# Patient Record
Sex: MALE | Race: WHITE | Employment: OTHER | URBAN - METROPOLITAN AREA
[De-identification: names, ages, dates, MRNs, and addresses within clinical notes are randomized per-mention and may not be internally consistent; named-entity substitution may affect disease eponyms.]

---

## 2018-10-08 ENCOUNTER — HOSPITAL ENCOUNTER (INPATIENT)
Age: 83
LOS: 22 days | Discharge: NURSING, REHAB OR LONG-TERM CARE | DRG: 057 | End: 2018-10-30
Attending: INTERNAL MEDICINE | Admitting: INTERNAL MEDICINE
Payer: MEDICARE

## 2018-10-08 DIAGNOSIS — I11.9 HYPERTENSIVE HEART DISEASE WITHOUT HEART FAILURE: Chronic | ICD-10-CM

## 2018-10-08 DIAGNOSIS — Z74.09 IMPAIRED MOBILITY AND ADLS: ICD-10-CM

## 2018-10-08 DIAGNOSIS — R05.9 COUGH: Chronic | ICD-10-CM

## 2018-10-08 DIAGNOSIS — E03.9 HYPOTHYROIDISM, UNSPECIFIED TYPE: Chronic | ICD-10-CM

## 2018-10-08 DIAGNOSIS — I25.10 CORONARY ARTERY DISEASE INVOLVING NATIVE CORONARY ARTERY OF NATIVE HEART WITHOUT ANGINA PECTORIS: Chronic | ICD-10-CM

## 2018-10-08 DIAGNOSIS — E78.5 DYSLIPIDEMIA: Chronic | ICD-10-CM

## 2018-10-08 DIAGNOSIS — E55.9 VITAMIN D DEFICIENCY: Chronic | ICD-10-CM

## 2018-10-08 DIAGNOSIS — J44.9 CHRONIC OBSTRUCTIVE PULMONARY DISEASE, UNSPECIFIED COPD TYPE (HCC): Chronic | ICD-10-CM

## 2018-10-08 DIAGNOSIS — Z91.89 AT RISK FOR ASPIRATION PNEUMONIA: Chronic | ICD-10-CM

## 2018-10-08 DIAGNOSIS — I63.9 ACUTE ISCHEMIC STROKE (HCC): Primary | ICD-10-CM

## 2018-10-08 DIAGNOSIS — R35.0 BENIGN PROSTATIC HYPERPLASIA WITH URINARY FREQUENCY: Chronic | ICD-10-CM

## 2018-10-08 DIAGNOSIS — N40.1 BENIGN PROSTATIC HYPERPLASIA WITH URINARY FREQUENCY: Chronic | ICD-10-CM

## 2018-10-08 DIAGNOSIS — Z78.9 IMPAIRED MOBILITY AND ADLS: ICD-10-CM

## 2018-10-08 DIAGNOSIS — K25.9 GASTRIC ULCER, UNSPECIFIED CHRONICITY, UNSPECIFIED WHETHER GASTRIC ULCER HEMORRHAGE OR PERFORATION PRESENT: Chronic | ICD-10-CM

## 2018-10-08 DIAGNOSIS — G47.9 DIFFICULTY SLEEPING: Chronic | ICD-10-CM

## 2018-10-08 DIAGNOSIS — R35.0 URINARY FREQUENCY: ICD-10-CM

## 2018-10-08 PROBLEM — D32.9 MENINGIOMA (HCC): Chronic | Status: ACTIVE | Noted: 2018-10-03

## 2018-10-08 PROBLEM — Z93.1 STATUS POST INSERTION OF PERCUTANEOUS ENDOSCOPIC GASTROSTOMY (PEG) TUBE (HCC): Status: ACTIVE | Noted: 2018-10-05

## 2018-10-08 PROBLEM — I69.354 HEMIPARESIS AFFECTING LEFT SIDE AS LATE EFFECT OF CEREBROVASCULAR ACCIDENT (CVA) (HCC): Status: ACTIVE | Noted: 2018-10-02

## 2018-10-08 PROBLEM — I69.391 DYSPHAGIA AS LATE EFFECT OF CEREBROVASCULAR ACCIDENT (CVA): Status: ACTIVE | Noted: 2018-10-02

## 2018-10-08 PROBLEM — M48.02 CERVICAL SPINAL STENOSIS: Chronic | Status: ACTIVE | Noted: 2018-10-02

## 2018-10-08 PROBLEM — I69.322 DYSARTHRIA AS LATE EFFECT OF CEREBELLAR CEREBROVASCULAR ACCIDENT (CVA): Status: ACTIVE | Noted: 2018-10-02

## 2018-10-08 PROCEDURE — 65310000000 HC RM PRIVATE REHAB

## 2018-10-08 PROCEDURE — 74011250637 HC RX REV CODE- 250/637: Performed by: INTERNAL MEDICINE

## 2018-10-08 RX ORDER — DOCUSATE SODIUM 100 MG/1
100 CAPSULE, LIQUID FILLED ORAL 2 TIMES DAILY
Status: DISCONTINUED | OUTPATIENT
Start: 2018-10-08 | End: 2018-10-30 | Stop reason: HOSPADM

## 2018-10-08 RX ORDER — MULTIVIT WITH MINERALS/HERBS
1 TABLET ORAL DAILY
Status: DISCONTINUED | OUTPATIENT
Start: 2018-10-09 | End: 2018-10-30 | Stop reason: HOSPADM

## 2018-10-08 RX ORDER — ATORVASTATIN CALCIUM 40 MG/1
80 TABLET, FILM COATED ORAL
Status: DISCONTINUED | OUTPATIENT
Start: 2018-10-08 | End: 2018-10-30 | Stop reason: HOSPADM

## 2018-10-08 RX ORDER — LANOLIN ALCOHOL/MO/W.PET/CERES
3 CREAM (GRAM) TOPICAL
Status: DISCONTINUED | OUTPATIENT
Start: 2018-10-08 | End: 2018-10-17

## 2018-10-08 RX ORDER — CLOPIDOGREL BISULFATE 75 MG/1
75 TABLET ORAL DAILY
Status: DISCONTINUED | OUTPATIENT
Start: 2018-10-09 | End: 2018-10-30 | Stop reason: HOSPADM

## 2018-10-08 RX ORDER — CHOLECALCIFEROL (VITAMIN D3) 125 MCG
100 CAPSULE ORAL DAILY
Status: DISCONTINUED | OUTPATIENT
Start: 2018-10-09 | End: 2018-10-30 | Stop reason: HOSPADM

## 2018-10-08 RX ORDER — HYDRALAZINE HYDROCHLORIDE 50 MG/1
50 TABLET, FILM COATED ORAL EVERY 8 HOURS
Status: DISCONTINUED | OUTPATIENT
Start: 2018-10-08 | End: 2018-10-09

## 2018-10-08 RX ORDER — FACIAL-BODY WIPES
10 EACH TOPICAL
Status: DISCONTINUED | OUTPATIENT
Start: 2018-10-08 | End: 2018-10-30 | Stop reason: HOSPADM

## 2018-10-08 RX ORDER — AMLODIPINE BESYLATE 10 MG/1
10 TABLET ORAL DAILY
Status: DISCONTINUED | OUTPATIENT
Start: 2018-10-09 | End: 2018-10-17

## 2018-10-08 RX ORDER — TAMSULOSIN HYDROCHLORIDE 0.4 MG/1
0.4 CAPSULE ORAL EVERY EVENING
Status: DISCONTINUED | OUTPATIENT
Start: 2018-10-08 | End: 2018-10-30 | Stop reason: HOSPADM

## 2018-10-08 RX ORDER — LISINOPRIL 40 MG/1
40 TABLET ORAL DAILY
Status: DISCONTINUED | OUTPATIENT
Start: 2018-10-09 | End: 2018-10-30 | Stop reason: HOSPADM

## 2018-10-08 RX ORDER — TIMOLOL MALEATE 5 MG/ML
1 SOLUTION/ DROPS OPHTHALMIC DAILY
Status: DISCONTINUED | OUTPATIENT
Start: 2018-10-09 | End: 2018-10-30 | Stop reason: HOSPADM

## 2018-10-08 RX ORDER — LEVOTHYROXINE SODIUM 50 UG/1
50 TABLET ORAL
Status: DISCONTINUED | OUTPATIENT
Start: 2018-10-09 | End: 2018-10-30 | Stop reason: HOSPADM

## 2018-10-08 RX ORDER — METOPROLOL TARTRATE 25 MG/1
25 TABLET, FILM COATED ORAL EVERY 12 HOURS
Status: DISCONTINUED | OUTPATIENT
Start: 2018-10-08 | End: 2018-10-30 | Stop reason: HOSPADM

## 2018-10-08 RX ORDER — HEPARIN SODIUM 5000 [USP'U]/ML
5000 INJECTION, SOLUTION INTRAVENOUS; SUBCUTANEOUS EVERY 12 HOURS
Status: DISCONTINUED | OUTPATIENT
Start: 2018-10-09 | End: 2018-10-30 | Stop reason: HOSPADM

## 2018-10-08 RX ORDER — GUAIFENESIN 100 MG/5ML
81 LIQUID (ML) ORAL DAILY
Status: DISCONTINUED | OUTPATIENT
Start: 2018-10-09 | End: 2018-10-30 | Stop reason: HOSPADM

## 2018-10-08 RX ORDER — FINASTERIDE 5 MG/1
5 TABLET, FILM COATED ORAL EVERY EVENING
Status: DISCONTINUED | OUTPATIENT
Start: 2018-10-08 | End: 2018-10-30 | Stop reason: HOSPADM

## 2018-10-08 RX ORDER — PANTOPRAZOLE SODIUM 40 MG/1
40 GRANULE, DELAYED RELEASE ORAL
Status: DISCONTINUED | OUTPATIENT
Start: 2018-10-09 | End: 2018-10-30 | Stop reason: HOSPADM

## 2018-10-08 RX ADMIN — MELATONIN TAB 3 MG 3 MG: 3 TAB at 22:20

## 2018-10-08 RX ADMIN — HYDRALAZINE HYDROCHLORIDE 50 MG: 50 TABLET, FILM COATED ORAL at 22:20

## 2018-10-08 RX ADMIN — TAMSULOSIN HYDROCHLORIDE 0.4 MG: 0.4 CAPSULE ORAL at 22:20

## 2018-10-08 RX ADMIN — METOPROLOL TARTRATE 25 MG: 25 TABLET ORAL at 22:20

## 2018-10-08 RX ADMIN — FINASTERIDE 5 MG: 5 TABLET, FILM COATED ORAL at 22:20

## 2018-10-08 RX ADMIN — DOCUSATE SODIUM 100 MG: 100 CAPSULE, LIQUID FILLED ORAL at 22:20

## 2018-10-08 RX ADMIN — ATORVASTATIN CALCIUM 80 MG: 40 TABLET, FILM COATED ORAL at 22:20

## 2018-10-09 LAB
ALBUMIN SERPL-MCNC: 2.9 G/DL (ref 3.4–5)
ALBUMIN/GLOB SERPL: 1 {RATIO} (ref 0.8–1.7)
ALP SERPL-CCNC: 92 U/L (ref 45–117)
ALT SERPL-CCNC: 21 U/L (ref 16–61)
ANION GAP SERPL CALC-SCNC: 8 MMOL/L (ref 3–18)
AST SERPL-CCNC: 14 U/L (ref 15–37)
BASOPHILS # BLD: 0 K/UL (ref 0–0.1)
BASOPHILS NFR BLD: 0 % (ref 0–2)
BILIRUB DIRECT SERPL-MCNC: 0.4 MG/DL (ref 0–0.2)
BILIRUB SERPL-MCNC: 1.3 MG/DL (ref 0.2–1)
BUN SERPL-MCNC: 20 MG/DL (ref 7–18)
BUN/CREAT SERPL: 26 (ref 12–20)
CALCIUM SERPL-MCNC: 8.1 MG/DL (ref 8.5–10.1)
CHLORIDE SERPL-SCNC: 106 MMOL/L (ref 100–108)
CO2 SERPL-SCNC: 29 MMOL/L (ref 21–32)
CREAT SERPL-MCNC: 0.77 MG/DL (ref 0.6–1.3)
DIFFERENTIAL METHOD BLD: ABNORMAL
EOSINOPHIL # BLD: 0.2 K/UL (ref 0–0.4)
EOSINOPHIL NFR BLD: 3 % (ref 0–5)
ERYTHROCYTE [DISTWIDTH] IN BLOOD BY AUTOMATED COUNT: 14 % (ref 11.6–14.5)
GLOBULIN SER CALC-MCNC: 2.8 G/DL (ref 2–4)
GLUCOSE SERPL-MCNC: 108 MG/DL (ref 74–99)
HCT VFR BLD AUTO: 36.4 % (ref 36–48)
HGB BLD-MCNC: 13.1 G/DL (ref 13–16)
LYMPHOCYTES # BLD: 1.5 K/UL (ref 0.9–3.6)
LYMPHOCYTES NFR BLD: 22 % (ref 21–52)
MAGNESIUM SERPL-MCNC: 2.3 MG/DL (ref 1.6–2.6)
MCH RBC QN AUTO: 31.3 PG (ref 24–34)
MCHC RBC AUTO-ENTMCNC: 36 G/DL (ref 31–37)
MCV RBC AUTO: 87.1 FL (ref 74–97)
MONOCYTES # BLD: 0.6 K/UL (ref 0.05–1.2)
MONOCYTES NFR BLD: 9 % (ref 3–10)
NEUTS SEG # BLD: 4.5 K/UL (ref 1.8–8)
NEUTS SEG NFR BLD: 66 % (ref 40–73)
PLATELET # BLD AUTO: 146 K/UL (ref 135–420)
PMV BLD AUTO: 11.3 FL (ref 9.2–11.8)
POTASSIUM SERPL-SCNC: 4.2 MMOL/L (ref 3.5–5.5)
PROT SERPL-MCNC: 5.7 G/DL (ref 6.4–8.2)
RBC # BLD AUTO: 4.18 M/UL (ref 4.7–5.5)
SODIUM SERPL-SCNC: 143 MMOL/L (ref 136–145)
WBC # BLD AUTO: 6.8 K/UL (ref 4.6–13.2)

## 2018-10-09 PROCEDURE — 97535 SELF CARE MNGMENT TRAINING: CPT

## 2018-10-09 PROCEDURE — 82306 VITAMIN D 25 HYDROXY: CPT | Performed by: INTERNAL MEDICINE

## 2018-10-09 PROCEDURE — 80048 BASIC METABOLIC PNL TOTAL CA: CPT | Performed by: INTERNAL MEDICINE

## 2018-10-09 PROCEDURE — 92610 EVALUATE SWALLOWING FUNCTION: CPT

## 2018-10-09 PROCEDURE — 80076 HEPATIC FUNCTION PANEL: CPT | Performed by: INTERNAL MEDICINE

## 2018-10-09 PROCEDURE — 96125 COGNITIVE TEST BY HC PRO: CPT

## 2018-10-09 PROCEDURE — 74011000250 HC RX REV CODE- 250: Performed by: INTERNAL MEDICINE

## 2018-10-09 PROCEDURE — 92522 EVALUATE SPEECH PRODUCTION: CPT

## 2018-10-09 PROCEDURE — 77030011256 HC DRSG MEPILEX <16IN NO BORD MOLN -A

## 2018-10-09 PROCEDURE — 97167 OT EVAL HIGH COMPLEX 60 MIN: CPT

## 2018-10-09 PROCEDURE — 74011250637 HC RX REV CODE- 250/637: Performed by: INTERNAL MEDICINE

## 2018-10-09 PROCEDURE — 97163 PT EVAL HIGH COMPLEX 45 MIN: CPT

## 2018-10-09 PROCEDURE — 83735 ASSAY OF MAGNESIUM: CPT | Performed by: INTERNAL MEDICINE

## 2018-10-09 PROCEDURE — 74011250636 HC RX REV CODE- 250/636: Performed by: INTERNAL MEDICINE

## 2018-10-09 PROCEDURE — 36415 COLL VENOUS BLD VENIPUNCTURE: CPT | Performed by: INTERNAL MEDICINE

## 2018-10-09 PROCEDURE — 85025 COMPLETE CBC W/AUTO DIFF WBC: CPT | Performed by: INTERNAL MEDICINE

## 2018-10-09 PROCEDURE — 97530 THERAPEUTIC ACTIVITIES: CPT

## 2018-10-09 PROCEDURE — 97112 NEUROMUSCULAR REEDUCATION: CPT

## 2018-10-09 PROCEDURE — 65310000000 HC RM PRIVATE REHAB

## 2018-10-09 RX ORDER — HYDRALAZINE HYDROCHLORIDE 25 MG/1
25 TABLET, FILM COATED ORAL EVERY 8 HOURS
Status: DISCONTINUED | OUTPATIENT
Start: 2018-10-09 | End: 2018-10-10

## 2018-10-09 RX ORDER — TIMOLOL MALEATE 6.8 MG/ML
1 SOLUTION/ DROPS OPHTHALMIC DAILY
COMMUNITY

## 2018-10-09 RX ADMIN — ATORVASTATIN CALCIUM 80 MG: 40 TABLET, FILM COATED ORAL at 21:12

## 2018-10-09 RX ADMIN — Medication 1 TABLET: at 09:22

## 2018-10-09 RX ADMIN — DOCUSATE SODIUM 100 MG: 100 CAPSULE, LIQUID FILLED ORAL at 09:22

## 2018-10-09 RX ADMIN — ASPIRIN 81 MG CHEWABLE TABLET 81 MG: 81 TABLET CHEWABLE at 09:21

## 2018-10-09 RX ADMIN — DOCUSATE SODIUM 100 MG: 100 CAPSULE, LIQUID FILLED ORAL at 17:53

## 2018-10-09 RX ADMIN — TAMSULOSIN HYDROCHLORIDE 0.4 MG: 0.4 CAPSULE ORAL at 17:54

## 2018-10-09 RX ADMIN — CLOPIDOGREL BISULFATE 75 MG: 75 TABLET, FILM COATED ORAL at 21:12

## 2018-10-09 RX ADMIN — AMLODIPINE BESYLATE 10 MG: 10 TABLET ORAL at 09:21

## 2018-10-09 RX ADMIN — HEPARIN SODIUM 5000 UNITS: 5000 INJECTION, SOLUTION INTRAVENOUS; SUBCUTANEOUS at 06:08

## 2018-10-09 RX ADMIN — FINASTERIDE 5 MG: 5 TABLET, FILM COATED ORAL at 17:54

## 2018-10-09 RX ADMIN — HYDRALAZINE HYDROCHLORIDE 25 MG: 25 TABLET, FILM COATED ORAL at 21:12

## 2018-10-09 RX ADMIN — LISINOPRIL 40 MG: 40 TABLET ORAL at 06:08

## 2018-10-09 RX ADMIN — HYDRALAZINE HYDROCHLORIDE 50 MG: 50 TABLET, FILM COATED ORAL at 06:08

## 2018-10-09 RX ADMIN — LEVOTHYROXINE SODIUM 50 MCG: 50 TABLET ORAL at 06:08

## 2018-10-09 RX ADMIN — PANTOPRAZOLE SODIUM 40 MG: 40 GRANULE, DELAYED RELEASE ORAL at 06:54

## 2018-10-09 RX ADMIN — METOPROLOL TARTRATE 25 MG: 25 TABLET ORAL at 09:21

## 2018-10-09 RX ADMIN — METOPROLOL TARTRATE 25 MG: 25 TABLET ORAL at 21:12

## 2018-10-09 RX ADMIN — HEPARIN SODIUM 5000 UNITS: 5000 INJECTION, SOLUTION INTRAVENOUS; SUBCUTANEOUS at 17:55

## 2018-10-09 RX ADMIN — Medication 100 MG: at 09:22

## 2018-10-09 RX ADMIN — TIMOLOL MALEATE 1 DROP: 5 SOLUTION OPHTHALMIC at 09:59

## 2018-10-09 RX ADMIN — MELATONIN TAB 3 MG 3 MG: 3 TAB at 21:12

## 2018-10-09 NOTE — PROGRESS NOTES
NUTRITION Nursing Referral: MST, EN/PN PTA Nutrition Consult: General Nutrition Management & Supplements RECOMMENDATIONS / PLAN:  
 
- Continue bolus tube feeds of Jevity 1.5, 237 mL (1 can) x 5 times daily with water flush of 100 mL before and after each bolus feed. - Continue RD inpatient monitoring and evaluation. Goal EN Regimen: Jevity 1.5, 240 mL (1 can) 5 times daily + 100 mL water flush before and after each bolus to provide: 1775 kcal, 76 gm protein, 59 gm fat, 256 gm CHO, 27 gm fiber, 900 mL free water, 1900 mL total water, 100% RDIs NUTRITION INTERVENTIONS & DIAGNOSIS:  
 
[x] Enteral nutrition: continue bolus tube feeding Nutrition Diagnosis:  Inadequate oral intake related to inability to tolerate po as evidenced by NPO 
 
ASSESSMENT:  
 
Pt unavailable at time of visit. Currently NPO; receiving bolus tube feeding via PEG. Tolerating feeds. EN infusion adequate to meet patients estimated nutritional needs:   [] Yes     [] No   [x] Unable to determine at this time Tube Feeding:  Bolus feeds of Jevity 1.5, 237 mL (1 can) x 5 times daily via PEG Water Flushes:  100 mL before and after each bolus feed Residuals: 0 mL Diet: DIET NPO With Tube Feedings DIET TUBE FEEDING Food Allergies:  None known Current Appetite:   [] Good     [] Fair     [] Poor     [x] Other: NPO Appetite/meal intake prior to admission:   [] Good     [] Fair     [] Poor     [x] Other: unknown, NPO Feeding Limitations:  [x] Swallowing difficulty    [] Chewing difficulty    [] Other: 
Current Meal Intake: No data found. BM: 10/8 Skin Integrity:  Left arm skin tear Edema:   [x] No     [] Yes Pertinent Medications: Reviewed: bowel regimen, vitamin B complex Recent Labs 10/09/18 
 6766 NA  143  
K  4.2 CL  106 CO2  29 GLU  108* BUN  20* CREA  0.77 CA  8.1*  
MG  2.3 ALB  2.9*  
SGOT  14* ALT  21 Intake/Output Summary (Last 24 hours) at 10/09/18 5723 Last data filed at 10/09/18 6563 Gross per 24 hour Intake              320 ml Output              150 ml Net              170 ml Anthropometrics: 
Ht Readings from Last 1 Encounters:  
10/08/18 5' 6\" (1.676 m) Last 3 Recorded Weights in this Encounter 10/08/18 2046 Weight: 67.6 kg (149 lb) Body mass index is 24.05 kg/(m^2). Weight History:    Noted 4 lb wt loss x 10 month PTA per chart hx 
 
Weight Metrics 10/8/2018 12/11/2017 6/27/2017 9/7/2016 5/24/2016 Weight 149 lb 153 lb 153 lb 155 lb 154 lb BMI 24.05 kg/m2 25.07 kg/m2 25.07 kg/m2 25.4 kg/m2 25.23 kg/m2 Admitting Diagnosis: R CVA Acute ischemic stroke ([facility] [address] Pertinent PMHx:  Kidney stones, gastric ulcer, CAD, dyslipidemia, COPD, hypothyroidism Education Needs:        [x] None identified  [] Identified - Not appropriate at this time  []  Identified and addressed - refer to education log Learning Limitations:   [] None identified  [x] Identified: bilateral hearing loss Cultural, Synagogue & ethnic food preferences:  [x] None identified    [] Identified and addressed ESTIMATED NUTRITION NEEDS:  
 
Calories: 9285-6582 kcal (MSJx1.2-1.4) based on  [x] Actual BW: 68 kg      [] IBW Protein: 54-68 gm (0.8-1 gm/kg) based on  [x] Actual BW      [] IBW Fluid: 1 mL/kcal 
  
MONITORING & EVALUATION:  
 
Nutrition Goal(s): 1. Patient will tolerate enteral nutrition formula and regimen without difficulty within the next 7 days. Outcome:  [] Met/Ongoing    []  Not Met    [x] New/Initial Goal  
2. Patient will meet their estimated nutritional needs through adequate enteral nutrition within the next 7 days. Outcome:  [] Met/Ongoing    []  Not Met    [x] New/Initial Goal  
 
Monitoring:   [] Food and beverage intake   [] Diet order   [x] Nutrition-focused physical findings   [x] Treatment/therapy   [] Weight   [x] Enteral nutrition intake Previous Recommendations (for follow-up assessments only):     [] Implemented       []   Not Implemented (RD to address)      [] No Longer Appropriate     [] No Recommendation Made Discharge Planning:  Goal tube feeds via PEG [x] Participated in care planning, discharge planning, & interdisciplinary rounds as appropriate Demar Sosa RD Pager: [phone]

## 2018-10-09 NOTE — PROGRESS NOTES
Problem: Dysphagia (Adult) Goal: *Speech Goal: (INSERT TEXT) Long term goals: 1. Patient will tolerate small amounts of PO using safe swallowing techniques without overt s/s of aspiration in 4/5 trials. 2. Patient will perform oral/pharyngeal strengthening exercises with supervision. 3. Patient will participate in laryngeal strengthening exercises and swallowing maneuvers, min cues - supervison. 4. Patient will recall 3 words after 5 minutes, min assist-supervision. 5. Patient will perform functional problem solving and reasoning tasks with supervision. Short term goals (by 10/16/18): 1. Patient will tolerate small amounts of PO puree with the SLP using safe swallowing techniques without overt s/s of aspiration in 4/5 trials. 2. Patient will perform oral/pharyngeal strengthening exercises with mod assist/cues. 3. Patient will participate in laryngeal strengthening exercises and swallowing maneuvers, mod cues. 4. Patient will recall 3 words after 5 minutes, mod-min assist. 
5. Patient will perform functional problem solving and reasoning tasks with 75-85% accuracy. Speech LAnguage Pathology evaluation Patient: Enma Ruiz (22 y.o. male) Date: 10/9/2018 Primary Diagnosis: R CVA Acute ischemic stroke ([facility] [address] Precautions:   Aspiration, Fall (Head of bed 30-45 degrees at all times per speech therapist) Time In: 4960 Time Out[de-identified]  1330 SUBJECTIVE:  
Patient stated I have been having trouble swallowing for 6-7 months. OBJECTIVE:  
 
Past Medical History:  
Diagnosis Date  Acute ischemic stroke ([facility] [address] 10/2/2018 Acute Ischemic Stroke (acute infarct of the posterior superior right basal ganglia and adjacent right corona radiata) with residual left hemiparesis, dysphagia and dysarthria  Benign prostatic hyperplasia  Bilateral hearing loss  Cervical spinal stenosis 10/2/2018  Chronic obstructive pulmonary disease (COPD) ([facility] [address]  Coronary artery disease involving native coronary artery of native heart  Diastolic dysfunction without heart failure  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) 10/2/2018  Dyslipidemia  Dysphagia as late effect of cerebrovascular accident (CVA) 10/2/2018  Gastric ulcer  Glaucoma  Hemiparesis affecting left side as late effect of cerebrovascular accident (CVA) ([facility] [address] 10/2/2018  History of kidney stones  History of transient ischemic attack (TIA) 2x  Hypertensive heart disease without heart failure  Hypothyroidism  Meningioma ([facility] [address] 10/3/2018 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  
Procedure Laterality Date  HX CORONARY ARTERY BYPASS GRAFT  2011 S/P CABG x 4  
 HX GASTROSTOMY  10/05/2018  HX HEMORRHOIDECTOMY Prior Level of Function/Home Situation:  
Barriers to Learning/Limitations: None Compensate with: visual, verbal, tactile, kinesthetic cues/model Home Situation Home Environment: Private residence # Steps to Enter: 4 Rails to Enter: Yes Hand Rails : Bilateral 
Wheelchair Ramp: No 
One/Two Story Residence: One story Living Alone: No 
Support Systems: Spouse/Significant Other/Partner Patient Expects to be Discharged to[de-identified] Private residence Current DME Used/Available at Home: Isauro Jones, tigre, Tonya Holman, rollator Tub or Shower Type: Shower Mental Status: 
Neurologic State: Appropriate for age, Drowsy Orientation Level: Disoriented to place, Disoriented to time, Oriented to person, Oriented to situation Cognition: Appropriate for age attention/concentration Perception: Appears intact Perseveration: No perseveration noted Safety/Judgement: Awareness of environment Motor Speech: 
Oral-Motor Structure/Motor Speech Face: Lower facial weakness Labial: Left droop Dentition: Upper & lower dentures Oral Hygiene: fair Lingual: Decreased strength;Left deviation Velum: No impairment Mandible: No impairment Apraxic Characteristics: None Dysarthric Characteristics: Imprecise Intonation: [facility] Rate: [facility] Prosody: [facility] Overall Impairment Severity: Mild Language Comprehension and Expression: Auditory Comprehension Auditory Impairment: No  
Hearing Aid: Bilateral 
Verbal Expression Primary Mode of Expression: Verbal 
Initiation: No impairment Automatic Speech Task: No impairment Repetition: No impairment Naming: No impairment Sentence Completion: No impairment Sentence Formulation: No impairment Conversation: Dysarthric Overall Impairment: Mild Cueing type: Verbal 
Cueing amount: Minimal 
Reading Comprehension Visual Impairment: Glasses/contacts Pre-Morbid Reading Status: Unknown/unable to assess Written Expression Pre-Morbid Dominant Hand: Right Pre-Morbid Writing Skills: [facility] Neuro-Linguistics: 
Verbal Reasoning Tasks: Impaired Verbal Problem Solving: Impaired Verbal Organization: Impaired Thought Organization: [facility] Mathematical:  (DNT) Memory: Impaired Attention : No Impairement Pragmatics: 
Pragmatics Impairment: No impairment Voice: 
Vocal Quality: Wet (Intermittently) Pain: 
Pre-Treatment:   No report of pain Post-Treatment:  No report of pain After treatment:  
[x]              Patient left in no apparent distress sitting up in chair 
[]              Patient left in no apparent distress in bed 
[x]              Call bell left within reach 
[]              Nursing notified 
[x]              Caregiver present 
[]              Bed alarm activated ASSESSMENT:  
Based on the objective data described below, the patient presents with mild impairment of memory and reasoning skills. Patient will benefit from skilled intervention to address the above impairments. Patients rehabilitation potential is considered to be Excellent Factors which may influence rehabilitation potential include:  
[]              None noted []              Mental ability/status [x]              Medical condition [x]              Home/family situation and support systems [x]              Safety awareness []              Pain tolerance/management 
[]              Other: PLAN:  
Recommendations and Planned Interventions: SLP will follow daily for cognitive retraining per the above plan of care. Frequency/Duration: Patient will be followed by speech-language pathology 1-2 times per day/4-7 days per week to address goals. Discharge Recommendations: Home Health COMMUNICATION/EDUCATION:  
[x]  Patient/family have participated as able in goal setting and plan of care. [x]  Patient/family agree to work toward stated goals and plan of care. []  Patient understands intent and goals of therapy, but is neutral about his/her participation. []  Patient is unable to participate in goal setting and plan of care. Estimated LOS: three weeks Thank you for this referral. 
Pepe Agustin SLP Time Calculation:  23 Minutes Speech LAnguage Pathology bedside swallow evaluation Patient: Arcenio Pickens (72 y.o. male) Date: 10/9/2018 Primary Diagnosis: R CVA Acute ischemic stroke ([facility] Precautions:   Aspiration, Fall (Head of bed 30-45 degrees at all times per speech therapist) Time In: 1250 Time Out[de-identified]  1330 SUBJECTIVE:  
Patient stated I have been having trouble swallowing for 6-7 months. OBJECTIVE:  
 
Past Medical History:  
Diagnosis Date  [facility] 10/2/2018 Acute Ischemic Stroke (acute infarct of the posterior superior right basal ganglia and adjacent right corona radiata) with residual left hemiparesis, dysphagia and dysarthria  Benign prostatic hyperplasia  Bilateral hearing loss  Cervical spinal stenosis 10/2/2018  Chronic obstructive pulmonary disease ([facility]D) [facility]  Coronary artery disease involving native coronary artery of native heart  Diastolic dysfunction without heart failure  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) 10/2/2018  Dyslipidemia  Dysphagia as late effect of cerebrovascular accident (CVA) 10/2/2018  Gastric ulcer  Glaucoma  Hemiparesis affecting left side as late effect of cerebrovascular accident (CVA) ([facility] [address] 10/2/2018  History of kidney stones  History of transient ischemic attack (TIA) 2x  Hypertensive heart disease without heart failure  Hypothyroidism  Meningioma ([facility] [address] 10/3/2018 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  
Procedure Laterality Date  HX CORONARY ARTERY BYPASS GRAFT  2011 S/P CABG x 4  
 HX GASTROSTOMY  10/05/2018  HX HEMORRHOIDECTOMY Prior Level of Function/Home Situation:  
Home Situation Home Environment: Private residence # Steps to Enter: 4 Rails to Enter: Yes Hand Rails : Bilateral 
Wheelchair Ramp: No 
One/Two Story Residence: One story Living Alone: No 
Support Systems: Spouse/Significant Other/Partner Patient Expects to be Discharged to[de-identified] Private residence Current DME Used/Available at Home: Casandra Latch, quad, Scharlene Minors, rollator Tub or Shower Type: Shower Diet prior to admission: NPO with PEG tube feedings Current Diet:  NPO with PEG tube feedings Barriers to Learning/Limitations: None Compensate with: visual, verbal, tactile, kinesthetic cues/model Cognitive and Communication Status: 
Neurologic State: Appropriate for age, Drowsy Orientation Level: Disoriented to place, Disoriented to time, Oriented to person, Oriented to situation Cognition: Appropriate for age attention/concentration Perception: Appears intact Perseveration: No perseveration noted Safety/Judgement: Awareness of environment Oral Assessment: 
Oral Assessment Labial: Left droop Dentition: Upper & lower dentures Oral Hygiene: fair Lingual: Decreased strength;Left deviation Velum: No impairment Mandible: No impairment P.O. Trials: 
Patient Position: Seated Vocal quality prior to P.O.: Wet (Intermittently) Consistency Presented: Puree How Presented: SLP-fed/presented;Straw Bolus Acceptance: No impairment Bolus Formation/Control: Impaired Type of Impairment: Delayed Propulsion: Delayed (# of seconds) Oral Residue: None Initiation of Swallow: Delayed (# of seconds) Laryngeal Elevation: Decreased Aspiration Signs/Symptoms: Clear throat Pharyngeal Phase Characteristics: Double swallow Comments: Other consistencies not presented as documented aspiration seen on MBS. Oral Phase Severity: Mild Pharyngeal Phase Severity : Moderate-severe Pain: 
Pre-Treatment:    No report of pain Post-Treatment:  No report of pain After treatment:  
[x]            Patient left in no apparent distress sitting up in chair 
[]            Patient left in no apparent distress in bed 
[x]            Call bell left within reach 
[]            Nursing notified 
[x]            Caregiver present 
[]            Bed alarm activated COMMUNICATION/EDUCATION:  
[x]            Posted safety precautions in patient's room. [x]            Patient/family have participated as able in goal setting and plan of care. [x]            Patient/family agree to work toward stated goals and plan of care. []            Patient understands intent and goals of therapy, but is neutral about his/her participation. []            Patient is unable to participate in goal setting and plan of care. ASSESSMENT:  
Based on the objective data described above, the patient presents with moderate to severe primarily pharyngeal dysphagia which may have preceded his CVA. Patient will benefit from skilled intervention to address the above impairments. Patients rehabilitation potential is considered to be Fair Factors which may influence rehabilitation potential include:  
[]            None noted []            Mental ability/status [x]            Medical condition [x]            Home/family situation and support systems [x]            Safety awareness 
[]            Pain tolerance/management []            Other: PLAN:  
Recommendations and Planned Interventions: SLP will follow daily to address dysphagia with the above plan of care. Frequency/Duration: Patient will be followed by speech-language pathology 1-2 times per day/4-7 days per week to address goals. Discharge Recommendations: Home Health Estimated LOS: Three weeks Thank you for this referral. 
JAIME Dean Time Calculation:  15 Minutes

## 2018-10-09 NOTE — H&P
[address] 
[address] [place] [address], [address] INPATIENT REHABILITATION 
HISTORY AND PHYSICAL 
(Post Admission Physician Evaluation) Name: Heather Villanueva CSN: [id number] Age: 80 y.o. MRN: [id number] Sex: male Admit Date: 10/8/2018 PCP: Dr. Andrew Dawkins Primary Rehab Impairment Category (HEATHER): Stroke Impairment Group Label: Left body involvement (right brain) Etiologic Diagnosis: Acute Ischemic Stroke (acute infarct of the posterior superior right basal ganglia and adjacent right corona radiata) with residual left hemiparesis, dysphagia and dysarthria Subjective:  
 
Patient seen and examined. History of the Present Illness: The patient is an 15-year-old White male with multiple medical comorbidities who was admitted to [facility] on 10/2/2018 due to left-sided weakness. The patient was apparently well until 3 days prior to admission, while walking through the hallway in his house using a quad cane, patient had a fall and he hit his head against the furniture as he fell to the floor. The wife dit not report any loss of consciousness. The patient refused to go t the doctor for medical attention. On the day of admission, the patient was brought to the office of his PCP and after evaluation, he was sent to the [facility] Emergency Department for further evaluation. CT scan of the head (10/2/2018) showed atrophy, ischemic change and chronic lacunar infarctions; no hemorrhage, mass effect or convincing CT evidence of acute cortical infarction; no other significant abnormality. CT scan of the cervical spine (10/2/2018) showed no evidence of fracture or dislocation; central stenosis is maximal at C3-C4 and considerable at this level; multilevel neural foramen stenosis also maximal at C3-C4 and on the right at C6-C7. Chest x-ray (10/2/2018) showed no acute cardiopulmonary disease. The patient was admitted under the service of the [address] [place] [address] (Dr. Lyndsey Harvey). Excerpt (History of Present Illness) from the H&P by Dr. Lyndsey Harvey: \"Mr Jennifer Cummings is a 80 y.o. Male w/ PMH remarkable for HTN, CAD CABG who presents to the ED w/ chief complaint of progressive left arm and leg weakness since Saturday. Patient's wife reports fall and symptoms that started on Saturday where she wanted him to be evaluated but patient refused and wanted to be seen by his PCP today. PCP referred him to the emergency room for further evaluation. Patient does not have any headache, vision changes, swallowing issues, neck pain, numbness in arms or legs. It appears the fall was mechanical per the wife. Wife reports patient progressively worsening over the past few months. Wife says he only takes baby aspirin but not Plavix. Patient with no chest pain or shortness of breath currently but complains of bilateral lower extremity edema. \" 
 
Neurological exam by Dr. Lyndsey Harvey was WNL except for 4/5 motor strength on the LUE and LLE MRI of the brain (10/3/2018) showed an acute ischemic infarct posterior superior right basal ganglia and adjacent right corona radiata; combination of chronic lacunar infarcts, chronic ischemic changes and prominent perivascular spaces bilateral basal ganglia and thalami; bilateral deep white matter and pontine signal changes likely chronic microvascular ischemic disease; incidental small right parafalcine meningioma without significant mass effect; nonspecific partial opacification bilateral mastoid and epitympanum.  
 
MRI of the cervical spine (10/3/2018) showed high- grade spinal stenosis at C3-4 with lesser degrees of stenosis at C4-5, C5-6 and C6-7; there is likely subtle cord edema around C3-4 due to the severity of stenosis; potentially significant foraminal narrowing at several levels, most notably bilaterally at C3-4 and on the right at C6-7; clinical indication states abnormal cervical spine imaging with bone destruction; there is no destructive lesion seen on this examination; there is no recent cervical spine imaging showing bone destruction. MRA of the head (10/3/2018) multifocal areas of intracranial stenosis most pronounced proximal right posterior cerebral artery and right M1 segment middle cerebral artery; hypoplastic right vertebral artery terminating in PICA. MRA of the neck (10/3/2018) showed moderate stenosis proximal bilateral ICAs, 40% on the right and 38% on the left; developmentally small right vertebral artery temrinating in PICA, with multifocal areas of stenosis; no significant left vertebral stenosis; moderate to severe stenosis right M1 segment, mild to moderate stenosis left M1 segment. Neurology consult (Dr. Stacy Jang) was called for evaluation and comanagement of the stroke. Dr. Balwinder Abel recommended dual antiplatelet therapy and a high-dose statin. He also recommended repeat imaging of his meningioma in 1 year. Aspirin was continued. Atorvastatin and Clopidogrel were added. Metoprolol tartrate was continued. Amlodipine, Lisinopril and Hydralazine were added for additional blood pressure control. 2D echocardiogram (10/3/2018) showed EF 55%; mild concentric LVH; mild diastolic dysfunction; no masses; shunts or thrombi seen; negative bubble study x 2. Gastroenterology consult (Dr. Lalo Herron) was called for evaluation of dysphagia and placement of PEG tube. The patient underwent an Insertion of percutaneous endoscopic gastrostomy (PEG) tube on 10/5/2018 done by Dr. Lalo Herron. The patient tolerated the procedure well with no reported complications.   
 
The patient had remained hemodynamically stable but due to the abovementioned neurologic deficit, the patient was noted to have impaired mobility and ADLs. Patient was felt to be a good candidate for acute inpatient rehabilitation. Upon evaluation by Physical Therapy and Occupational Therapy, the patient was recommended for acute inpatient rehabilitation. The patient was discharged and was subsequently admitted to the [facility] for intensive rehabilitation to help recover strength, function and mobility. Past Medical History: 
Past Medical History:  
Diagnosis Date  Acute ischemic stroke ([address] 10/2/2018 Acute Ischemic Stroke (acute infarct of the posterior superior right basal ganglia and adjacent right corona radiata) with residual left hemiparesis, dysphagia and dysarthria  Benign prostatic hyperplasia  Bilateral hearing loss  Cervical spinal stenosis 10/2/2018  Chronic obstructive pulmonary disease (COPD) ([address]  Coronary artery disease involving native coronary artery of native heart  Diastolic dysfunction without heart failure  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) 10/2/2018  Dyslipidemia  Dysphagia as late effect of cerebrovascular accident (CVA) 10/2/2018  Gastric ulcer  Glaucoma  Hemiparesis affecting left side as late effect of cerebrovascular accident (CVA) ([address] 10/2/2018  History of kidney stones  History of transient ischemic attack (TIA) 2x  Hypertensive heart disease without heart failure  Hypothyroidism  Meningioma ([address] 10/3/2018 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 
Past Surgical History:  
Procedure Laterality Date  HX CORONARY ARTERY BYPASS GRAFT  2011 S/P CABG x 4  
 HX GASTROSTOMY  10/05/2018  HX HEMORRHOIDECTOMY Allergies: Allergies Allergen Reactions  Codeine Other (comments) Social History: The patient is , lives with his wife in a 1-story house with a 3-step entry in [place], [place]. He has a remote history of cigarette smoking. He denied any alcohol or illicit drug use. Family History: Both parents are . Family History Problem Relation Age of Onset  Breast Cancer Mother  Emphysema Father Home Medications (medications taken at home prior to admission to [facility]): Prior to Admission Medications Prescriptions Last Dose Informant Patient Reported? Taking? Vit A,C,E-Zinc-Copper (PRESERVISION) cap capsule Unknown at Unknown time  Yes No  
Sig: Take 1 Cap by mouth. aspirin delayed-release 81 mg tablet 10/8/2018 at Unknown time  Yes Yes Sig: Take  by mouth daily. diclofenac EC (VOLTAREN) 75 mg EC tablet Unknown at Unknown time  Yes No  
finasteride (PROSCAR) 5 mg tablet 10/8/2018 at Unknown time  No Yes Sig: Take 1 Tab by mouth daily. metoprolol tartrate (LOPRESSOR) 25 mg tablet 10/8/2018 at Unknown time  Yes Yes  
tamsulosin (FLOMAX) 0.4 mg capsule 10/7/2018 at Unknown time  No Yes Sig: Take 1 Cap by mouth nightly. Facility-Administered Medications: None Transfer Medications (from the transfer summary prepared by Dr. Komal Smith): 
 
Medication Dose Route Frequency  multivitamin with mineral PO LIQD  15 ml Oral DAILY  acetaminophen (TYLENOL) solution 650 mg  650 mg Oral Q4H PRN  
 amLODIPine (NORVASC) tablet 10 mg  10 mg Oral DAILY  aspirin EC 81 mg PO TBEC  81 mg Oral DAILY  atorvastatin (LIPITOR) tablet 80 mg  80 mg Oral QHS  clopidogrel (PLAVIX) tablet 75 mg  75 mg Oral DAILY  hydrALAZINE (APRESOLINE) tablet 50 mg  50 mg Oral Q8H  
 lisinopril (PRINIVIL, ZESTRIL) tablet 40 mg  40 mg Oral DAILY  omeprazole (PRILOSEC) CPDR 40 mg  40 mg Oral ACB  finasteride (PROSCAR) tablet 5 mg  5 mg Oral EVERY EVENING  
 levothyroxine (SYNTHROID) tablet 50 mcg  50 mcg Oral DAILY  metoprolol tartrate (LOPRESSOR) tablet 25 mg  25 mg Oral Q12H  tamsulosin (FLOMAX) capsule 0.4 mg  0.4 mg Oral DAILY BEFORE SUPPER  timolol (TIMOPTIC) 0.5 % ophthalmic solution 1 Drop  1 Drop Both Eyes DAILY Review Of Systems:  
CONSTITUTIONAL: No weight loss. EYES: No blurred vision and no eye discharge. ENT: No nasal discharge. No ear pain. CARDIOVASCULAR: No chest pain and no diaphoresis. RESPIRATORY: No cough, no hemoptysis. GI: No vomiting, no diarrhea : No urinary frequency and no dysuria. MUSCULOSKELETAL: No muscle pains. SKIN: No rashes. NEURO: As above. ENDOCRINE: No polyphagia and no polydipsia. HEMATOLOGY: No bleeding tendencies. Objective:  
 
Vital Signs: 
Patient Vitals for the past 24 hrs: 
 BP Temp Pulse Resp SpO2 Height Weight 10/09/18 1343 109/53 - 62 - - - -  
10/09/18 0749 121/59 98 °F (36.7 °C) 100 20 93 % - -  
10/09/18 0606 143/61 - 63 - - - -  
10/08/18 2046 165/59 97.3 °F (36.3 °C) 65 18 97 % 5' 6\" (1.676 m) 67.6 kg (149 lb) Body mass index is 24.05 kg/(m^2). Physical Examination: 
GENERAL SURVEY: Patient is awake, alert, oriented x 3, sitting comfortably on the chair, not in acute respiratory distress. HEENT: pink palpebral conjunctivae, anicteric sclerae, no nasoaural discharge, moist oral mucosa NECK: supple, no jugular venous distention, no palpable lymph nodes CHEST/LUNGS: symmetrical chest expansion, good air entry, clear breath sounds HEART: adynamic precordium, good S1 S2, no S3, regular rhythm, no murmurs ABDOMEN: (+) PEG tube in place, flat, bowel sounds appreciated, soft, non-tender EXTREMITIES: (+) amputated distal phalanx of the thumb of the left hand, pink nailbeds, no edema, full and equal pulses, no calf tenderness NEUROLOGICAL EXAM: The patient is awake, alert and oriented x3, able to answer questions fairly appropriately, able to follow 1 and 2 step commands. Able to tell time from the wall clock. Cranial nerves II-XII are grossly intact except for slurred speech and dysphagia. No gross sensory deficit. Motor strength is 4+/5 on the RUE and RLE, 4/5 on the LUE, 4-/5 on the LLE. Current Medications: 
Current Facility-Administered Medications Medication Dose Route Frequency  docusate sodium (COLACE) capsule 100 mg  100 mg Per G Tube BID  
 bisacodyl (DULCOLAX) suppository 10 mg  10 mg Rectal Q48H PRN  
 heparin (porcine) injection 5,000 Units  5,000 Units SubCUTAneous Q12H  
 acetaminophen (TYLENOL) solution 650 mg  650 mg Oral Q4H PRN  
 amLODIPine (NORVASC) tablet 10 mg  10 mg Per G Tube DAILY  co-enzyme Q-10 (CO Q-10) capsule 100 mg  100 mg Oral DAILY  vitamin B complex tablet  1 Tab Oral DAILY  melatonin tablet 3 mg  3 mg Per G Tube QHS  aspirin chewable tablet 81 mg  81 mg Per G Tube DAILY  atorvastatin (LIPITOR) tablet 80 mg  80 mg Per G Tube QHS  clopidogrel (PLAVIX) tablet 75 mg  75 mg PEG Tube DAILY  hydrALAZINE (APRESOLINE) tablet 50 mg  50 mg Per G Tube Q8H  
 lisinopril (PRINIVIL, ZESTRIL) tablet 40 mg  40 mg Per G Tube DAILY  pantoprazole (PROTONIX) granules for oral suspension 40 mg  40 mg Per G Tube ACB  finasteride (PROSCAR) tablet 5 mg  5 mg Oral QPM  
 levothyroxine (SYNTHROID) tablet 50 mcg  50 mcg Per G Tube 6am  
 metoprolol tartrate (LOPRESSOR) tablet 25 mg  25 mg Per G Tube Q12H  tamsulosin (FLOMAX) capsule 0.4 mg  0.4 mg Oral QPM  
 timolol (TIMOPTIC) 0.5 % ophthalmic solution 1 Drop  1 Drop Both Eyes DAILY Functional Assessment:  
 
Occupational Therapy Prior Level of Function  Pre-Admission Screen  Post-Admission Evaluation Eating Independent Eating Moderate Assist Eating Functional Level: 1 Grooming Independent Grooming Moderate Assist Grooming Upper Body Dressing Independent Upper Body Dressing Moderate Assist Upper Body Dressing Lower Body Dressing Independent Lower Body Dressing Maximal Assist Lower Body Dressing Bladder Management Independent Bladder Management Supervision Toileting Functional Level: 1 Bowel Management Independent Bowel Management Supervision Physical Therapy Prior Level of Function  Pre-Admission Screen  Post-Admission Evaluation Ambulation Independent Ambulation Maximal Assist    
Bed Mobility Independent Bed Mobility Minimal Assist Bed/Mat Mobility Rolling Right : 3 (Moderate assistance ) Rolling Left : 3 (Moderate assistance ) Supine to Sit Independent Supine to Sit Minimal Assist Bed/Mat Mobility Rolling Right : 3 (Moderate assistance ) Rolling Left : 3 (Moderate assistance ) Sit to Stand Independent Sit to Stand Minimal Assist Bed/Mat Mobility Rolling Right : 3 (Moderate assistance ) Rolling Left : 3 (Moderate assistance ) Bed/Chair Transfers Independent Bed/Chair Transfers Moderate Assist    
Toilet Transfers Independent Toilet Transfers Moderate Assist Toilet Transfers Speech and Language Pathology Post-Admission Evaluation Comprehension (Native Language) Primary Mode of Comprehension: Auditory Score: 3 Comments:  (Hard of Hearing) Expression (Native Language) Primary Mode of Expression: Verbal 
Score: 3 Social Interaction/Pragmatics Score: 3 Problem Solving Score: 2 Memory Score: 2 Legend: 
 7 - Independent 6 - Modified Independent 5 - Standby Assistance / Supervision / Alex Oneil 4 - Minimum Assistance / [address] 3 - Moderate Assistance 2 - Maximum Assistance 1 - Total Assistance / Dependent Labs on Admission: 
Recent Results (from the past 24 hour(s)) CBC WITH AUTOMATED DIFF Collection Time: 10/09/18  6:20 AM  
Result Value Ref Range WBC 6.8 4.6 - 13.2 K/uL  
 RBC 4.18 (L) 4.70 - 5.50 M/uL  
 HGB 13.1 13.0 - 16.0 g/dL HCT 36.4 36.0 - 48.0 % MCV 87.1 74.0 - 97.0 FL  
 MCH 31.3 24.0 - 34.0 PG  
 MCHC 36.0 31.0 - 37.0 g/dL  
 RDW 14.0 11.6 - 14.5 % PLATELET 712 473 - 722 K/uL MPV 11.3 9.2 - 11.8 FL  
 NEUTROPHILS 66 40 - 73 % LYMPHOCYTES 22 21 - 52 % MONOCYTES 9 3 - 10 % EOSINOPHILS 3 0 - 5 % BASOPHILS 0 0 - 2 %  
 ABS. NEUTROPHILS 4.5 1.8 - 8.0 K/UL  
 ABS. LYMPHOCYTES 1.5 0.9 - 3.6 K/UL  
 ABS. MONOCYTES 0.6 0.05 - 1.2 K/UL  
 ABS. EOSINOPHILS 0.2 0.0 - 0.4 K/UL  
 ABS. BASOPHILS 0.0 0.0 - 0.1 K/UL  
 DF AUTOMATED MAGNESIUM Collection Time: 10/09/18  6:20 AM  
Result Value Ref Range Magnesium 2.3 1.6 - 2.6 mg/dL METABOLIC PANEL, BASIC Collection Time: 10/09/18  6:20 AM  
Result Value Ref Range Sodium 143 136 - 145 mmol/L Potassium 4.2 3.5 - 5.5 mmol/L Chloride 106 100 - 108 mmol/L  
 CO2 29 21 - 32 mmol/L Anion gap 8 3.0 - 18 mmol/L Glucose 108 (H) 74 - 99 mg/dL BUN 20 (H) 7.0 - 18 MG/DL Creatinine 0.77 0.6 - 1.3 MG/DL  
 BUN/Creatinine ratio 26 (H) 12 - 20 GFR est AA >60 >60 ml/min/1.73m2 GFR est non-AA >60 >60 ml/min/1.73m2 Calcium 8.1 (L) 8.5 - 10.1 MG/DL  
HEPATIC FUNCTION PANEL Collection Time: 10/09/18  6:20 AM  
Result Value Ref Range Protein, total 5.7 (L) 6.4 - 8.2 g/dL Albumin 2.9 (L) 3.4 - 5.0 g/dL Globulin 2.8 2.0 - 4.0 g/dL A-G Ratio 1.0 0.8 - 1.7 Bilirubin, total 1.3 (H) 0.2 - 1.0 MG/DL Bilirubin, direct 0.4 (H) 0.0 - 0.2 MG/DL Alk. phosphatase 92 45 - 117 U/L  
 AST (SGOT) 14 (L) 15 - 37 U/L  
 ALT (SGPT) 21 16 - 61 U/L Estimated Glomerular Filtration Rate: On admission, estimated GFR based on a Creatinine of 0.77 mg/dl: 
 Using CKD-EPI = 81.6 mL/min/1.73m2 Using MDRD = 101.6 mL/min/1.73m2 Assessment:  
 
Primary Rehabilitation Diagnosis 1. Impaired Mobility and ADLs 2.  Acute Ischemic Stroke (acute infarct of the posterior superior right basal ganglia and adjacent right corona radiata) with residual left hemiparesis, dysphagia and dysarthria Comorbidities  Urinary frequency R35.0  Nocturia R35.1  Glaucoma H40.9  History of kidney stones Z87.442  Cervical spinal stenosis M48.02  
 Meningioma  D32.9  Hypertensive heart disease without heart failure I11.9  Diastolic dysfunction without heart failure I51.89  
 Gastric ulcer K25.9  Coronary artery disease involving native coronary artery of native heart I25.10  Dyslipidemia E78.5  Chronic obstructive pulmonary disease (COPD)  J44.9  Status post insertion of percutaneous endoscopic gastrostomy (PEG) tube  Z93.1  Hypothyroidism E03.9  Benign prostatic hyperplasia N40.0  Bilateral hearing loss H91.93  
 History of transient ischemic attack (TIA) Z86.73  
 Traumatic amputation of left thumb U31.413V Willingness to participate in the program: Good Rehabilitation Potential: Good Plan: 1. Medical Issues being followed closely: 
  [x]  Fall and safety precautions  
  []  Wound Care  
  [x]  Bowel and Bladder Function  
  [x]  Fluid Electrolyte and Nutrition Balance  
  []  Pain Control 2. Issues that 24 hour rehabilitation nursing is following: 
  [x]  Fall and safety precautions  
  []  Wound Care  
  [x]  Bowel and Bladder Function  
  [x]  Fluid Electrolyte and Nutrition Balance  
  []  Pain Control   
  [x]  Assistance with and education on in-room safety with transfers to and from the bed, wheelchair, toilet and shower. 3. Acute rehabilitation plan of care: 
  [x]  Patient to be evaluated and treated by:  
        [x]  Physical Therapy [x]  Occupational Therapy [x]  Speech Therapy  
  []  Hold Rehab until further notice 5. Medications: 
  [x]  MAR Reviewed [x]  Continue Present Medications 6. DVT Prophylaxis:   
  []  Lovenox [x]  Unfractionated Heparin  
  []  Coumadin  
  []  NOAC  
  []  BECKY Stockings  
  []  Sequential Compression Device []  None 7. Rehabilitation program and expectations from patient, as well as medical issues discussed with the patient. REHABILITATION PLAN:   
1. The patient is being admitted to a comprehensive acute inpatient rehabilitation program consisting of at least 180 minutes a day, 5 out of 7 days a week of  combined physical, occupational and speech therapy, and close supervision by a physician with special training and experience in rehabilitation medicine. 2. The patient's prognosis for significant practical improvement within a reasonable period of time appears to be good. 3. The estimated length of stay is 17 days. 4. The patient/family has a good understanding of our discharge process. The patient has potential to make improvement and is in need of at least two of the following multidisciplinary therapies including but not limited to physical, occupational, speech, nutritional services, wound care, prosthetics and orthotics. The patient is expected to be able to return to home with home health therapy and family support. 5. Given the patient's multiple co-morbidities and risk for further medical complications, rehabilitation services could not be safely provided at a lower level of care such as a skilled nursing facility. 6. Physical therapy for therapeutic exercise, progressive mobility, gait training, transfer training, bed mobility training, patient and family education, and wheelchair mobility training. Physical therapy goals to address  extremity function, range of motion, balance, safety awareness, independence in transfers, activity tolerance, independence in bed mobility, and independence in ambulation. 7. Occupational therapy for self-care home management, transfer training, therapeutic exercise, activity, wheelchair mobility training.  Occupational therapy goals to address  extremity function, cognition, balance, activity tolerance, independence in functional transfers, range of motion, safety awareness, independence in ADL  and independence in home management skills. 8. Speech and Language Pathology for cognitive training, dysphagia therapy, speech and language communication therapy. Goals for speech therapy to address cognition, expression of language, comprehension, safety awareness and safe swallow. 9. Specialized 24 hour rehabilitation nursing care for bowel and bladder retraining, disease management, pain management, pressure ulcer prevention and management per policy, education on pressure relief techniques, embolism prevention, nutrition management, hydration management, transfer training and  
medication distribution. 10. Nutrition and Dietary services will be obtained for assessment of adequate calorie needs, hydration and calorie counts as appropriate. 11. Therapeutic recreation for leisure skills. 15. Rehab psychology for coping skills. 15. Social work services for patient and family counseling and safe discharge planning. 14. I will be in charge of the inpatient rehab program. Full details of the inpatient rehab program will be outlined in the initial team conference. REHABILITATION GOALS:  Improve functional and activities of daily living skills in order to return back to independent living with family support. MEDICAL PLAN: 
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 
 > Aspirin 81 mg via PEG tube once daily in AM 
 > Atorvastatin 80 mg via PEG tube q HS 
 > Coenzyme Q10 100 mg via PEG tube once daily 
 > Clopidogrel 75 mg via PEG tube once daily in PM 
 > Vitamin B complex 1 tab via PEG tube once daily 
 
> Dysphagia as late effect of CVA; S/P Insertion of percutaneous endoscopic gastrostomy (PEG) tube (10/5/2018) 
 > NPO with tube feeding 
 > Jevity 1.5 237 ml via PEG tube 5 times daily (6AM, 10AM, 2PM, 6PM, 10PM) > Water flush 100 ml via PEG tube vefore and after each bolus feeding 
 
> Hypertensive heart disease without heart failure 
 > Amlodipine 10 mg via PEG tube once daily (9AM) 
 > Decrease Hydralazine from 50 mg to 25 mg via PEG tube q 8 hr (6AM, 2PM, 10PM) > Lisinopril 40 mg via PEG tube once daily (6AM) 
 > Metoprolol tartrate 25 mg via PEG tube q 12 hr (9AM, 9PM) PRECAUTIONS:  
1. Safety/fall precautions. 2. Deep venous thrombosis precautions. 3. Aspiration precautions. POTENTIAL BARRIERS TO DISCHARGE:  
1. Risk for falls. 2. Family limited in ability to provide constant care. RELEVANT CHANGES SINCE PREADMISSION SCREENING: I have compared the patients medical and functional status at the time of the pre-admission screening and on this post-admission evaluation. The preadmission screen and findings from therapy evaluations both support my post admission physician evaluation, deeming this patient to be an appropriate candidate for the IRF. The patient requires multidisciplinary treatment, physician oversight and intensive therapy not provided at a lower level of care. By signing this document, I acknowledge that I have personally performed a full physical examination on this patient within 24 hours of admission to this inpatient rehabilitation facility and have determined the patient to be able to tolerate the above course of treatment at an intensive level for a reasonable period of time. I will be completing a detailed individualized plan of care for this patient by day #4 of the patients stay based upon the Pre-Admission Screen, the Post-Admission Evaluation, and the therapy evaluations. Signed:    Francine Moore MD 
  October 9, 2018

## 2018-10-09 NOTE — PROGRESS NOTES
Problem: Mobility Impaired (Adult and Pediatric) Goal: *Therapy Goal (Edit Goal, Insert Text) Physical Therapy Goals Initiated 10/9/2018 and to be accomplished within 7 day(s) 10/15/2018 1. Patient will move from supine to sit and sit to supine , scoot up and down and roll side to side in bed with moderate assistance . 2.  Patient will transfer from bed to chair and chair to bed with moderate assistance  using the least restrictive device. 3.  Patient will perform sit to stand with moderate assistance . 4. Patient will ambulate with maximal assistance for 5 feet with the least restrictive device. 5.  Patient will ascend/descend 1 stair with 1-2 handrail(s) with maximal assistance. 6.  Patient will perform 50 ft of wheelchair mobility with modified technique moderate assist for steering and negotiation of obstacles. 7.  Patient will be able to maintain sitting balance without support for at least 5 minutes with verbal cues only for maintaining midline/upright position for ease of transfers. Physical Therapy Goals Initiated 10/9/2018 and to be accomplished within 28 day(s) on 11/6/2018 1. Patient will move from supine to sit and sit to supine , scoot up and down and roll side to side in bed with supervision/set-up. 2.  Patient will transfer from bed to chair and chair to bed with supervision/set-up using the least restrictive device. 3.  Patient will perform sit to stand with supervision/set-up. 4.  Patient will ambulate with minimal assistance/contact guard assist for 50 feet with the least restrictive device. 5.  Patient will ascend/descend 4 stairs with 2 handrail(s) with minimal assistance/contact guard assist. 
6.  Patient will perform 150 ft of wheelchair mobility at modified independent level. 7.  Patient will demonstrate improved postural control for ease of functional mobility as demonstrated by PASS score >16/36 physical Therapy EVALUATION 
 
 Patient: Rigoberto Tom (73 y.o. male) Date: 10/9/2018 Diagnosis: R CVA Acute ischemic stroke ([facility] [place] [address] Acute ischemic stroke ([facility] [place] [address] Precautions: Aspiration, Fall (Head of bed 30-45 degrees at all times per speech therapist) Chart, physical therapy assessment, plan of care and goals were reviewed. Time In: 1130 Time Out: 1240 Patient seen for initial evaluation and subsequent treatment including therapeutic activity, wheelchair mobility, and neuromuscular re-education. Per chart, patient with acute infarct of posterior superior right basal ganglia and adjacent right corona radiata. Pain: No pain reported. Patient identified with name and :yes SUBJECTIVE:  
 
Patient stated I'm tired, I didn't sleep well.  Patient agreeable to participation in physical therapy evaluation and treatment; agreeable to participation with wife present in room. Patient soft spoken, hard of hearing needing repetition of questions/instructions and needing to repeat himself at times with his responses. Patient is a fairly good historian, with his spouse only providing occasional details. Patient reported that on the day of his admission to the hospital, he was feeling \"dizzy\" and \"unbalanced. \" His wife reports that she gave him a quad cane to use to walk with inside their home and then he had a fall. Per patient, his left leg \"gave out\" and he felt very weak. OBJECTIVE DATA SUMMARY:  
 
Past Medical History:  
Diagnosis Date  Acute ischemic stroke ([facility] [place] [address] 10/2/2018 Acute Ischemic Stroke (acute infarct of the posterior superior right basal ganglia and adjacent right corona radiata) with residual left hemiparesis, dysphagia and dysarthria  Benign prostatic hyperplasia  Bilateral hearing loss  Cervical spinal stenosis 10/2/2018  Chronic obstructive pulmonary disease (COPD) ([facility] [place] [address]  Coronary artery disease involving native coronary artery of native heart  Diastolic dysfunction without heart failure  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) 10/2/2018  Dyslipidemia  Dysphagia as late effect of cerebrovascular accident (CVA) 10/2/2018  Gastric ulcer  Glaucoma  Hemiparesis affecting left side as late effect of cerebrovascular accident (CVA) ([facility] [address] 10/2/2018  History of kidney stones  History of transient ischemic attack (TIA) 2x  Hypertensive heart disease without heart failure  Hypothyroidism  Meningioma ([facility] [address] 10/3/2018 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  
Procedure Laterality Date  HX CORONARY ARTERY BYPASS GRAFT  2011 S/P CABG x 4  
 HX GASTROSTOMY  10/05/2018  HX HEMORRHOIDECTOMY Therapy Diagnosis:  
Difficulty with bed mobility  [x] Difficulty with functional transfers  [x] Difficulty with ambulation  [x] Difficulty with stair negotiations  [x] Problem List:   
Decreased strength B LE, left weaker than right  [x] Decreased strength trunk/core  [x] Decreased AROM, particularly left LE  [x] Decreased PROM  [x] Decreased endurance  [x] Decreased balance sitting  [x] Decreased balance standing  [x] Pain   [] Slow ambulation velocity  [x] Decreased coordination  [x] Decreased safety awareness  [x] Functional Limitations:  
Decreased independence with bed mobility  [x] Decreased independence with functional transfers  [x] Decreased independence with ambulation  [x] Decreased independence with stair negotiation  [x] Previous Functional Level:  Patient was independent with all mobility without AD but per patient and his wife, patient moved very cautiously and slowly.  Patient drove, was able to go grocery shopping, attend [employer] regularly without difficulty with his mobility other than moving slowly for safety. Home Environment: Home Environment: Private residence # Steps to Enter:  4 Rails to Enter: Yes Hand Rails : Bilateral 
Wheelchair Ramp: No 
One/Two Story Residence: One story Living Alone: No 
Support Systems: Child(geovani), Family member(s) Patient Expects to be Discharged to[de-identified] Private residence Current DME Used/Available at Home: None Tub or Shower Type: Shower Barriers to Learning/Limitations: yes;  sensory deficits-vision/hearing/speech Compensate with: visual, verbal, tactile, kinesthetic cues/model Outcome Measures: Postural Assessment Scale for Stroke Patients: 6/36 indicating significant difficulty with postural control/trunk control and motor planning for maintaining postures and for changing postures. MMT Initial Assessment Right Lower Extremity Left Lower Extremity Hip Flexion 4+ 2-  
Knee Extension 4+ 2-  
Knee Flexion 4+ 2- Ankle Dorsiflexion 4+ 2-  
0/5 No palpable muscle contraction 1/5 Palpable muscle contraction, no joint movement 2-/5 Less than full range of motion in gravity eliminated position 2/5 Able to complete full range of motion in gravity eliminated position 2+/5 Able to initiate movement against gravity 3-/5 More than half but not full range of motion against gravity 3/5 Able to complete full range of motion against gravity 3+/5 Completes full range of motion against gravity with minimal resistance 4-/5 Completes full range of motion against gravity with minimal-moderate resistance 4/5 Completes full range of motion against gravity with moderate resistance 4+/5 Completes full range of motion against gravity with moderate-maximum resistance 5/5 Completes full range of motion against gravity with maximum resistance AROM: patient demonstrating significant difficulty with performing left LE movements for AROM due to weakness. FIM SCORES Initial Assessment Bed/Chair/Wheelchair Transfers Transfer Type: SPT without device Transfer Assistance : 2 (Maximal assistance) Sit to Stand Assistance: Maximum assistance Car Transfers: Not tested Car Type: N/A Wheelchair Mobility Able to Propel (ft): 15 feet Functional Level: 1 Curbs/Ramps Assist Required (FIM Score): 0 (Not tested) Wheelchair Setup Assist Required : 2 (Maximal assistance) Wheelchair Management: Manages right brake Walking [place] 1 (Dependent/total assistance) Steps/Stairs Steps/Stairs Ambulated (#): 0 Level of Assist : 0 (Not tested) (not safe to assess at this time) PRIMARY MODE OF LOCOMOTION: currently safest at wheelchair level and based on current impairments will likely continue to be at wheelchair level for safest mobility. Will re-assess as appropriate. Please see [facility] Interdisciplinary Eval: Coordination/Balance Section for details regarding FIM score description. BED/CHAIR/WHEELCHAIR TRANSFERS Initial Assessment Rolling Right 3 (Moderate assistance ) Rolling Left 3 (Moderate assistance ) Supine to Sit 2 (Maximal assistance) Sit to Stand Maximum assistance Sit to Supine 2 (Maximal assistance) Transfer Assist Score Transfer Type: SPT without device Transfer Assistance : 2 (Maximal assistance) Sit to Stand Assistance: Maximum assistance Car Transfers: Not tested Car Type: N/A Transfer Type SPT without device Comments needing assist with lifting,lowering,pivoting to transfer surface Patient pushing to his weaker left side during transfer, resulting in difficulty cueing patient to appropriately weightshift and pivot toward his right side when transferring to his right. Slightly better with transfers to his right side. Car Transfer Not tested due to safety, will reassess when appropriate Car Type N/A  
 
 
WHEELCHAIR MOBILITY/MANAGEMENT Initial Assessment Able to Propel 15 feet Functional Level 1: Max assist for steering as patient with difficulty with tyrell-technique using right UE/LE to propel himself straight in hallway, fatiguing quickly when only performing with right LE. Curbs/ramps assistance required 0 (Not tested) Wheelchair set up assistance required 2 (Maximal assistance) Wheelchair management Manages right brake WALKING INDEPENDENCE Initial Assessment Assistive device Other (comment) (parallel bars) Ambulation assistance - level surface 1 (Dependent/total assistance) Distance 2 Feet (ft) Functional Level 1 Comments Performing only a couple steps forward/backward assist x 2, cues and assist for weightshift, upright and more midline position with decreased left lateral pushing to left side. Patient needing max/total assist for facilitating left LE forward/backward. Ambulation assistance - unlevel surface  Not yet safe to perform STEPS/STAIRS Initial Assessment Steps/Stairs ambulated Steps/Stairs Ambulated (#): 0 Level of Assist : 0 (Not tested) (not safe to assess at this time) Rail Use  n/a Functional Level 0 Comments unable to safely assess at this time given significant assist required for transfers and for gait attempt. Curbs/Ramps  Not yet safe to assess Neuromuscular re-education:  
 Patient performing supine bridges emphasis on improved hip ext activation left LE, also performing sidelying left LE PNF rhythmic initiation progressing to repeated contractions for improved muscle activation. Static standing in parallel bars with second person facilitating left UE weightbearing, mirror for visual feedback and PT blocking left knee for safety, emphasis on midline and upright trunk position. Static sitting balance on mat emphasis on midline orientation and upright posture.   
 
ASSESSMENT : 
Based on the objective data described below, the patient presents with decreased strength, endurance, impaired trunk control, impaired motor control/planning, decreased functional problem solving, impaired perception of midline orientation, impaired sensation left LE leading to difficulty participating in bed mobility, transfers, gait including negotiation of stairs. Patient currently requiring significant assist, observed to have pushing behaviors toward his weaker left side. Patient will benefit from skilled intervention to address the above impairments. Patients rehabilitation potential is considered to be Good Factors which may influence rehabilitation potential include:  
[]         None noted 
[]         Mental ability/status [x]         Medical condition 
[x]         Home/family situation and support systems 
[]         Safety awareness 
[]         Pain tolerance/management 
[]         Other: PLAN : 
Order received from MD for physical therapy services and chart reviewed. Pt to be seen 5 times per week for 3 hours of total therapy per day for 4 weeks. Thank you for the referral. 
 
Pt would benefit from skilled physical therapy in order to improve independent functional mobility within the home. Interventions may include range of motion (AROM, PROM B LE/trunk), motor function (B LE/trunk strengthening/coordination), activity tolerance (vitals, oxygen saturation levels), bed mobility training, balance activities, gait training (progressive ambulation program), and functional transfer training. Goals to be updated as appropriate based on patient's progress. Discharge Recommendations: [address] Further Equipment Recommendations for Discharge: Equipment needs to continue to be updated based on patient's progress. Currently, recommending wheelchair with wheelchair cushion. Assistive device to be determined as gait training progresses. May require AFO left LE as patient does not have functional ankle DF strength and may improve safety of transfers and allow for progression with gait/standing activities. Activity Tolerance:  
Fair, patient fatiguing quickly. After treatment:  
Patient left seated in wheelchair, call button within reach and spouse in room. COMMUNICATION/EDUCATION:  
[x]         Fall prevention education was provided and the patient/caregiver indicated understanding. [x]         Patient/family have participated as able in goal setting and plan of care. [x]         Patient/family agree to work toward stated goals and plan of care. []         Patient understands intent and goals of therapy, but is neutral about his/her participation. []         Patient is unable to participate in goal setting and plan of care. Thank you for this referral. 
Tho Hernadez, PT 
10/9/2018

## 2018-10-09 NOTE — PROGRESS NOTES
Problem: Self Care Deficits Care Plan (Adult) Goal: *Therapy Goal (Edit Goal, Insert Text) Occupational Therapy Goals Long Term Goals Initiated 10/9/18 and to be accomplished within 4 week(s) 1. Pt will perform self-feeding with total dependence. 2. Pt will perform grooming with supervision. 3. Pt will perform UB bathing with Min A. 
4. Pt will perform LB bathing with Min A prn AE. 5. Pt will perform tub/shower transfer with Mod A.  
6. Pt will perform UB dressing with Setup. 7. Pt will perform LB dressing with Min A prn AE. 8. Pt will perform toileting task with Min A. 
9. Pt will perform toilet transfer with Mod A. Short Term Goals Initiated 10/9/18 and to be accomplished within 7 day(s) 10/16/18 1. Pt will perform grooming with CGA. 2. Pt will perform UB bathing with Mod A. 
3. Pt will perform LB bathing with Mod A prn AE. 4. Pt will perform tub/shower transfer with Mod A. 
5. Pt will perform UB dressing with Mod A. 
6. Pt will perform LB dressing with Mod A prn AE. 7. Pt will perform toileting task with Max A. 
8. Pt will perform toilet transfer with Mod A. Occupational Therapy EVALUATION Patient: Heather Villanueva 80 y.o. Date: 10/9/2018 Primary Diagnosis: R CVA Acute ischemic stroke ([facility] [address] Patient identified with name and : Yes Past Medical History:  
Past Medical History:  
Diagnosis Date  Acute ischemic stroke ([facility] [address] 10/2/2018 Acute Ischemic Stroke (acute infarct of the posterior superior right basal ganglia and adjacent right corona radiata) with residual left hemiparesis, dysphagia and dysarthria  Benign prostatic hyperplasia  Bilateral hearing loss  Cervical spinal stenosis 10/2/2018  Chronic obstructive pulmonary disease (COPD) ([facility] [address]  Coronary artery disease involving native coronary artery of native heart  Diastolic dysfunction without heart failure  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) 10/2/2018  Dyslipidemia  Dysphagia as late effect of cerebrovascular accident (CVA) 10/2/2018  Gastric ulcer  Glaucoma  Hemiparesis affecting left side as late effect of cerebrovascular accident (CVA) ([facility] [address] 10/2/2018  History of kidney stones  History of transient ischemic attack (TIA) 2x  Hypertensive heart disease without heart failure  Hypothyroidism  Meningioma ([facility] [address] 10/3/2018 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 
Pt reports 4/10 pain or discomfort prior to treatment in dorsal right hand. Abrasions and bruising observed on dorsal right hand; pt reported the marks were from when he fell. Pt reports 0/10 pain or discomfort post treatment. SUBJECTIVE:  
Patient reported he had a heart attack and cut the tip of his left thumb off in 2011. OBJECTIVE DATA SUMMARY:  
Pt reclined in bed upon initiation of session. Pt informed of current date plan of care; pt receptive and agreeable. [x]  Right hand dominant   []  Left hand dominant Therapy Diagnosis:  
Difficulty with ADLs  [x] Difficulty with functional transfers  [x] Difficulty with ambulation  [x] Difficulty with IADLs  [x] Problem List:   
Decreased strength B UE  [x]  Left UE hemiparesis Decreased strength trunk/core  [x] Decreased AROM   [x]  Left UE hemiparesis Decreased endurance  [x] Decreased balance sitting  [x] Decreased balance standing  [x] Pain   [x] Decreased PROM  [x]  Left UE hemiparesis Functional Limitations:  
Decreased independence with ADL  [x] Decreased independence with functional transfers  [x] Decreased independence with ambulation  [x] Decreased independence with IADL  [x] Previous Functional Level: Independent Home Environment: Home Situation Home Environment: Private residence # Steps to Enter:  4 Rails to Enter: Yes Hand Rails : Bilateral 
Wheelchair Ramp: No 
One/Two Story Residence: One story Living Alone: No 
Support Systems: Child(geovani), Family member(s), Spouse Patient Expects to be Discharged to[de-identified] Private residence Current DME Used/Available at Home: Quad cane Tub or Shower Type: Shower Barriers to Learning/Limitations: yes;  cognitive and physical 
Compensate with: visual, verbal, tactile, kinesthetic cues/model Outcome Measures: MMT Initial Assessment Right Upper Extremity  Left Upper Extremity UE AROM WFL Hemiparesis Shoulder flexion - - Shoulder extension - - Shoulder ABDuction - - Shoulder ADDUction - -  
Elbow Flexion - -  
Elbow Extension - - Wrist Extension/Flexion - -  
 - -  
0/5 No palpable muscle contraction 1/5 Palpable muscle contraction, no joint movement 2-/5 Less than full range of motion in gravity eliminated position 2/5 Able to complete full range of motion in gravity eliminated position 2+/5 Able to initiate movement against gravity 3-/5 More than half but not full range of motion against gravity 3/5 Able to complete full range of motion against gravity 3+/5 Completes full range of motion against gravity with minimal resistance 4-/5 Completes full range of motion against gravity with minimal resistance 4/5 Completes full range of motion against gravity with moderate resistance 5/5 Completes full range of motion against gravity with maximum resistance Sensation: Impaired Coordination: Impaired FIM SCORES Initial Assessment Bladder - level of assist 1 Bladder - accident frequency score Bowel - level of assist    
Bowel - accident frequency score Please see IRC Interdisciplinary Eval: Coordination/Balance Section for details regarding FIM score description. COGNITION/PERCEPTION Initial Assessment Reading Status Unknown/unable to assess Writing Status Intact Arousal/Alertness Generalized responses Orientation Level Oriented X4 Visual Fields  (Appears intact) Praxis Impaired Body Scheme Cues to maintain midline in sitting, Cues to maintain midline in standing, Tactile, Verbal, Visual  
 
COMPREHENSION MODE Initial Assessment Primary Mode of Comprehension Auditory Hearing Aide Bilateral  
Corrective Lenses Glasses Score 3 EXPRESSION Initial Assessment Primary Mode of Expression Verbal  
Score 3 Comments SOCIAL INTERACTION/PRAGMATICS Initial Assessment Score 3 Comments PROBLEM SOLVING Initial Assessment Score 2 Comments MEMORY Initial Assessment Score 2 Comments EATING Initial Assessment Functional Level 0 Comments NPO  
 
GROOMING Initial Assessment Functional Level 4 Oral Hygiene FIM: 3 Tasks completed by patient Washed face, Washed hands Comments Pt long sitting in bed; washed face with mod vcs for sequencing (needed cues to remove glasses before washing face). Pt lying supine in bed performed hand hygeine with min A for thoroughness. BATHING Initial Assessment Functional Level 2 Body parts patient bathed Arm, left, Buttocks, Chest, Maribel area, Thigh, left, Thigh, right Comments Pt completed bathing in bed; long sitting he washed left arm, chest, anterior pericare, and right and left thigh. Side lying he washed buttocks and posterior pericare. TUB/SHOWER TRANSFER INDEPENDENCE Initial Assessment Score 0 Comments NT due to safety UPPER BODY DRESSING/UNDRESSING Initial Assessment Functional Level 2 Items applied/Steps completed Pullover (4 steps) Comments Pt doff/don pullover shirt with Max A and Max vcs. Pt required mod assist to lean forward; pt attempted to reach behind head to pull shirt overhead and required mod assist for this. Pt required mod assist to remove BUE from shirt.  Pt required max assist to thread left arm through arm hole, and setup for him to thread right arm. Pt donned shirt overhead with min A. Pt requried max A to pull shirt down. LOWER BODY DRESSING/UNDRESSING Initial Assessment Functional Level 2 Sock and/or Shoe Management FIM:1  
Items applied/Steps completed Elastic waist pants (3 steps), Sock, left (1 step), Sock, right (1 step) Comments Pt lying supine in bed; pt required total assist to thread legs in pants. Pt bend knees with max assist and stabilization for left leg; pt bridge with stabilization of left leg and requried min A to pull pants up to waist.   
 
TOILETING Initial Assessment Functional Level 1 Comments Pt require max A for clothing management and max A for maintaining midline sitting. TOILET TRANSFER INDEPENDENCE Initial Assessment Transfer score 2 Comments INSTRUMENTAL ADL Initial Assessment (PLOF) Meal preparation Other (Comment) (Unknown) Homemaking  (Unknown) Medicine Management  (unknown) Financial Management  (unknown) ASSESSMENT : 
Based on the objective data described below, the patient presents with deficits in ADL/IADL participation, activity tolerance, functional transfers, and functional mobility. Patient demonstrates weakness in left UE, decreased endurance, decreased problem solving, decreased balance sitting and standing. Patient demonstrates lateral lean to left side when sitting/standing. Pt requires total dependence to max assist for self care tasks. Pt reports living in a one story home with his wife of 58 years. There are 4 steps to enter his home, and there is a railing on both sides of the stairs. Patient reports he has a bathroom adjacent to his bedroom and has a walk-in shower. Per patient report, there are no grab bars in the bathroom. Patient reports he has two sons that live in the [facility].   
 
Pt would benefit from skilled occupational therapy in order to improve independent functional mobility/ADLs,/IADLs within the home. Interventions may include range of motion (AROM, PROM B UE), motor function (B UE/ strengthening/coordination), activity tolerance (vitals, oxygen saturation levels), balance training, ADL/IADL training and functional transfer training. Please see IRC; Interdisciplinary Eval, Care Plan, and Patient Education for further information regarding occupational therapy examination and plan of care. PLAN : 
Recommendations and Planned Interventions: 
[x]               Self Care Training                   [x]        Therapeutic Activities [x]               Functional Mobility Training    [x]        Cognitive Retraining 
[x]               Therapeutic Exercises            [x]        Endurance Activities [x]               Balance Training                     [x]        Neuromuscular Re-Education [x]               Visual/Perceptual Training      [x]   Home Safety Training 
[x]               Patient Education                    [x]        Family Training/Education []               Other (comment): Frequency/Duration: Patient will be followed by occupational therapy 1-2 times per day/4-7 days per week to address goals. Discharge Recommendations: Home health vs SNF 2/2 progress Further Equipment Recommendations for Discharge: Shower chair with backrest, bedside commode 2/2 progress and discharge to home environment, installation of grab bars near toilet and shower for safety Please refer to the flowsheet for vital signs taken during this treatment. After treatment:  
[x] Patient left in no apparent distress sitting up in wheelchair 
[] Patient left in no apparent distress in bed 
[x] Call bell left within reach 
[] Nursing notified 
[x] Caregiver present 
[] Bed alarm activated COMMUNICATION/EDUCATION:  
[] Home safety education was provided and the patient/caregiver indicated understanding. [x] Patient/family have participated as able in goal setting and plan of care. [x] Patient/family agree to work toward stated goals and plan of care. [] Patient understands intent and goals of therapy, but is neutral about his/her participation. [] Patient is unable to participate in goal setting and plan of care. Order received from MD for occupational therapy services and chart reviewed. Pt to be seen 5 times per week for 3 hours of total therapy per day for 4 weeks.   Thank you for the referral. 
 
Thank you for this referral. 
Danny Gutierrez OTS

## 2018-10-09 NOTE — PROGRESS NOTES
conducted an initial consultation and Spiritual Assessment for Mellissa Aragon, who is a 80 y. o.,male. Patients Primary Language is: [place]. According to the patients EMR Jew Affiliation is: Unknown. The reason the Patient came to the hospital is:  
Patient Active Problem List  
 Diagnosis Date Noted  Hypertensive heart disease without heart failure  Diastolic dysfunction without heart failure  Gastric ulcer  Coronary artery disease involving native coronary artery of native heart  Dyslipidemia  Chronic obstructive pulmonary disease (COPD) ([address]  Hypothyroidism  Benign prostatic hyperplasia  Bilateral hearing loss  Status post insertion of percutaneous endoscopic gastrostomy (PEG) tube ([address] 10/05/2018  Meningioma ([address] 10/03/2018  Acute ischemic stroke ([address] 10/02/2018  Impaired mobility and ADLs 10/02/2018  Hemiparesis affecting left side as late effect of cerebrovascular accident (CVA) ([address] 10/02/2018  Dysphagia as late effect of cerebrovascular accident (CVA) 10/02/2018  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) 10/02/2018  Cervical spinal stenosis 10/02/2018  Urinary frequency  Nocturia  Glaucoma  History of kidney stones The  provided the following Interventions: 
Initiated a relationship of care and support. Explored issues of abby, belief, spirituality and Holiness/ritual needs while hospitalized. Listened empathically. Provided chaplaincy education. Provided information about Spiritual Care Services. Offered prayer and assurance of continued prayers on patient's behalf. Chart reviewed. The following outcomes where achieved: 
Patient shared limited information about both their medical narrative and spiritual journey/beliefs.  confirmed Patient's Jew Affiliation as Carmen Marquez. Patient processed feeling about current hospitalization. Patient expressed gratitude for 's visit. Assessment: 
Patient does not have any Samaritan/cultural needs that will affect patients preferences in health care. There are no spiritual or Samaritan issues which require intervention at this time. Plan: 
Chaplains will continue to follow and will provide pastoral care on an as needed/requested basis.  recommends bedside caregivers page  on duty if patient shows signs of acute spiritual or emotional distress. [id number] [facility] Spiritual Care  
[phone]

## 2018-10-09 NOTE — REHAB NOTE
SHIFT CHANGE NOTE FOR Adriel Alvarado Bedside and Verbal shift change report given to Kellee Velasquez (oncoming nurse) by Maria Elena Dumont RN (offgoing nurse). Report included the following information SBAR, Kardex, MAR and Recent Results. Situation: 
 Code Status: DNR Reason for Admission:  
Hospital Day: 1 Problem List:  
Hospital Problems  Date Reviewed: 10/9/2018 Codes Class Noted POA History of transient ischemic attack (TIA) ICD-10-CM: K74.00 
ICD-9-CM: V12.54  Unknown Yes Overview Signed 10/9/2018  3:28 PM by Morro Johnson MD  
  2x Hypertensive heart disease without heart failure (Chronic) ICD-10-CM: I11.9 ICD-9-CM: 402.90  Unknown Yes Status post insertion of percutaneous [facility] gastrostomy (PEG) tube ([facility] ICD-10-CM: Z93.1 ICD-9-CM: V44.1  10/5/2018 Yes * (Principal)Acute ischemic stroke ([facility] ICD-10-CM: I63.9 ICD-9-CM: 434.91  10/2/2018 Yes Overview Signed 10/8/2018  6:32 PM by Morro Johnson MD  
  Acute Ischemic Stroke (acute infarct of the posterior superior right basal ganglia and adjacent right corona radiata) with residual left hemiparesis, dysphagia and dysarthria Impaired mobility and ADLs ICD-10-CM: Z74.09 
ICD-9-CM: 799.89  10/2/2018 Yes Hemiparesis affecting left side as late effect of cerebrovascular accident (CVA) ([facility] [address] ICD-10-CM: W43.485 ICD-9-CM: 438.20  10/2/2018 Yes Dysphagia as late effect of cerebrovascular accident (CVA) ICD-10-CM: R52.910 ICD-9-CM: 438.82  10/2/2018 Yes 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) ICD-10-CM: M07.682 ICD-9-CM: 438.13  10/2/2018 Yes Traumatic amputation of left thumb ICD-10-CM: T86.511L ICD-9-CM: 885.0  1/1/2011 Yes Background: 
 Past Medical History:  
Past Medical History:  
Diagnosis Date  Acute ischemic stroke ([facility] [address] 10/2/2018  Acute Ischemic Stroke (acute infarct of the posterior superior right basal ganglia and adjacent right corona radiata) with residual left hemiparesis, dysphagia and dysarthria  Benign prostatic hyperplasia  Bilateral hearing loss  Cervical spinal stenosis 10/2/2018  Chronic obstructive pulmonary disease (COPD) ([facility] [address]  Coronary artery disease involving native coronary artery of native heart  Diastolic dysfunction without heart failure  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) 10/2/2018  Dyslipidemia  Dysphagia as late effect of cerebrovascular accident (CVA) 10/2/2018  Gastric ulcer  Glaucoma  Hemiparesis affecting left side as late effect of cerebrovascular accident (CVA) ([facility] [address] 10/2/2018  History of kidney stones  History of transient ischemic attack (TIA) 2x  Hypertensive heart disease without heart failure  Hypothyroidism  Meningioma ([facility] [address] 10/3/2018 Small right parafalcine meningioma without mass effect  Nocturia  Traumatic amputation of left thumb 2011  Urinary frequency Patient taking anticoagulants yes Patient has a defibrillator: no  
 
Assessment: 
? Changes in Assessment throughout shift: none ? Patient has central line: no Reasons if yes: n/a Insertion date: none Last dressing date: N/A 
? Patient has Suarez Cath: no Reasons if yes: N/A Insertion date:N/A 
 
? Last Vitals: 
  
Vitals:  
 10/09/18 0749 10/09/18 0939 10/09/18 1343 10/09/18 1553 BP: 121/59 147/58 109/53 146/60 Pulse: 100 94 62 70 Resp: 20   19 Temp: 98 °F (36.7 °C)   97.8 °F (36.6 °C) SpO2: 93% 96%  100% Weight:      
Height:      
 
 
? PAIN Pain Assessment Pain Intensity 1: 0 (10/09/18 1526) Pain Intensity 1: 2 (12/29/14 1105) Pain Location 1: Abdomen Pain Intervention(s) 1: Medication (see MAR) Patient Stated Pain Goal: 0 Patient Stated Pain Goal: 0 
o Intervention effective: yes   
o Other actions taken for pain: comfort measures - repositioning - no pain ? Skin Assessment Skin color Condition/Temperature Integrity Turgor Weekly Pressure Ulcer Documentation Wound Prevention & Protection Methods Orientation of wound Orientation of Wound Prevention: Posterior Location of Prevention Location of Wound Prevention: Abdomen, Sacrum/Coccyx Dressing Present Dressing Present : Yes Dressing Status Dressing Status: Intact Wound Offloading Wound Offloading (Prevention Methods): Bed, pressure reduction mattress, Chair cushion, Pillows, Repositioning, Turning, Wheelchair ? INTAKE/OUPUT Date 10/08/18 1900 - 10/09/18 4995 10/09/18 0700 - 10/10/18 5978 Shift 8853-1594 24 Hour Total 4719-8744 6819-6915 24 Hour Total  
I 
N 
T 
A 
K 
E 
 NG/ 265 5091  1080 Water Flush Volume (mL) (PEG/Gastrostomy Tube 10/06/18) 200 200 300  300 Medication Volume (PEG/Gastrostomy Tube 10/06/18) 60 60 100  100 Intake (ml) (PEG/Gastrostomy Tube 10/06/18) 60 60 680  680 Shift Total 
(mL/kg) 320 
(4.7) 320 
(4.7) 1080 
(16)  1080 
(16) O 
U T 
P 
U Ilah Austin Urine (mL/kg/hr) 150 150 Urine Voided 150 150 Urine Occurrence(s) 2 x 2 x 0 x  0 x Stool Stool Occurrence(s) 0 x 0 x 0 x  0 x Shift Total 
(mL/kg) 150 
(2.2) 150 
(2.2)  549 1153  1080 Weight (kg) 67.6 67.6 67.6 67.6 67.6 Recommendations: 1. Patient needs and requests: repositioning 2. Diet: NPO - Jevity 1.5 bolus feeds 3. Pending tests/procedures: am labs 4. Functional Level/Equipment: w/c 
 
5. Estimated Discharge Date:  Posted on Whiteboard in Patients Room: [facility] Safety Check A safety check occurred in the patient's room between off going nurse and oncoming nurse listed above. The safety check included the below items Area Items H High Alert Medications ? Verify all high alert medication drips (heparin, PCA, etc.) E Equipment ? Suction is set up for ALL patients (with sonia) ? Red plugs utilized for all equipment (IV pumps, etc.) ? WOWs wiped down at end of shift. ? Room stocked with oxygen, suction, and other unit-specific supplies A Alarms ? Bed alarm is set for fall risk patients ? Ensure chair alarm is in place and activated if patient is up in a chair L Lines ? Check IV for any infiltration ? Suarez bag is empty if patient has a Suarez ? Tubing and IV bags are labeled Patricalyn Sonia Safety ? Room is clean, patient is clean, and equipment is clean. ? Hallways are clear from equipment besides carts. ? Fall bracelet on for fall risk patients ? Ensure room is clear and free of clutter ? Suction is set up for ALL patients (with sonia) ? Hallways are clear from equipment besides carts. ? Isolation precautions followed, supplies available outside room, sign posted Leo Ibarra RN

## 2018-10-09 NOTE — REHAB NOTE
SHIFT CHANGE NOTE FOR Socrates Bustos Bedside and Verbal shift change report given to Isaac Ordoñez (oncoming nurse) by Cori Taylor (offgoing nurse). Report included the following information SBAR, Kardex, MAR and Recent Results. Situation: 
 Code Status: DNR Reason for Admission:  
Hospital Day: 1 Problem List:  
Hospital Problems  Date Reviewed: 12/11/2017 Codes Class Noted POA Hypertensive heart disease without heart failure (Chronic) ICD-10-CM: I11.9 ICD-9-CM: 402.90  Unknown Yes Status post insertion of percutaneous endoscopic gastrostomy (PEG) tube ([facility] ICD-10-CM: Z93.1 ICD-9-CM: V44.1  10/5/2018 Yes * (Principal)Acute ischemic stroke ([facility] ICD-10-CM: I63.9 ICD-9-CM: 434.91  10/2/2018 Yes Overview Signed 10/8/2018  6:32 PM by Chris Coyne MD  
  Acute Ischemic Stroke (acute infarct of the posterior superior right basal ganglia and adjacent right corona radiata) with residual left hemiparesis, dysphagia and dysarthria Impaired mobility and ADLs ICD-10-CM: Z74.09 
ICD-9-CM: 799.89  10/2/2018 Yes Hemiparesis affecting left side as late effect of cerebrovascular accident (CVA) ([facility] ICD-10-CM: N95.427 ICD-9-CM: 438.20  10/2/2018 Yes Dysphagia as late effect of cerebrovascular accident (CVA) ICD-10-CM: G25.340 ICD-9-CM: 438.82  10/2/2018 Yes 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) ICD-10-CM: U99.253 ICD-9-CM: 438.13  10/2/2018 Yes Background: 
 Past Medical History:  
Past Medical History:  
Diagnosis Date  Acute ischemic stroke ([facility] 10/2/2018 Acute Ischemic Stroke (acute infarct of the posterior superior right basal ganglia and adjacent right corona radiata) with residual left hemiparesis, dysphagia and dysarthria  Benign prostatic hyperplasia  Bilateral hearing loss  Cervical spinal stenosis 10/2/2018  Chronic obstructive pulmonary disease (COPD) ([facility]  Coronary artery disease involving native coronary artery of native heart  Diastolic dysfunction without heart failure  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) 10/2/2018  Dyslipidemia  Dysphagia as late effect of cerebrovascular accident (CVA) 10/2/2018  Gastric ulcer  Glaucoma  Hemiparesis affecting left side as late effect of cerebrovascular accident (CVA) ([facility] 10/2/2018  History of kidney stones  Hypertensive heart disease without heart failure  Hypothyroidism  Meningioma ([facility] 10/3/2018 Small right parafalcine meningioma without mass effect  Nocturia  Urinary frequency Patient taking anticoagulants yes Patient has a defibrillator: no  
 
Assessment: 
? Changes in Assessment throughout shift: none ? Patient has central line: no Reasons if yes: n/a Insertion date: none Last dressing date: N/A 
? Patient has Suarez Cath: no Reasons if yes: N/A Insertion date:N/A 
 
? Last Vitals: 
  
Vitals:  
 10/08/18 2046 BP: 165/59 Pulse: 65 Resp: 18 Temp: 97.3 °F (36.3 °C) SpO2: 97% Weight: 67.6 kg (149 lb) Height: 5' 6\" (1.676 m) ? PAIN Pain Assessment Pain Intensity 1: 0 (10/09/18 0205) Pain Intensity 1: 2 (12/29/14 1105) Pain Location 1: Abdomen Pain Intervention(s) 1: Medication (see MAR) Patient Stated Pain Goal: 0 Patient Stated Pain Goal: 0 
o Intervention effective: yes   
o Other actions taken for pain: comfort measures - repositioning - no pain ? Skin Assessment Skin color Condition/Temperature Integrity Turgor Weekly Pressure Ulcer Documentation Wound Prevention & Protection Methods Orientation of wound Orientation of Wound Prevention: Mid 
Location of Prevention Location of Wound Prevention: Abdomen Dressing Present Dressing Present : Yes Dressing Status Dressing Status: Intact Wound Offloading ?  INTAKE/OUPUT 
 
 Date 10/08/18 0700 - 10/09/18 3517 10/09/18 0700 - 10/10/18 8582 Shift 0700-1859 1900-0659 24 Hour Total 0700-1859 1900-0659 24 Hour Total  
I 
N 
T 
A 
K 
E Shift Total 
(mL/kg) O 
U T 
P 
U Kyle Bail Urine  150 150 Urine Voided  150 150 Urine Occurrence(s)  1 x 1 x Stool Stool Occurrence(s)  0 x 0 x Shift Total 
(mL/kg)  150 
(2.2) 150 
(2.2) NET  -150 -150 Weight (kg)  67.6 67.6 67.6 67.6 67.6 Recommendations: 1. Patient needs and requests: repositioning 2. Diet: NPO - Jevity 1.5 bolus feeds 3. Pending tests/procedures: am labs 4. Functional Level/Equipment: w/c 
 
5. Estimated Discharge Date:  Posted on Whiteboard in Patients Room: [facility] Safety Check A safety check occurred in the patient's room between off going nurse and oncoming nurse listed above. The safety check included the below items Area Items H High Alert Medications ? Verify all high alert medication drips (heparin, PCA, etc.) E Equipment ? Suction is set up for ALL patients (with sonia) ? Red plugs utilized for all equipment (IV pumps, etc.) ? WOWs wiped down at end of shift. ? Room stocked with oxygen, suction, and other unit-specific supplies A Alarms ? Bed alarm is set for fall risk patients ? Ensure chair alarm is in place and activated if patient is up in a chair L Lines ? Check IV for any infiltration ? Suarez bag is empty if patient has a Suarez ? Tubing and IV bags are labeled [facility] Safety ? Room is clean, patient is clean, and equipment is clean. ? Hallways are clear from equipment besides carts. ? Fall bracelet on for fall risk patients ? Ensure room is clear and free of clutter ? Suction is set up for ALL patients (with sonia) ? Hallways are clear from equipment besides carts. ? Isolation precautions followed, supplies available outside room, sign posted Noé Hankins RN

## 2018-10-09 NOTE — PROGRESS NOTES
4 eyed skin assessment performed by Sonido Luis and Per Alexander RN - no noted skin breakdown. Skin intact. Skin tear with dressing noted to Left upper extremity with bruisng. Family states patient had a fall previously at home. Dry dressing noted to peg site. Small scratches noted to left lower leg. Maribel area intact.

## 2018-10-10 PROBLEM — E55.9 VITAMIN D DEFICIENCY: Chronic | Status: ACTIVE | Noted: 2018-10-09

## 2018-10-10 LAB — 25(OH)D3 SERPL-MCNC: 22.5 NG/ML (ref 30–100)

## 2018-10-10 PROCEDURE — 74011250636 HC RX REV CODE- 250/636: Performed by: INTERNAL MEDICINE

## 2018-10-10 PROCEDURE — 97530 THERAPEUTIC ACTIVITIES: CPT

## 2018-10-10 PROCEDURE — 97112 NEUROMUSCULAR REEDUCATION: CPT

## 2018-10-10 PROCEDURE — 65310000000 HC RM PRIVATE REHAB

## 2018-10-10 PROCEDURE — 74011250637 HC RX REV CODE- 250/637: Performed by: INTERNAL MEDICINE

## 2018-10-10 PROCEDURE — 97542 WHEELCHAIR MNGMENT TRAINING: CPT

## 2018-10-10 PROCEDURE — 92526 ORAL FUNCTION THERAPY: CPT

## 2018-10-10 RX ORDER — HYDRALAZINE HYDROCHLORIDE 10 MG/1
20 TABLET, FILM COATED ORAL EVERY 12 HOURS
Status: DISCONTINUED | OUTPATIENT
Start: 2018-10-10 | End: 2018-10-30 | Stop reason: HOSPADM

## 2018-10-10 RX ORDER — MELATONIN
2000 2 TIMES DAILY
Status: DISCONTINUED | OUTPATIENT
Start: 2018-10-11 | End: 2018-10-30 | Stop reason: HOSPADM

## 2018-10-10 RX ADMIN — ASPIRIN 81 MG CHEWABLE TABLET 81 MG: 81 TABLET CHEWABLE at 09:14

## 2018-10-10 RX ADMIN — HEPARIN SODIUM 5000 UNITS: 5000 INJECTION, SOLUTION INTRAVENOUS; SUBCUTANEOUS at 06:28

## 2018-10-10 RX ADMIN — LEVOTHYROXINE SODIUM 50 MCG: 50 TABLET ORAL at 06:29

## 2018-10-10 RX ADMIN — ATORVASTATIN CALCIUM 80 MG: 40 TABLET, FILM COATED ORAL at 22:03

## 2018-10-10 RX ADMIN — PANTOPRAZOLE SODIUM 40 MG: 40 GRANULE, DELAYED RELEASE ORAL at 06:29

## 2018-10-10 RX ADMIN — LISINOPRIL 40 MG: 40 TABLET ORAL at 06:27

## 2018-10-10 RX ADMIN — AMLODIPINE BESYLATE 10 MG: 10 TABLET ORAL at 09:14

## 2018-10-10 RX ADMIN — TIMOLOL MALEATE 1 DROP: 5 SOLUTION OPHTHALMIC at 09:34

## 2018-10-10 RX ADMIN — DOCUSATE SODIUM 100 MG: 100 CAPSULE, LIQUID FILLED ORAL at 09:15

## 2018-10-10 RX ADMIN — Medication 50000 UNITS: at 16:43

## 2018-10-10 RX ADMIN — Medication 100 MG: at 00:00

## 2018-10-10 RX ADMIN — METOPROLOL TARTRATE 25 MG: 25 TABLET ORAL at 09:16

## 2018-10-10 RX ADMIN — FINASTERIDE 5 MG: 5 TABLET, FILM COATED ORAL at 18:07

## 2018-10-10 RX ADMIN — CLOPIDOGREL BISULFATE 75 MG: 75 TABLET, FILM COATED ORAL at 22:03

## 2018-10-10 RX ADMIN — Medication 1 TABLET: at 09:16

## 2018-10-10 RX ADMIN — ACETAMINOPHEN 650 MG: 650 SOLUTION ORAL at 11:57

## 2018-10-10 RX ADMIN — METOPROLOL TARTRATE 25 MG: 25 TABLET ORAL at 22:03

## 2018-10-10 RX ADMIN — TAMSULOSIN HYDROCHLORIDE 0.4 MG: 0.4 CAPSULE ORAL at 18:07

## 2018-10-10 RX ADMIN — HEPARIN SODIUM 5000 UNITS: 5000 INJECTION, SOLUTION INTRAVENOUS; SUBCUTANEOUS at 18:04

## 2018-10-10 RX ADMIN — DOCUSATE SODIUM 100 MG: 100 CAPSULE, LIQUID FILLED ORAL at 18:07

## 2018-10-10 RX ADMIN — HYDRALAZINE HYDROCHLORIDE 25 MG: 25 TABLET, FILM COATED ORAL at 06:27

## 2018-10-10 RX ADMIN — HYDRALAZINE HYDROCHLORIDE 20 MG: 10 TABLET, FILM COATED ORAL at 22:03

## 2018-10-10 RX ADMIN — MELATONIN TAB 3 MG 3 MG: 3 TAB at 22:03

## 2018-10-10 NOTE — PROGRESS NOTES
[x] Psychology  [] Social Work [] Recreational Therapy INTERVENTION  UNITS/TIME OF SERVICE Assessment October 10, 2018 Supportive Counseling Orientation Discharge Planning Resource Linkage Progress/Current Status Attempt to complete Psychological Evaluation, as requested on admission to ARU by Dr. Kang, was unsuccessful as patient is found having extreme problems with coughing/choking secondary to problems with swallow. It was deemed too stressful to try and engage him in conversation at this time and attempt will be made at a later date. I asked nursing to try and provide him with some assistance.  
 
Juan Francisco Aragon, PHD 10/10/2018 9:53 AM

## 2018-10-10 NOTE — PROGRESS NOTES
Problem: Mobility Impaired (Adult and Pediatric) Goal: *Therapy Goal (Edit Goal, Insert Text) Physical Therapy Goals Initiated 10/9/2018 and to be accomplished within 7 day(s) 10/15/2018 1. Patient will move from supine to sit and sit to supine , scoot up and down and roll side to side in bed with moderate assistance . 2.  Patient will transfer from bed to chair and chair to bed with moderate assistance  using the least restrictive device. 3.  Patient will perform sit to stand with moderate assistance . 4. Patient will ambulate with maximal assistance for 5 feet with the least restrictive device. 5.  Patient will ascend/descend 1 stair with 1-2 handrail(s) with maximal assistance. 6.  Patient will perform 50 ft of wheelchair mobility with modified technique moderate assist for steering and negotiation of obstacles. 7.  Patient will be able to maintain sitting balance without support for at least 5 minutes with verbal cues only for maintaining midline/upright position for ease of transfers. Physical Therapy Goals Initiated 10/9/2018 and to be accomplished within 28 day(s) on 11/6/2018 1. Patient will move from supine to sit and sit to supine , scoot up and down and roll side to side in bed with supervision/set-up. 2.  Patient will transfer from bed to chair and chair to bed with supervision/set-up using the least restrictive device. 3.  Patient will perform sit to stand with supervision/set-up. 4.  Patient will ambulate with minimal assistance/contact guard assist for 50 feet with the least restrictive device. 5.  Patient will ascend/descend 4 stairs with 2 handrail(s) with minimal assistance/contact guard assist. 
6.  Patient will perform 150 ft of wheelchair mobility at modified independent level. 7.  Patient will demonstrate improved postural control for ease of functional mobility as demonstrated by PASS score >16/36 physical Therapy TREATMENT Patient: Quan Hernandez (38 y.o. male) Date: 10/10/2018 Diagnosis: R CVA Acute ischemic stroke ([facility] Acute ischemic stroke ([facility] Precautions: Aspiration, Fall (Head of bed 30-45 degrees at all times per speech therapist) Chart, physical therapy assessment, plan of care and goals were reviewed. Time In: 9715 Time Out: 1135 Patient Seen For: Balance activities;Transfer training; Wheelchair mobility; Patient education; Neuromuscular re-education Pain: No pain reporting during therapy session. Patient identified with name and :yes SUBJECTIVE:  
 
Patient agreeable to treatment; continues to report that he tires easily. OBJECTIVE DATA SUMMARY:  
Objective: BED/MAT MOBILITY Daily Assessment Supine to Sit : 2 (Maximal assistance) Sit to Supine : 2 (Maximal assistance) Needing min/moderate assist for rolling to left (paretic) side, cues and assist for bringing left LE off edge of bed and mat, assist at trunk/shoulders for coming into sitting position at edge of mat from left sidelying position. Has difficulty coming into midline position upon immediate sitting, needing assist initially with achieving appropriate upright and midline position as patient with tendency to lean/push laterally to left. TRANSFERS Daily Assessment Transfer Type: SPT without device Transfer Assistance : 2 (Maximal assistance) Sit to Stand Assistance: Maximum assistance Car Transfers: Not tested (Not yet assessed given difficulty of bed<->chair) Car Type: N/A Max assist required as patient needing lifting, pivoting and lowering assist for stand pivot transfer. Greater ease transferring to left side as patient pushing toward his left side. Sit<->stand reps from mat emphasis on maintaining more midline position.  Cueing patient to reach back to mat to his right side with his right hand to encourage more midline position as patient otherwise pushing with right LE and trunk toward his left side with sit<->stand. GAIT Daily Assessment Amount of Assistance: 0 (Not tested) Not emphasis of treatment today. Will progress with pre-gait as appropriate. STEPS or STAIRS Daily Assessment Steps/Stairs Ambulated (#): 1 Level of Assist : 1 (Dependent/total care) (Assist x 2 to perform task) Rail Use: Right Needing 2-person assist for negotiation of stairs with total assist with weightshift, blocking of left knee to allow right LE to step up onto step and then lifting assist up onto step and assist with bringing left LE onto step. Needing assist with safe stepping off of step and lowering back into chair. BALANCE Daily Assessment Sitting - Static: Fair (occasional); Occassional;Poor (constant support) Sitting - Dynamic: Poor (constant support) Standing - Static: Poor Standing - Dynamic : Impaired Needing assist initially with obtaining midline and upright position for initial sitting balance edge of bed and mat, then needing only occasional min assist and verbal cues 50-75% of the time. WHEELCHAIR MOBILITY Daily Assessment Able to Propel (ft): 120 feet Functional Level: 2 Curbs/Ramps Assist Required (FIM Score): 0 (Not tested) Wheelchair Setup Assist Required : 2 (Maximal assistance) Wheelchair Management: Manages right brake Better able to perform wheelchair mobility today with tyrell-technique using right UE and LE to perform w/c mobility; cues for scanning environment appropriately for management of obstacles and for steering straight. Still needing assist with left brake, left footrest.   
 
 
Neuro Re-Education: 
Sitting balance edge of mat, leaning laterally initially to right and then coming back into midline position, postural mirror with plumbline for visual feedback of upright and midline position.  Progressing to laterally leaning to left elbow on wedge and needing moderate assist coming back into midline position. Progressed to static standing with parallel bar positioned only at patient's right while therapist facilitating from in front and then from the side to assist with patient standing in upright, midline position, needing mod/max assist for maintaining balance and needing blocking of left knee constantly. Cues for appropriate trunk position including avoiding trunk rotation. Supine (head/trunk propped up on wedge) bridges 3 x 10 reps emphasis on symmetrical lifting bilateral hips/pelvis to facilitate left LE muscle activation. ASSESSMENT: 
Patient would continue to benefit from neuro re-ed to improve ability to participate in transfers, progressing to pre-gait as appropriate. May benefit from co-treatment with OT to maximize ability to participate in functional mobility as patient with complex presentation with left UE/LE weakness and decreased postural control with trunk weakness. Continues to have difficulty with maintaining midline/upright postural control in sitting and standing; recommend ongoing core/trunk stabilization and postural awareness training particularly in sitting progressing to dynamic tasks and standing as appropriate. Better ability to participate in wheelchair mobility today. Progression toward goals: 
[x]      Improving appropriately and progressing toward goals 
[]      Improving slowly and progressing toward goals 
[]      Not making progress toward goals and plan of care will be adjusted PLAN: 
Patient continues to benefit from skilled intervention to address the above impairments. Continue treatment per established plan of care. Discharge Recommendations:  [address] depending on progression with functional mobility and caregiver needs Further Equipment Recommendations for Discharge:  wheelchair currently recommended given significant difficulty participating in standing tasks, and other equipment for gait not yet been able to assess as patient unable to safely gait at this time. Will reassess as appropriate based on patient's progress. Estimated LOS: 4 weeks Activity Tolerance:  
Fair, fatigues quickly. After treatment:  
Patient left seated in wheelchair, call button within reach and family in room. Rosalie Butler, PT 
10/10/2018

## 2018-10-10 NOTE — CONSULTS
ARU PSYCHOLOGICAL SCREENING    Assessment Initiated:  October 10, 2018    Rehab Diagnosis:  Right CVA    Pertinent Physical/Psychiatric History:     Patient Active Problem List   Diagnosis Code    Urinary frequency R35.0    Nocturia R35.1    Glaucoma H40.9    History of kidney stones Z87.442    Acute ischemic stroke ([facility] I63.9    Impaired mobility and ADLs Z74.09    Hemiparesis affecting left side as late effect of cerebrovascular accident (CVA) ([facility] [address] [address] R99.576    Dysphagia as late effect of cerebrovascular accident (CVA) I74.200    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) I69.322    Cervical spinal stenosis M48.02    Meningioma ([facility] [address] D32.9    Hypertensive heart disease without heart failure J61.4    Diastolic dysfunction without heart failure I51.89    Gastric ulcer K25.9    Coronary artery disease involving native coronary artery of native heart I25.10    Dyslipidemia E78.5    Chronic obstructive pulmonary disease (COPD) ([facility]) J44.9    Status post insertion of percutaneous endoscopic gastrostomy (PEG) tube ([facility]) Z93.1    Hypothyroidism E03.9    Benign prostatic hyperplasia N40.0    Bilateral hearing loss H91.93    History of transient ischemic attack (TIA) Z86.73    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 Patient has remote history of tobacco use but no other substance use nor dependency in his history. OBJECTIVE  GENERAL OBSERVATIONS  Willingness to participate in program: [] good   [] fair [x] indifferent [] poor    General Appearance:  Patient found lying in bed with upper torso elevated and wearing night clothes. Sensory Impairments:  Patient was initially consumed by uncontrolled coughing but was eventually able to calm himself, though requiring self suctioning of fluids.   He spoke in very low volume and slowly, appearing tired, especially after the coughing/choking episode. Patient has bilateral hearing loss, wears hearing aides, but was able to compensate for conversational purposes. Faith Affiliation:  Unknown    Admission Assessment  Discharge Status   [] alert  [x] lethargic  [] difficult to arouse  [] fluctuating  [] other: Level of Consciousness [] alert  [] lethargic  [] difficult to arouse  [] fluctuating  [] other:   [x] person  [] place  [] time  [x] situation Oriented [] person  [] place  [] time  [] situation   [] within normal limits  [x] impaired       [] mild        [x] moderate        [] severe Attention [] within normal limits  [] impaired       [] mild        [] moderate        [] severe   [] within normal limits  [x] impaired       [x] mild        [x] moderate        [] severe Memory [] within normal limits  [] impaired       [] mild        [] moderate        [] severe   [] appropriate to situation  [] depressed  [x] anxious  [] angry   [x] fearful  [] emotionally labile  [x] other: Patient describes immediate concerns about recent family dynamics between a son and patient's spouse.  Mood [] appropriate to situation  [] depressed  [] anxious  [] angry   [] fearful  [] emotionally labile  [] other:   [] appropriate  [x] flat  [] inappropriate to content of speech Affect [] appropriate  [] flat  [] inappropriate to content of speech   [] appropriate  [] aggressive/agitated  [x] withdrawn  [] inappropriate  [] other: Behavior [] appropriate  [] aggressive/agitated  [] withdrawn  [] inappropriate  [] other:   [] good  [x] limited  [] denial  [] none Insight Into Illness [] good  [] limited  [] denial  [] none   [] intact  [x] impaired       [x] mild        [x] moderate        [] severe       Describe:  Cognition [] intact  [] impaired       [] mild        [] moderate        [] severe       Describe:    [] coping  [x] demonstrates poor adjustment  [] undetermined       As evidenced by: Patient is overwhelmed by uncontrolled coughing/choking at times of initial contact and then appearing exhausted. Patient Adjustment to Disability [] coping  [] demonstrates poor adjustment  [] undetermined       As evidenced by:    [] coping  [] demonstrates poor adjustment  [x] undetermined      As evidenced by: Not available on contact. Family Adjustment to Disability [] coping  [] demonstrates poor adjustment  [] undetermined      As evidenced by:      ASSESSMENT  Clinical Impression: On my initial contact with patient this morning, he was consumed with uncontrolled coughing and choking, requiring self suctioning and allotted time to try and calm himself. Importantly, he was especially assisted by a nursing aide who provided him with outstanding support. On my later contact with him to complete this evaluation, patient was more calm though obviously tired after earlier episode. He is an 80year old, , retired ([employer] ),  gentleman who resides with spouse in one [place] residence with three steps to enter in [place], [place]. They have two sons residing locally. Unfortunately, he reflected on recent dynamics between his spouse and their younger son who apparently was angry that spouse did not initiate medical treatment for patient quickly enough, without relying later on their children's input to make decision for hospital evaluation. Ironically, patient suggests that he would have resisted earlier, regardless. He describes having had prior TIA but now feeling overwhelmed by extreme stroke sequelae. He reports that he has always been independent and was still operating an automobile. He is not entirely convincing that he is wholly motivated to participate in treatment program; and, instead, he simply seems tired and overwhelmed by what he must confront. Attempt will be made to provide stroke education, as appropriate.     Emotionally, patient denies history of psychiatric services and is only Rx Melatonin, presumably for sleep, at time of this evaluation. He actually denied feeling depressed and only acknowledged anxiety feelings about his situation and, specifically, the apparent problems that ensued between spouse and son. Patient is not displaying acute distress on evaluation and no lability is observed. But, he nonetheless seems depressed and may be a candidate for anti-depressant medication, which can be further assessed while he is on ARU and managed by Dr. Knag. Patient will be monitored for emotional and/or behavioral difficulties while he is on ARU and encouraged to persevere. However, it seems uncertain whether he actually has the stamina for acute rehabilitation and that minimum requirements for participation may simply be too demanding for him. Cognitively, patient was intermittently alert but sometimes lapsing with closed eyes. He was not entirely oriented. His problem solving and recall are impaired and he will require cues, prompts and redirection for maximum effort, problem solving and carry over. Patient Strengths:  Superficially pleasant and cooperative and reportedly was premorbidly independent. Patient Preferences:  Uncertain disposition, although he would prefer to return to home. Rehab Potential:  Guarded with uncertain stamina for acute rehabilitation. Educational Needs: Under each heading list the specific items in which the patient or family will need education/training.  Example: hip precautions, use of walker, ADL equipment, neglect, judgment, adjustment, etc.     Special considerations or accommodations for teaching:  [x] Yes     [] No     [] NA  If Yes, explain: Situational stress, exhaustion, cognitive sequelae Discharge Status    Completed Demonstrated/ Verbalized Understanding    Yes No Yes No   Info regarding disability: Limited insight about recovery [] [] [] []   Adjustment: Increased dependency [] [] [] []   Cognition: Impaired problem solving and recall [] [] [] []   Other: Situational stress [] [] [] []   Other:  [] [] [] []   If education not completed, explain: [] [] [] []     PLAN  Problem: Limited insight about all aspects of recovery  Long Term Goal: Increase insight about recovery  Intervention: Patient/storke education  At Discharge  LTG Achieved: [] Yes [] No If not achieved, explain:    Problem: Forced dependency  Long Term Goal: Accept increased dependency  Intervention: Support  and patient education  At Discharge  LTG Achieved: [] Yes [] No If not achieved, explain:    Problem: Situational stress  Long Term Goal: Attempt to minimize subjective distress  Intervention: Support   At Discharge  LTG Achieved: [] Yes [] No If not achieved, explain:    Problem: Cognitive sequelae  Long Term Goal: Maximize attention, problem solving and carry over  Intervention: Cues, prompts and redirection  At Discharge  LTG Achieved: [] Yes [] No If not achieved, explain:    Problem:   Long Term Goal:   Intervention:   At Discharge  LTG Achieved: [] Yes [] No If not achieved, explain:    Cary Smart, THE [facility]  10/10/2018 7:45 PM    DISCHARGE STATUS    Clinical Impressions:      Follow-up Services Recommended Purpose                 Cary Smart, PHD  Discharge Date/Time:

## 2018-10-10 NOTE — REHAB NOTE
Bedside and Verbal shift change report given to Connie Key RN (oncoming nurse) by Kelsey Alex RN (offgoing nurse). Report included the following information SBAR, Kardex, MAR and Recent Results. SITUATION:  
? Code Status: DNR Reason for Admission: R CVA ? Acute ischemic stroke ([facility] [place] [facility] ? Hospital day: 2 
? Problem List:  
   
Hospital Problems  Date Reviewed: 10/9/2018 Codes Class Noted POA History of transient ischemic attack (TIA) ICD-10-CM: M34.06 
ICD-9-CM: V12.54  Unknown Yes Overview Signed 10/9/2018  3:28 PM by Lizzy Haile MD  
  2x Hypertensive heart disease without heart failure (Chronic) ICD-10-CM: I11.9 ICD-9-CM: 402.90  Unknown Yes Status post insertion of percutaneous endoscopic gastrostomy (PEG) tube ([facility] [place] [facility] ICD-10-CM: Z93.1 ICD-9-CM: V44.1  10/5/2018 Yes * (Principal)Acute ischemic stroke ([facility] [place] ICD-10-CM: I63.9 ICD-9-CM: 434.91  10/2/2018 Yes Overview Signed 10/8/2018  6:32 PM by Lizzy Haile MD  
  Acute Ischemic Stroke (acute infarct of the posterior superior right basal ganglia and adjacent right corona radiata) with residual left hemiparesis, dysphagia and dysarthria Impaired mobility and ADLs ICD-10-CM: Z74.09 
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  
 Past Medical History:  
Past Medical History:  
Diagnosis Date  Acute ischemic stroke ([facility] 10/2/2018  Acute Ischemic Stroke (acute infarct of the posterior superior right basal ganglia and adjacent right corona radiata) with residual left hemiparesis, dysphagia and dysarthria  Benign prostatic hyperplasia  Bilateral hearing loss  Cervical spinal stenosis 10/2/2018  Chronic obstructive pulmonary disease (COPD) ([facility]  Coronary artery disease involving native coronary artery of native heart  Diastolic dysfunction without heart failure  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) 10/2/2018  Dyslipidemia  Dysphagia as late effect of cerebrovascular accident (CVA) 10/2/2018  Gastric ulcer  Glaucoma  Hemiparesis affecting left side as late effect of cerebrovascular accident (CVA) ([facility] [address] 10/2/2018  History of kidney stones  History of transient ischemic attack (TIA) 2x  Hypertensive heart disease without heart failure  Hypothyroidism  Meningioma ([facility] [address] [facility] 10/3/2018 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  
? Changes in Assessment Throughout Shift: none ? Patient has Central Line: no Reasons if yes:  
? Patient has Suarez Cath: no Reasons if yes:   
 
? Last Vitals: 
  
Vitals:  
 10/09/18 1553 10/09/18 2100 10/10/18 7037 10/10/18 5308 BP: 146/60 133/58 132/60 163/67 Pulse: 70 85 88 65 Resp: 19 20  18 Temp: 97.8 °F (36.6 °C) 98.8 °F (37.1 °C)  99 °F (37.2 °C) SpO2: 100% 99%  98% Weight:      
Height:      
 
 
? IV and DRAINS (will only show if present) PEG/Gastrostomy Tube 10/06/18-Site Assessment: Intact ? WOUND (if present) Wound Type:  none Dressing present Dressing Present : No 
 Wound Concerns/Notes:  none ? PAIN Pain Assessment Pain Intensity 1: 0 (10/10/18 0739) Patient Stated Pain Goal: 0 
o Interventions for Pain:  none 
o Intervention effective: no 
o Time of last intervention: see flowsheet o Reassessment Completed: yes ? Last 3 Weights: 
Last 3 Recorded Weights in this Encounter 10/08/18 2046 Weight: 67.6 kg (149 lb) Weight change: ? INTAKE/OUPUT Current Shift: 10/10 0701 - 10/10 1900 In: -  
Out: 225 [Urine:225] Last three shifts: 10/08 1901 - 10/10 0700 In: 2280 Out: 150 [Urine:150] ? LAB RESULTS Recent Labs 10/09/18 
 6752 WBC  6.8 HGB  13.1 HCT  36.4 PLT  146 Recent Labs 10/09/18 
 1602 NA  143  
K  4.2 GLU  108* BUN  20* CREA  0.77 CA  8.1*  
MG  2.3 RECOMMENDATIONS AND DISCHARGE PLANNING 1. Pending tests/procedures/ Plan of Care or Other Needs: labs 2. Discharge plan for patient and Needs/Barriers: SNF 3. Estimated Discharge Date: TBD Posted on Whiteboard in Patients Room: no   
 
4. The patient's care plan was reviewed with the oncoming nurse. \"HEALS\" SAFETY CHECK Fall Risk Total Score: 6 Safety Measures: Safety Measures: Bed/Chair alarm on, Bed/Chair-Wheels locked, Bed in low position, Call light within reach A safety check occurred in the patient's room between off going nurse and oncoming nurse listed above. The safety check included the below items Area Items H High Alert Medications ? Verify all high alert medication drips (heparin, PCA, etc.) E Equipment ? Suction is set up for ALL patients (with yanker) ? Red plugs utilized for all equipment (IV pumps, etc.) ? WOWs wiped down at end of shift. ? Room stocked with oxygen, suction, and other unit-specific supplies A Alarms ? Bed alarm is set for fall risk patients ? Ensure chair alarm is in place and activated if patient is up in a chair L Lines ? Check IV for any infiltration ? Suarez bag is empty if patient has a Suarez ? Tubing and IV bags are labeled Leaf[employer] Rink Safety ? Room is clean, patient is clean, and equipment is clean. ? Hallways are clear from equipment besides carts. ? Fall bracelet on for fall risk patients ? Ensure room is clear and free of clutter ? Suction is set up for ALL patients (with sonia) ? Hallways are clear from equipment besides carts. ? Isolation precautions followed, supplies available outside room, sign posted Caamcho Lopez RN

## 2018-10-10 NOTE — PROGRESS NOTES
[address] 
[address] [place] [address], [address] INPATIENT REHABILITATION 
DAILY PROGRESS NOTE Date: 10/10/2018 Name: Cindy Oneal Age / Sex: 80 y.o. / male CSN: [id number] MRN: [id number] Admit Date: 10/8/2018 Length of Stay: 2 days Primary Rehab Diagnosis: Impaired Mobility and ADLs secondary to Acute Ischemic Stroke (acute infarct of the posterior superior right basal ganglia and adjacent right corona radiata) with residual left hemiparesis, dysphagia and dysarthria Subjective:  
 
Patient seen and examined. Blood pressure fairly controlled. Patient's Complaint:  
No significant medical complaints Pain Control: no current joint or muscle symptoms, essentially pain-free Objective:  
 
Vital Signs: 
Patient Vitals for the past 24 hrs: 
 BP Temp Pulse Resp SpO2  
10/10/18 1327 109/52 - 64 - -  
10/10/18 0735 163/67 99 °F (37.2 °C) 65 18 98 % 10/10/18 0627 132/60 - 88 - -  
10/09/18 2100 133/58 98.8 °F (37.1 °C) 85 20 99 % 10/09/18 1553 146/60 97.8 °F (36.6 °C) 70 19 100 % Physical Examination: 
GENERAL SURVEY: Patient is awake, alert, oriented x 3, sitting comfortably on the chair, not in acute respiratory distress. HEENT: pink palpebral conjunctivae, anicteric sclerae, no nasoaural discharge, moist oral mucosa NECK: supple, no jugular venous distention, no palpable lymph nodes CHEST/LUNGS: symmetrical chest expansion, good air entry, clear breath sounds HEART: adynamic precordium, good S1 S2, no S3, regular rhythm, no murmurs ABDOMEN: (+) PEG tube in place, flat, bowel sounds appreciated, soft, non-tender EXTREMITIES: (+) amputated distal phalanx of the thumb of the left hand, pink nailbeds, no edema, full and equal pulses, no calf tenderness NEUROLOGICAL EXAM: The patient is awake, alert and oriented x3, able to answer questions fairly appropriately, able to follow 1 and 2 step commands. Able to tell time from the wall clock. Cranial nerves II-XII are grossly intact except for slurred speech and dysphagia. No gross sensory deficit. Motor strength is 4+/5 on the RUE and RLE, 4/5 on the LUE, 4-/5 on the LLE. Current Medications: 
Current Facility-Administered Medications Medication Dose Route Frequency  pneumococcal 23-valent (PNEUMOVAX 23) injection 0.5 mL  0.5 mL IntraMUSCular PRIOR TO DISCHARGE  hydrALAZINE (APRESOLINE) tablet 25 mg  25 mg Per G Tube Q8H  
 docusate sodium (COLACE) capsule 100 mg  100 mg Per G Tube BID  
 bisacodyl (DULCOLAX) suppository 10 mg  10 mg Rectal Q48H PRN  
 heparin (porcine) injection 5,000 Units  5,000 Units SubCUTAneous Q12H  
 acetaminophen (TYLENOL) solution 650 mg  650 mg Oral Q4H PRN  
 amLODIPine (NORVASC) tablet 10 mg  10 mg Per G Tube DAILY  co-enzyme Q-10 (CO Q-10) capsule 100 mg  100 mg Oral DAILY  vitamin B complex tablet  1 Tab Oral DAILY  melatonin tablet 3 mg  3 mg Per G Tube QHS  aspirin chewable tablet 81 mg  81 mg Per G Tube DAILY  atorvastatin (LIPITOR) tablet 80 mg  80 mg Per G Tube QHS  clopidogrel (PLAVIX) tablet 75 mg  75 mg PEG Tube DAILY  lisinopril (PRINIVIL, ZESTRIL) tablet 40 mg  40 mg Per G Tube DAILY  pantoprazole (PROTONIX) granules for oral suspension 40 mg  40 mg Per G Tube ACB  finasteride (PROSCAR) tablet 5 mg  5 mg Oral QPM  
 levothyroxine (SYNTHROID) tablet 50 mcg  50 mcg Per G Tube 6am  
 metoprolol tartrate (LOPRESSOR) tablet 25 mg  25 mg Per G Tube Q12H  tamsulosin (FLOMAX) capsule 0.4 mg  0.4 mg Oral QPM  
 timolol (TIMOPTIC) 0.5 % ophthalmic solution 1 Drop  1 Drop Both Eyes DAILY Allergies: Allergies Allergen Reactions  Codeine Other (comments) Functional Progress: PHYSICAL THERAPY 
 
ON ADMISSION MOST RECENT Wheelchair Mobility/Management Able to Propel (ft): 15 feet Functional Level: 1 Curbs/Ramps Assist Required (FIM Score): 0 (Not tested) Wheelchair Setup Assist Required : 2 (Maximal assistance) Wheelchair Management: Manages right brake Wheelchair Mobility/Management Able to Propel (ft): 15 feet Functional Level: 1 Curbs/Ramps Assist Required (FIM Score): 0 (Not tested) Wheelchair Setup Assist Required : 2 (Maximal assistance) Wheelchair Management: Manages right brake Gait Amount of Assistance: 1 (Dependent/total assistance) Distance (ft): 2 Feet (ft) Assistive Device: Other (comment) (parallel bars) Gait Amount of Assistance: 1 (Dependent/total assistance) Distance (ft): 2 Feet (ft) Assistive Device: Other (comment) (parallel bars) Balance-Sitting/Standing Sitting - Static: Fair (occasional) Sitting - Dynamic: Poor (constant support) Standing - Static: Poor, Constant support Standing - Dynamic : Impaired Other (comment): Postural Assessment Scale for Stroke Patients (PASS): 6/36 Indicating significant difficulty maintaining and changing postures Balance-Sitting/Standing Sitting - Static: Fair (occasional) Sitting - Dynamic: Poor (constant support) Standing - Static: Poor, Constant support Standing - Dynamic : Impaired Other (comment): Postural Assessment Scale for Stroke Patients (PASS): 6/36 Indicating significant difficulty maintaining and changing postures Bed/Mat Mobility Rolling Right : 3 (Moderate assistance ) Rolling Left : 3 (Moderate assistance ) Supine to Sit : 2 (Maximal assistance) Sit to Supine : 2 (Maximal assistance) Bed/Mat Mobility Rolling Right : 2 (Maximal assistance) Rolling Left : 2 (Maximal assistance) Supine to Sit : 2 (Maximal assistance) Sit to Supine : 2 (Maximal assistance) Transfers Transfer Type: SPT without device Transfer Assistance : 2 (Maximal assistance) Sit to Stand Assistance: Maximum assistance Car Transfers: Not tested Car Type: N/A Transfers Transfer Type: SPT without device Transfer Assistance : 2 (Maximal assistance) Sit to Stand Assistance: Maximum assistance Car Transfers: Not tested Car Type: N/A Steps or Stairs Steps/Stairs Ambulated (#): 0 Level of Assist : 0 (Not tested) (not safe to assess at this time) Steps or Stairs Steps/Stairs Ambulated (#): 0 Level of Assist : 0 (Not tested) (not safe to assess at this time) Lab/Data Review: No results found for this or any previous visit (from the past 24 hour(s)). Estimated Glomerular Filtration Rate: On admission, estimated GFR based on a Creatinine of 0.77 mg/dl: 
 Using CKD-EPI = 81.6 mL/min/1.73m2 Using MDRD = 101.6 mL/min/1.73m2 Assessment:  
 
Primary Rehabilitation Diagnosis 1. Impaired Mobility and ADLs 2. 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 
  
Comorbidities  Urinary frequency R35.0  Nocturia R35.1  Glaucoma H40.9  History of kidney stones Z87.442  Cervical spinal stenosis M48.02  
 Meningioma  D32.9  Hypertensive heart disease without heart failure I11.9  Diastolic dysfunction without heart failure I51.89  
 Gastric ulcer K25.9  Coronary artery disease involving native coronary artery of native heart I25.10  Dyslipidemia E78.5  Chronic obstructive pulmonary disease (COPD)  J44.9  Status post insertion of percutaneous endoscopic gastrostomy (PEG) tube  Z93.1  Hypothyroidism E03.9  Benign prostatic hyperplasia N40.0  Bilateral hearing loss H91.93  
 History of transient ischemic attack (TIA) Z86.73  
 Traumatic amputation of left thumb P15.919M  
  
 
Plan: 1. Justification for continued stay: Good progression towards established rehabilitation goals. 2. Medical Issues being followed closely: 
  [x]  Fall and safety precautions  
  []  Wound Care  
  [x]  Bowel and Bladder Function [x]  Fluid Electrolyte and Nutrition Balance  
  []  Pain Control 3. Issues that 24 hour rehabilitation nursing is following: 
  [x]  Fall and safety precautions  
  []  Wound Care  
  [x]  Bowel and Bladder Function  
  [x]  Fluid Electrolyte and Nutrition Balance  
  []  Pain Control   
  [x]  Assistance with and education on in-room safety with transfers to and from the bed, wheelchair, toilet and shower. 4. Acute rehabilitation plan of care: 
  [x]  Continue current care and rehab. [x]  Physical Therapy [x]  Occupational Therapy [x]  Speech Therapy  
  []  Hold Rehab until further notice 5. Medications: 
  [x]  MAR Reviewed [x]  Continue Present Medications 6. DVT Prophylaxis:   
  []  Lovenox [x]  Unfractionated Heparin  
  []  Coumadin  
  []  NOAC  
  []  BECKY Stockings  
  []  Sequential Compression Device []  None 7. Orders:  
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 
 > Aspirin 81 mg via PEG tube once daily in AM 
 > Atorvastatin 80 mg via PEG tube q HS 
 > Coenzyme Q10 100 mg via PEG tube once daily 
 > Clopidogrel 75 mg via PEG tube once daily in PM 
 > Vitamin B complex 1 tab via PEG tube once daily 
 
> Dysphagia as late effect of CVA; S/P Insertion of percutaneous endoscopic gastrostomy (PEG) tube (10/5/2018) 
 > NPO with tube feeding 
 > Jevity 1.5 237 ml via PEG tube 5 times daily (6AM, 10AM, 2PM, 6PM, 10PM) > Water flush 100 ml via PEG tube vefore and after each bolus feeding 
 
> Hypertensive heart disease without heart failure 
 > Amlodipine 10 mg via PEG tube once daily (9AM) 
 > Decrease Hydralazine from 50 mg to 25 mg via PEG tube q 8 hr (6AM, 2PM, 10PM) > Lisinopril 40 mg via PEG tube once daily (6AM) 
 > Metoprolol tartrate 25 mg via PEG tube q 12 hr (9AM, 9PM) 8. Patient's progress in rehabilitation and medical issues discussed with the patient. All questions answered to the best of my ability. Care plan discussed with patient and nurse. Signed:    Lyla Stack MD 
  October 10, 2018

## 2018-10-10 NOTE — PROGRESS NOTES
Problem: Self Care Deficits Care Plan (Adult) Goal: *Therapy Goal (Edit Goal, Insert Text) Occupational Therapy Goals Long Term Goals Initiated 10/9/18 and to be accomplished within 4 week(s) 1. Pt will perform self-feeding with total dependence. 2. Pt will perform grooming with supervision. 3. Pt will perform UB bathing with Min A. 
4. Pt will perform LB bathing with Min A prn AE. 5. Pt will perform tub/shower transfer with Mod A.  
6. Pt will perform UB dressing with Setup. 7. Pt will perform LB dressing with Min A prn AE. 8. Pt will perform toileting task with Min A. 
9. Pt will perform toilet transfer with Mod A. Short Term Goals Initiated 10/9/18 and to be accomplished within 7 day(s) 10/16/18 1. Pt will perform grooming with CGA. 2. Pt will perform UB bathing with Mod A. 
3. Pt will perform LB bathing with Mod A prn AE. 4. Pt will perform tub/shower transfer with Mod A. 
5. Pt will perform UB dressing with Mod A. 
6. Pt will perform LB dressing with Mod A prn AE. 7. Pt will perform toileting task with Max A. 
8. Pt will perform toilet transfer with Mod A. Occupational Therapy TREATMENT Patient: Yusra Rothman 80 y.o. Patient identified with name and : yes Date: 10/10/2018 Time In: 1330 Time Out[de-identified] 1500 Diagnosis: R CVA Acute ischemic stroke ([facility] [address] Precautions: Aspiration, Fall (Head of bed 30-45 degrees at all times per speech therapist) Chart, occupational therapy assessment, plan of care, and goals were reviewed. Pain: 
Pt reports 4/10 pain or discomfort prior to treatment in left shoulder. Pt reports 0/10 pain or discomfort post treatment. Intervention Provided: Rest Breaks SUBJECTIVE:  
Patient reported he was tired at initiation of treatment. Patient observed to fall asleep during first 10 minutes of NMRE exercises.  Following continued prompting from OTS to keep head up and to complete the exercises, patient reported he was starting to wake up a little, and demonstrated higher arousal.  
 
OBJECTIVE DATA SUMMARY:  
 
NMRE Daily Assessment Pt seated in wheelchair at table top completed skateboard exercises. Pt performed abduction and adduction with and without right arm self-assist. Pt moved arm to targets with mod assist for abduction and min assist for adduction when pt completed movement without right arm self-assist.  
 
Pt seated in wheelchair perform scapular elevation and retraction with visual mirror feedback: 4 sets x 3 reps. Scapular elevation required overload on the right shoulder to facilitate flow on the left scapula. Scapular retraction required max vcs to lean forward and press chest forward. Minimal movement detected in left scapula for both retraction and elevation. Pt seated in wheelchair perform self assisted PROM elbow flexion  (2 sets x 10 reps) and elbow extension (3 sets x 10 reps) via interlocking fingers. Pt required min A to interlock fingers and total assist to stabilize left elbow to facilitate full ROM. MOBILITY/TRANSFERS Daily Assessment Pt wc<>stand with min A. Pt complete stand pivot transfer wc<>bed with mod A. Pt eob<>supine with max A and max vcs for body positioning. ASSESSMENT: 
Patient continues to demonstrate lateral lean and hunched posture when sitting upright. Patient requires max vcs to keep head and shoulders up when sitting upright. Patient demonstrates decreased endurance/ activity tolerance. Patient continues to require mod assist and max vcs for functional transfers. Patient would benefit from continued skilled OT services to increase his independence and safety in self care tasks and functional transfers. Progression toward goals: 
[]          Improving appropriately and progressing toward goals [x]          Improving slowly and progressing toward goals []          Not making progress toward goals and plan of care will be adjusted PLAN: 
Patient continues to benefit from skilled intervention to address the above impairments. Continue treatment per established plan of care. Discharge Recommendations:  Home health vs SNF 2/2 progress Further Equipment Recommendations for Discharge: Shower chair with backrest, bedside commode 2/2 progress and discharge to home environment, installation of grab bars near toilet and shower for safety Activity Tolerance: 
poor Estimated LOS: 11/6/18 Please refer to the flowsheet for vital signs taken during this treatment. After treatment:  
[]  Patient left in no apparent distress sitting up in chair  
[x]  Patient left in no apparent distress in bed 
[x]  Call bell left within reach 
[]  Nursing notified 
[]  Caregiver present 
[]  Bed alarm activated COMMUNICATION/EDUCATION:  
[] Home safety education was provided and the patient/caregiver indicated understanding. [x] Patient/family have participated as able in goal setting and plan of care. [x] Patient/family agree to work toward stated goals and plan of care. [] Patient understands intent and goals of therapy, but is neutral about his/her participation. [] Patient is unable to participate in goal setting and plan of care.  
 
 
Shanna Macias, OTS

## 2018-10-10 NOTE — REHAB NOTE
[address] 
[address] [place] [address], [address] INPATIENT REHABILITATION 
OVERALL PLAN OF CARE Name: Quan Hernandez CSN: [id number] Age: 80 y.o. MRN: [id number] Sex: male Admit Date: 10/8/2018 Primary Rehabilitation Diagnosis 1. Impaired Mobility and ADLs 2. 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 
  
Comorbidities  Urinary frequency R35.0  Nocturia R35.1  Glaucoma H40.9  History of kidney stones Z87.442  Cervical spinal stenosis M48.02  
 Meningioma  D32.9  Hypertensive heart disease without heart failure I11.9  Diastolic dysfunction without heart failure I51.89  
 Gastric ulcer K25.9  Coronary artery disease involving native coronary artery of native heart I25.10  Dyslipidemia E78.5  Chronic obstructive pulmonary disease (COPD)  J44.9  Status post insertion of percutaneous endoscopic gastrostomy (PEG) tube  Z93.1  Hypothyroidism E03.9  Benign prostatic hyperplasia N40.0  Bilateral hearing loss H91.93  
 History of transient ischemic attack (TIA) Z86.73  
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  
  
 
ANTICIPATED INTERVENTIONS THAT SUPPORT THE MEDICAL NECESSITY OF THIS ADMISSION: 
 
I. Physical Therapy 
            A. Intensity: 1 hour per day 
            B. Frequency: 5 times per week 
            C. Duration: 4 weeks             I. Long Term Goals: 1. Patient will move from supine to sit and sit to supine , scoot up and down and roll side to side in bed with supervision/set-up. 2.  Patient will transfer from bed to chair and chair to bed with supervision/set-up using the least restrictive device. 3.  Patient will perform sit to stand with supervision/set-up.  
  4.  Patient will ambulate with minimal assistance/contact guard assist for 50 feet with the least restrictive device. 5.  Patient will ascend/descend 4 stairs with 2 handrail(s) with minimal assistance/contact guard assist. 
  6.  Patient will perform 150 ft of wheelchair mobility at modified independent level. 7.  Patient will demonstrate improved postural control for ease of functional mobility as demonstrated by PASS score >16/36 
 E. Interventions: Interventions may include range of motion (AROM, PROM B LE/trunk), motor function (B LE/trunk strengthening/coordination), activity tolerance (vitals, oxygen saturation levels), bed mobility training, balance activities, gait training (progressive ambulation program), and functional transfer training. Goals to be updated as appropriate based on patient's progress. II. Occupational Therapy 
21 . Intensity: 1 hour per day 
            B. Frequency: 5 times per week 
            C. Duration: 4 weeks             P. Long Term Goals: 1. Pt will perform self-feeding with total dependence. 2. Pt will perform grooming with supervision. 3. Pt will perform UB bathing with Min A. 
  4. Pt will perform LB bathing with Min A prn AE. 5. Pt will perform tub/shower transfer with Mod A.  
  6. Pt will perform UB dressing with Setup. 7. Pt will perform LB dressing with Min A prn AE. 8. Pt will perform toileting task with Min A. 
  9. Pt will perform toilet transfer with Mod A. 
 E. Interventions: Interventions may include range of motion (AROM, PROM B UE), motor function (B UE/ strengthening/coordination), activity tolerance (vitals, oxygen saturation levels), balance training, ADL/IADL training and functional transfer training. III. Speech Therapy 
            A. Intensity: 1-2 times per day 
            B. Frequency: 4-7 times per week 
            C. Duration: 3 weeks             D. Long Term Goals: 1.  Patient will tolerate small amounts of PO using safe swallowing techniques without overt s/s of aspiration in 4/5 trials. 2. Patient will perform oral/pharyngeal strengthening exercises with supervision. 3. Patient will participate in laryngeal strengthening exercises and swallowing maneuvers, min cues - supervison. 4. Patient will recall 3 words after 5 minutes, min assist-supervision. 5. Patient will perform functional problem solving and reasoning tasks with supervision. E. Interventions: SLP will follow daily for cognitive retraining and to address dysphagia as outlined in the SLP Evaluation. PHYSICIAN'S ASSESSMENT OF FINDINGS: 
 
Are the established goals sufficient for achieving the optimal level of function? [x]  Yes      []  No 
 
What changes would you recommend to the goals as written? None Are the interventions noted sufficient for achieving the optimal level of function? [x]  Yes      []  No 
 
What changes would you recommend to the interventions noted? If therapy staff is unable to provide 3 hr of total therapy per day in 5 days due to medical issues or decreased patient tolerance, may modify treatment schedule to 15 hr/week. Estimated length of stay: 3 weeks Medical rehabilitation prognosis: 
  []  Excellent [x]  Good  
  []  Fair  
  []  Guarded Discharge Destination:  
  [x]  Home  
  []  [address]  
  []  [address]  
  []  [address]  
  []  [address] []  Other:  
 
 
Signed:    Rossy Amaay MD 
  October 10, 2018

## 2018-10-10 NOTE — PROGRESS NOTES
Problem: Dysphagia (Adult) Goal: *Speech Goal: (INSERT TEXT) Long term goals: 1. Patient will tolerate small amounts of PO using safe swallowing techniques without overt s/s of aspiration in 4/5 trials. 2. Patient will perform oral/pharyngeal strengthening exercises with supervision. 3. Patient will participate in laryngeal strengthening exercises and swallowing maneuvers, min cues - supervison. 4. Patient will recall 3 words after 5 minutes, min assist-supervision. 5. Patient will perform functional problem solving and reasoning tasks with supervision. Short term goals (by 10/16/18): 1. Patient will tolerate small amounts of PO puree with the SLP using safe swallowing techniques without overt s/s of aspiration in 4/5 trials. 2. Patient will perform oral/pharyngeal strengthening exercises with mod assist/cues. 3. Patient will participate in laryngeal strengthening exercises and swallowing maneuvers, mod cues. 4. Patient will recall 3 words after 5 minutes, mod-min assist. 
5. Patient will perform functional problem solving and reasoning tasks with 75-85% accuracy. Speech language pathology treatment Patient: Joseph Ibarra (41 y.o. male) Date: 10/10/2018 Diagnosis: R CVA Acute ischemic stroke ([facility] [address] Acute ischemic stroke ([facility] [address] SUBJECTIVE:  
Patient stated Yes, I'm tired. OBJECTIVE:  
Mental Status: 
Patient was awakened from sleep to start the session. He continued to fall back into sleep, even while participating in oral exercises. Treatment & Interventions:  
Mr. Sindy Ness was seen for only 15 minutes this afternoon as he was very sleepy and unable to remain alert. Motor Speech: SLP presented oral exercises and reviewed many of these x 5 with the patient. Pharyngeal exercises deferred until patient is more alert. Response & Tolerance to Activities: 
Patient was somnolent and difficult to keep awake.   Session cut short due to his relative inability to participate after having had OT and PT eariler. Pain: 
Pain Scale 1: Visual 
Pain Orientation 1: Left Pain Description 1: Aching After treatment:  
[]       Patient left in no apparent distress sitting up in chair 
[x]       Patient left in no apparent distress in bed 
[x]       Call bell left within reach [x]       Nursing notified 
[]       Caregiver present 
[]       Bed alarm activated ASSESSMENT:  
Progression toward goals: 
[]       Improving appropriately and progressing toward goals [x]       Improving slowly and progressing toward goals 
[]       Not making progress toward goals and plan of care will be adjusted PLAN:  
Patient continues to benefit from skilled intervention to address the above impairments. Continue treatment per established plan of care. Discharge Recommendations:  Home Health Estimated LOS: 3-4 weeks JAIME Peña Time Calculation: 15 mins

## 2018-10-10 NOTE — PROGRESS NOTES
Pt is an 80year old male admitted to ARU for CVA. Pt is alert and oriented with his wife in the room. Pt consents to conversation with wife present. Pt lives with his wife in a 1 level home with 3 steps with railing to enter the front of the home and a walk in shower. Pt also notes that he has 3 steps to enter with no railing to enter the back of the home and a tub shower. Pt states that he was independent in care prior to acute onset. Pt states that she has not history with DME, outpatient therapy and SNF. Pt states that he ahs used home health in the past for nursing care. Pt states that he does not have a POA and notes that his wife is his legal NOK contact, [place] Hernandez ([phone] no cell phone). Pt further indicates family contacts as Samia Jose - oldest son ([phone]) and james Lukas Brayan - youngest son ([phone]). Pt confirms his insurance as medicare and [employer]. Sw reviewed dc planning and team conference. Pt and wife declined questions at this time. Sw will follow.

## 2018-10-10 NOTE — INTERDISCIPLINARY ROUNDS
[address] 
[address] [place] [address], [address] INPATIENT REHABILITATION 
PRE-TEAM CONFERENCE SUMMARY Date of Conference: 10/11/18 Name: Arcenio Pickens Age / Sex: 80 y.o. / male CSN: [id number] MRN: [id number] Admit Date: 10/8/2018 Length of Stay: 2 days Primary Rehabilitation Diagnosis 1. Impaired Mobility and ADLs 2. 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 
  
Comorbidities  Urinary frequency R35.0  Nocturia R35.1  Glaucoma H40.9  History of kidney stones Z87.442  Cervical spinal stenosis M48.02  
 Meningioma  D32.9  Hypertensive heart disease without heart failure I11.9  Diastolic dysfunction without heart failure I51.89  
 Gastric ulcer K25.9  Coronary artery disease involving native coronary artery of native heart I25.10  Dyslipidemia E78.5  Chronic obstructive pulmonary disease (COPD)  J44.9  Status post insertion of percutaneous endoscopic gastrostomy (PEG) tube  Z93.1  Hypothyroidism E03.9  Benign prostatic hyperplasia N40.0  Bilateral hearing loss H91.93  
 History of transient ischemic attack (TIA) Z86.73  
 Traumatic amputation of left thumb A85.027W  
  
 
 
Therapy: FIM SCORES Initial Assessment Weekly Progress Assessment 10/9/2018 Eating Functional Level: 1 Comments: NPO   Functional Level: 1 Comments: NPO Swallowing Grooming 4  4 Bathing 2  2 Upper Body Dressing Functional Level: 2 Items Applied/Steps Completed: Pullover (4 steps) Comments: Pt doff/don pullover shirt with Max A and Max vcs. Pt required mod assist to lean forward; pt attempted to reach behind head to pull shirt overhead and required mod assist for this. Pt required mod assist to remove BUE from shirt.  Pt required max assist to thread left arm through arm hole, and setup for him to thread right arm. Pt donned shirt overhead with min A. Pt requried max A to pull shirt down. Functional Level: 2 Items Applied/Steps Completed: Pullover (4 steps) Comments: Pt doff/don pullover shirt with Max A and Max vcs. Pt required mod assist to lean forward; pt attempted to reach behind head to pull shirt overhead and required mod assist for this. Pt required mod assist to remove BUE from shirt. Pt required max assist to thread left arm through arm hole, and setup for him to thread right arm. Pt donned shirt overhead with min A. Pt requried max A to pull shirt down. Lower Body Dressing Functional Level: 2 Items Applied/Steps Completed: Elastic waist pants (3 steps), Sock, left (1 step), Sock, right (1 step) Comments: Pt lying supine in bed; pt required total assist to thread legs in pants. Pt bend knees with max assist and stabilization for left leg; pt bridge with stabilization of left leg and requried min A to pull pants up to waist.    Functional Level: 2 Items Applied/Steps Completed: Elastic waist pants (3 steps), Sock, left (1 step), Sock, right (1 step) Comments: Pt lying supine in bed; pt required total assist to thread legs in pants. Pt bend knees with max assist and stabilization for left leg; pt bridge with stabilization of left leg and requried min A to pull pants up to waist.   
Toileting Functional Level: 1 Comments: Pt require max A for clothing management and max A for maintaining midline sitting. Functional Level: 1 Comments: Pt require max A for clothing management and max A for maintaining midline sitting. Bladder  level of assist 1 Bladder  accident frequency score Bowel  level of assist      
Bowel  accident frequency score Toilet Transfer [place] Toilet Transfer Score: 2   Toilet Transfer Score: 2 Tub/Shower Transfer [place] Tub or Shower Type: Shower Tub/Shower Transfer Score: 0 Comments: NT due to safety Tub or Shower Type: Shower Tub/Shower Transfer Score: 0 Comments: NT due to safety Comprehension Primary Mode of Comprehension: Auditory Score: 3 Comments:  (Hard of Hearing) Primary Mode of Comprehension: Auditory Score: 5 Comments:  (Hard of Hearing) Expression Primary Mode of Expression: Verbal 
Score: 3 Comments: Needing min cues for speaking up to be able to be understood by therapist   
  Primary Mode of Expression: Verbal 
Score: 4 Comments: Needing min cues for speaking up to be able to be understood by therapist  
Social Interaction Score: 3 Comments: minimal encouragement to participate in therapy   Score: 4 Comments: minimal encouragement to participate in therapy Problem Solving Score: 2 Comments: Functional problem solving for initiating transfers, bed mobility needing moderate cues, also needing significant cues for attempting to sequence wheelchair mobility using right UE/LE   Score:5 Comments: Functional problem solving for initiating transfers, bed mobility needing moderate cues, also needing significant cues for attempting to sequence wheelchair mobility using right UE/LE Memory Score: 2 Comments: able to recall information with only occasional reminder provided by spouse/therapist   Score: 4 Comments: able to recall information with only occasional reminder provided by spouse/therapist  
 
FIM SCORES Initial Assessment Weekly Progress Assessment 10/10/2018 Bed/Chair/Wheelchair Transfers Transfer Type: SPT without device Transfer Assistance : 2 (Maximal assistance) Sit to Stand Assistance: Maximum assistance Car Transfers: Not tested Car Type: N/A Transfer Type: SPT without device Transfer Assistance : 2 (Maximal assistance) Sit to Stand Assistance: Maximum assistance Car Transfers: Not tested (Not yet assessed given difficulty of bed<->chair) Car Type: N/A Bed Mobility Rolling Right 3 (Moderate assistance ) Rolling Left 3 (Moderate assistance ) Supine to Sit 2 (Maximal assistance) Sit to Stand Maximum assistance Sit to Supine 2 (Maximal assistance) Rolling Right 3 (from eval) Rolling Left 3 (from eval) Supine to Sit 
 2 (Maximal assistance) Sit to Stand Maximum assistance Sit to Supine 2 (Maximal assistance) Locomotion (W/C) Able to Propel (ft): 15 feet Functional Level: 1 Curbs/Ramps Assist Required (FIM Score): 0 (Not tested) Wheelchair Setup Assist Required : 2 (Maximal assistance) Wheelchair Management: Manages right brake Function 2 performing minimal assist for 120 ft Setup Assistance 
2 (Maximal assistance) Locomotion (W/C distance) 15 Feet 120 feet Locomotion (Walk) 1 (Dependent/total assistance) 0 (Not tested) Locomotion (Walk dist.) 2 Feet (ft)  not assessed since eval (2 ft) Steps/Stairs Steps/Stairs Ambulated (#): 0 Level of Assist : 0 (Not tested) (not safe to assess at this time)  Stairs: 1 Level of assist: 1 (dependent with assist x 2) Railing use: right Nursing:  
 
Neuro:   AAA&O x3____ Respiratory:   [x] WNL   [] O2   [] LPM ______ Other: 
Peripheral Vascular:   [] TEDS present   [] Edema present ____ Grade Cardiac:   [x] WNL   [] Other Genitourinary:   [] continent   [x] incontinent   [] mary  Abdominal _______ LBM 
GI: __NPO  Diet/ Liquids _Jevity 1.5 bolus  tube feeds Musculoskeletal: ____Limited ROM Transfers __w/c___ Assistive Device Used __1 person assist__ Level of Assistance Skin Integumentary:   [x] Intact   [] Not Intact   __________Preventative Measures Details_Turn and reposition_____________________________________________________________ Pain: [x] Controlled   [] Not Controlled   Pain Meds:   [] Scheduled   [] PRN Registered Dietitian / Nutrition:  
No data found. Supplements:          [] Yes   [] No     
Amount of supplement consumed: Intake/Output Summary (Last 24 hours) at 10/10/18 1549 Last data filed at 10/10/18 1020 Gross per 24 hour Intake             1820 ml Output              405 ml Net             1415 ml Last bowel movement:  
 
 
 
Interdisciplinary Team Goals: 1. Discipline  Physical Therapy Goal  Patient will be able to perform transfers at moderate assist level without pushing to his left side. Barrier  Pushing behaviors toward weaker left side, decreased strength, impaired perception of midline, decreased motor control and trunk control, impaired static/dynamic balance, decreased endurance. Intervention  Neuromuscular re-education, therapeutic activity, education regarding safe mobility techniques. Goals written by:   Jordon Holloway, PETE  
 
2. Discipline  Occupational Therapy Goal  Patient will perform self ROM exercises (elbow flexion and elbow extension) 3 times a day when not in therapy. Barrier  Memory, inattention to left side, endurance, left hemiparesis Intervention  NMRE, TA, TE, ADL retraining Goals written by:  NINA Epps 3. Discipline  Speech Therapy Goal  Patient will perform oral/pharyngeal strengthening exercises with min-mod cues from the SLP Barrier  Deconditioning, Significant dysphagia Intervention  Training in strengthening exercises and swallowing maeuvers, PO trials, repeat MBS as needed. Goals written by:  Fang Carrillo CCC-SLP 4. Discipline  Nursing Goal  Patient will be free from aspiration Barrier  difficulties swallowing Intervention  suction mouth as needed, mouth care, Maintain NPO diet, keep HOB above 30 degrees Goals written by: Emani Caro RN  
 
5. Discipline  Clinical Psychology Goal  Identify mood distress associated with increased dependency Barrier  Situational stress Intervention  Support   Goals written by:  Wade Olson, Phd  
 
 6.  Discipline  Nutrition / Dietetics Goal    
 Barrier Intervention Goals written by:     
 
 
Disposition / Discharge Planning: Follow-up therapy services:  tbd  
DME recommendations:  tbd  
Estimated discharge date:  11/6/18 Discharge Location:  home Electronic Signatures:  
 
 Signature Date Signed Physical Therapist 
  Aris Chao, PT [id number] Occupational Therapist 
  Claudeen Logan, NINA Angel, OTR/L  10/10/2018 
10/10/2018 Speech Therapist 
 Michael Vallecillo, [address]  10/10/18 Recreational Therapist 
  Vj Jensena Downs 10/10/2018 Nursing Matias Garcia, Krista  10/10/18 Dietitian Clinical Psychologist 
  Jose Casanova, PhD 10/10/18 Physician 
  Dinora Myers MD   10/10/2018  Christina Slaughter, FELICIANO  10/10/18 The above information has been reviewed with the patient in a language that they can understand. Opportunity for comments and questions has been provided and a signed attestation has been scanned into the \"media tab\" of the EMR. Patient Signature: ______________________________________________________ Date Signed: __________________________________________________________

## 2018-10-10 NOTE — PROGRESS NOTES
[x] Psychology  [] Social Work [] Recreational Therapy INTERVENTION  UNITS/TIME OF SERVICE Assessment October 10, 2018 Supportive Counseling Orientation Discharge Planning Resource Linkage Progress/Current Status Later attempt this morning to complete Psychological Evaluation was successful, as patient had stopped excessive coughing and was found more at ease. He speaks in very low volume and appears very tired. He presents as overwhelmed by circumstances post stroke, though describes feeling more anxious than depressed. He alludes to feeling particularly upset about some family dynamics occurring when his stroke occurred, with his younger son reportedly blaming patient's spouse for not contacting emergency services quickly enough. He will be monitored for emotional and/or behavioral difficulties while on ARU and encouraged to persevere.  
 
Dena Aldana, THE [facility] 10/10/2018 7:42 PM

## 2018-10-11 LAB
HCT VFR BLD AUTO: 33.7 % (ref 36–48)
HGB BLD-MCNC: 11.9 G/DL (ref 13–16)
PLATELET # BLD AUTO: 156 K/UL (ref 135–420)

## 2018-10-11 PROCEDURE — 74011250636 HC RX REV CODE- 250/636: Performed by: INTERNAL MEDICINE

## 2018-10-11 PROCEDURE — 36415 COLL VENOUS BLD VENIPUNCTURE: CPT | Performed by: INTERNAL MEDICINE

## 2018-10-11 PROCEDURE — 97112 NEUROMUSCULAR REEDUCATION: CPT

## 2018-10-11 PROCEDURE — 97535 SELF CARE MNGMENT TRAINING: CPT

## 2018-10-11 PROCEDURE — 92526 ORAL FUNCTION THERAPY: CPT

## 2018-10-11 PROCEDURE — 74011250637 HC RX REV CODE- 250/637: Performed by: INTERNAL MEDICINE

## 2018-10-11 PROCEDURE — 97110 THERAPEUTIC EXERCISES: CPT

## 2018-10-11 PROCEDURE — 85018 HEMOGLOBIN: CPT | Performed by: INTERNAL MEDICINE

## 2018-10-11 PROCEDURE — 85049 AUTOMATED PLATELET COUNT: CPT | Performed by: INTERNAL MEDICINE

## 2018-10-11 PROCEDURE — 65310000000 HC RM PRIVATE REHAB

## 2018-10-11 PROCEDURE — 97530 THERAPEUTIC ACTIVITIES: CPT

## 2018-10-11 RX ORDER — TRAZODONE HYDROCHLORIDE 50 MG/1
50 TABLET ORAL
Status: DISCONTINUED | OUTPATIENT
Start: 2018-10-11 | End: 2018-10-23

## 2018-10-11 RX ORDER — MODAFINIL 100 MG/1
100 TABLET ORAL DAILY
Status: DISCONTINUED | OUTPATIENT
Start: 2018-10-12 | End: 2018-10-11

## 2018-10-11 RX ORDER — TRAZODONE HYDROCHLORIDE 50 MG/1
50 TABLET ORAL
Status: DISCONTINUED | OUTPATIENT
Start: 2018-10-11 | End: 2018-10-11

## 2018-10-11 RX ORDER — MODAFINIL 100 MG/1
100 TABLET ORAL DAILY
Status: DISCONTINUED | OUTPATIENT
Start: 2018-10-12 | End: 2018-10-30 | Stop reason: HOSPADM

## 2018-10-11 RX ADMIN — LISINOPRIL 40 MG: 40 TABLET ORAL at 05:08

## 2018-10-11 RX ADMIN — MELATONIN TAB 3 MG 3 MG: 3 TAB at 21:59

## 2018-10-11 RX ADMIN — HEPARIN SODIUM 5000 UNITS: 5000 INJECTION, SOLUTION INTRAVENOUS; SUBCUTANEOUS at 17:38

## 2018-10-11 RX ADMIN — TRAZODONE HYDROCHLORIDE 50 MG: 50 TABLET ORAL at 21:59

## 2018-10-11 RX ADMIN — Medication 1 TABLET: at 10:57

## 2018-10-11 RX ADMIN — TAMSULOSIN HYDROCHLORIDE 0.4 MG: 0.4 CAPSULE ORAL at 17:37

## 2018-10-11 RX ADMIN — PANTOPRAZOLE SODIUM 40 MG: 40 GRANULE, DELAYED RELEASE ORAL at 06:50

## 2018-10-11 RX ADMIN — DOCUSATE SODIUM 100 MG: 100 CAPSULE, LIQUID FILLED ORAL at 10:58

## 2018-10-11 RX ADMIN — ACETAMINOPHEN 650 MG: 650 SOLUTION ORAL at 22:00

## 2018-10-11 RX ADMIN — HYDRALAZINE HYDROCHLORIDE 20 MG: 10 TABLET, FILM COATED ORAL at 10:58

## 2018-10-11 RX ADMIN — FINASTERIDE 5 MG: 5 TABLET, FILM COATED ORAL at 17:37

## 2018-10-11 RX ADMIN — VITAMIN D, TAB 1000IU (100/BT) 2000 UNITS: 25 TAB at 10:57

## 2018-10-11 RX ADMIN — HEPARIN SODIUM 5000 UNITS: 5000 INJECTION, SOLUTION INTRAVENOUS; SUBCUTANEOUS at 05:11

## 2018-10-11 RX ADMIN — Medication 100 MG: at 10:57

## 2018-10-11 RX ADMIN — BISACODYL 10 MG: 10 SUPPOSITORY RECTAL at 21:59

## 2018-10-11 RX ADMIN — METOPROLOL TARTRATE 25 MG: 25 TABLET ORAL at 10:58

## 2018-10-11 RX ADMIN — AMLODIPINE BESYLATE 10 MG: 10 TABLET ORAL at 10:57

## 2018-10-11 RX ADMIN — TIMOLOL MALEATE 1 DROP: 5 SOLUTION OPHTHALMIC at 10:59

## 2018-10-11 RX ADMIN — METOPROLOL TARTRATE 25 MG: 25 TABLET ORAL at 21:59

## 2018-10-11 RX ADMIN — ATORVASTATIN CALCIUM 80 MG: 40 TABLET, FILM COATED ORAL at 21:59

## 2018-10-11 RX ADMIN — HYDRALAZINE HYDROCHLORIDE 20 MG: 10 TABLET, FILM COATED ORAL at 21:59

## 2018-10-11 RX ADMIN — DOCUSATE SODIUM 100 MG: 100 CAPSULE, LIQUID FILLED ORAL at 17:37

## 2018-10-11 RX ADMIN — CLOPIDOGREL BISULFATE 75 MG: 75 TABLET, FILM COATED ORAL at 21:59

## 2018-10-11 RX ADMIN — LEVOTHYROXINE SODIUM 50 MCG: 50 TABLET ORAL at 05:08

## 2018-10-11 RX ADMIN — VITAMIN D, TAB 1000IU (100/BT) 2000 UNITS: 25 TAB at 17:37

## 2018-10-11 RX ADMIN — ASPIRIN 81 MG CHEWABLE TABLET 81 MG: 81 TABLET CHEWABLE at 10:58

## 2018-10-11 NOTE — PROGRESS NOTES
[address] 
[address] [place] [address], [address] INPATIENT REHABILITATION 
DAILY PROGRESS NOTE Date: 10/11/2018 Name: Avi Hall Age / Sex: 80 y.o. 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  
 
Patient seen and examined. Blood pressure fairly controlled. Patient reported to be drowsy/sleepy during daytime. Team conference was held at bedside this PM.  
 
Patient's Complaint:  
Has problem sleeping Pain Control: no current joint or muscle symptoms, essentially pain-free Objective:  
 
Vital Signs: 
Patient Vitals for the past 24 hrs: 
 BP Temp Pulse Resp SpO2  
10/11/18 0745 180/67 97.4 °F (36.3 °C) 94 18 96 % 10/11/18 0508 135/58 - 71 - -  
10/10/18 2100 149/66 97 °F (36.1 °C) 83 20 97 % 10/10/18 1600 123/61 97.6 °F (36.4 °C) 63 18 99 % 10/10/18 1327 109/52 - 64 - - Physical Examination: 
GENERAL SURVEY: Patient is awake, alert, oriented x 3, sitting comfortably on the chair, not in acute respiratory distress. HEENT: pink palpebral conjunctivae, anicteric sclerae, no nasoaural discharge, moist oral mucosa NECK: supple, no jugular venous distention, no palpable lymph nodes CHEST/LUNGS: symmetrical chest expansion, good air entry, clear breath sounds HEART: adynamic precordium, good S1 S2, no S3, regular rhythm, no murmurs ABDOMEN: (+) PEG tube in place, flat, bowel sounds appreciated, soft, non-tender EXTREMITIES: (+) amputated distal phalanx of the thumb of the left hand, pink nailbeds, no edema, full and equal pulses, no calf tenderness NEUROLOGICAL EXAM: The patient is awake, alert and oriented x3, able to answer questions fairly appropriately, able to follow 1 and 2 step commands. Able to tell time from the wall clock. Cranial nerves II-XII are grossly intact except for slurred speech and dysphagia. No gross sensory deficit. Motor strength is 4+/5 on the RUE and RLE, 4/5 on the LUE, 4-/5 on the LLE. Current Medications: 
Current Facility-Administered Medications Medication Dose Route Frequency  cholecalciferol (VITAMIN D3) tablet 2,000 Units  2,000 Units Oral BID  hydrALAZINE (APRESOLINE) tablet 20 mg  20 mg Per G Tube Q12H  pneumococcal 23-valent (PNEUMOVAX 23) injection 0.5 mL  0.5 mL IntraMUSCular PRIOR TO DISCHARGE  docusate sodium (COLACE) capsule 100 mg  100 mg Per G Tube BID  
 bisacodyl (DULCOLAX) suppository 10 mg  10 mg Rectal Q48H PRN  
 heparin (porcine) injection 5,000 Units  5,000 Units SubCUTAneous Q12H  
 acetaminophen (TYLENOL) solution 650 mg  650 mg Oral Q4H PRN  
 amLODIPine (NORVASC) tablet 10 mg  10 mg Per G Tube DAILY  co-enzyme Q-10 (CO Q-10) capsule 100 mg  100 mg Oral DAILY  vitamin B complex tablet  1 Tab Oral DAILY  melatonin tablet 3 mg  3 mg Per G Tube QHS  aspirin chewable tablet 81 mg  81 mg Per G Tube DAILY  atorvastatin (LIPITOR) tablet 80 mg  80 mg Per G Tube QHS  clopidogrel (PLAVIX) tablet 75 mg  75 mg PEG Tube DAILY  lisinopril (PRINIVIL, ZESTRIL) tablet 40 mg  40 mg Per G Tube DAILY  pantoprazole (PROTONIX) granules for oral suspension 40 mg  40 mg Per G Tube ACB  finasteride (PROSCAR) tablet 5 mg  5 mg Oral QPM  
 levothyroxine (SYNTHROID) tablet 50 mcg  50 mcg Per G Tube 6am  
 metoprolol tartrate (LOPRESSOR) tablet 25 mg  25 mg Per G Tube Q12H  tamsulosin (FLOMAX) capsule 0.4 mg  0.4 mg Oral QPM  
 timolol (TIMOPTIC) 0.5 % ophthalmic solution 1 Drop  1 Drop Both Eyes DAILY Allergies: Allergies Allergen Reactions  Codeine Other (comments) Functional Progress: SPEECH AND LANGUAGE PATHOLOGY ON ADMISSION MOST RECENT Comprehension (Native Language) Primary Mode of Comprehension: Auditory Score: 3 Comments:  (Hard of Hearing) Comprehension (Native Language) Primary Mode of Comprehension: Auditory Score: 5 Comments: hard of hearing needing min repetition for full understanding Expression (Native Language) Primary Mode of Expression: Verbal 
Score: 3 Comments: Needing min cues for speaking up to be able to be understood by therapist 
 Expression (Native Language) Primary Mode of Expression: Verbal 
Score: 4 Comments: Needing min cues for speaking up to be able to be understood by therapist 
  
Social Interaction/Pragmatics Score: 3 Comments: minimal encouragement to participate in therapy Social Interaction/Pragmatics Score: 4 Comments: minimal encouragement to participate in therapy Problem Solving Score: 2 Comments: Functional problem solving for initiating transfers, bed mobility needing moderate cues, also needing significant cues for attempting to sequence wheelchair mobility using right UE/LE Problem Solving Score: 4 Comments: Functional problem solving for initiating transfers, bed mobility needing moderate cues, also needing significant cues for attempting to sequence wheelchair mobility using right UE/LE Memory Score: 2 Comments: able to recall information with only occasional reminder provided by spouse/therapist Memory Score: 4 Comments: able to recall information with only occasional reminder provided by spouse/therapist 
  
 
Legend: 
 7 - Independent 6 - Modified Independent 5 - Standby Assistance / Supervision / Syl Odonnell 4 - Minimum Assistance / [address] 3 - Moderate Assistance 2 - Maximum Assistance 1 - Total Assistance / Dependent Lab/Data Review: 
Recent Results (from the past 24 hour(s)) HGB & HCT Collection Time: 10/11/18  6:11 AM  
Result Value Ref Range HGB 11.9 (L) 13.0 - 16.0 g/dL HCT 33.7 (L) 36.0 - 48.0 % PLATELET COUNT Collection Time: 10/11/18  6:11 AM  
Result Value Ref Range PLATELET 204 072 - 656 K/uL Estimated Glomerular Filtration Rate: On admission, estimated GFR based on a Creatinine of 0.77 mg/dl: 
 Using CKD-EPI = 81.6 mL/min/1.73m2 Using MDRD = 101.6 mL/min/1.73m2 Assessment:  
 
Primary Rehabilitation Diagnosis 1. Impaired Mobility and ADLs 2. 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 
  
Comorbidities  Urinary frequency R35.0  Nocturia R35.1  Glaucoma H40.9  History of kidney stones Z87.442  Cervical spinal stenosis M48.02  
 Meningioma  D32.9  Hypertensive heart disease without heart failure I11.9  Diastolic dysfunction without heart failure I51.89  
 Gastric ulcer K25.9  Coronary artery disease involving native coronary artery of native heart I25.10  Dyslipidemia E78.5  Chronic obstructive pulmonary disease (COPD)  J44.9  Status post insertion of percutaneous endoscopic gastrostomy (PEG) tube  Z93.1  Hypothyroidism E03.9  Benign prostatic hyperplasia N40.0  Bilateral hearing loss H91.93  
 History of transient ischemic attack (TIA) Z86.73  
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  
  
 
Plan: 1. Justification for continued stay: Good progression towards established rehabilitation goals. 2. Medical Issues being followed closely: 
  [x]  Fall and safety precautions  
  []  Wound Care  
  [x]  Bowel and Bladder Function  
  [x]  Fluid Electrolyte and Nutrition Balance  
  []  Pain Control 3. Issues that 24 hour rehabilitation nursing is following: 
  [x]  Fall and safety precautions  
  []  Wound Care  
  [x]  Bowel and Bladder Function  
  [x]  Fluid Electrolyte and Nutrition Balance  
  []  Pain Control [x]  Assistance with and education on in-room safety with transfers to and from the bed, wheelchair, toilet and shower. 4. Acute rehabilitation plan of care: 
  [x]  Continue current care and rehab. [x]  Physical Therapy [x]  Occupational Therapy [x]  Speech Therapy  
  []  Hold Rehab until further notice 5. Medications: 
  [x]  MAR Reviewed [x]  Continue Present Medications 6. DVT Prophylaxis:   
  []  Lovenox [x]  Unfractionated Heparin  
  []  Coumadin  
  []  NOAC  
  []  BECKY Stockings  
  []  Sequential Compression Device []  None 7. Orders:  
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 
 > Continue: 
  > Aspirin 81 mg via PEG tube once daily in AM 
  > Atorvastatin 80 mg via PEG tube q HS 
  > Coenzyme Q10 100 mg via PEG tube once daily 
  > Clopidogrel 75 mg via PEG tube once daily in PM 
  > Vitamin B complex 1 tab via PEG tube once daily 
 
> Dysphagia as late effect of CVA; S/P Insertion of percutaneous endoscopic gastrostomy (PEG) tube (10/5/2018) 
 > NPO with tube feeding 
 > Jevity 1.5 237 ml via PEG tube 5 times daily (6AM, 10AM, 2PM, 6PM, 10PM) > Water flush 100 ml via PEG tube vefore and after each bolus feeding 
 
> Hypertensive heart disease without heart failure 
 > On admission to the ARU, decreased Hydralazine from 50 mg to 25 mg via PEG tube q 8 hr (6AM, 2PM, 10PM) > On 10/10/2018, decrease Hydralazine from 25 mg via PEG tube q 8 hr (6AM, 2PM, 10PM) to 20 mg via PEG tube q 12 hr (9AM, 9PM) > Continue: 
  > Amlodipine 10 mg via PEG tube once daily (9AM) 
  > Hydralazine 20 mg via PEG tube q 12 hr (9AM, 9PM) > Lisinopril 40 mg via PEG tube once daily (6AM) 
  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 
 
> Neurocognitive modulation 
 > Start: 
  > Trazodone 50 mg via PEG tube q HS  
  > Modafinil 100 mg via PEG tube q AM 
 
> Difficulty sleeping 
 > On admission to the ARU, started Melatonin 3 mg via PEG tube q HS 
 > Start Trazodone 50 mg via PEG tube q HS 
 > Continue Melatonin 3 mg via PEG tube q HS 8. Patient's progress in rehabilitation and medical issues discussed with the patient. All questions answered to the best of my ability. Care plan discussed with patient and nurse. Signed:    Robby Kelly MD 
  October 11, 2018

## 2018-10-11 NOTE — INTERDISCIPLINARY ROUNDS
[address] 
[address] [place] [address], [address] [facility] SUMMARY Date of Conference: 10/11/2018 Name: Enma Ruiz Age / Sex: 80 y.o. / male CSN: [id number] MRN: [id number] Admit Date: 10/8/2018 Length of Stay: 3 days Primary Rehabilitation Diagnosis 1. Impaired Mobility and ADLs 2. 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 
  
Comorbidities  Urinary frequency R35.0  Nocturia R35.1  Glaucoma H40.9  History of kidney stones Z87.442  Cervical spinal stenosis M48.02  
 Meningioma  D32.9  Hypertensive heart disease without heart failure I11.9  Diastolic dysfunction without heart failure I51.89  
 Gastric ulcer K25.9  Coronary artery disease involving native coronary artery of native heart I25.10  Dyslipidemia E78.5  Chronic obstructive pulmonary disease (COPD)  J44.9  Status post insertion of percutaneous endoscopic gastrostomy (PEG) tube  Z93.1  Hypothyroidism E03.9  Benign prostatic hyperplasia N40.0  Bilateral hearing loss H91.93  
 History of transient ischemic attack (TIA) Z86.73  
 Traumatic amputation of left thumb L64.860K  
  
 
 
Therapy: FIM SCORES Initial Assessment Weekly Progress Assessment 10/9/2018 Eating Functional Level: 1 Comments: NPO   Functional Level: 1 Comments: NPO Swallowing Grooming 4 Grooming Assistance : 4 (Minimal assistance)4 Bathing 2  2 Upper Body Dressing Functional Level: 2 Items Applied/Steps Completed: Pullover (4 steps) Comments: Pt doff/don pullover shirt with Max A and Max vcs. Pt required mod assist to lean forward; pt attempted to reach behind head to pull shirt overhead and required mod assist for this.  Pt required mod assist to remove BUE from shirt. Pt required max assist to thread left arm through arm hole, and setup for him to thread right arm. Pt donned shirt overhead with min A. Pt requried max A to pull shirt down. Functional Level: 2 Items Applied/Steps Completed: Pullover (4 steps) Comments: Pt doff/don pullover shirt with Max A and Max vcs. Pt required mod assist to lean forward; pt attempted to reach behind head to pull shirt overhead and required mod assist for this. Pt required mod assist to remove BUE from shirt. Pt required max assist to thread left arm through arm hole, and setup for him to thread right arm. Pt donned shirt overhead with min A. Pt requried max A to pull shirt down. Lower Body Dressing Functional Level: 2 Items Applied/Steps Completed: Elastic waist pants (3 steps), Sock, left (1 step), Sock, right (1 step) Comments: Pt lying supine in bed; pt required total assist to thread legs in pants. Pt bend knees with max assist and stabilization for left leg; pt bridge with stabilization of left leg and requried min A to pull pants up to waist.    Functional Level: 2 Items Applied/Steps Completed: Elastic waist pants (3 steps), Sock, left (1 step), Sock, right (1 step) Comments: Pt lying supine in bed; pt required total assist to thread legs in pants. Pt bend knees with max assist and stabilization for left leg; pt bridge with stabilization of left leg and requried min A to pull pants up to waist.   
Toileting Functional Level: 1 Comments: Pt require max A for clothing management and max A for maintaining midline sitting. Functional Level: 1 Comments: Pt require max A for clothing management and max A for maintaining midline sitting. Bladder  level of assist 1 Bladder  accident frequency score Bowel  level of assist      
Bowel  accident frequency score Toilet Transfer Mineral Toilet Transfer Score: 2   Toilet Transfer Score: 2 Tub/Shower Transfer Mercer Tub or Shower Type: Shower Tub/Shower Transfer Score: 0 Comments: NT due to safety Tub or Shower Type: Shower Tub/Shower Transfer Score: 0 Comments: NT due to safety Comprehension Primary Mode of Comprehension: Auditory Score: 3 Comments:  (Hard of Hearing) Primary Mode of Comprehension: Auditory Score: 5 Comments:  (Hard of Hearing) Expression Primary Mode of Expression: Verbal 
Score: 3 Comments: Needing min cues for speaking up to be able to be understood by therapist   
  Primary Mode of Expression: Verbal 
Score: 4 Comments: Needing min cues for speaking up to be able to be understood by therapist  
Social Interaction Score: 3 Comments: minimal encouragement to participate in therapy   Score: 4 Comments: minimal encouragement to participate in therapy Problem Solving Score: 2 Comments: Functional problem solving for initiating transfers, bed mobility needing moderate cues, also needing significant cues for attempting to sequence wheelchair mobility using right UE/LE   Score:5 Comments: Functional problem solving for initiating transfers, bed mobility needing moderate cues, also needing significant cues for attempting to sequence wheelchair mobility using right UE/LE Memory Score: 2 Comments: able to recall information with only occasional reminder provided by spouse/therapist   Score: 4 Comments: able to recall information with only occasional reminder provided by spouse/therapist  
 
FIM SCORES Initial Assessment Weekly Progress Assessment 10/11/2018 Bed/Chair/Wheelchair Transfers Transfer Type: SPT without device Transfer Assistance : 2 (Maximal assistance) Sit to Stand Assistance: Maximum assistance Car Transfers: Not tested Car Type: N/A Transfer Type: SPT without device Transfer Assistance : 2 (Maximal assistance) Sit to Stand Assistance: Maximum assistance Car Transfers: Not tested Car Type: N/A  
 Bed Mobility Rolling Right 3 (Moderate assistance ) Rolling Left 3 (Moderate assistance ) Supine to Sit 2 (Maximal assistance) Sit to Stand Maximum assistance Sit to Supine 2 (Maximal assistance) Rolling Right 3 (Moderate assistance )3 (from eval) Rolling Left 
 2 (Maximal assistance)3 (from eval) Supine to Sit 
 2 (Maximal assistance) Sit to Stand Maximum assistance Sit to Supine 2 (Maximal assistance) Locomotion (W/C) Able to Propel (ft): 15 feet Functional Level: 1 Curbs/Ramps Assist Required (FIM Score): 0 (Not tested) Wheelchair Setup Assist Required : 2 (Maximal assistance) Wheelchair Management: Manages right brake Function 3 performing minimal assist for 120 ft Setup Assistance 
2 (Maximal assistance) Locomotion (W/C distance) 15 Feet 180 feet Locomotion (Walk) 1 (Dependent/total assistance) 0 (Not tested) Locomotion (Walk dist.) 2 Feet (ft) 0 Feet (ft)not assessed since eval (2 ft) Steps/Stairs Steps/Stairs Ambulated (#): 0 Level of Assist : 0 (Not tested) (not safe to assess at this time)  Stairs: 1 Level of assist: 1 (dependent with assist x 2) Railing use: right Nursing:  
 
Neuro:   AAA&O x3____ Respiratory:   [x] WNL   [] O2   [] LPM ______ Other: 
Peripheral Vascular:   [] TEDS present   [] Edema present ____ Grade Cardiac:   [x] WNL   [] Other Genitourinary:   [] continent   [x] incontinent   [] mary  Abdominal _______ LBM 
GI: __NPO  Diet/ Liquids _Jevity 1.5 bolus  tube feeds Musculoskeletal: ____Limited ROM Transfers __w/c___ Assistive Device Used __1 person assist__ Level of Assistance Skin Integumentary:   [x] Intact   [] Not Intact   __________Preventative Measures Details_Turn and reposition_____________________________________________________________ Pain: [x] Controlled   [] Not Controlled   Pain Meds:   [] Scheduled   [] PRN Registered Dietitian / Nutrition:  
No data found. Supplements:          [] Yes   [] No     
Amount of supplement consumed:   
 
 
Intake/Output Summary (Last 24 hours) at 10/11/18 1636 Last data filed at 10/11/18 1544 Gross per 24 hour Intake              940 ml Output              200 ml Net              740 ml Last bowel movement:  
 
 
 
Interdisciplinary Team Goals: 1. Discipline  Physical Therapy Goal  Patient will be able to perform transfers at moderate assist level without pushing to his left side. Barrier  Pushing behaviors toward weaker left side, decreased strength, impaired perception of midline, decreased motor control and trunk control, impaired static/dynamic balance, decreased endurance. Intervention  Neuromuscular re-education, therapeutic activity, education regarding safe mobility techniques. Goals written by:   Gracia Henley PT  
 
2. Discipline  Occupational Therapy Goal  Patient will perform self ROM exercises (elbow flexion and elbow extension) 3 times a day when not in therapy. Barrier  Memory, inattention to left side, endurance, left hemiparesis Intervention  NMRE, TA, TE, ADL retraining Goals written by:  NINA Oviedo 3. Discipline  Speech Therapy Goal  Patient will perform oral/pharyngeal strengthening exercises with min-mod cues from the SLP Barrier  Deconditioning, Significant dysphagia Intervention  Training in strengthening exercises and swallowing manayeli, PO trials, repeat MBS as needed. Goals written by:  Julienne Ugalde CCC-SLP 4. Discipline  Nursing Goal  Patient will be free from aspiration Barrier  difficulties swallowing Intervention  suction mouth as needed, mouth care, Maintain NPO diet, keep HOB above 30 degrees Goals written by: Vicenta Scruggs RN  
 
5. Discipline  Clinical Psychology Goal  Identify mood distress associated with increased dependency Barrier  Situational stress Intervention  Support  Goals written by:  Kaden Vogel, Phd  
 
6. Discipline  Nutrition / Dietetics Goal    
 Barrier Intervention Goals written by:     
 
 
Disposition / Discharge Planning: Follow-up therapy services:  tbd  
DME recommendations:  tbd  
Estimated discharge date:  11/6/18 Discharge Location:  home Interdisciplinary team rounds were held this PM with the following team members:   
 
 Name Physical Therapist 
  Edmundo Renteria, PT Occupational Therapist 
  Sneha Kenyon, OTR/L Speech Therapist 
  Livier Posadas, [address] [facility] Nursing Marion Vance RN Physician 
  Frank Simon MD   
 FELICIANO Rangel Signed:  Frank Simon MD 
  October 11, 2018

## 2018-10-11 NOTE — ROUTINE PROCESS
SHIFT CHANGE NOTE FOR Tisha Reed Bedside and Verbal shift change report given to Lor Glynn LPN (oncoming nurse) by Anthony Michael, HARESH (offgoing nurse). Report included the following information SBAR, Kardex, MAR and Recent Results. Situation: 
 Code Status: DNR Reason for Admission: Right CVA Hospital Day: 3 Problem List:  
Hospital Problems  Date Reviewed: 10/9/2018 Codes Class Noted POA History of transient ischemic attack (TIA) ICD-10-CM: D51.05 
ICD-9-CM: V12.54  Unknown Yes Overview Signed 10/9/2018  3:28 PM by Ramonita Buckner MD  
  2x Vitamin D deficiency (Chronic) ICD-10-CM: E55.9 ICD-9-CM: 268.9  10/9/2018 Yes Overview Signed 10/10/2018  1:59 PM by Ramonita Buckner MD  
  Vitamin D 25-Hydroxy (10/10/2018) = 22.5 Hypertensive heart disease without heart failure (Chronic) ICD-10-CM: I11.9 ICD-9-CM: 402.90  Unknown Yes Status post insertion of percutaneous endoscopic gastrostomy (PEG) tube ([facility] ICD-10-CM: Z93.1 ICD-9-CM: V44.1  10/5/2018 Yes * (Principal)Acute ischemic stroke ([facility] [address] ICD-10-CM: I63.9 ICD-9-CM: 434.91  10/2/2018 Yes Overview Signed 10/8/2018  6:32 PM by Ramonita Buckner MD  
  Acute Ischemic Stroke (acute infarct of the posterior superior right basal ganglia and adjacent right corona radiata) with residual left hemiparesis, dysphagia and dysarthria Impaired mobility and ADLs ICD-10-CM: Z74.09 
ICD-9-CM: 799.89  10/2/2018 Yes Hemiparesis affecting left side as late effect of cerebrovascular accident (CVA) ([facility] [address] ICD-10-CM: N36.436 ICD-9-CM: 438.20  10/2/2018 Yes Dysphagia as late effect of cerebrovascular accident (CVA) ICD-10-CM: J11.458 ICD-9-CM: 438.82  10/2/2018 Yes 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) ICD-10-CM: F86.813 ICD-9-CM: 438.13  10/2/2018 Yes Traumatic amputation of left thumb ICD-10-CM: X04.796X ICD-9-CM: 885.0  1/1/2011 Yes Background: 
 Past Medical History:  
Past Medical History:  
Diagnosis Date  Acute ischemic stroke ([facility] [address] 10/2/2018 Acute Ischemic Stroke (acute infarct of the posterior superior right basal ganglia and adjacent right corona radiata) with residual left hemiparesis, dysphagia and dysarthria  Benign prostatic hyperplasia  Bilateral hearing loss  Cervical spinal stenosis 10/2/2018  Chronic obstructive pulmonary disease (COPD) ([facility] [address]  Coronary artery disease involving native coronary artery of native heart  Diastolic dysfunction without heart failure  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) 10/2/2018  Dyslipidemia  Dysphagia as late effect of cerebrovascular accident (CVA) 10/2/2018  Gastric ulcer  Glaucoma  Hemiparesis affecting left side as late effect of cerebrovascular accident (CVA) ([facility] [address] 10/2/2018  History of kidney stones  History of transient ischemic attack (TIA) 2x  Hypertensive heart disease without heart failure  Hypothyroidism  [facility] 10/3/2018 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  
 
Assessment: 
? Changes in Assessment throughout shift: none ? Patient has central line: no Reasons if yes: Insertion date: Last dressing date: 
? Patient has Suarez Cath: no Reasons if yes: Insertion date: 
 
? Last Vitals: 
  
Vitals:  
 10/10/18 0735 10/10/18 1327 10/10/18 1600 10/10/18 2100 BP: 163/67 109/52 123/61 149/66 Pulse: 65 64 63 83 Resp: 18  18 20 Temp: 99 °F (37.2 °C)  97.6 °F (36.4 °C) 97 °F (36.1 °C) SpO2: 98%  99% 97% Weight:      
Height:      
 
 
? PAIN Pain Assessment Pain Intensity 1: 0 (10/11/18 0040) Pain Intensity 1: 2 (12/29/14 1105) Pain Location 1: Shoulder Pain Location 1: Abdomen Pain Intervention(s) 1: Medication (see MAR) Pain Intervention(s) 1: Medication (see MAR) Patient Stated Pain Goal: 0 Patient Stated Pain Goal: 0 
o Intervention effective: yes   
o Other actions taken for pain: turn reposition rest 
 
? Skin Assessment Skin color Skin Color: Appropriate for ethnicity Condition/Temperature Skin Condition/Temp: Dry, Fragile Integrity Skin Integrity: Abrasion, Laceration (comment), Tear Turgor Turgor: Non-tenting Weekly Pressure Ulcer Documentation Wound Prevention & Protection Methods Orientation of wound Orientation of Wound Prevention: Posterior Location of Prevention Location of Wound Prevention: Buttocks, Sacrum/Coccyx Dressing Present Dressing Present : No 
Dressing Status Dressing Status: Intact Wound Offloading Wound Offloading (Prevention Methods): Bed, pressure reduction mattress, Turning, Repositioning, Pillows ? INTAKE/OUPUT Date 10/10/18 0700 - 10/11/18 5457 10/11/18 0700 - 10/12/18 8287 Shift 8430-2121 0423-8734 24 Hour Total 4050-9405 6278-7128 24 Hour Total  
I 
N 
T 
A 
K 
E 
 P. O.  0 0     
   P. O.  0 0 NG/ 522 5442 Water Flush Volume (mL) (PEG/Gastrostomy Tube 10/06/18) 150 100 250 Medication Volume (PEG/Gastrostomy Tube 10/06/18) 110 20 130 Intake (ml) (PEG/Gastrostomy Tube 10/06/18)  Shift Total 
(mL/kg) 960 
(14.2) 480 
(7.1) 1440 
(21.3) O 
U T 
P 
U Ulyess Pong Urine (mL/kg/hr) 405 
(0.5)  405 Urine Voided 405  405 Urine Occurrence(s) 2 x 2 x 4 x Stool Stool Occurrence(s) 0 x 0 x 0 x Shift Total 
(mL/kg) 405 
(6)  405 
(6)  923 7328 Weight (kg) 67.6 67.6 67.6 67.6 67.6 67.6 Recommendations: 1. Patient needs and requests: assist with feedings, ADl's, mobility 2. Diet: NPO with tube feeding bolus 3. Pending tests/procedures: none 4. Functional Level/Equipment: 1 person assist, w/c 
 
5. Estimated Discharge Date: TBD Posted on Whiteboard in Patients Room: no 
 
[facility] Safety Check A safety check occurred in the patient's room between off going nurse and oncoming nurse listed above. The safety check included the below items Area Items H High Alert Medications ? Verify all high alert medication drips (heparin, PCA, etc.) E Equipment ? Suction is set up for ALL patients (with sonia) ? Red plugs utilized for all equipment (IV pumps, etc.) ? WOWs wiped down at end of shift. ? Room stocked with oxygen, suction, and other unit-specific supplies A Alarms ? Bed alarm is set for fall risk patients ? Ensure chair alarm is in place and activated if patient is up in a chair L Lines ? Check IV for any infiltration ? Suarez bag is empty if patient has a Suarez ? Tubing and IV bags are labeled Luke Brown Safety ? Room is clean, patient is clean, and equipment is clean. ? Hallways are clear from equipment besides carts. ? Fall bracelet on for fall risk patients ? Ensure room is clear and free of clutter ? Suction is set up for ALL patients (with sonia) ? Hallways are clear from equipment besides carts. ? Isolation precautions followed, supplies available outside room, sign posted

## 2018-10-11 NOTE — ROUTINE PROCESS
SHIFT CHANGE NOTE FOR Yusra Primes Bedside and Verbal shift change report given to Naty RN (oncoming nurse) by Mirza Rosales LPN (offgoing nurse). Report included the following information SBAR, Kardex, MAR and Recent Results. Situation: 
 Code Status: DNR Reason for Admission: Right CVA Hospital Day: 3 Problem List:  
Hospital Problems  Date Reviewed: 10/11/2018 Codes Class Noted POA History of transient ischemic attack (TIA) ICD-10-CM: T09.22 
ICD-9-CM: V12.54  Unknown Yes Overview Signed 10/9/2018  3:28 PM by Rizwan Dominique MD  
  2x Vitamin D deficiency (Chronic) ICD-10-CM: E55.9 ICD-9-CM: 268.9  10/9/2018 Yes Overview Signed 10/10/2018  1:59 PM by Rizwan Dominique MD  
  Vitamin D 25-Hydroxy (10/10/2018) = 22.5 Hypertensive heart disease without heart failure (Chronic) ICD-10-CM: I11.9 ICD-9-CM: 402.90  Unknown Yes Status post insertion of percutaneous endoscopic gastrostomy (PEG) tube ([facility] ICD-10-CM: Z93.1 ICD-9-CM: V44.1  10/5/2018 Yes * (Principal)Acute ischemic stroke ([facility] [facility] ICD-10-CM: I63.9 ICD-9-CM: 434.91  10/2/2018 Yes Overview Signed 10/8/2018  6:32 PM by Rizwan Dominique MD  
  Acute Ischemic Stroke (acute infarct of the posterior superior right basal ganglia and adjacent right corona radiata) with residual left hemiparesis, dysphagia and dysarthria Impaired mobility and ADLs ICD-10-CM: Z74.09 
ICD-9-CM: 799.89  10/2/2018 Yes Hemiparesis affecting left side as late effect of cerebrovascular accident (CVA) ([facility] [facility] ICD-10-CM: I31.133 ICD-9-CM: 438.20  10/2/2018 Yes Dysphagia as late effect of cerebrovascular accident (CVA) ICD-10-CM: F06.793 ICD-9-CM: 438.82  10/2/2018 Yes 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) ICD-10-CM: B87.556 ICD-9-CM: 438.13  10/2/2018 Yes Traumatic amputation of left thumb ICD-10-CM: A13.367I ICD-9-CM: 885.0  1/1/2011 Yes Background: 
 Past Medical History:  
Past Medical History:  
Diagnosis Date  Acute ischemic stroke ([facility] [address] [facility] 10/2/2018 Acute Ischemic Stroke (acute infarct of the posterior superior right basal ganglia and adjacent right corona radiata) with residual left hemiparesis, dysphagia and dysarthria  Benign prostatic hyperplasia  Bilateral hearing loss  Cervical spinal stenosis 10/2/2018  Chronic obstructive pulmonary disease (COPD) ([facility] [facility]  Coronary artery disease involving native coronary artery of native heart  Diastolic dysfunction without heart failure  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) 10/2/2018  Dyslipidemia  Dysphagia as late effect of cerebrovascular accident (CVA) 10/2/2018  Gastric ulcer  Glaucoma  Hemiparesis affecting left side as late effect of cerebrovascular accident (CVA) ([facility] 10/2/2018  History of kidney stones  History of transient ischemic attack (TIA) 2x  Hypertensive heart disease without heart failure  Hypothyroidism  [facility] 10/3/2018 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  
 
Assessment: 
? Changes in Assessment throughout shift: none ? Patient has central line: no Reasons if yes: Insertion date: Last dressing date: 
? Patient has Suarez Cath: no Reasons if yes: Insertion date: 
 
? Last Vitals: 
  
Vitals:  
 10/10/18 2100 10/11/18 0508 10/11/18 0745 10/11/18 1544 BP: 149/66 135/58 180/67 136/54 Pulse: 83 71 94 64 Resp: 20 18 18 Temp: 97 °F (36.1 °C)  97.4 °F (36.3 °C) 97.9 °F (36.6 °C) SpO2: 97%  96% 97% Weight:      
Height:      
 
 
? PAIN Pain Assessment Pain Intensity 1: 0 (10/11/18 1608) Pain Intensity 1: 2 (12/29/14 1105) Pain Location 1: Shoulder Pain Location 1: Abdomen Pain Intervention(s) 1: Medication (see MAR) Pain Intervention(s) 1: Medication (see MAR) Patient Stated Pain Goal: 0 Patient Stated Pain Goal: 0 
o Intervention effective: yes   
o Other actions taken for pain: turn reposition rest 
 
? Skin Assessment Skin color Skin Color: Appropriate for ethnicity Condition/Temperature Skin Condition/Temp: Dry, Fragile Integrity Skin Integrity: Abrasion, Laceration (comment), Tear Turgor Turgor: Non-tenting Weekly Pressure Ulcer Documentation Wound Prevention & Protection Methods Orientation of wound Orientation of Wound Prevention: Posterior Location of Prevention Location of Wound Prevention: Buttocks, Sacrum/Coccyx Dressing Present Dressing Present : No 
Dressing Status Dressing Status: Intact Wound Offloading Wound Offloading (Prevention Methods): Bed, pressure reduction mattress, Turning, Repositioning, Pillows ? INTAKE/OUPUT Date 10/10/18 0700 - 10/11/18 1074 10/11/18 0700 - 10/12/18 6978 Shift 2999-5791 2553-1874 24 Hour Total 4954-2482 6459-7875 24 Hour Total  
I 
N 
T 
A 
K 
E 
 P. O.  0 0     
   P. O.  0 0 NG/ 401 0872 Water Flush Volume (mL) (PEG/Gastrostomy Tube 10/06/18) 150 100 250 Medication Volume (PEG/Gastrostomy Tube 10/06/18) 110 20 130 Intake (ml) (PEG/Gastrostomy Tube 10/06/18)  Shift Total 
(mL/kg) 960 
(14.2) 480 
(7.1) 1440 
(21.3) O 
U T 
P 
U Ernst Goodie Urine (mL/kg/hr) 405 
(0.5)  405 
(0.2) 500  500 Urine Voided 405  405 500  500 Urine Occurrence(s) 2 x 3 x 5 x 0 x  0 x Stool Stool Occurrence(s) 0 x 0 x 0 x 0 x  0 x Shift Total 
(mL/kg) 405 
(6)  405 (6) 500 
(7.4)  500 
(7.4)  109 1963 -500  -500 Weight (kg) 67.6 67.6 67.6 67.6 67.6 67.6 Recommendations: 1. Patient needs and requests: assist with feedings, ADl's, mobility 2. Diet: NPO with tube feeding bolus 3. Pending tests/procedures: none 4. Functional Level/Equipment: 1 person assist, w/c 
 
5. Estimated Discharge Date: TBD Posted on Whiteboard in Patients Room: no 
 
[facility] Safety Check A safety check occurred in the patient's room between off going nurse and oncoming nurse listed above. The safety check included the below items Area Items H High Alert Medications ? Verify all high alert medication drips (heparin, PCA, etc.) E Equipment ? Suction is set up for ALL patients (with sonia) ? Red plugs utilized for all equipment (IV pumps, etc.) ? WOWs wiped down at end of shift. ? Room stocked with oxygen, suction, and other unit-specific supplies A Alarms ? Bed alarm is set for fall risk patients ? Ensure chair alarm is in place and activated if patient is up in a chair L Lines ? Check IV for any infiltration ? Suarez bag is empty if patient has a Suarez ? Tubing and IV bags are labeled [facility] Safety ? Room is clean, patient is clean, and equipment is clean. ? Hallways are clear from equipment besides carts. ? Fall bracelet on for fall risk patients ? Ensure room is clear and free of clutter ? Suction is set up for ALL patients (with sonia) ? Hallways are clear from equipment besides carts. ? Isolation precautions followed, supplies available outside room, sign posted

## 2018-10-11 NOTE — PROGRESS NOTES
Problem: Dysphagia (Adult) Goal: *Speech Goal: (INSERT TEXT) Long term goals: 1. Patient will tolerate small amounts of PO using safe swallowing techniques without overt s/s of aspiration in 4/5 trials. 2. Patient will perform oral/pharyngeal strengthening exercises with supervision. 3. Patient will participate in laryngeal strengthening exercises and swallowing maneuvers, min cues - supervison. 4. Patient will recall 3 words after 5 minutes, min assist-supervision. 5. Patient will perform functional problem solving and reasoning tasks with supervision. Short term goals (by 10/16/18): 1. Patient will tolerate small amounts of PO puree with the SLP using safe swallowing techniques without overt s/s of aspiration in 4/5 trials. 2. Patient will perform oral/pharyngeal strengthening exercises with mod assist/cues. 3. Patient will participate in laryngeal strengthening exercises and swallowing maneuvers, mod cues. 4. Patient will recall 3 words after 5 minutes, mod-min assist. 
5. Patient will perform functional problem solving and reasoning tasks with 75-85% accuracy. Speech language pathology treatment Patient: Jennifer Cummings (65 y.o. male) Date: 10/11/2018 Diagnosis: R CVA Acute ischemic stroke ([facility] [address] [facility] Acute ischemic stroke ([facility] [address] SUBJECTIVE:  
Patient stated This is my granddaughter Hungjer. She's a nurse down the road. OBJECTIVE:  
Mental Status: Though awakened from a nap, patient was more alert than yesterday. He was engaged and interactive, even more so after the arrival of his granddaughter. Treatment & Interventions:  
Patient was seen for a half hour session at his bedside this morning. The following treatment tasks were presented: Motor Speech/Dysphagia:  
Oral exercises: Min cues Trials with ice:  Initially tolerated well, the cough when distracted Neuro-Linguistics:  
Orientation:  Supervision Problem solvin% accuracy Response & Tolerance to Activities: 
Mr. Luis Miguel Crespo continues to demonstrate relatively poor stamina in tasks, but was more lively as his family entered. He received encouragement to participate to his best. 
 
Pain: 
Pain Scale 1: Visual 
No report of pain After treatment:  
[]       Patient left in no apparent distress sitting up in chair 
[x]       Patient left in no apparent distress in bed 
[x]       Call bell left within reach 
[]       Nursing notified 
[x]       Caregiver present 
[]       Bed alarm activated ASSESSMENT:  
Progression toward goals: 
[]       Improving appropriately and progressing toward goals [x]       Improving slowly and progressing toward goals 
[]       Not making progress toward goals and plan of care will be adjusted PLAN:  
Patient continues to benefit from skilled intervention to address the above impairments. Continue treatment per established plan of care. Discharge Recommendations:  Home Health Estimated LOS: 4 weeks JAIME Greco Time Calculation: 30 mins

## 2018-10-11 NOTE — PROGRESS NOTES
Problem: Self Care Deficits Care Plan (Adult) Goal: *Therapy Goal (Edit Goal, Insert Text) Occupational Therapy Goals Long Term Goals Initiated 10/9/18 and to be accomplished within 4 week(s) 1. Pt will perform self-feeding with total dependence. 2. Pt will perform grooming with supervision. 3. Pt will perform UB bathing with Min A. 
4. Pt will perform LB bathing with Min A prn AE. 5. Pt will perform tub/shower transfer with Mod A.  
6. Pt will perform UB dressing with Setup. 7. Pt will perform LB dressing with Min A prn AE. 8. Pt will perform toileting task with Min A. 
9. Pt will perform toilet transfer with Mod A. Short Term Goals Initiated 10/9/18 and to be accomplished within 7 day(s) 10/16/18 1. Pt will perform grooming with CGA. 2. Pt will perform UB bathing with Mod A. 
3. Pt will perform LB bathing with Mod A prn AE. 4. Pt will perform tub/shower transfer with Mod A. 
5. Pt will perform UB dressing with Mod A. 
6. Pt will perform LB dressing with Mod A prn AE. 7. Pt will perform toileting task with Max A. 
8. Pt will perform toilet transfer with Mod A. Occupational Therapy TREATMENT Patient: Ramiro Mojica 80 y.o. Patient identified with name and : Yes 
 
Date: 10/11/2018 First Tx Session Time In:  8:05 Time Out[de-identified] 9:35 Diagnosis: R CVA Acute ischemic stroke ([facility] [address] Precautions: Aspiration, Fall (Head of bed 30-45 degrees at all times per speech therapist) Chart, occupational therapy assessment, plan of care, and goals were reviewed. Pain: 
Pt reports 0/10 pain or discomfort prior to treatment. Pt reports 0/10 pain or discomfort post treatment. SUBJECTIVE:  
Patient stated I sure hope I can get back to gardening after all this. \" OBJECTIVE DATA SUMMARY:  
 
THERAPEUTIC ACTIVITY Daily Assessment Pt presented supine in bed upon therapist arrival with HOB ~ 30 degrees. Pt reported he did not sleep well last night and was very tried requesting bed level activity this morning. Pt also reported he was having difficulty hearing and required MAX A changing hearing batteries. NMRE Daily Assessment Pt performed skateboard exercises 2 sets x 15 MOD A with abduction and min assist with adduction without  R UE assist.  
AAROM 2 sets x 10 elbow flexion and extension ( Pt conts to require min a to interlock fingers) . Pt also performed scapular elevation and retraction 2 sets x 15 with min movements noted on L scapula. Pt requires max cueing and asssitance for proper upright posture long sitting in bed with notable lateral lean. GROOMING Daily Assessment Grooming Grooming Assistance : 4 (Minimal assistance) Pt performed grooming task supine in bed with HOB raised ~ 40 degrees combing hair and shaving with electric shaver. Pt required increased time due to fatigue with mult rest breaks. ASSESSMENT: 
Pt demonstrated fair efforts throughout tx session this date due to fatigue requiring mod/max cueing to stay on task and keep eyes open. Pt educated on SROM HEP with elbow flexion and extension when long sitting in bed or in w/c. Pt returned understanding through verbalization. Progression toward goals: 
[]          Improving appropriately and progressing toward goals [x]          Improving slowly and progressing toward goals 
[]          Not making progress toward goals and plan of care will be adjusted PLAN: 
Patient continues to benefit from skilled intervention to address the above impairments. Continue treatment per established plan of care. Discharge Recommendations:  Home Health vs SNF 2/2 progress Further Equipment Recommendations for Discharge: If d/c to Home recommend BSC, shower chair with back Activity Tolerance: 
Fair - due to fatigue. Estimated LOS: 11/6/18 Please refer to the flowsheet for vital signs taken during this treatment. After treatment:  
[]  Patient left in no apparent distress sitting up in chair  
[x]  Patient left in no apparent distress in bed 
[x]  Call bell left within reach [x]  Nursing notified 
[]  Caregiver present 
[]  Bed alarm activated COMMUNICATION/EDUCATION:  
[x] Home safety education was provided and the patient/caregiver indicated understanding. [] Patient/family have participated as able in goal setting and plan of care. [x] Patient/family agree to work toward stated goals and plan of care. [] Patient understands intent and goals of therapy, but is neutral about his/her participation. [] Patient is unable to participate in goal setting and plan of care. Yasmine Feliciano

## 2018-10-11 NOTE — REHAB NOTE
Bedside and Verbal shift change report given to Gabby Kan RN (oncoming nurse) by Iza Pang RN (offgoing nurse). Report included the following information SBAR, Kardex, MAR and Recent Results. SITUATION:  
? Code Status: DNR Reason for Admission: R CVA ? [facility] ? Hospital day: 2 
? Problem List:  
   
Hospital Problems  Date Reviewed: 10/9/2018 Codes Class Noted POA History of transient ischemic attack (TIA) ICD-10-CM: F85.75 
ICD-9-CM: V12.54  Unknown Yes Overview Signed 10/9/2018  3:28 PM by Caio Metzger MD  
  2x Vitamin D deficiency (Chronic) ICD-10-CM: E55.9 ICD-9-CM: 268.9  10/9/2018 Yes Overview Signed 10/10/2018  1:59 PM by Caio Metzger MD  
  Vitamin D 25-Hydroxy (10/10/2018) = 22.5 Hypertensive heart disease without heart failure (Chronic) ICD-10-CM: I11.9 ICD-9-CM: 402.90  Unknown Yes Status post insertion of percutaneous endoscopic gastrostomy (PEG) tube ([facility] ICD-10-CM: Z93.1 ICD-9-CM: V44.1  10/5/2018 Yes * (Principal)Acute ischemic stroke ([facility] ICD-10-CM: I63.9 ICD-9-CM: 434.91  10/2/2018 Yes Overview Signed 10/8/2018  6:32 PM by Caio Metzger MD  
  Acute Ischemic Stroke (acute infarct of the posterior superior right basal ganglia and adjacent right corona radiata) with residual left hemiparesis, dysphagia and dysarthria Impaired mobility and ADLs ICD-10-CM: Z74.09 
ICD-9-CM: 799.89  10/2/2018 Yes Hemiparesis affecting left side as late effect of cerebrovascular accident (CVA) ([facility] ICD-10-CM: E92.802 ICD-9-CM: 438.20  10/2/2018 Yes Dysphagia as late effect of cerebrovascular accident (CVA) ICD-10-CM: L55.813 ICD-9-CM: 438.82  10/2/2018 Yes 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) ICD-10-CM: W32.127 ICD-9-CM: 438.13  10/2/2018 Yes Traumatic amputation of left thumb ICD-10-CM: S36.702D ICD-9-CM: 885.0  1/1/2011 Yes BACKGROUND:  
 Past Medical History:  
Past Medical History:  
Diagnosis Date  Acute ischemic stroke ([facility] [address] [facility] 10/2/2018 Acute Ischemic Stroke (acute infarct of the posterior superior right basal ganglia and adjacent right corona radiata) with residual left hemiparesis, dysphagia and dysarthria  Benign prostatic hyperplasia  Bilateral hearing loss  Cervical spinal stenosis 10/2/2018  Chronic obstructive pulmonary disease (COPD) ([facility]  Coronary artery disease involving native coronary artery of native heart  Diastolic dysfunction without heart failure  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) 10/2/2018  Dyslipidemia  Dysphagia as late effect of cerebrovascular accident (CVA) 10/2/2018  Gastric ulcer  Glaucoma  Hemiparesis affecting left side as late effect of cerebrovascular accident (CVA) ([facility] 10/2/2018  History of kidney stones  History of transient ischemic attack (TIA) 2x  Hypertensive heart disease without heart failure  Hypothyroidism  Meningioma ([facility] 10/3/2018 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  
? Changes in Assessment Throughout Shift: none ? Patient has Central Line: no Reasons if yes:  
? Patient has Suarez Cath: no Reasons if yes:   
 
? Last Vitals: 
  
Vitals:  
 10/10/18 0627 10/10/18 0735 10/10/18 1327 10/10/18 1600 BP: 132/60 163/67 109/52 123/61 Pulse: 88 65 64 63 Resp:  18  18 Temp:  99 °F (37.2 °C)  97.6 °F (36.4 °C) SpO2:  98%  99% Weight:      
Height:      
 
 
? IV and DRAINS (will only show if present) PEG/Gastrostomy Tube 10/06/18-Site Assessment: Drainage (comment) ? WOUND (if present) Wound Type:  none Dressing present Dressing Present : No 
 Wound Concerns/Notes:  none ? PAIN Pain Assessment Pain Intensity 1: 0 (10/10/18 1600) Pain Location 1: Shoulder Pain Intervention(s) 1: Medication (see MAR) Patient Stated Pain Goal: 0 
o Interventions for Pain:  none 
o Intervention effective: no 
o Time of last intervention: see flowsheet  
o Reassessment Completed: yes ? Last 3 Weights: 
Last 3 Recorded Weights in this Encounter 10/08/18 2046 Weight: 67.6 kg (149 lb) Weight change: ? INTAKE/OUPUT Current Shift:   
  Last three shifts: 10/09 0701 - 10/10 1900 In: 0082 Out: 405 [Urine:405] ? LAB RESULTS Recent Labs 10/09/18 
 9978 WBC  6.8 HGB  13.1 HCT  36.4 PLT  146 Recent Labs 10/09/18 
 1519 NA  143  
K  4.2 GLU  108* BUN  20* CREA  0.77 CA  8.1*  
MG  2.3 RECOMMENDATIONS AND DISCHARGE PLANNING 1. Pending tests/procedures/ Plan of Care or Other Needs: labs 2. Discharge plan for patient and Needs/Barriers: SNF 3. Estimated Discharge Date: TBD Posted on Whiteboard in Patients Room: no   
 
4. The patient's care plan was reviewed with the oncoming nurse. \"HEALS\" SAFETY CHECK Fall Risk Total Score: 6 Safety Measures: Safety Measures: Bed/Chair alarm on, Bed/Chair-Wheels locked, Bed in low position, Call light within reach, Fall prevention (comment), Gripper socks, Side rails X 3 A safety check occurred in the patient's room between off going nurse and oncoming nurse listed above. The safety check included the below items Area Items H High Alert Medications ? Verify all high alert medication drips (heparin, PCA, etc.) E Equipment ? Suction is set up for ALL patients (with yanker) ? Red plugs utilized for all equipment (IV pumps, etc.) ? WOWs wiped down at end of shift. ? Room stocked with oxygen, suction, and other unit-specific supplies A Alarms ? Bed alarm is set for fall risk patients ? Ensure chair alarm is in place and activated if patient is up in a chair L Lines ? Check IV for any infiltration ? Suarez bag is empty if patient has a Suarez ? Tubing and IV bags are labeled [facility] Safety ? Room is clean, patient is clean, and equipment is clean. ? Hallways are clear from equipment besides carts. ? Fall bracelet on for fall risk patients ? Ensure room is clear and free of clutter ? Suction is set up for ALL patients (with sonia) ? Hallways are clear from equipment besides carts. ? Isolation precautions followed, supplies available outside room, sign posted Gaston Beauchamp RN

## 2018-10-11 NOTE — PROGRESS NOTES
Problem: Mobility Impaired (Adult and Pediatric) Goal: *Therapy Goal (Edit Goal, Insert Text) Physical Therapy Goals Initiated 10/9/2018 and to be accomplished within 7 day(s) 10/15/2018 1. Patient will move from supine to sit and sit to supine , scoot up and down and roll side to side in bed with moderate assistance . 2.  Patient will transfer from bed to chair and chair to bed with moderate assistance  using the least restrictive device. 3.  Patient will perform sit to stand with moderate assistance . 4. Patient will ambulate with maximal assistance for 5 feet with the least restrictive device. 5.  Patient will ascend/descend 1 stair with 1-2 handrail(s) with maximal assistance. 6.  Patient will perform 50 ft of wheelchair mobility with modified technique moderate assist for steering and negotiation of obstacles. 7.  Patient will be able to maintain sitting balance without support for at least 5 minutes with verbal cues only for maintaining midline/upright position for ease of transfers. Physical Therapy Goals Initiated 10/9/2018 and to be accomplished within 28 day(s) on 11/6/2018 1. Patient will move from supine to sit and sit to supine , scoot up and down and roll side to side in bed with supervision/set-up. 2.  Patient will transfer from bed to chair and chair to bed with supervision/set-up using the least restrictive device. 3.  Patient will perform sit to stand with supervision/set-up. 4.  Patient will ambulate with minimal assistance/contact guard assist for 50 feet with the least restrictive device. 5.  Patient will ascend/descend 4 stairs with 2 handrail(s) with minimal assistance/contact guard assist. 
6.  Patient will perform 150 ft of wheelchair mobility at modified independent level. 7.  Patient will demonstrate improved postural control for ease of functional mobility as demonstrated by PASS score >16/36 physical Therapy TREATMENT Patient: Alvin Walker (42 y.o. male) Date: 10/11/2018 Diagnosis: R CVA Acute ischemic stroke ([facility] [address] Acute ischemic stroke ([facility] [address] Precautions: Aspiration, Fall (Head of bed 30-45 degrees at all times per speech therapist) Chart, physical therapy assessment, plan of care and goals were reviewed. Time In: 1325 Time Out: 1425 Patient Seen For: Patient education; Therapeutic exercise;Transfer training; Other (see progress notes); Wheelchair mobility Pain: 
Pt pain was reported as 0/10 pre-treatment. Pt pain was reported as 0/10 post-treatment. Intervention: N/A Patient identified with name and : Frederick Luque SUBJECTIVE:  
 
Patient was found supine in bed with HOB elevated to 30 degrees, asleep. He is easily aroused when therapist knocks. He is pleasant and willing to participate in therapy, reporting he has been waiting. OBJECTIVE DATA SUMMARY:  
Objective: BED/MAT MOBILITY Daily Assessment Patient performed bridging with Max A for left pelvis in order to don his pants over his brief at start of session and to reposition himself back into bed at end of session. He performed rolling to the right with Mod A using right UE to reach across and grab bed rail. He required Max A to roll to the left with assistance at shoulder and hip. He also requires Max assist for supine<>sit at trunk and with left hip/LE management to square himself on the EOB. Rolling Right : 3 (Moderate assistance ) Rolling Left : 2 (Maximal assistance) Supine to Sit : 2 (Maximal assistance) Sit to Supine : 2 (Maximal assistance) TRANSFERS Daily Assessment Patient is Max A for STS and stand pivot transfers with left knee block and max cuing for stepping with right LE to turn to his right. Max A for anterior and up boost, turn and lowering into w/c and onto bed. SPT bed to w/c, w/c<>mat, w/c to bed. Transfer Type: SPT without device Transfer Assistance : 2 (Maximal assistance) Sit to Stand Assistance: Maximum assistance Car Transfers: Not tested Car Type: N/A  
 
 
GAIT Daily Assessment Unable at this time. Amount of Assistance: 0 (Not tested) Distance (ft): 0 Feet (ft) STEPS or STAIRS Daily Assessment Unable at this time. Steps/Stairs Ambulated (#): 0 Level of Assist : 0 (Not tested) BALANCE Daily Assessment See NMRE Sitting - Static: Fair (occasional) Sitting - Dynamic: Poor (constant support) Standing - Static: Poor Standing - Dynamic : Impaired WHEELCHAIR MOBILITY Daily Assessment Patient performed w/c mobility with right tyrell-technique and Mod A to steer, with constant left pathway deviations. At times her required cuing for consistent right LE use. Able to Propel (ft): 180 feet Functional Level: 3 Curbs/Ramps Assist Required (FIM Score): 0 (Not tested) Wheelchair Setup Assist Required : 2 (Maximal assistance) Wheelchair Management: Manages right brake;Manages left brake (with brake extender and cuing) THERAPEUTIC EXERCISES Daily Assessment Patient performed seated there-ex on EOM with Mod A to maintain balance while performing. Patient able to perform with S with right LE and with Mod A for left LE as her requires AAROM for left LE. Extremity: Both Exercise Type #1: Seated lower extremity strengthening (Marching and LAQ (left LE AAROM)) Sets Performed: 1 Reps Performed: 10 Level of Assist: Moderate assistance (for left LE AAROM) Neuro Re-Education: 
Seated balance EOM with leaning laterally to right and then coming back into midline position, postural mirror with freya line in front of patient for visual feedback of upright and midline position. Pt required intermittent CGA to maintain midline and upright position for seated balance EOM, tactile and verbal cues 50-75% of the time. Patient appears extremely fatigued and frequently nods off requiring therapist to wake him up and redirect him to current task.   
 
ASSESSMENT: 
 Patient is limited by fatigue despite being willing to participate. He continues to demonstrate decreased postural control and left sided weakness requiring Max A for functional mobility. Agree with previous therapist's note that patient may benefit from co-treatment with OT to maximize ability to participate in functional mobility tasks. Progression toward goals: 
[]      Improving appropriately and progressing toward goals [x]      Improving slowly and progressing toward goals 
[]      Not making progress toward goals and plan of care will be adjusted PLAN: 
Patient continues to benefit from skilled intervention to address the above impairments. Continue treatment per established plan of care. Discharge Recommendations: [address] depending on progression with functional mobility and caregiver needs Further Equipment Recommendations for Discharge:  wheelchair currently recommended given significant difficulty participating in standing tasks,  and other equipment for gait not yet been able to assess as patient unable to safely gait at this time. Will reassess as appropriate based on patient's progress. Estimated LOS: 11/6/2018 Activity Tolerance:  
Fair - Please refer to the flowsheet for vital signs taken during this treatment. After treatment:  
Patient left supine in bed with HOB elevated to 45 degrees, call bell/phone/suction in reach. CAM Earl 
10/11/2018

## 2018-10-11 NOTE — PROGRESS NOTES
Problem: Falls - Risk of 
Goal: *Absence of Falls Document Damianifm Furparesh Fall Risk and appropriate interventions in the flowsheet. Outcome: Progressing Towards Goal 
Fall Risk Interventions: 
Mobility Interventions: Bed/chair exit alarm, Patient to call before getting OOB, Utilize gait belt for transfers/ambulation Medication Interventions: Bed/chair exit alarm, Patient to call before getting OOB, Teach patient to arise slowly Elimination Interventions: Bed/chair exit alarm, Call light in reach, Patient to call for help with toileting needs, Urinal in reach, Toilet paper/wipes in reach, Toileting schedule/hourly rounds History of Falls Interventions: Bed/chair exit alarm, Room close to nurse's station, Door open when patient unattended Problem: Pressure Injury - Risk of 
Goal: *Prevention of pressure injury Document Rupert Scale and appropriate interventions in the flowsheet. Outcome: Progressing Towards Goal 
Pressure Injury Interventions: 
Sensory Interventions: Assess changes in LOC, Chair cushion, Check visual cues for pain, Keep linens dry and wrinkle-free, Maintain/enhance activity level, Minimize linen layers, Pressure redistribution bed/mattress (bed type), Turn and reposition approx. every two hours (pillows and wedges if needed) Activity Interventions: Chair cushion, Pressure redistribution bed/mattress(bed type) Mobility Interventions: Chair cushion, Pressure redistribution bed/mattress (bed type), Turn and reposition approx. every two hours(pillow and wedges) Nutrition Interventions: Document food/fluid/supplement intake Friction and Shear Interventions: Minimize layers

## 2018-10-12 ENCOUNTER — APPOINTMENT (OUTPATIENT)
Dept: GENERAL RADIOLOGY | Age: 83
DRG: 057 | End: 2018-10-12
Attending: INTERNAL MEDICINE
Payer: MEDICARE

## 2018-10-12 PROCEDURE — 97530 THERAPEUTIC ACTIVITIES: CPT

## 2018-10-12 PROCEDURE — 97542 WHEELCHAIR MNGMENT TRAINING: CPT

## 2018-10-12 PROCEDURE — 97112 NEUROMUSCULAR REEDUCATION: CPT

## 2018-10-12 PROCEDURE — BW03ZZZ PLAIN RADIOGRAPHY OF CHEST: ICD-10-PCS | Performed by: INTERNAL MEDICINE

## 2018-10-12 PROCEDURE — 74011250637 HC RX REV CODE- 250/637: Performed by: INTERNAL MEDICINE

## 2018-10-12 PROCEDURE — 65310000000 HC RM PRIVATE REHAB

## 2018-10-12 PROCEDURE — 92526 ORAL FUNCTION THERAPY: CPT

## 2018-10-12 PROCEDURE — 74011250636 HC RX REV CODE- 250/636: Performed by: INTERNAL MEDICINE

## 2018-10-12 PROCEDURE — 71046 X-RAY EXAM CHEST 2 VIEWS: CPT

## 2018-10-12 PROCEDURE — 77030011256 HC DRSG MEPILEX <16IN NO BORD MOLN -A

## 2018-10-12 RX ORDER — CLOPIDOGREL BISULFATE 75 MG/1
75 TABLET ORAL
Status: ON HOLD | COMMUNITY
End: 2018-10-29 | Stop reason: CLARIF

## 2018-10-12 RX ORDER — AMLODIPINE BESYLATE 10 MG/1
TABLET ORAL DAILY
Status: ON HOLD | COMMUNITY
End: 2018-10-29 | Stop reason: CLARIF

## 2018-10-12 RX ORDER — ATORVASTATIN CALCIUM 80 MG/1
80 TABLET, FILM COATED ORAL DAILY
Status: ON HOLD | COMMUNITY
End: 2018-10-29 | Stop reason: CLARIF

## 2018-10-12 RX ORDER — OMEPRAZOLE 40 MG/1
40 CAPSULE, DELAYED RELEASE ORAL DAILY
COMMUNITY
End: 2018-10-30

## 2018-10-12 RX ORDER — HYDRALAZINE HYDROCHLORIDE 50 MG/1
25 TABLET, FILM COATED ORAL 3 TIMES DAILY
Status: ON HOLD | COMMUNITY
End: 2018-10-29 | Stop reason: CLARIF

## 2018-10-12 RX ORDER — LISINOPRIL 40 MG/1
40 TABLET ORAL DAILY
Status: ON HOLD | COMMUNITY
End: 2018-10-29 | Stop reason: CLARIF

## 2018-10-12 RX ADMIN — PANTOPRAZOLE SODIUM 40 MG: 40 GRANULE, DELAYED RELEASE ORAL at 07:30

## 2018-10-12 RX ADMIN — HYDRALAZINE HYDROCHLORIDE 20 MG: 10 TABLET, FILM COATED ORAL at 20:41

## 2018-10-12 RX ADMIN — CLOPIDOGREL BISULFATE 75 MG: 75 TABLET, FILM COATED ORAL at 20:41

## 2018-10-12 RX ADMIN — Medication 100 MG: at 09:17

## 2018-10-12 RX ADMIN — LISINOPRIL 40 MG: 40 TABLET ORAL at 05:04

## 2018-10-12 RX ADMIN — MODAFINIL 100 MG: 100 TABLET ORAL at 09:17

## 2018-10-12 RX ADMIN — METOPROLOL TARTRATE 25 MG: 25 TABLET ORAL at 20:41

## 2018-10-12 RX ADMIN — AMLODIPINE BESYLATE 10 MG: 10 TABLET ORAL at 09:17

## 2018-10-12 RX ADMIN — VITAMIN D, TAB 1000IU (100/BT) 2000 UNITS: 25 TAB at 09:17

## 2018-10-12 RX ADMIN — DOCUSATE SODIUM 100 MG: 100 CAPSULE, LIQUID FILLED ORAL at 18:49

## 2018-10-12 RX ADMIN — Medication 1 TABLET: at 09:17

## 2018-10-12 RX ADMIN — ATORVASTATIN CALCIUM 80 MG: 40 TABLET, FILM COATED ORAL at 20:40

## 2018-10-12 RX ADMIN — FINASTERIDE 5 MG: 5 TABLET, FILM COATED ORAL at 18:49

## 2018-10-12 RX ADMIN — HEPARIN SODIUM 5000 UNITS: 5000 INJECTION, SOLUTION INTRAVENOUS; SUBCUTANEOUS at 05:04

## 2018-10-12 RX ADMIN — VITAMIN D, TAB 1000IU (100/BT) 2000 UNITS: 25 TAB at 18:49

## 2018-10-12 RX ADMIN — METOPROLOL TARTRATE 25 MG: 25 TABLET ORAL at 09:17

## 2018-10-12 RX ADMIN — HYDRALAZINE HYDROCHLORIDE 20 MG: 10 TABLET, FILM COATED ORAL at 09:17

## 2018-10-12 RX ADMIN — HEPARIN SODIUM 5000 UNITS: 5000 INJECTION, SOLUTION INTRAVENOUS; SUBCUTANEOUS at 18:49

## 2018-10-12 RX ADMIN — LEVOTHYROXINE SODIUM 50 MCG: 50 TABLET ORAL at 05:04

## 2018-10-12 RX ADMIN — TIMOLOL MALEATE 1 DROP: 5 SOLUTION OPHTHALMIC at 11:11

## 2018-10-12 RX ADMIN — TAMSULOSIN HYDROCHLORIDE 0.4 MG: 0.4 CAPSULE ORAL at 18:49

## 2018-10-12 RX ADMIN — DOCUSATE SODIUM 100 MG: 100 CAPSULE, LIQUID FILLED ORAL at 09:17

## 2018-10-12 RX ADMIN — ASPIRIN 81 MG CHEWABLE TABLET 81 MG: 81 TABLET CHEWABLE at 09:17

## 2018-10-12 RX ADMIN — MELATONIN TAB 3 MG 3 MG: 3 TAB at 20:40

## 2018-10-12 RX ADMIN — TRAZODONE HYDROCHLORIDE 50 MG: 50 TABLET ORAL at 20:41

## 2018-10-12 NOTE — PROGRESS NOTES
NUTRITION Nursing Referral: SHIELA FERGUSON PTA Nutrition Consult: General Nutrition Management & Supplements RECOMMENDATIONS / PLAN:  
 
- Continue bolus tube feeds of Jevity 1.5, 237 mL (1 can) x 5 times daily with water flush of 100 mL before and after each bolus feed. - Continue RD inpatient monitoring and evaluation. Goal EN Regimen: Jevity 1.5, 240 mL (1 can) 5 times daily + 100 mL water flush before and after each bolus to provide: 1775 kcal, 76 gm protein, 59 gm fat, 256 gm CHO, 27 gm fiber, 900 mL free water, 1900 mL total water, 100% RDIs NUTRITION INTERVENTIONS & DIAGNOSIS:  
 
[x] Enteral nutrition: continue bolus tube feeding Nutrition Diagnosis:  Inadequate oral intake related to inability to tolerate po as evidenced by NPO 
 
ASSESSMENT:  
 
10/12: Pt tolerating bolus tube feeds at goal rate 10/9: Pt unavailable at time of visit. Currently NPO; receiving bolus tube feeding via PEG. Tolerating feeds. EN infusion adequate to meet patients estimated nutritional needs:   [] Yes     [] No   [x] Unable to determine at this time Tube Feeding:  Bolus feeds of Jevity 1.5, 237 mL (1 can) x 5 times daily via PEG Water Flushes:  100 mL before and after each bolus feed Residuals: 0 mL Diet: DIET NPO With Tube Feedings DIET TUBE FEEDING Food Allergies:  None known Current Appetite:   [] Good     [] Fair     [] Poor     [x] Other: NPO Appetite/meal intake prior to admission:   [] Good     [] Fair     [] Poor     [x] Other: unknown, NPO Feeding Limitations:  [x] Swallowing difficulty    [] Chewing difficulty    [] Other: 
Current Meal Intake: No data found. BM: 10/12 Skin Integrity:  Left arm skin tear Edema:   [x] No     [] Yes Pertinent Medications: Reviewed: bowel regimen, vitamin B complex No results for input(s): NA, K, CL, CO2, GLU, BUN, CREA, CA, MG, PHOS, ALB, PREALB, TBIL, SGOT, ALT in the last 72 hours. Intake/Output Summary (Last 24 hours) at 10/12/18 1007 Last data filed at 10/12/18 6608 Gross per 24 hour Intake              160 ml Output              500 ml Net             -340 ml Anthropometrics: 
Ht Readings from Last 1 Encounters:  
10/08/18 5' 6\" (1.676 m) Last 3 Recorded Weights in this Encounter 10/08/18 2046 Weight: 67.6 kg (149 lb) Body mass index is 24.05 kg/(m^2). Weight History:    Noted 4 lb wt loss x 10 month PTA per chart hx 
 
Weight Metrics 10/8/2018 12/11/2017 6/27/2017 9/7/2016 5/24/2016 Weight 149 lb 153 lb 153 lb 155 lb 154 lb BMI 24.05 kg/m2 25.07 kg/m2 25.07 kg/m2 25.4 kg/m2 25.23 kg/m2 Admitting Diagnosis: R CVA Acute ischemic stroke ([facility] [address] Pertinent PMHx:  Kidney stones, gastric ulcer, CAD, dyslipidemia, COPD, hypothyroidism Education Needs:        [x] None identified  [] Identified - Not appropriate at this time  []  Identified and addressed - refer to education log Learning Limitations:   [] None identified  [x] Identified: bilateral hearing loss Cultural, [employer]ism & ethnic food preferences:  [x] None identified    [] Identified and addressed ESTIMATED NUTRITION NEEDS:  
 
Calories: 0384-2966 kcal (MSJx1.2-1.4) based on  [x] Actual BW: 68 kg      [] IBW Protein: 54-68 gm (0.8-1 gm/kg) based on  [x] Actual BW      [] IBW Fluid: 1 mL/kcal 
  
MONITORING & EVALUATION:  
 
Nutrition Goal(s): 1. Patient will tolerate enteral nutrition formula and regimen without difficulty within the next 7 days. Outcome:  [x] Met/Ongoing    []  Not Met    [] New/Initial Goal  
2. Patient will meet their estimated nutritional needs through adequate enteral nutrition within the next 7 days. Outcome:  [x] Met/Ongoing    []  Not Met    [] New/Initial Goal  
 
Monitoring:   [] Food and beverage intake   [] Diet order   [x] Nutrition-focused physical findings   [x] Treatment/therapy   [] Weight   [x] Enteral nutrition intake Previous Recommendations (for follow-up assessments only):     [x]   Implemented       []   Not Implemented (RD to address)      [] No Longer Appropriate     [] No Recommendation Made Discharge Planning:  Goal tube feeds via PEG [x] Participated in care planning, discharge planning, & interdisciplinary rounds as appropriate Jose Roberto Mcleod RD Pager: [phone]

## 2018-10-12 NOTE — PROGRESS NOTES
Problem: Mobility Impaired (Adult and Pediatric) Goal: *Therapy Goal (Edit Goal, Insert Text) Physical Therapy Goals Initiated 10/9/2018 and to be accomplished within 7 day(s) 10/15/2018 1. Patient will move from supine to sit and sit to supine , scoot up and down and roll side to side in bed with moderate assistance . 2.  Patient will transfer from bed to chair and chair to bed with moderate assistance  using the least restrictive device. 3.  Patient will perform sit to stand with moderate assistance . 4. Patient will ambulate with maximal assistance for 5 feet with the least restrictive device. 5.  Patient will ascend/descend 1 stair with 1-2 handrail(s) with maximal assistance. 6.  Patient will perform 50 ft of wheelchair mobility with modified technique moderate assist for steering and negotiation of obstacles. 7.  Patient will be able to maintain sitting balance without support for at least 5 minutes with verbal cues only for maintaining midline/upright position for ease of transfers. Physical Therapy Goals Initiated 10/9/2018 and to be accomplished within 28 day(s) on 11/6/2018 1. Patient will move from supine to sit and sit to supine , scoot up and down and roll side to side in bed with supervision/set-up. 2.  Patient will transfer from bed to chair and chair to bed with supervision/set-up using the least restrictive device. 3.  Patient will perform sit to stand with supervision/set-up. 4.  Patient will ambulate with minimal assistance/contact guard assist for 50 feet with the least restrictive device. 5.  Patient will ascend/descend 4 stairs with 2 handrail(s) with minimal assistance/contact guard assist. 
6.  Patient will perform 150 ft of wheelchair mobility at modified independent level. 7.  Patient will demonstrate improved postural control for ease of functional mobility as demonstrated by PASS score >16/36 physical Therapy TREATMENT Patient: Jazmin Howell (63 y.o. male) Date: 10/12/2018 Diagnosis: R CVA Acute ischemic stroke ([facility] [address] Acute ischemic stroke ([facility] [address] Precautions: Aspiration, Fall (Head of bed 30-45 degrees at all times per speech therapist) Chart, physical therapy assessment, plan of care and goals were reviewed. Time In: 1030 Time Out: 1102 Time In: 9141 Time Out: 1530 Patient Seen For: Wheelchair mobility;Transfer training;Balance activities (Neuromuscular re-education) Pain: No pain reported today. Patient identified with name and :yes SUBJECTIVE:  
 
Patient reporting feeling very tired today; stated that he did not sleep well last night. Observed to have more coughing throughout treatment today. OBJECTIVE DATA SUMMARY:  
Objective: BED/MAT MOBILITY Daily Assessment Rolling Right : 2 (Maximal assistance) Rolling Left : 2 (Maximal assistance) Maximal assist for rolling in bed (elevated head of bed due to aspiration precautions) and for lifting up hips for supine bridging to don/doff pants. Patient working on rolling with therapist as part of getting cleaned up after episode of urinary and bowel incontinence. TRANSFERS Daily Assessment Transfer Assistance : 2 (Maximal assistance) Sit to Stand Assistance: Maximum assistance Car Transfers: Not tested Maximal assist with transfers without AD, performing both stand pivot and squat pivot transfer, noted to have significant pushing to his weaker left side. Sit<->stand needing maximal assist, blocking of left knee and cues for more upright and midline position. BALANCE Daily Assessment Sitting - Static: Fair (occasional) Sitting - Dynamic: Poor (constant support) Standing - Static: Poor Standing - Dynamic : Impaired See neuro re-ed WHEELCHAIR MOBILITY Daily Assessment Able to Propel (ft): 55 feet Functional Level: 2 Curbs/Ramps Assist Required (FIM Score): 0 (Not tested) Wheelchair Setup Assist Required : 2 (Maximal assistance) Wheelchair Management: Manages right brake Needing cue for using only right LE initially and then using right UE to assist with propulsion/steering as patient otherwise attempting to only use right UE, forgetting to coordinate/use right LE to appropriately steer himself straight in hallway. Needing mod/max assist for steering as a result. Requiring frequent rests as a result. Performing 55-60 ft bouts. THERAPEUTIC EXERCISES Daily Assessment Reviewed incentive spirometry x 10 reps while still in room, cues for taking slow, deep inspiration breath. Neuro Re-Education: 
Significantly increased left UE tone observed today (particularly during second session) with left UE drawing into shoulder add, IR, elbow flex. Patient needing assist and cues ~75-80% of the time for maintaining upright, midline position during static sitting at edge of mat as patient with tendency to push with right LE, trunk and right UE toward weaker left side. Once in midline position, attempted forward reaching task using swiss ball but patient unable to maintain appropriate midline position even with small amplitude of movement. Performing standing repetition x 1 minute, needing second person assist for safety, patient assisted with standing and right knee blocked, cues for upright head/chest with bilateral forearms resting on table in front of him, mirror for feedback to maintain more midline and upright position. Patient reporting some lightheadedness requiring max assist for lowering back onto mat table surface, and patient then reported complete alleviation of symptoms. Assisted back into wheelchair and handed off to speech therapist for continued treatment.   
 
ASSESSMENT: 
Patient would continue to benefit from neuro re-ed emphasis on postural awareness and midline orientation, progressing with static sitting to dynamic sitting and to static standing as appropriate. Recommend co-treatment with OT given complex presentation and pushing behaviors, requiring two skilled therapists to facilitate appropriate upright, midline posture for maximum participation in functional positions to progress toward his functional goals. Progression toward goals: 
[]      Improving appropriately and progressing toward goals [x]      Improving slowly and progressing toward goals 
[]      Not making progress toward goals and plan of care will be adjusted PLAN: 
Patient continues to benefit from skilled intervention to address the above impairments. Continue treatment per established plan of care. Discharge Recommendations:  [address] depending on patient's progression and caregiver needs Further Equipment Recommendations for Discharge:  wheelchair currently required given significant impairments, however, will continue to reassess most appropriate equipment needs as patient progresses. Estimated LOS:4 weeks Activity Tolerance:  
Fair, continues to be quite drowsy at times needing frequent rests. After treatment:  
Patient left supine in bed with hand off to radiology technician for AM session; handed off to speech therapist in PM seated in wheelchair. Elke Zayas, PT 
10/12/2018

## 2018-10-12 NOTE — PROGRESS NOTES
Problem: Self Care Deficits Care Plan (Adult) Goal: *Therapy Goal (Edit Goal, Insert Text) Occupational Therapy Goals Long Term Goals Initiated 10/9/18 and to be accomplished within 4 week(s) 1. Pt will perform self-feeding with total dependence. 2. Pt will perform grooming with supervision. 3. Pt will perform UB bathing with Min A. 
4. Pt will perform LB bathing with Min A prn AE. 5. Pt will perform tub/shower transfer with Mod A.  
6. Pt will perform UB dressing with Setup. 7. Pt will perform LB dressing with Min A prn AE. 8. Pt will perform toileting task with Min A. 
9. Pt will perform toilet transfer with Mod A. Short Term Goals Initiated 10/9/18 and to be accomplished within 7 day(s) 10/16/18 1. Pt will perform grooming with CGA. 2. Pt will perform UB bathing with Mod A. 
3. Pt will perform LB bathing with Mod A prn AE. 4. Pt will perform tub/shower transfer with Mod A. 
5. Pt will perform UB dressing with Mod A. 
6. Pt will perform LB dressing with Mod A prn AE. 7. Pt will perform toileting task with Max A. 
8. Pt will perform toilet transfer with Mod A. Occupational Therapy TREATMENT Patient: Lenny Branham 80 y.o. Patient identified with name and : Yes 
 
Date: 10/12/2018 First Tx Session Time In: 1330 Time Out[de-identified] 2360 Diagnosis: R CVA Acute ischemic stroke ([facility] [address] Precautions: Aspiration, Fall (Head of bed 30-45 degrees at all times per speech therapist) Chart, occupational therapy assessment, plan of care, and goals were reviewed. Pain: 
Pt reports 0/10 pain or discomfort prior to treatment. Pt reports 0/10 pain or discomfort post treatment. Intervention Provided: None Required SUBJECTIVE:  
Patient stated I love the therapy gym\", upon initiation of session. OBJECTIVE DATA SUMMARY:  
 
THERAPEUTIC ACTIVITY Daily Assessment Pt supine<>eob with max A and mod vcs for hand placement. Pt squat pivot eob<>wc with max A. NMRE Daily Assessment Pt seated in wc with back off backrest performed scapular exercises (elevation (5 sets x 3 reps) and retraction (4 sets x 3 reps)) prn rest breaks. Pt require right shoulder overload to facilitate moderate scapular elevation. Pt require mod tactile cueing between scapulae and mod vcs for moderate scapular retraction. Pt seated in wc with back off backrest performed shoulder abduction and adduction (3 sets x 6 reps) prn rest breaks. Pt required max A for abduction to 90 degrees; pt demonstrate minimal initiation of movement. Pt required max A for adduction; pt demonstrate minimal initiation of movement for first 2 sets, third set pt pectoralis major fatigued out. Pt attempt to interlock fingers for self assisted PROM; due to difficulty OTR/L modified grasp to cupping grasp. Pt self assist PROM, with right hand cupping left hand, elbow flexion and elbow extension (2 sets x 10 reps). Pt require stabilization of left elbow when completing elbow extension to facilitate full ROM. ASSESSMENT: 
Pt continues to demonstrate left UE weakness. On current date, pt demonstrated minimal tone in elbow extension around 30 degrees and minimal tone in shoulder abduction around 80 degrees. Pt demonstrated no tone in elbow flexion or forearm supination/pronation. On current date, pt require max assist for squat pivot transfer eob<>wc. On current date, pt demonstrate moderate scapular contraction for scapular retraction and elevation. Pt would benefit from continued skilled OT services to increase independence in self care and functional transfers. Progression toward goals: 
[]          Improving appropriately and progressing toward goals [x]          Improving slowly and progressing toward goals 
[]          Not making progress toward goals and plan of care will be adjusted PLAN: 
Patient continues to benefit from skilled intervention to address the above impairments. Continue treatment per established plan of care. Discharge Recommendations:  Home Health vs SNF 2/2 progress Further Equipment Recommendations for Discharge: If d/c to Home recommend BSC, shower chair with back Activity Tolerance: 
Poor Estimated LOS: 11/6/18 Please refer to the flowsheet for vital signs taken during this treatment. After treatment:  
[x]  Patient left in no apparent distress sitting up in wheelchair  
[]  Patient left in no apparent distress in bed 
[x]  Call bell left within reach 
[]  Nursing notified 
[x]  Caregiver present (spouse) []  Bed alarm activated COMMUNICATION/EDUCATION:  
[] Home safety education was provided and the patient/caregiver indicated understanding. [x] Patient/family have participated as able in goal setting and plan of care. [x] Patient/family agree to work toward stated goals and plan of care. [] Patient understands intent and goals of therapy, but is neutral about his/her participation. [] Patient is unable to participate in goal setting and plan of care.  
 
 
Edna John, OTS

## 2018-10-12 NOTE — ROUTINE PROCESS
SHIFT CHANGE NOTE FOR [facility] Bedside and Verbal shift change report given to Naty RN (oncoming nurse) by Marty Mancia LPN (offgoing nurse). Report included the following information SBAR, Kardex, MAR and Recent Results. Situation: 
 Code Status: DNR Reason for Admission: Right CVA Hospital Day: 4 Problem List:  
Hospital Problems  Date Reviewed: 10/11/2018 Codes Class Noted POA History of transient ischemic attack (TIA) ICD-10-CM: K31.66 
ICD-9-CM: V12.54  Unknown Yes Overview Signed 10/9/2018  3:28 PM by Sherine Quiñones MD  
  2x Vitamin D deficiency (Chronic) ICD-10-CM: E55.9 ICD-9-CM: 268.9  10/9/2018 Yes Overview Signed 10/10/2018  1:59 PM by Sherine Quiñones MD  
  Vitamin D 25-Hydroxy (10/10/2018) = 22.5 Hypertensive heart disease without heart failure (Chronic) ICD-10-CM: I11.9 ICD-9-CM: 402.90  Unknown Yes Status post insertion of percutaneous endoscopic gastrostomy (PEG) tube ([facility] [place] ICD-10-CM: Z93.1 ICD-9-CM: V44.1  10/5/2018 Yes * (Principal)Acute ischemic stroke ([facility] [address] ICD-10-CM: I63.9 ICD-9-CM: 434.91  10/2/2018 Yes Overview Signed 10/8/2018  6:32 PM by Sherine Quiñones MD  
  Acute Ischemic Stroke (acute infarct of the posterior superior right basal ganglia and adjacent right corona radiata) with residual left hemiparesis, dysphagia and dysarthria Impaired mobility and ADLs ICD-10-CM: Z74.09 
ICD-9-CM: 799.89  10/2/2018 Yes Hemiparesis affecting left side as late effect of cerebrovascular accident (CVA) ([facility] [address] ICD-10-CM: N30.729 ICD-9-CM: 438.20  10/2/2018 Yes Dysphagia as late effect of cerebrovascular accident (CVA) ICD-10-CM: X00.894 ICD-9-CM: 438.82  10/2/2018 Yes 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) ICD-10-CM: D13.025 ICD-9-CM: 438.13  10/2/2018 Yes Traumatic amputation of left thumb ICD-10-CM: D84.153Z ICD-9-CM: 885.0  1/1/2011 Yes Background: 
 Past Medical History:  
Past Medical History:  
Diagnosis Date  Acute ischemic stroke ([facility] [address] 10/2/2018 Acute Ischemic Stroke (acute infarct of the posterior superior right basal ganglia and adjacent right corona radiata) with residual left hemiparesis, dysphagia and dysarthria  Benign prostatic hyperplasia  Bilateral hearing loss  Cervical spinal stenosis 10/2/2018  Chronic obstructive pulmonary disease (COPD) ([facility] [address]  Coronary artery disease involving native coronary artery of native heart  Diastolic dysfunction without heart failure  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) 10/2/2018  Dyslipidemia  Dysphagia as late effect of cerebrovascular accident (CVA) 10/2/2018  Gastric ulcer  Glaucoma  Hemiparesis affecting left side as late effect of cerebrovascular accident (CVA) ([facility] [address] 10/2/2018  History of kidney stones  History of transient ischemic attack (TIA) 2x  Hypertensive heart disease without heart failure  Hypothyroidism  Meningioma ([facility] [address] 10/3/2018 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  
 
Assessment: 
? Changes in Assessment throughout shift: none ? Patient has central line: no Reasons if yes: Insertion date: Last dressing date: 
? Patient has Suarez Cath: no Reasons if yes: Insertion date: 
 
? Last Vitals: 
  
Vitals:  
 10/11/18 1544 10/11/18 2201 10/12/18 6798 10/12/18 0725 BP: 136/54 135/59 131/59 135/62 Pulse: 64 70 61 65 Resp: 18 18 18 Temp: 97.9 °F (36.6 °C) 97 °F (36.1 °C)  97.5 °F (36.4 °C) SpO2: 97% 96%  100% Weight:      
Height:      
 
 
? PAIN Pain Assessment Pain Intensity 1: 0 (10/12/18 0755) Pain Intensity 1: 2 (12/29/14 1105) Pain Location 1: Shoulder Pain Location 1: Abdomen Pain Intervention(s) 1: Medication (see MAR), Repositioned, Rest, Position, Elevation Pain Intervention(s) 1: Medication (see MAR) Patient Stated Pain Goal: 0 Patient Stated Pain Goal: 0 
o Intervention effective: yes   
o Other actions taken for pain: turn reposition rest 
 
? Skin Assessment Skin color Skin Color: Appropriate for ethnicity Condition/Temperature Skin Condition/Temp: Dry, Fragile Integrity Skin Integrity: Abrasion Turgor Turgor: Non-tenting Weekly Pressure Ulcer Documentation Wound Prevention & Protection Methods Orientation of wound Orientation of Wound Prevention: Posterior Location of Prevention Location of Wound Prevention: Buttocks, Sacrum/Coccyx Dressing Present Dressing Present : Yes Dressing Status Dressing Status: Intact Wound Offloading Wound Offloading (Prevention Methods): Bed, pressure redistribution/air ? INTAKE/OUPUT Date 10/11/18 0700 - 10/12/18 4660 10/12/18 0700 - 10/13/18 3708 Shift 4862-6297 6542-0818 24 Hour Total 4265-4171 2482-3941 24 Hour Total  
I 
N 
T 
A 
K 
E 
 P. O.  0 0     
   P. O.  0 0 NG/GT  160 160 Water Flush Volume (mL) (PEG/Gastrostomy Tube 10/06/18)  100 100 Medication Volume (PEG/Gastrostomy Tube 10/06/18)  60 60 Shift Total 
(mL/kg)  160 
(2.4) 160 
(2.4) O 
U T 
P 
U [place] Urine (mL/kg/hr) 500 
(0.6)  500 
(0.3) Urine Voided 500  500 Urine Occurrence(s) 0 x 2 x 2 x Stool Stool Occurrence(s) 0 x 2 x 2 x Shift Total 
(mL/kg) 500 
(7.4)  500 
(7.4) NET -500 160 -340 Weight (kg) 67.6 67.6 67.6 67.6 67.6 67.6 Recommendations: 1. Patient needs and requests: assist with feedings, ADl's, mobility 2. Diet: NPO with tube feeding bolus 3. Pending tests/procedures: none 4.  Functional Level/Equipment: 1 person assist, w/c 
 
 5. Estimated Discharge Date: TBD Posted on Whiteboard in Patients Room: no 
 
[facility] A safety check occurred in the patient's room between off going nurse and oncoming nurse listed above. The safety check included the below items Area Items H High Alert Medications ? Verify all high alert medication drips (heparin, PCA, etc.) E Equipment ? Suction is set up for ALL patients (with sonia) ? Red plugs utilized for all equipment (IV pumps, etc.) ? WOWs wiped down at end of shift. ? Room stocked with oxygen, suction, and other unit-specific supplies A Alarms ? Bed alarm is set for fall risk patients ? Ensure chair alarm is in place and activated if patient is up in a chair L Lines ? Check IV for any infiltration ? Suarez bag is empty if patient has a Suarez ? Tubing and IV bags are labeled Nitish Deed Safety ? Room is clean, patient is clean, and equipment is clean. ? Hallways are clear from equipment besides carts. ? Fall bracelet on for fall risk patients ? Ensure room is clear and free of clutter ? Suction is set up for ALL patients (with sonia) ? Hallways are clear from equipment besides carts. ? Isolation precautions followed, supplies available outside room, sign posted

## 2018-10-12 NOTE — PROGRESS NOTES
[address] 
[address] [place] [address], [address] INPATIENT REHABILITATION 
DAILY PROGRESS NOTE Date: 10/12/2018 Name: Heather Villanueva Age / Sex: 80 y.o. 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  
 
Patient seen and examined. Blood pressure fairly controlled. Patient was found by Speech Therapist laying flat on the bed after just receiving PEG tube feeds. Patient's Complaint:  
No significant medical complaints Pain Control: no current joint or muscle symptoms, essentially pain-free Objective:  
 
Vital Signs: 
Patient Vitals for the past 24 hrs: 
 BP Temp Pulse Resp SpO2  
10/12/18 0725 135/62 97.5 °F (36.4 °C) 65 18 100 % 10/12/18 0504 131/59 - 61 - -  
10/11/18 2201 135/59 97 °F (36.1 °C) 70 18 96 % 10/11/18 1544 136/54 97.9 °F (36.6 °C) 64 18 97 % Physical Examination: 
GENERAL SURVEY: Patient is awake, alert, oriented x 3, sitting comfortably on the chair, not in acute respiratory distress. HEENT: pink palpebral conjunctivae, anicteric sclerae, no nasoaural discharge, moist oral mucosa NECK: supple, no jugular venous distention, no palpable lymph nodes CHEST/LUNGS: symmetrical chest expansion, good air entry, clear breath sounds HEART: adynamic precordium, good S1 S2, no S3, regular rhythm, no murmurs ABDOMEN: (+) PEG tube in place, flat, bowel sounds appreciated, soft, non-tender EXTREMITIES: (+) amputated distal phalanx of the thumb of the left hand, pink nailbeds, no edema, full and equal pulses, no calf tenderness NEUROLOGICAL EXAM: The patient is awake, alert and oriented x3, able to answer questions fairly appropriately, able to follow 1 and 2 step commands. Able to tell time from the wall clock. Cranial nerves II-XII are grossly intact except for slurred speech and dysphagia. No gross sensory deficit. Motor strength is 4+/5 on the RUE and RLE, 4/5 on the LUE, 4-/5 on the LLE. Current Medications: 
Current Facility-Administered Medications Medication Dose Route Frequency  traZODone (DESYREL) tablet 50 mg  50 mg Per G Tube QHS  modafinil (PROVIGIL) tablet 100 mg  100 mg Per G Tube DAILY  cholecalciferol (VITAMIN D3) tablet 2,000 Units  2,000 Units Oral BID  hydrALAZINE (APRESOLINE) tablet 20 mg  20 mg Per G Tube Q12H  pneumococcal 23-valent (PNEUMOVAX 23) injection 0.5 mL  0.5 mL IntraMUSCular PRIOR TO DISCHARGE  docusate sodium (COLACE) capsule 100 mg  100 mg Per G Tube BID  
 bisacodyl (DULCOLAX) suppository 10 mg  10 mg Rectal Q48H PRN  
 heparin (porcine) injection 5,000 Units  5,000 Units SubCUTAneous Q12H  
 acetaminophen (TYLENOL) solution 650 mg  650 mg Oral Q4H PRN  
 amLODIPine (NORVASC) tablet 10 mg  10 mg Per G Tube DAILY  co-enzyme Q-10 (CO Q-10) capsule 100 mg  100 mg Oral DAILY  vitamin B complex tablet  1 Tab Oral DAILY  melatonin tablet 3 mg  3 mg Per G Tube QHS  aspirin chewable tablet 81 mg  81 mg Per G Tube DAILY  atorvastatin (LIPITOR) tablet 80 mg  80 mg Per G Tube QHS  clopidogrel (PLAVIX) tablet 75 mg  75 mg PEG Tube DAILY  lisinopril (PRINIVIL, ZESTRIL) tablet 40 mg  40 mg Per G Tube DAILY  pantoprazole (PROTONIX) granules for oral suspension 40 mg  40 mg Per G Tube ACB  finasteride (PROSCAR) tablet 5 mg  5 mg Oral QPM  
 levothyroxine (SYNTHROID) tablet 50 mcg  50 mcg Per G Tube 6am  
 metoprolol tartrate (LOPRESSOR) tablet 25 mg  25 mg Per G Tube Q12H  tamsulosin (FLOMAX) capsule 0.4 mg  0.4 mg Oral QPM  
 timolol (TIMOPTIC) 0.5 % ophthalmic solution 1 Drop  1 Drop Both Eyes DAILY Allergies: Allergies Allergen Reactions  Codeine Other (comments) Lab/Data Review: No results found for this or any previous visit (from the past 24 hour(s)). Estimated Glomerular Filtration Rate: On admission, estimated GFR based on a Creatinine of 0.77 mg/dl: 
 Using CKD-EPI = 81.6 mL/min/1.73m2 Using MDRD = 101.6 mL/min/1.73m2 Assessment:  
 
Primary Rehabilitation Diagnosis 1. Impaired Mobility and ADLs 2. 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 
  
Comorbidities  Urinary frequency R35.0  Nocturia R35.1  Glaucoma H40.9  History of kidney stones Z87.442  Cervical spinal stenosis M48.02  
 Meningioma  D32.9  Hypertensive heart disease without heart failure I11.9  Diastolic dysfunction without heart failure I51.89  
 Gastric ulcer K25.9  Coronary artery disease involving native coronary artery of native heart I25.10  Dyslipidemia E78.5  Chronic obstructive pulmonary disease (COPD)  J44.9  Status post insertion of percutaneous endoscopic gastrostomy (PEG) tube  Z93.1  Hypothyroidism E03.9  Benign prostatic hyperplasia N40.0  Bilateral hearing loss H91.93  
 History of transient ischemic attack (TIA) Z86.73  
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  
  
 
Plan: 1. Justification for continued stay: Good progression towards established rehabilitation goals. 2. Medical Issues being followed closely: 
  [x]  Fall and safety precautions  
  []  Wound Care  
  [x]  Bowel and Bladder Function  
  [x]  Fluid Electrolyte and Nutrition Balance  
  []  Pain Control 3. Issues that 24 hour rehabilitation nursing is following: 
  [x]  Fall and safety precautions  
  []  Wound Care  
  [x]  Bowel and Bladder Function  
  [x]  Fluid Electrolyte and Nutrition Balance  
  []  Pain Control [x]  Assistance with and education on in-room safety with transfers to and from the bed, wheelchair, toilet and shower. 4. Acute rehabilitation plan of care: 
  [x]  Continue current care and rehab. [x]  Physical Therapy [x]  Occupational Therapy [x]  Speech Therapy  
  []  Hold Rehab until further notice 5. Medications: 
  [x]  MAR Reviewed [x]  Continue Present Medications 6. DVT Prophylaxis:   
  []  Lovenox [x]  Unfractionated Heparin  
  []  Coumadin  
  []  NOAC  
  []  BECKY Stockings  
  []  Sequential Compression Device []  None 7. Orders:  
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 
 > Continue: 
  > Aspirin 81 mg via PEG tube once daily in AM 
  > Atorvastatin 80 mg via PEG tube q HS 
  > Coenzyme Q10 100 mg via PEG tube once daily 
  > Clopidogrel 75 mg via PEG tube once daily in PM 
  > Vitamin B complex 1 tab via PEG tube once daily 
 
> Dysphagia as late effect of CVA; S/P Insertion of percutaneous endoscopic gastrostomy (PEG) tube (10/5/2018) 
 > NPO with tube feeding 
 > Jevity 1.5 237 ml via PEG tube 5 times daily (6AM, 10AM, 2PM, 6PM, 10PM) > Water flush 100 ml via PEG tube vefore and after each bolus feeding 
 
> Hypertensive heart disease without heart failure 
 > On admission to the ARU, decreased Hydralazine from 50 mg to 25 mg via PEG tube q 8 hr (6AM, 2PM, 10PM) > On 10/10/2018, decrease Hydralazine from 25 mg via PEG tube q 8 hr (6AM, 2PM, 10PM) to 20 mg via PEG tube q 12 hr (9AM, 9PM) > Continue: 
  > Amlodipine 10 mg via PEG tube once daily (9AM) 
  > Hydralazine 20 mg via PEG tube q 12 hr (9AM, 9PM) > Lisinopril 40 mg via PEG tube once daily (6AM) 
  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 
 > Continue Trazodone 50 mg via PEG tube q HS 
 
> Neurocognitive modulation 
 > On 10/11/2018, started: 
  > Trazodone 50 mg via PEG tube q HS 
  > Modafinil 100 mg via PEG tube q AM 
 > Continue: 
  > Trazodone 50 mg via PEG tube q HS  
  > Modafinil 100 mg via PEG tube q AM 
 
> Difficulty sleeping 
 > On admission to the ARU, started Melatonin 3 mg via PEG tube q HS 
 > On 10/11/2018, started Trazodone 50 mg via PEG tube q HS 
 > Continue: 
  > Melatonin 3 mg via PEG tube q HS 
   > Trazodone 50 mg via PEG tube q HS 
 
> Chest x-ray (10/12/2018) showed a small left pleural effusion; prominent interstitial lung markers could represent mild infiltration or chronic interstitial lung disease. 8. Patient's progress in rehabilitation and medical issues discussed with the patient. All questions answered to the best of my ability. Care plan discussed with patient and nurse. Signed:    Rossy Amaya MD 
  October 12, 2018

## 2018-10-12 NOTE — ROUTINE PROCESS
SHIFT CHANGE NOTE FOR [facility] 
  
Bedside and Verbal shift change report given to GRECIA Reyna (oncoming nurse) by NINA Mcclure, RN (offgoing nurse). Report included the following information SBAR, Kardex, MAR and Recent Results. 
  
Situation: 
                      Code Status: DNR Reason for Admission: Right CVA Hospital Day: 3 Problem List:  
Hospital Problems  Date Reviewed: 10/11/2018  
           
 Hang Hauser    Codes Class Noted POA   
         
  History of transient ischemic attack (TIA) ICD-10-CM: Z86.73 
ICD-9-CM: V12.54   Unknown Yes   
  Overview Signed 10/9/2018  3:28 PM by Ramonita Buckner MD   
    2x  
   
      
  Vitamin D deficiency (Chronic) ICD-10-CM: E55.9 ICD-9-CM: 269. 9   10/9/2018 Yes   
  Overview Signed 10/10/2018  1:59 PM by Ramonita Buckner MD   
    Vitamin D 25-Hydroxy (10/10/2018) = 22.5   
   
      
  Hypertensive heart disease without heart failure (Chronic) ICD-10-CM: I11.9 ICD-9-CM: 402.90   Unknown Yes   
      
  Status post insertion of percutaneous endoscopic gastrostomy (PEG) tube ([facility] ICD-10-CM: Z93.1 ICD-9-CM: V44.1   10/5/2018 Yes   
      
  * (Principal)Acute ischemic stroke ([facility] ICD-10-CM: I63.9 ICD-9-CM: 434.91   10/2/2018 Yes   
  Overview Signed 10/8/2018  6:32 PM by Ramonita Buckner MD   
    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  
   
      
  Impaired mobility and ADLs ICD-10-CM: Z74.09 
ICD-9-CM: 799.89   10/2/2018 Yes   
      
  Hemiparesis affecting left side as late effect of cerebrovascular accident (CVA) ([facility] ICD-10-CM: Y69.178 ICD-9-CM: 438.20   10/2/2018 Yes   
      
  Dysphagia as late effect of cerebrovascular accident (CVA) ICD-10-CM: Y97.982 ICD-9-CM: 438.82   10/2/2018 Yes   
      
  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) ICD-10-CM: Z55.767 ICD-9-CM: 438.13   10/2/2018 Yes   
      
  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 0   1/1/2011 Yes   
      
  
  
  
Background: 
                      Past Medical History:  
    
Past Medical History:  
Diagnosis Date  Acute ischemic stroke ([facility] 10/2/2018  
  Acute Ischemic Stroke (acute infarct of the posterior superior right basal ganglia and adjacent right corona radiata) with residual left hemiparesis, dysphagia and dysarthria  Benign prostatic hyperplasia    
 Bilateral hearing loss    
 Cervical spinal stenosis 10/2/2018  Chronic obstructive pulmonary disease (COPD) ([facility]    
 Coronary artery disease involving native coronary artery of native heart    
 Diastolic dysfunction without heart failure    
 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) 10/2/2018  Dyslipidemia    
 Dysphagia as late effect of cerebrovascular accident (CVA) 10/2/2018  Gastric ulcer    
 Glaucoma    
 Hemiparesis affecting left side as late effect of cerebrovascular accident (CVA) ([facility] 10/2/2018  History of kidney stones    
 History of transient ischemic attack (TIA)    
  2x  Hypertensive heart disease without heart failure    
 Hypothyroidism    
 Meningioma ([facility] 10/3/2018  
  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    
 Traumatic amputation of left thumb 2011  Urinary frequency    
 Vitamin D deficiency 10/9/2018  
  Vitamin D 25-Hydroxy (10/10/2018) = 22.5   
  
                      Patient taking anticoagulants yes Heparin Patient has a defibrillator: no  
  
Assessment: 
¨ Changes in Assessment throughout shift: none 
  
¨ Patient has central line: no Reasons if yes: Insertion date: Last dressing date: 
¨ Patient has Suarez Cath: no Reasons if yes: Insertion date: 
  
¨ Last Vitals: 
   
      
Vitals:   10/10/18 2100 10/11/18 0508 10/11/18 0745 10/11/18 1544 BP: 149/66 135/58 180/67 136/54 Pulse: 83 71 94 64 Resp: 20 18 18 Temp: 97 °F (36.1 °C)   97.4 °F (36.3 °C) 97.9 °F (36.6 °C) SpO2: 97%   96% 97% Weight:          
Height:          
  
  
· PAIN Pain Assessment Pain Intensity 1: 0 (10/11/18 1608) Pain Intensity 1: 2 (12/29/14 1105) Pain Location 1: Shoulder Pain Location 1: Abdomen Pain Intervention(s) 1: Medication (see MAR) Pain Intervention(s) 1: Medication (see MAR) Patient Stated Pain Goal: 0 Patient Stated Pain Goal: 0 
¨ Intervention effective: yes ¨ Other actions taken for pain: turn reposition rest 
  
· Skin Assessment Skin color Skin Color: Appropriate for ethnicity Condition/Temperature Skin Condition/Temp: Dry, Fragile Integrity Skin Integrity: Abrasion, Laceration (comment), Tear Turgor Turgor: Non-tenting Weekly Pressure Ulcer Documentation Wound Prevention & Protection Methods Orientation of wound Orientation of Wound Prevention: Posterior Location of Prevention Location of Wound Prevention: Buttocks, Sacrum/Coccyx Dressing Present Dressing Present : No 
Dressing Status Dressing Status: Intact Wound Offloading Wound Offloading (Prevention Methods): Bed, pressure reduction mattress, Turning, Repositioning, Pillows 
  
· INTAKE/OUPUT 
  
         
Date 10/10/18 0700 - 10/11/18 1978 10/11/18 0700 - 10/12/18 5555 Shift 0700-1859 1900-0659 24 Hour Total 0700-1859 1900-0659 24 Hour Total  
I 
N 
T 
A 
K 
E  P. O.   0 0        
   P. O.   0 0        
 NG/ 629 6672        
   Water Flush Volume (mL) (PEG/Gastrostomy Tube 10/06/18) 150 100 250        
   Medication Volume (PEG/Gastrostomy Tube 10/06/18) 110 20 130        
   Intake (ml) (PEG/Gastrostomy Tube 10/06/18)         
 Shift Total 
(mL/kg) 960 
(14.2) 480 
(7.1) 1440 
(21.3)        
O 
U T 
P 
U T  Urine (mL/kg/hr) 405 
(0.5)   405 
(0.2) 500   500 Urine Voided 405   405 500   500 Urine Occurrence(s) 2 x 3 x 5 x 0 x   0 x Stool              
   Stool Occurrence(s) 0 x 0 x 0 x 0 x   0 x Shift Total 
(mL/kg) 405 (6)   405 
(6) 500 
(7.4)   500 
(7.4)  477 6615 -500   -500 Weight (kg) 67.6 67.6 67.6 67.6 67.6 67.6  
  
  
Recommendations: 1. Patient needs and requests: assist with feedings, ADl's, mobility 
  
2. Diet: NPO with tube feeding bolus 
  
3. Pending tests/procedures: none  
  
4. Functional Level/Equipment: 1 person assist, w/c 
  
5. Estimated Discharge Date: TBD Posted on Whiteboard in Patients Room: no 
  
[facility] Safety Check 
  
A safety check occurred in the patient's room between off going nurse and oncoming nurse listed above. 
  
The safety check included the below items Area Items H High Alert Medications § Verify all high alert medication drips (heparin, PCA, etc.) E Equipment § Suction is set up for ALL patients (with sonia) § Red plugs utilized for all equipment (IV pumps, etc.) § WOWs wiped down at end of shift. § Room stocked with oxygen, suction, and other unit-specific supplies A Alarms § Bed alarm is set for fall risk patients § Ensure chair alarm is in place and activated if patient is up in a chair L Lines § Check IV for any infiltration § Suarez bag is empty if patient has a Suarez § Tubing and IV bags are labeled Abhay Jasper Safety § Room is clean, patient is clean, and equipment is clean. § Hallways are clear from equipment besides carts. § Fall bracelet on for fall risk patients § Ensure room is clear and free of clutter § Suction is set up for ALL patients (with sonia) § Hallways are clear from equipment besides carts.   
§ Isolation precautions followed, supplies available outside room, sign posted

## 2018-10-12 NOTE — PROGRESS NOTES
Problem: Dysphagia (Adult) Goal: *Speech Goal: (INSERT TEXT) Long term goals: 1. Patient will tolerate small amounts of PO using safe swallowing techniques without overt s/s of aspiration in 4/5 trials. 2. Patient will perform oral/pharyngeal strengthening exercises with supervision. 3. Patient will participate in laryngeal strengthening exercises and swallowing maneuvers, min cues - supervison. 4. Patient will recall 3 words after 5 minutes, min assist-supervision. 5. Patient will perform functional problem solving and reasoning tasks with supervision. Short term goals (by 10/16/18): 1. Patient will tolerate small amounts of PO puree with the SLP using safe swallowing techniques without overt s/s of aspiration in 4/5 trials. 2. Patient will perform oral/pharyngeal strengthening exercises with mod assist/cues. 3. Patient will participate in laryngeal strengthening exercises and swallowing maneuvers, mod cues. 4. Patient will recall 3 words after 5 minutes, mod-min assist. 
5. Patient will perform functional problem solving and reasoning tasks with 75-85% accuracy. Speech language pathology treatment Patient: Jeancarlos Santacruz (5 y.o. male) Date: 10/12/2018 Diagnosis: R CVA Acute ischemic stroke ([facility] Acute ischemic stroke ([facility] SUBJECTIVE:  
Patient stated I could taste that stuff this morning referring to his tube feeding, prior to the 1000 feeding. OBJECTIVE:  
Mental Status: 
Mr. Leisa Mortimer was found asleep in his bed this morning with the bed nearly flat (<15* per the side of the bed). SLP raised the [facility] to 30* and put in his hearing aids. After elevating the bed, patient demonstrated strong, wet cough. Initially patient not coughing, but had wet viuce; later (after raising the [facility]) cough was productive of thick frothy mucous. Patient and SLP able to remove a small amount with oral suctioning.   RN was asked to assist in repositioning him in the bed to be more comfortable. When informed about the bed position she stated, \"that must have been the night shift\". It was unclear whether this nurse had been in the room this morning before the SLP at 0930. When asked, she reported that the last tube feeding had been given at 0600. These events were reported to the MD who followed up with the patient and ordered a chest xray. This afternoon, Mr. Lalit Verdugo was still with frequent wet cough, more so that previously seen since his admission on Tuesday. Treatment & Interventions:  
Mr. Lalit Verdugo was seen for two speech therapy sessions, morning and afternoon. Little could be accomplished this morning as SLP was trying to encourage cough and make the patient comfortable. This afternoon, the following treatment tasks were presented: Motor Speech/Dysphagia: 
Production of strong final /k/:  100% accuracy Vowel prolongations:   Up to 15 seconds Pitch glides:    Limited range Use of incentive spirometer:  Patient able to use the device and a 2nd at his bedside. Response & Tolerance to Activities: 
Patient quite cooperative this afternoon. He is very pleasant and enjoys conversation. SLP remains concerned about suspected aspiration of tube feeding due to near absent elevation of HOB after the early morning tube feeding. Pain: 
Pain Scale 1: Visual 
No report of pain. After treatment:  
[x]       Patient left in no apparent distress sitting up in chair 
[]       Patient left in no apparent distress in bed 
[]       Call bell left within reach [x]       Nursing notified 
[]       Caregiver present 
[]       Bed alarm activated ASSESSMENT:  
Progression toward goals: 
[]       Improving appropriately and progressing toward goals [x]       Improving slowly and progressing toward goals 
[]       Not making progress toward goals and plan of care will be adjusted PLAN:  
Patient continues to benefit from skilled intervention to address the above impairments. Continue treatment per established plan of care. Discharge Recommendations:  Home Health Estimated LOS: 11/6/18 JAIME Bolton Time Calculation:  60 Minutes ADDENDUM: 
SLP able to hear Mr. Oliverio Guan coughing throughout the time spent writing daily notes. After their completion, SLP stopped in to give him more soft tissues (from SLP office). SLP observed his t-shirt to have stains of mucous on the front. These stains were beige in color, like the tube feeding. Patient quite uncomfortable with coughing and has suctioned significant amounts from productive coughs.

## 2018-10-12 NOTE — PROGRESS NOTES
Patient taken to xray via stretcher for chest xray with transportation. Head elevated 45 degree angle

## 2018-10-13 PROCEDURE — 74011250636 HC RX REV CODE- 250/636: Performed by: INTERNAL MEDICINE

## 2018-10-13 PROCEDURE — 74011250637 HC RX REV CODE- 250/637: Performed by: INTERNAL MEDICINE

## 2018-10-13 PROCEDURE — 97530 THERAPEUTIC ACTIVITIES: CPT

## 2018-10-13 PROCEDURE — 65310000000 HC RM PRIVATE REHAB

## 2018-10-13 RX ADMIN — VITAMIN D, TAB 1000IU (100/BT) 2000 UNITS: 25 TAB at 17:38

## 2018-10-13 RX ADMIN — LEVOTHYROXINE SODIUM 50 MCG: 50 TABLET ORAL at 05:39

## 2018-10-13 RX ADMIN — HYDRALAZINE HYDROCHLORIDE 20 MG: 10 TABLET, FILM COATED ORAL at 21:53

## 2018-10-13 RX ADMIN — DOCUSATE SODIUM 100 MG: 100 CAPSULE, LIQUID FILLED ORAL at 09:01

## 2018-10-13 RX ADMIN — AMLODIPINE BESYLATE 10 MG: 10 TABLET ORAL at 09:01

## 2018-10-13 RX ADMIN — TRAZODONE HYDROCHLORIDE 50 MG: 50 TABLET ORAL at 21:53

## 2018-10-13 RX ADMIN — METOPROLOL TARTRATE 25 MG: 25 TABLET ORAL at 21:53

## 2018-10-13 RX ADMIN — ASPIRIN 81 MG CHEWABLE TABLET 81 MG: 81 TABLET CHEWABLE at 09:00

## 2018-10-13 RX ADMIN — MODAFINIL 100 MG: 100 TABLET ORAL at 09:02

## 2018-10-13 RX ADMIN — TAMSULOSIN HYDROCHLORIDE 0.4 MG: 0.4 CAPSULE ORAL at 17:38

## 2018-10-13 RX ADMIN — DOCUSATE SODIUM 100 MG: 100 CAPSULE, LIQUID FILLED ORAL at 17:38

## 2018-10-13 RX ADMIN — HEPARIN SODIUM 5000 UNITS: 5000 INJECTION, SOLUTION INTRAVENOUS; SUBCUTANEOUS at 05:39

## 2018-10-13 RX ADMIN — TIMOLOL MALEATE 1 DROP: 5 SOLUTION OPHTHALMIC at 09:18

## 2018-10-13 RX ADMIN — PANTOPRAZOLE SODIUM 40 MG: 40 GRANULE, DELAYED RELEASE ORAL at 06:38

## 2018-10-13 RX ADMIN — Medication 100 MG: at 14:01

## 2018-10-13 RX ADMIN — CLOPIDOGREL BISULFATE 75 MG: 75 TABLET, FILM COATED ORAL at 21:53

## 2018-10-13 RX ADMIN — LISINOPRIL 40 MG: 40 TABLET ORAL at 05:39

## 2018-10-13 RX ADMIN — MELATONIN TAB 3 MG 3 MG: 3 TAB at 21:53

## 2018-10-13 RX ADMIN — METOPROLOL TARTRATE 25 MG: 25 TABLET ORAL at 09:01

## 2018-10-13 RX ADMIN — Medication 1 TABLET: at 09:01

## 2018-10-13 RX ADMIN — VITAMIN D, TAB 1000IU (100/BT) 2000 UNITS: 25 TAB at 09:01

## 2018-10-13 RX ADMIN — HYDRALAZINE HYDROCHLORIDE 20 MG: 10 TABLET, FILM COATED ORAL at 09:00

## 2018-10-13 RX ADMIN — HEPARIN SODIUM 5000 UNITS: 5000 INJECTION, SOLUTION INTRAVENOUS; SUBCUTANEOUS at 17:39

## 2018-10-13 RX ADMIN — FINASTERIDE 5 MG: 5 TABLET, FILM COATED ORAL at 17:39

## 2018-10-13 RX ADMIN — ATORVASTATIN CALCIUM 80 MG: 40 TABLET, FILM COATED ORAL at 21:53

## 2018-10-13 NOTE — PROGRESS NOTES
Problem: Mobility Impaired (Adult and Pediatric) Goal: *Therapy Goal (Edit Goal, Insert Text) Physical Therapy Goals Initiated 10/9/2018 and to be accomplished within 7 day(s) 10/15/2018 1. Patient will move from supine to sit and sit to supine , scoot up and down and roll side to side in bed with moderate assistance . 2.  Patient will transfer from bed to chair and chair to bed with moderate assistance  using the least restrictive device. 3.  Patient will perform sit to stand with moderate assistance . 4. Patient will ambulate with maximal assistance for 5 feet with the least restrictive device. 5.  Patient will ascend/descend 1 stair with 1-2 handrail(s) with maximal assistance. 6.  Patient will perform 50 ft of wheelchair mobility with modified technique moderate assist for steering and negotiation of obstacles. 7.  Patient will be able to maintain sitting balance without support for at least 5 minutes with verbal cues only for maintaining midline/upright position for ease of transfers. Physical Therapy Goals Initiated 10/9/2018 and to be accomplished within 28 day(s) on 11/6/2018 1. Patient will move from supine to sit and sit to supine , scoot up and down and roll side to side in bed with supervision/set-up. 2.  Patient will transfer from bed to chair and chair to bed with supervision/set-up using the least restrictive device. 3.  Patient will perform sit to stand with supervision/set-up. 4.  Patient will ambulate with minimal assistance/contact guard assist for 50 feet with the least restrictive device. 5.  Patient will ascend/descend 4 stairs with 2 handrail(s) with minimal assistance/contact guard assist. 
6.  Patient will perform 150 ft of wheelchair mobility at modified independent level. 7.  Patient will demonstrate improved postural control for ease of functional mobility as demonstrated by PASS score >16/36 physical Therapy TREATMENT Patient: Lalo Colón (15 y.o. male) Date: 10/13/2018 Diagnosis: R CVA Acute ischemic stroke ([facility] Acute ischemic stroke ([facility] Precautions: Aspiration, Fall (Head of bed 30-45 degrees at all times per speech therapist) Chart, physical therapy assessment, plan of care and goals were reviewed. Time In: 1030 Time Out: 1130 Patient Seen For: Transfer training; bed mobility training, sitting balance Pain: No pain reported during therapy Patient identified with name and :yes SUBJECTIVE:  
 
Patient continues to report feeling very tired, again observed to have coughing. Found in upright position in bed, sleeping needing to be woken up for therapy. OBJECTIVE DATA SUMMARY:  
Objective: BED/MAT MOBILITY Daily Assessment Rolling Right : 0 (Not tested) Rolling Left : 2 (Maximal assistance) Supine to Sit : 2 (Maximal assistance) Sit to Supine : 0 (Not tested) Max assist as patient needing assist at trunk and lower extremities to come into sitting. Pushes to left side upon coming up into sitting beyond midline position needing assist and cues for righting his balance. TRANSFERS Daily Assessment Transfer Type: SPT without device Other: stand pivot and squat pivot transfer Transfer Assistance : 2 (Maximal assistance) Sit to Stand Assistance: Maximum assistance Max assist for transfers without AD, having significant difficulty with transferring to right due to pushing with right LE observed (even if patient attempting to pull himself over with right UE on armrest of chair). Needing total assist for toilet transfer (3:1 commode over toilet) as patient needing second person assist for going back to his right side. Bed<->chair and chair->recliner chair needing max assist for stand/squat pivot to his left as he assisted with pushing to his left. More difficulty with pivoting when shoes donned during transfer. Therapist having to provide significant assist for bringing trunk forward so center of gravity forward over base of support, assist and cues for maintaining more midline position. Sit<->stand max assist for lifting/lowering with patient having difficulty coming into full upright position and leaning toward his weaker left side. BALANCE Daily Assessment Sitting - Static: Fair (occasional); Occassional;Poor (constant support) (seated edge of bed) Sitting - Dynamic: Poor (constant support) Standing - Static: Poor Standing - Dynamic : Impaired Needing moderate assist for maintaining midline position, cues for upright position for sitting edge of bed, more difficulty today sitting at edge of bed with compliant bed surface. ASSESSMENT: 
Patient noted to fatigue more easily with sitting edge of bed today, continues to require significant assist for bed mobility, transfers, including sit<->stand. Recommend co-treatment with OT given complexity of patient's presentation, including poor strength and endurance, impaired midline orientation requiring two skilled therapists to appropriately facilitate functional mobility with upright, midline positioning during tasks. Progression toward goals: 
[]      Improving appropriately and progressing toward goals [x]      Improving slowly and progressing toward goals 
[]      Not making progress toward goals and plan of care will be adjusted PLAN: 
Patient continues to benefit from skilled intervention to address the above impairments. Continue treatment per established plan of care. Discharge Recommendations:  [address] depending on patient's progress. Further Equipment Recommendations for Discharge:  wheelchair required currently, may require more complex seating depending on patient's presentation and progression with therapy. Ongoing reassessment of most appropriate equipment needs for patient. Estimated LOS: 4 weeks Activity Tolerance:  
Poor to fair depending on fatigue. Patient fatiguing very quickly. After treatment:  
Patient left seated in recliner chair with bilateral feet elevated, call button within reach and family in room. Ghazala Duran, PT 
10/13/2018

## 2018-10-13 NOTE — ROUTINE PROCESS
SHIFT CHANGE NOTE FOR Tigist Wiley Bedside and Verbal shift change report given to Jessica Joseph LPN (oncoming nurse) by Uriah Ugalde RN (offgoing nurse). Report included the following information SBAR, Kardex, MAR and Recent Results. Situation: 
 Code Status: DNR Reason for Admission: Right ischemic stroke Hospital Day: 5 Problem List:  
Hospital Problems  Date Reviewed: 10/12/2018 Codes Class Noted POA History of transient ischemic attack (TIA) ICD-10-CM: F63.87 
ICD-9-CM: V12.54  Unknown Yes Overview Signed 10/9/2018  3:28 PM by Javy Ryder MD  
  2x Vitamin D deficiency (Chronic) ICD-10-CM: E55.9 ICD-9-CM: 268.9  10/9/2018 Yes Overview Signed 10/10/2018  1:59 PM by Javy Ryder MD  
  Vitamin D 25-Hydroxy (10/10/2018) = 22.5 Hypertensive heart disease without heart failure (Chronic) ICD-10-CM: I11.9 ICD-9-CM: 402.90  Unknown Yes Status post insertion of [facility] ICD-10-CM: Z93.1 ICD-9-CM: V44.1  10/5/2018 Yes * (Principal)Acute ischemic stroke ([facility] ICD-10-CM: I63.9 ICD-9-CM: 434.91  10/2/2018 Yes Overview Signed 10/8/2018  6:32 PM by Javy Ryder MD  
  Acute Ischemic Stroke (acute infarct of the posterior superior right basal ganglia and adjacent right corona radiata) with residual left hemiparesis, dysphagia and dysarthria Impaired mobility and ADLs ICD-10-CM: Z74.09 
ICD-9-CM: 799.89  10/2/2018 Yes Hemiparesis affecting left side as late effect of cerebrovascular accident (CVA) ([facility] ICD-10-CM: T93.755 ICD-9-CM: 438.20  10/2/2018 Yes Dysphagia as late effect of cerebrovascular accident (CVA) ICD-10-CM: R80.917 ICD-9-CM: 438.82  10/2/2018 Yes 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) ICD-10-CM: T62.332 ICD-9-CM: 438.13  10/2/2018 Yes Traumatic amputation of left thumb ICD-10-CM: E11.106G ICD-9-CM: 885.0  1/1/2011 Yes Background: 
 Past Medical History:  
Past Medical History:  
Diagnosis Date  Acute ischemic stroke ([facility] 10/2/2018 Acute Ischemic Stroke (acute infarct of the posterior superior right basal ganglia and adjacent right corona radiata) with residual left hemiparesis, dysphagia and dysarthria  Benign prostatic hyperplasia  Bilateral hearing loss  Cervical spinal stenosis 10/2/2018  Chronic obstructive pulmonary disease (COPD) ([facility]  Coronary artery disease involving native coronary artery of native heart  Diastolic dysfunction without heart failure  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) 10/2/2018  Dyslipidemia  Dysphagia as late effect of cerebrovascular accident (CVA) 10/2/2018  Gastric ulcer  Glaucoma  Hemiparesis affecting left side as late effect of cerebrovascular accident (CVA) ([facility] 10/2/2018  History of kidney stones  History of transient ischemic attack (TIA) 2x  Hypertensive heart disease without heart failure  Hypothyroidism  Meningioma ([facility] 10/3/2018 Small right parafalcine meningioma without mass effect  Nocturia  Traumatic amputation of left thumb 2011  Urinary frequency  Vitamin D deficiency 10/9/2018 Vitamin D 25-Hydroxy (10/10/2018) = 22.5 Patient taking anticoagulants no Patient has a defibrillator: no  
 
Assessment: 
? Changes in Assessment throughout shift: hacking cough, productive ? Patient has central line: no Reasons if yes: Insertion date: Last dressing date: 
? Patient has Suarez Cath: no Reasons if yes: Insertion date: 
 
? Last Vitals: 
  
Vitals:  
 10/12/18 2040 10/12/18 2041 10/13/18 0726 10/13/18 1630 BP: 141/57 148/60 142/56 125/55 Pulse: 81 81 70 77 Resp: 18 18 18 Temp: 98.9 °F (37.2 °C)  96.9 °F (36.1 °C) 98.3 °F (36.8 °C) SpO2: 98%  98% 95% Weight:      
Height:      
 
 
?  PAIN 
 Pain Assessment Pain Intensity 1: 0 (10/13/18 1509) Pain Intensity 1: 2 (12/29/14 1105) Pain Location 1: Shoulder Pain Location 1: Abdomen Pain Intervention(s) 1: Medication (see MAR), Repositioned, Rest, Position, Elevation Pain Intervention(s) 1: Medication (see MAR) Patient Stated Pain Goal: 0 Patient Stated Pain Goal: 0 
o Intervention effective: yes   
o Other actions taken for pain: turn reposition ? Skin Assessment Skin color Skin Color: Appropriate for ethnicity Condition/Temperature Skin Condition/Temp: Warm, Dry Integrity Skin Integrity: Incision (comment), Abrasion Turgor Turgor: Non-tenting Weekly Pressure Ulcer Documentation Wound Prevention & Protection Methods Orientation of wound Orientation of Wound Prevention: Posterior Location of Prevention Location of Wound Prevention: Buttocks, Sacrum/Coccyx Dressing Present Dressing Present : Yes Dressing Status Dressing Status: Intact Wound Offloading Wound Offloading (Prevention Methods): Bed, pressure reduction mattress, Chair cushion, Pillows, Repositioning, Turning, Wheelchair ? INTAKE/OUPUT Date 10/12/18 1900 - 10/13/18 1113 10/13/18 0700 - 10/14/18 1737 Shift 6234-7083 24 Hour Total 5436-1206 5537-8001 24 Hour Total  
I 
N 
T 
A 
K 
E 
 P. O. 0 0 0  0  
   P. O. 0 0 0  0 NG/ 320 980  980 Water Flush Volume (mL) (PEG/Gastrostomy Tube 10/06/18) 200 200 200  200 Medication Volume (PEG/Gastrostomy Tube 10/06/18) 120 120 50  50 Intake (ml) (PEG/Gastrostomy Tube 10/06/18)   730  730 Shift Total 
(mL/kg) 320 
(4.7) 320 
(4.7) 980 
(14.5)  980 
(14.5) O 
U T 
P 
U Lion Rias Urine (mL/kg/hr)   200 
(0.2)  200 Urine Voided   200  200 Urine Occurrence(s) 3 x 4 x 4 x  4 x Stool Stool Occurrence(s) 0 x 0 x 0 x  0 x Shift Total 
(mL/kg)   200 
(3)  200 
(3)  320 780  780 Weight (kg) 67.6 67.6 67.6 67.6 67.6 Recommendations: 1. Patient needs and requests: Assist with ADL's/ help with repositioning 2. Diet: NPO with tube feeding bolus x5/day 3. Pending tests/procedures: CXR yest  
 
4. Functional Level/Equipment: w/c, 1 person assist 
 
5. Estimated Discharge Date: TBD Posted on Whiteboard in Patients Room: [facility] Safety Check A safety check occurred in the patient's room between off going nurse and oncoming nurse listed above. The safety check included the below items Area Items H High Alert Medications ? Verify all high alert medication drips (heparin, PCA, etc.) E Equipment ? Suction is set up for ALL patients (with sonia) ? Red plugs utilized for all equipment (IV pumps, etc.) ? WOWs wiped down at end of shift. ? Room stocked with oxygen, suction, and other unit-specific supplies A Alarms ? Bed alarm is set for fall risk patients ? Ensure chair alarm is in place and activated if patient is up in a chair L Lines ? Check IV for any infiltration ? Suarez bag is empty if patient has a Suarez ? Tubing and IV bags are labeled Lauretha Lard Safety ? Room is clean, patient is clean, and equipment is clean. ? Hallways are clear from equipment besides carts. ? Fall bracelet on for fall risk patients ? Ensure room is clear and free of clutter ? Suction is set up for ALL patients (with sonia) ? Hallways are clear from equipment besides carts. ? Isolation precautions followed, supplies available outside room, sign posted

## 2018-10-13 NOTE — PROGRESS NOTES
conducted a Follow up consultation and Spiritual Assessment for Yury Julio, who is a 80 y. o.,male. The  provided the following Interventions: 
Continued the relationship of care and support. Listened empathically. Offered prayer and assurance of continued prayer on patients behalf. Chart reviewed. The following outcomes were achieved: 
Patient expressed gratitude for 's visit. Assessment: 
There are no further spiritual or Confucianist issues which require Spiritual Care Services interventions at this time. Plan: 
Chaplains will continue to follow and will provide pastoral care on an as needed/requested basis.  recommends bedside caregivers page  on duty if patient shows signs of acute spiritual or emotional distress. [address] [facility] Spiritual Care  
[phone]

## 2018-10-13 NOTE — PROGRESS NOTES
Progress Note Patient: Patt Gibbons MRN: [id number]  SSN: [id number] YOB: 1931  Age: 80 y.o. Sex: male Admit Date: 10/8/2018 LOS: 5 days Subjective:  
 
Patient admitted for thwerapy s/p CVA. No acute problems Objective:  
 
Vitals:  
 10/12/18 1601 10/12/18 2040 10/12/18 2041 10/13/18 2842 BP: 149/66 141/57 148/60 142/56 Pulse: 71 81 81 70 Resp: 18 18  18 Temp: 98 °F (36.7 °C) 98.9 °F (37.2 °C)  96.9 °F (36.1 °C) SpO2: 98% 98%  98% Weight:      
Height:      
  
 
Intake and Output: 
Current Shift: 10/13 0701 - 10/13 1900 In: 540 Out: - Last three shifts: 10/11 1901 - 10/13 0700 In: 640 Out: - Physical Exam:  
GENERAL: alert, cooperative, no distress, appears older than stated age LUNG: clear to auscultation bilaterally HEART: regular rate and rhythm Lab/Data Review: All lab results for the last 24 hours reviewed. cxr  No focal infiltrate Assessment:  
 
Principal Problem: 
  Acute ischemic stroke ([facility] (10/2/2018) Overview: Acute Ischemic Stroke (acute infarct of the posterior superior right basal  
    ganglia and adjacent right corona radiata) with residual left hemiparesis,  
    dysphagia and dysarthria Active Problems: 
  Impaired mobility and ADLs (10/2/2018) Hemiparesis affecting left side as late effect of cerebrovascular accident (CVA) ([address] (10/2/2018) Dysphagia as late effect of cerebrovascular accident (CVA) (10/2/2018) 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) (10/2/2018) Hypertensive heart disease without heart failure () Status post insertion of percutaneous endoscopic gastrostomy (PEG) tube ([facility] (10/5/2018) History of transient ischemic attack (TIA) () Overview: 2x Traumatic amputation of left thumb (1/1/2011) Vitamin D deficiency (10/9/2018) Overview: Vitamin D 25-Hydroxy (10/10/2018) = 22.5 Plan: Continue present management. Signed By: Graciela Gutiérrez MD   
 October 13, 2018

## 2018-10-13 NOTE — ROUTINE PROCESS
SHIFT CHANGE NOTE FOR Larry Melendrez Bedside and Verbal shift change report given to Haresh Reed RN (oncoming nurse) by Queenie Benitez RN (offgoing nurse). Report included the following information SBAR, Kardex, MAR and Recent Results. Situation: 
 Code Status: DNR Reason for Admission: Right ischemic stroke Hospital Day: 5 Problem List:  
Hospital Problems  Date Reviewed: 10/12/2018 Codes Class Noted POA History of transient ischemic attack (TIA) ICD-10-CM: O51.06 
ICD-9-CM: V12.54  Unknown Yes Overview Signed 10/9/2018  3:28 PM by Quirino Morse MD  
  2x Vitamin D deficiency (Chronic) ICD-10-CM: E55.9 ICD-9-CM: 268.9  10/9/2018 Yes Overview Signed 10/10/2018  1:59 PM by Quirino Morse MD  
  Vitamin D 25-Hydroxy (10/10/2018) = 22.5 Hypertensive heart disease without heart failure (Chronic) ICD-10-CM: I11.9 ICD-9-CM: 402.90  Unknown Yes Status post insertion of [facility] ([facility] ICD-10-CM: Z93.1 ICD-9-CM: V44.1  10/5/2018 Yes * (Principal)Acute ischemic stroke ([facility] ICD-10-CM: I63.9 ICD-9-CM: 434.91  10/2/2018 Yes Overview Signed 10/8/2018  6:32 PM by Quirino Morse MD  
  Acute Ischemic Stroke (acute infarct of the posterior superior right basal ganglia and adjacent right corona radiata) with residual left hemiparesis, dysphagia and dysarthria Impaired mobility and ADLs ICD-10-CM: Z74.09 
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 
 Past Medical History:  
Past Medical History:  
Diagnosis Date  Acute ischemic stroke ([facility] 10/2/2018 Acute Ischemic Stroke (acute infarct of the posterior superior right basal ganglia and adjacent right corona radiata) with residual left hemiparesis, dysphagia and dysarthria  Benign prostatic hyperplasia  Bilateral hearing loss  Cervical spinal stenosis 10/2/2018  Chronic obstructive pulmonary disease (COPD) ([facility]  Coronary artery disease involving native coronary artery of native heart  Diastolic dysfunction without heart failure  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) 10/2/2018  Dyslipidemia  Dysphagia as late effect of cerebrovascular accident (CVA) 10/2/2018  Gastric ulcer  Glaucoma  Hemiparesis affecting left side as late effect of cerebrovascular accident (CVA) ([facility] 10/2/2018  History of kidney stones  History of transient ischemic attack (TIA) 2x  Hypertensive heart disease without heart failure  Hypothyroidism  Meningioma ([facility] 10/3/2018 Small right parafalcine meningioma without mass effect  Nocturia  Traumatic amputation of left thumb 2011  Urinary frequency  Vitamin D deficiency 10/9/2018 Vitamin D 25-Hydroxy (10/10/2018) = 22.5 Patient taking anticoagulants no Patient has a defibrillator: no  
 
Assessment: 
? Changes in Assessment throughout shift: hacking cough, productive ? Patient has central line: no Reasons if yes: Insertion date: Last dressing date: 
? Patient has Suarez Cath: no Reasons if yes: Insertion date: 
 
? Last Vitals: 
  
Vitals:  
 10/12/18 6815 10/12/18 0725 10/12/18 1601 10/12/18 2040 BP: 131/59 135/62 149/66 141/57 Pulse: 61 65 71 81 Resp:  18 18 18 Temp:  97.5 °F (36.4 °C) 98 °F (36.7 °C) 98.9 °F (37.2 °C) SpO2:  100% 98% 98% Weight:      
Height:      
 
 
?  PAIN 
 Pain Assessment Pain Intensity 1: 0 (10/13/18 0018) Pain Intensity 1: 2 (12/29/14 1105) Pain Location 1: Shoulder Pain Location 1: Abdomen Pain Intervention(s) 1: Medication (see MAR), Repositioned, Rest, Position, Elevation Pain Intervention(s) 1: Medication (see MAR) Patient Stated Pain Goal: 0 Patient Stated Pain Goal: 0 
o Intervention effective: yes   
o Other actions taken for pain: turn reposition ? Skin Assessment Skin color Skin Color: Appropriate for ethnicity Condition/Temperature Skin Condition/Temp: Warm, Dry, Fragile Integrity Skin Integrity: Abrasion Turgor Turgor: Non-tenting Weekly Pressure Ulcer Documentation Wound Prevention & Protection Methods Orientation of wound Orientation of Wound Prevention: Posterior Location of Prevention Location of Wound Prevention: Buttocks, Sacrum/Coccyx Dressing Present Dressing Present : Yes Dressing Status Dressing Status: Intact Wound Offloading Wound Offloading (Prevention Methods): Bed, pressure reduction mattress, Turning, Repositioning, Pillows ? INTAKE/OUPUT Date 10/12/18 0700 - 10/13/18 7533 10/13/18 0700 - 10/14/18 2709 Shift 0738-5645 5617-1313 24 Hour Total 0755-0588 1059-3123 24 Hour Total  
I 
N 
T 
A 
K 
E 
 P. O. 0 0 0     
   P. O. 0 0 0 NG/GT  160 160 Water Flush Volume (mL) (PEG/Gastrostomy Tube 10/06/18)  100 100 Medication Volume (PEG/Gastrostomy Tube 10/06/18)  60 60 Shift Total 
(mL/kg) 0 
(0) 160 
(2.4) 160 
(2.4) O 
U T 
P 
U Ernst Goodie Urine (mL/kg/hr) Urine Occurrence(s) 1 x 2 x 3 x Stool Stool Occurrence(s) 0 x 0 x 0 x Shift Total 
(mL/kg) NET 0 160 160 Weight (kg) 67.6 67.6 67.6 67.6 67.6 67.6 Recommendations: 1. Patient needs and requests: Assist with ADL's/ help with repositioning 2. Diet: NPO with tube feeding bolus x5/day 3.  Pending tests/procedures: CXR yest  
 
 4. Functional Level/Equipment: w/c, 1 person assist 
 
5. Estimated Discharge Date: TBD Posted on Whiteboard in Patients Room: no  
 
[facility] A safety check occurred in the patient's room between off going nurse and oncoming nurse listed above. The safety check included the below items Area Items H High Alert Medications ? Verify all high alert medication drips (heparin, PCA, etc.) E Equipment ? Suction is set up for ALL patients (with sonia) ? Red plugs utilized for all equipment (IV pumps, etc.) ? WOWs wiped down at end of shift. ? Room stocked with oxygen, suction, and other unit-specific supplies A Alarms ? Bed alarm is set for fall risk patients ? Ensure chair alarm is in place and activated if patient is up in a chair L Lines ? Check IV for any infiltration ? Suarez bag is empty if patient has a Suarez ? Tubing and IV bags are labeled Estrella Puff Safety ? Room is clean, patient is clean, and equipment is clean. ? Hallways are clear from equipment besides carts. ? Fall bracelet on for fall risk patients ? Ensure room is clear and free of clutter ? Suction is set up for ALL patients (with sonia) ? Hallways are clear from equipment besides carts. ? Isolation precautions followed, supplies available outside room, sign posted

## 2018-10-14 PROCEDURE — 97535 SELF CARE MNGMENT TRAINING: CPT

## 2018-10-14 PROCEDURE — 65310000000 HC RM PRIVATE REHAB

## 2018-10-14 PROCEDURE — 74011250637 HC RX REV CODE- 250/637: Performed by: FAMILY MEDICINE

## 2018-10-14 PROCEDURE — 74011000250 HC RX REV CODE- 250: Performed by: INTERNAL MEDICINE

## 2018-10-14 PROCEDURE — 74011250636 HC RX REV CODE- 250/636: Performed by: INTERNAL MEDICINE

## 2018-10-14 PROCEDURE — 74011250637 HC RX REV CODE- 250/637: Performed by: INTERNAL MEDICINE

## 2018-10-14 RX ORDER — FUROSEMIDE 20 MG/1
20 TABLET ORAL ONCE
Status: COMPLETED | OUTPATIENT
Start: 2018-10-14 | End: 2018-10-14

## 2018-10-14 RX ADMIN — MELATONIN TAB 3 MG 3 MG: 3 TAB at 22:25

## 2018-10-14 RX ADMIN — VITAMIN D, TAB 1000IU (100/BT) 2000 UNITS: 25 TAB at 18:02

## 2018-10-14 RX ADMIN — DOCUSATE SODIUM 100 MG: 100 CAPSULE, LIQUID FILLED ORAL at 18:00

## 2018-10-14 RX ADMIN — VITAMIN D, TAB 1000IU (100/BT) 2000 UNITS: 25 TAB at 08:37

## 2018-10-14 RX ADMIN — LISINOPRIL 40 MG: 40 TABLET ORAL at 07:04

## 2018-10-14 RX ADMIN — HEPARIN SODIUM 5000 UNITS: 5000 INJECTION, SOLUTION INTRAVENOUS; SUBCUTANEOUS at 06:00

## 2018-10-14 RX ADMIN — FUROSEMIDE 20 MG: 20 TABLET ORAL at 14:51

## 2018-10-14 RX ADMIN — Medication 100 MG: at 08:38

## 2018-10-14 RX ADMIN — HYDRALAZINE HYDROCHLORIDE 20 MG: 10 TABLET, FILM COATED ORAL at 08:38

## 2018-10-14 RX ADMIN — HEPARIN SODIUM 5000 UNITS: 5000 INJECTION, SOLUTION INTRAVENOUS; SUBCUTANEOUS at 18:01

## 2018-10-14 RX ADMIN — METOPROLOL TARTRATE 25 MG: 25 TABLET ORAL at 08:38

## 2018-10-14 RX ADMIN — TIMOLOL MALEATE 1 DROP: 5 SOLUTION OPHTHALMIC at 14:00

## 2018-10-14 RX ADMIN — CLOPIDOGREL BISULFATE 75 MG: 75 TABLET, FILM COATED ORAL at 22:24

## 2018-10-14 RX ADMIN — TAMSULOSIN HYDROCHLORIDE 0.4 MG: 0.4 CAPSULE ORAL at 18:00

## 2018-10-14 RX ADMIN — DOCUSATE SODIUM 100 MG: 100 CAPSULE, LIQUID FILLED ORAL at 08:38

## 2018-10-14 RX ADMIN — MODAFINIL 100 MG: 100 TABLET ORAL at 08:37

## 2018-10-14 RX ADMIN — LEVOTHYROXINE SODIUM 50 MCG: 50 TABLET ORAL at 06:00

## 2018-10-14 RX ADMIN — AMLODIPINE BESYLATE 10 MG: 10 TABLET ORAL at 08:38

## 2018-10-14 RX ADMIN — ASPIRIN 81 MG CHEWABLE TABLET 81 MG: 81 TABLET CHEWABLE at 08:43

## 2018-10-14 RX ADMIN — TRAZODONE HYDROCHLORIDE 50 MG: 50 TABLET ORAL at 22:26

## 2018-10-14 RX ADMIN — METOPROLOL TARTRATE 25 MG: 25 TABLET ORAL at 22:26

## 2018-10-14 RX ADMIN — FINASTERIDE 5 MG: 5 TABLET, FILM COATED ORAL at 18:00

## 2018-10-14 RX ADMIN — HYDRALAZINE HYDROCHLORIDE 20 MG: 10 TABLET, FILM COATED ORAL at 22:25

## 2018-10-14 RX ADMIN — Medication 1 TABLET: at 08:38

## 2018-10-14 RX ADMIN — ATORVASTATIN CALCIUM 80 MG: 40 TABLET, FILM COATED ORAL at 22:25

## 2018-10-14 NOTE — PROGRESS NOTES
Problem: Falls - Risk of 
Goal: *Absence of Falls Document Abimbola Comer Fall Risk and appropriate interventions in the flowsheet. Outcome: Progressing Towards Goal 
Fall Risk Interventions: 
Mobility Interventions: Bed/chair exit alarm Mentation Interventions: Bed/chair exit alarm Medication Interventions: Bed/chair exit alarm, Patient to call before getting OOB Elimination Interventions: Call light in reach, Bed/chair exit alarm, Patient to call for help with toileting needs History of Falls Interventions: Bed/chair exit alarm Problem: Pressure Injury - Risk of 
Goal: *Prevention of pressure injury Document Rupert Scale and appropriate interventions in the flowsheet. Outcome: Progressing Towards Goal 
Pressure Injury Interventions: 
Sensory Interventions: Assess changes in LOC Moisture Interventions: Absorbent underpads Activity Interventions: Increase time out of bed, Pressure redistribution bed/mattress(bed type) Mobility Interventions: Pressure redistribution bed/mattress (bed type) Nutrition Interventions: Document food/fluid/supplement intake Friction and Shear Interventions: Apply protective barrier, creams and emollients

## 2018-10-14 NOTE — PROGRESS NOTES
Problem: Self Care Deficits Care Plan (Adult) Goal: *Therapy Goal (Edit Goal, Insert Text) Occupational Therapy Goals Long Term Goals Initiated 10/9/18 and to be accomplished within 4 week(s) 1. Pt will perform self-feeding with total dependence. 2. Pt will perform grooming with supervision. 3. Pt will perform UB bathing with Min A. 
4. Pt will perform LB bathing with Min A prn AE. 5. Pt will perform tub/shower transfer with Mod A.  
6. Pt will perform UB dressing with Setup. 7. Pt will perform LB dressing with Min A prn AE. 8. Pt will perform toileting task with Min A. 
9. Pt will perform toilet transfer with Mod A. Short Term Goals Initiated 10/9/18 and to be accomplished within 7 day(s) 10/16/18 1. Pt will perform grooming with CGA. 2. Pt will perform UB bathing with Mod A. 
3. Pt will perform LB bathing with Mod A prn AE. 4. Pt will perform tub/shower transfer with Mod A. 
5. Pt will perform UB dressing with Mod A. 
6. Pt will perform LB dressing with Mod A prn AE. 7. Pt will perform toileting task with Max A. 
8. Pt will perform toilet transfer with Mod A. Occupational Therapy TREATMENT Patient: Joseph Avers 80 y.o. Patient identified with name and : Yes 
 
Date: 10/14/2018 First Tx Session Time In: 700 Time Out[de-identified] 800 Diagnosis: R CVA Acute ischemic stroke ([facility] Precautions: Aspiration, Fall (Head of bed 30-45 degrees at all times per speech therapist) Chart, occupational therapy assessment, plan of care, and goals were reviewed. Pain: 
Pt reports 0/10 pain or discomfort prior to treatment. Pt reports 0/10 pain or discomfort post treatment. Intervention Provided: NA 
 
 
SUBJECTIVE:  
Patient stated I need to get this (L) arm working.  OBJECTIVE DATA SUMMARY:  
 
UPPER BODY BATHING Daily Assessment Upper Body Bathing Bathing Assistance, Upper: 4 (Minimal assistance) Position Performed: Long sitting on bed Comments: UB bathing with min A: Patient handed washcoth from basin, and verbal cues to wash chest, stomach and (L) arm. Requires assistance to wash (R) arm and back. LOWER BODY BATHING Daily Assessment Lower Body Bathing Bathing Assistance, Lower : 3 (Moderate assistance ) Position Performed: Long sitting on bed Comments: Mod A for LB bathing: Patient handed washcloth and cued to wash upper thighs, he is able to wash posterior (R) thigh and lower (R) leg. Requires assistance for washing (L) leg due to hemiparesis and for (B) feet, buttocks. UPPER BODY DRESSING Daily Assessment Upper Body Dressing Dressing Assistance : 2 (Maximal assistance) Comments: Max A for UB dressing in long sitting - patient requires verbal cues to use (R) arm to remove shirt sleeve from (L) arm and when dressing in new shirt after bath, requires cues to pull (L) arm through sleeve. LOWER BODY DRESSING Daily Assessment Lower Body Dressing Dressing Assistance : 2 (Maximal assistance) Leg Crossed Method Used: No 
Position Performed: Long sitting on bed Comments: Max A for LB dressing for donning pants and is able to assist somewhat with rolling side to side (does better rolling left due to baseline strength in nonstroke affected UE, and is able to bridge (R) buttock and requires assist for (L). MOBILITY/TRANSFERS Daily Assessment Bed mobility - better rolling to (L) side due to pulling self over with non stroked affected (R) UE. ASSESSMENT: 
Patient motivated to participate in OT session. He performs morning ADLs long sitting in bed with wash basin at bedside table with [facility] raised due to peg tube precautions. Patient is able to participate with ADLs when handed wash cloth for bathing and verbal cues for parts of body to wash. He requires assistance for washing (R) UE due to (L) UE hemiparesis.   He is able to perform bed mobility to assist with this therapist washing back and buttocks and requires min A for rolling to (L) side however requires max A for rolling to (R) side due to (L) hemiparesis. Patient easily fatigues with morning ADLs and requests to stay in bed for some time for rest break prior to getting up for the day. Progression toward goals: 
[x]          Improving appropriately and progressing toward goals 
[]          Improving slowly and progressing toward goals 
[]          Not making progress toward goals and plan of care will be adjusted PLAN: 
Patient continues to benefit from skilled intervention to address the above impairments. Continue treatment per established plan of care. Discharge Recommendations: To Be Determined Further Equipment Recommendations for Discharge:  TBD Activity Tolerance: 
Poor Estimated LOS: TBD Please refer to the flowsheet for vital signs taken during this treatment. After treatment:  
[]  Patient left in no apparent distress sitting up in chair  
[x]  Patient left in no apparent distress in bed 
[x]  Call bell left within reach 
[]  Nursing notified 
[]  Caregiver present 
[]  Bed alarm activated COMMUNICATION/EDUCATION:  
[] Home safety education was provided and the patient/caregiver indicated understanding. [] Patient/family have participated as able in goal setting and plan of care. [] Patient/family agree to work toward stated goals and plan of care. [] Patient understands intent and goals of therapy, but is neutral about his/her participation. [] Patient is unable to participate in goal setting and plan of care. Thao Bolden, OTR/L

## 2018-10-14 NOTE — ROUTINE PROCESS
SHIFT CHANGE NOTE FOR Nataliya Hunt Bedside and Verbal shift change report given to Lam (oncoming nurse) by Jarocho Mccrary LPN (offgoing nurse). Report included the following information SBAR, Kardex, MAR and Recent Results. Situation: 
 Code Status: DNR Reason for Admission: Right CVA Hospital Day: 5 Problem List:  
Hospital Problems  Date Reviewed: 10/12/2018 Codes Class Noted POA History of transient ischemic attack (TIA) ICD-10-CM: B27.36 
ICD-9-CM: V12.54  Unknown Yes Overview Signed 10/9/2018  3:28 PM by Dinora Myers MD  
  2x Vitamin D deficiency (Chronic) ICD-10-CM: E55.9 ICD-9-CM: 268.9  10/9/2018 Yes Overview Signed 10/10/2018  1:59 PM by Dinora Myers MD  
  Vitamin D 25-Hydroxy (10/10/2018) = 22.5 Hypertensive heart disease without heart failure (Chronic) ICD-10-CM: I11.9 ICD-9-CM: 402.90  Unknown Yes Status post insertion of percutaneous [facility]st[facility] ([facility]) tube [facility] ICD-10-CM: Z93.1 ICD-9-CM: V44.1  10/5/2018 Yes * (Principal)Acute ischemic stroke ([facility] ICD-10-CM: I63.9 ICD-9-CM: 434.91  10/2/2018 Yes Overview Signed 10/8/2018  6:32 PM by Dinora Myers MD  
  Acute Ischemic Stroke (acute infarct of the posterior superior right basal ganglia and adjacent right corona radiata) with residual left hemiparesis, dysphagia and dysarthria Impaired mobility and ADLs ICD-10-CM: Z74.09 
ICD-9-CM: 799.89  10/2/2018 Yes Hemiparesis affecting left side as late effect of cerebrovascular accident (CVA) ([facility] ICD-10-CM: M60.038 ICD-9-CM: 438.20  10/2/2018 Yes Dysphagia as late effect of cerebrovascular accident (CVA) ICD-10-CM: H65.370 ICD-9-CM: 438.82  10/2/2018 Yes 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) ICD-10-CM: Y92.038 ICD-9-CM: 438.13  10/2/2018 Yes Traumatic amputation of left thumb ICD-10-CM: R54.586X ICD-9-CM: 885.0  1/1/2011 Yes Background: 
 Past Medical History:  
Past Medical History:  
Diagnosis Date  Acute ischemic stroke ([facility] 10/2/2018 Acute Ischemic Stroke (acute infarct of the posterior superior right basal ganglia and adjacent right corona radiata) with residual left hemiparesis, dysphagia and dysarthria  Benign prostatic hyperplasia  Bilateral hearing loss  Cervical spinal stenosis 10/2/2018  Chronic obstructive pulmonary disease (COPD) ([facility]  Coronary artery disease involving native coronary artery of native heart  Diastolic dysfunction without heart failure  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) 10/2/2018  Dyslipidemia  Dysphagia as late effect of cerebrovascular accident (CVA) 10/2/2018  Gastric ulcer  Glaucoma  Hemiparesis affecting left side as late effect of cerebrovascular accident (CVA) ([facility] 10/2/2018  History of kidney stones  History of transient ischemic attack (TIA) 2x  Hypertensive heart disease without heart failure  Hypothyroidism  Meningioma ([facility] 10/3/2018 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  
 
Assessment: 
? Changes in Assessment throughout shift: none ? Patient has central line: no Reasons if yes: Insertion date: Last dressing date: 
? Patient has Suarez Cath: no Reasons if yes: Insertion date: 
 
? Last Vitals: 
  
Vitals:  
 10/12/18 2040 10/12/18 2041 10/13/18 0726 10/13/18 1630 BP: 141/57 148/60 142/56 125/55 Pulse: 81 81 70 77 Resp: 18 18 18 Temp: 98.9 °F (37.2 °C)  96.9 °F (36.1 °C) 98.3 °F (36.8 °C) SpO2: 98%  98% 95% Weight:      
Height:      
 
 
? PAIN Pain Assessment Pain Intensity 1: 0 (10/13/18 2021) Pain Intensity 1: 2 (12/29/14 1105) Pain Location 1: Shoulder Pain Location 1: Abdomen Pain Intervention(s) 1: Medication (see MAR), Repositioned, Rest, Position, Elevation Pain Intervention(s) 1: Medication (see MAR) Patient Stated Pain Goal: 0 Patient Stated Pain Goal: 0 
o Intervention effective: yes   
o Other actions taken for pain: turn reposition rest 
 
? Skin Assessment Skin color Skin Color: Appropriate for ethnicity Condition/Temperature Skin Condition/Temp: Warm, Dry Integrity Skin Integrity: Incision (comment) Turgor Turgor: Non-tenting Weekly Pressure Ulcer Documentation Wound Prevention & Protection Methods Orientation of wound Orientation of Wound Prevention: Posterior Location of Prevention Location of Wound Prevention: Buttocks, Sacrum/Coccyx Dressing Present Dressing Present : Yes Dressing Status Dressing Status: Intact Wound Offloading Wound Offloading (Prevention Methods): Bed, pressure reduction mattress, Chair cushion, Pillows, Repositioning, Turning, Wheelchair ? INTAKE/OUPUT Date 10/12/18 1900 - 10/13/18 6979 10/13/18 0700 - 10/14/18 7114 Shift 8293-4532 24 Hour Total 4886-5433 3791-3756 24 Hour Total  
I 
N 
T 
A 
K 
E 
 P. O. 0 0 0  0  
   P. O. 0 0 0  0 NG/ 320 980  980 Water Flush Volume (mL) (PEG/Gastrostomy Tube 10/06/18) 200 200 200  200 Medication Volume (PEG/Gastrostomy Tube 10/06/18) 120 120 50  50 Intake (ml) (PEG/Gastrostomy Tube 10/06/18)   730  730 Shift Total 
(mL/kg) 320 
(4.7) 320 
(4.7) 980 
(14.5)  980 
(14.5) O 
U T 
P 
U Espiridion Karlo Urine (mL/kg/hr)   200 
(0.2)  200 Urine Voided   200  200 Urine Occurrence(s) 3 x 4 x 4 x  4 x Stool Stool Occurrence(s) 0 x 0 x 0 x  0 x Shift Total 
(mL/kg)   200 
(3)  200 
(3)  320 780  780 Weight (kg) 67.6 67.6 67.6 67.6 67.6 Recommendations: 1. Patient needs and requests: assist with feedings, ADl's, mobility 2. Diet: NPO with tube feeding bolus 3. Pending tests/procedures: none 4. Functional Level/Equipment: 1 person assist, w/c 
 
5. Estimated Discharge Date: TBD Posted on Whiteboard in Patients Room: no 
 
[facility] Safety Check A safety check occurred in the patient's room between off going nurse and oncoming nurse listed above. The safety check included the below items Area Items H High Alert Medications ? Verify all high alert medication drips (heparin, PCA, etc.) E Equipment ? Suction is set up for ALL patients (with sonia) ? Red plugs utilized for all equipment (IV pumps, etc.) ? WOWs wiped down at end of shift. ? Room stocked with oxygen, suction, and other unit-specific supplies A Alarms ? Bed alarm is set for fall risk patients ? Ensure chair alarm is in place and activated if patient is up in a chair L Lines ? Check IV for any infiltration ? Suarez bag is empty if patient has a Suarez ? Tubing and IV bags are labeled [employer] [place] Safety ? Room is clean, patient is clean, and equipment is clean. ? Hallways are clear from equipment besides carts. ? Fall bracelet on for fall risk patients ? Ensure room is clear and free of clutter ? Suction is set up for ALL patients (with sonia) ? Hallways are clear from equipment besides carts. ? Isolation precautions followed, supplies available outside room, sign posted

## 2018-10-14 NOTE — ROUTINE PROCESS
SHIFT CHANGE NOTE FOR Edith Chase Bedside and Verbal shift change report given to Ceci RN (oncoming nurse) by Isi Chance RN (offgoing nurse). Report included the following information SBAR, Kardex, MAR and Recent Results. Situation: 
 Code Status: DNR Reason for Admission: Right CVA Hospital Day: 6 Problem List:  
Hospital Problems  Date Reviewed: 10/12/2018 Codes Class Noted POA History of transient ischemic attack (TIA) ICD-10-CM: P98.74 
ICD-9-CM: V12.54  Unknown Yes Overview Signed 10/9/2018  3:28 PM by Joy Gayle MD  
  2x Vitamin D deficiency (Chronic) ICD-10-CM: E55.9 ICD-9-CM: 268.9  10/9/2018 Yes Overview Signed 10/10/2018  1:59 PM by Joy Gayle MD  
  Vitamin D 25-Hydroxy (10/10/2018) = 22.5 Hypertensive heart disease without heart failure (Chronic) ICD-10-CM: I11.9 ICD-9-CM: 402.90  Unknown Yes Status post insertion of percutaneous endoscopic [facility]st[facility] (PEG) tube [facility] ICD-10-CM: Z93.1 ICD-9-CM: V44.1  10/5/2018 Yes * (Principal)Acute ischemic stroke [facility] ICD-10-CM: I63.9 ICD-9-CM: 434.91  10/2/2018 Yes Overview Signed 10/8/2018  6:32 PM by Joy Gayle MD  
  Acute Ischemic Stroke (acute infarct of the posterior superior right basal ganglia and adjacent right corona radiata) with residual left hemiparesis, dysphagia and dysarthria Impaired mobility and ADLs ICD-10-CM: Z74.09 
ICD-9-CM: 799.89  10/2/2018 Yes Hemiparesis affecting left side as late effect of cerebrovascular accident (CVA) ([facility] ICD-10-CM: U07.844 ICD-9-CM: 438.20  10/2/2018 Yes Dysphagia as late effect of cerebrovascular accident (CVA) ICD-10-CM: R62.559 ICD-9-CM: 438.82  10/2/2018 Yes 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) ICD-10-CM: M45.254 ICD-9-CM: 438.13  10/2/2018 Yes Traumatic amputation of left thumb ICD-10-CM: G49.791L ICD-9-CM: 885.0  1/1/2011 Yes Background: 
 Past Medical History:  
Past Medical History:  
Diagnosis Date  [facility] 10/2/2018 Acute Ischemic Stroke (acute infarct of the posterior superior right basal ganglia and adjacent right corona radiata) with residual left hemiparesis, dysphagia and dysarthria  Benign prostatic hyperplasia  Bilateral hearing loss  Cervical spinal stenosis 10/2/2018  Chronic obstructive pulmonary disease (COPD) ([facility]  Coronary artery disease involving native coronary artery of native heart  Diastolic dysfunction without heart failure  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) 10/2/2018  Dyslipidemia  Dysphagia as late effect of cerebrovascular accident (CVA) 10/2/2018  Gastric ulcer  Glaucoma  Hemiparesis affecting left side as late effect of cerebrovascular accident (CVA) ([facility] 10/2/2018  History of kidney stones  History of transient ischemic attack (TIA) 2x  Hypertensive heart disease without heart failure  Hypothyroidism  Meningioma ([facility] 10/3/2018 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  
 
Assessment: 
? Changes in Assessment throughout shift: none ? Patient has central line: no Reasons if yes: Insertion date: Last dressing date: 
? Patient has Suarez Cath: no Reasons if yes: Insertion date: 
 
? Last Vitals: 
  
Vitals:  
 10/14/18 0704 10/14/18 0739 10/14/18 1447 10/14/18 1600 BP: 150/64 148/59 135/57 148/63 Pulse:  77 74 72 Resp:  16 20 18 Temp:  97.4 °F (36.3 °C) 97.8 °F (36.6 °C) 97.4 °F (36.3 °C) SpO2:  95% 97% 96% Weight:      
Height:      
 
 
? PAIN Pain Assessment Pain Intensity 1: 0 (10/14/18 1132) Pain Intensity 1: 2 (12/29/14 1105) Pain Location 1: Shoulder Pain Location 1: Abdomen Pain Intervention(s) 1: Medication (see MAR), Repositioned, Rest, Position, Elevation Pain Intervention(s) 1: Medication (see MAR) Patient Stated Pain Goal: 0 Patient Stated Pain Goal: 0 
o Intervention effective: yes   
o Other actions taken for pain: turn reposition rest 
 
? Skin Assessment Skin color Skin Color: Appropriate for ethnicity Condition/Temperature Skin Condition/Temp: Warm, Dry Integrity Skin Integrity: Incision (comment) Turgor Turgor: Non-tenting Weekly Pressure Ulcer Documentation Wound Prevention & Protection Methods Orientation of wound Orientation of Wound Prevention: Posterior Location of Prevention Location of Wound Prevention: Buttocks, Sacrum/Coccyx Dressing Present Dressing Present : Yes Dressing Status Dressing Status: Intact Wound Offloading Wound Offloading (Prevention Methods): Bed, pressure reduction mattress, Chair cushion, Pillows, Repositioning, Turning, Wheelchair ? INTAKE/OUPUT Date 10/13/18 1900 - 10/14/18 8760 10/14/18 0700 - 10/15/18 3590 Shift 5287-6505 24 Hour Total 3433-0221 6163-9096 24 Hour Total  
I 
N 
T 
A 
K 
E 
 P. O.  0     
   P. O.  0     
 NG/GT  980 1080  1080 Water Flush Volume (mL) (PEG/Gastrostomy Tube 10/06/18)  200 150  150 Medication Volume (PEG/Gastrostomy Tube 10/06/18)  50 150  150 Intake (ml) (PEG/Gastrostomy Tube 10/06/18)  730 780  780 Shift Total 
(mL/kg)  980 
(14.5) 1080 
(16)  1080 
(16) O 
U T 
P 
U Rene Prayer Urine (mL/kg/hr)  200 Urine Voided  200 Urine Occurrence(s) 4 x 8 x 3 x 1 x 4 x Stool Stool Occurrence(s) 0 x 0 x  0 x 0 x Shift Total 
(mL/kg)  200 
(3) NET  780 1080  1080 Weight (kg) 67.6 67.6 67.6 67.6 67.6 Recommendations: 1. Patient needs and requests: assist with feedings, ADl's, mobility 2. Diet: NPO with tube feeding bolus 3. Pending tests/procedures: none 4. Functional Level/Equipment: 1 person assist, w/c 
 
5. Estimated Discharge Date: TBD Posted on Whiteboard in Patients Room: no 
 
[facility] Safety Check A safety check occurred in the patient's room between off going nurse and oncoming nurse listed above. The safety check included the below items Area Items H High Alert Medications ? Verify all high alert medication drips (heparin, PCA, etc.) E Equipment ? Suction is set up for ALL patients (with sonia) ? Red plugs utilized for all equipment (IV pumps, etc.) ? WOWs wiped down at end of shift. ? Room stocked with oxygen, suction, and other unit-specific supplies A Alarms ? Bed alarm is set for fall risk patients ? Ensure chair alarm is in place and activated if patient is up in a chair L Lines ? Check IV for any infiltration ? Suarez bag is empty if patient has a Suarez ? Tubing and IV bags are labeled [employer] Safety ? Room is clean, patient is clean, and equipment is clean. ? Hallways are clear from equipment besides carts. ? Fall bracelet on for fall risk patients ? Ensure room is clear and free of clutter ? Suction is set up for ALL patients (with sonia) ? Hallways are clear from equipment besides carts. ? Isolation precautions followed, supplies available outside room, sign posted

## 2018-10-14 NOTE — PROGRESS NOTES
Progress Note Patient: Avi Hall MRN: [id number]  SSN: [id number] YOB: 1931  Age: 80 y.o. Sex: male Admit Date: 10/8/2018 LOS: 6 days Subjective:  
 
Patient s/p CVA with hypertension. Having some cough. Objective:  
 
Vitals:  
 10/13/18 1630 10/13/18 2200 10/14/18 0704 10/14/18 2561 BP: 125/55 136/59 150/64 148/59 Pulse: 77 77  77 Resp: 18 20  16 Temp: 98.3 °F (36.8 °C) 97.8 °F (36.6 °C)  97.4 °F (36.3 °C) SpO2: 95% 96%  95% Weight:      
Height:      
  
 
Intake and Output: 
Current Shift:   
Last three shifts: 10/12 1901 - 10/14 0700 In: 1300 Out: 200 [Urine:200] Physical Exam:  
GENERAL: alert, cooperative, mild distress, appears older than stated age LUNG: diminished breath sounds L base HEART: regular rate and rhythm EXTREMITIES:  edema trace Lab/Data Review: All lab results for the last 24 hours reviewed. No new labs Assessment:  
 
Principal Problem: 
  Acute ischemic stroke ([facility] (10/2/2018) Overview: Acute Ischemic Stroke (acute infarct of the posterior superior right basal  
    ganglia and adjacent right corona radiata) with residual left hemiparesis,  
    dysphagia and dysarthria Active Problems: 
  Impaired mobility and ADLs (10/2/2018) Hemiparesis affecting left side as late effect of cerebrovascular accident (CVA) ([facility] (10/2/2018) Dysphagia as late effect of cerebrovascular accident (CVA) (10/2/2018) 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) (10/2/2018) Hypertensive heart disease without heart failure () Status post insertion of percutaneous endoscopic gastrostomy (PEG) tube ([facility] (10/5/2018) History of transient ischemic attack (TIA) () Overview: 2x Traumatic amputation of left thumb (1/1/2011) Vitamin D deficiency (10/9/2018) Overview: Vitamin D 25-Hydroxy (10/10/2018) = 22.5 Plan:  
 
Lasix x 1 
 
 Signed By: Tina Lucas MD   
 October 14, 2018

## 2018-10-15 LAB
ANION GAP SERPL CALC-SCNC: 8 MMOL/L (ref 3–18)
BUN SERPL-MCNC: 34 MG/DL (ref 7–18)
BUN/CREAT SERPL: 37 (ref 12–20)
CALCIUM SERPL-MCNC: 7.8 MG/DL (ref 8.5–10.1)
CHLORIDE SERPL-SCNC: 99 MMOL/L (ref 100–108)
CO2 SERPL-SCNC: 30 MMOL/L (ref 21–32)
CREAT SERPL-MCNC: 0.92 MG/DL (ref 0.6–1.3)
GLUCOSE SERPL-MCNC: 92 MG/DL (ref 74–99)
HCT VFR BLD AUTO: 33.1 % (ref 36–48)
HGB BLD-MCNC: 11.4 G/DL (ref 13–16)
PLATELET # BLD AUTO: 189 K/UL (ref 135–420)
POTASSIUM SERPL-SCNC: 4.7 MMOL/L (ref 3.5–5.5)
SODIUM SERPL-SCNC: 137 MMOL/L (ref 136–145)

## 2018-10-15 PROCEDURE — 97112 NEUROMUSCULAR REEDUCATION: CPT

## 2018-10-15 PROCEDURE — 36415 COLL VENOUS BLD VENIPUNCTURE: CPT | Performed by: INTERNAL MEDICINE

## 2018-10-15 PROCEDURE — 74011250636 HC RX REV CODE- 250/636: Performed by: INTERNAL MEDICINE

## 2018-10-15 PROCEDURE — 80048 BASIC METABOLIC PNL TOTAL CA: CPT | Performed by: INTERNAL MEDICINE

## 2018-10-15 PROCEDURE — 97530 THERAPEUTIC ACTIVITIES: CPT

## 2018-10-15 PROCEDURE — 92526 ORAL FUNCTION THERAPY: CPT

## 2018-10-15 PROCEDURE — 65310000000 HC RM PRIVATE REHAB

## 2018-10-15 PROCEDURE — 85018 HEMOGLOBIN: CPT | Performed by: INTERNAL MEDICINE

## 2018-10-15 PROCEDURE — 74011250637 HC RX REV CODE- 250/637: Performed by: INTERNAL MEDICINE

## 2018-10-15 PROCEDURE — 85049 AUTOMATED PLATELET COUNT: CPT | Performed by: INTERNAL MEDICINE

## 2018-10-15 RX ORDER — GUAIFENESIN 100 MG/5ML
100 SOLUTION ORAL 4 TIMES DAILY
Status: DISCONTINUED | OUTPATIENT
Start: 2018-10-15 | End: 2018-10-30 | Stop reason: HOSPADM

## 2018-10-15 RX ORDER — DEXTROMETHORPHAN POLISTIREX 30 MG/5ML
30 SUSPENSION ORAL EVERY 12 HOURS
Status: DISCONTINUED | OUTPATIENT
Start: 2018-10-15 | End: 2018-10-30 | Stop reason: HOSPADM

## 2018-10-15 RX ADMIN — DOCUSATE SODIUM 100 MG: 100 CAPSULE, LIQUID FILLED ORAL at 09:30

## 2018-10-15 RX ADMIN — HEPARIN SODIUM 5000 UNITS: 5000 INJECTION, SOLUTION INTRAVENOUS; SUBCUTANEOUS at 18:12

## 2018-10-15 RX ADMIN — TAMSULOSIN HYDROCHLORIDE 0.4 MG: 0.4 CAPSULE ORAL at 18:12

## 2018-10-15 RX ADMIN — LISINOPRIL 40 MG: 40 TABLET ORAL at 06:17

## 2018-10-15 RX ADMIN — ASPIRIN 81 MG CHEWABLE TABLET 81 MG: 81 TABLET CHEWABLE at 09:30

## 2018-10-15 RX ADMIN — ATORVASTATIN CALCIUM 80 MG: 40 TABLET, FILM COATED ORAL at 20:43

## 2018-10-15 RX ADMIN — MODAFINIL 100 MG: 100 TABLET ORAL at 09:30

## 2018-10-15 RX ADMIN — DOCUSATE SODIUM 100 MG: 100 CAPSULE, LIQUID FILLED ORAL at 18:12

## 2018-10-15 RX ADMIN — METOPROLOL TARTRATE 25 MG: 25 TABLET ORAL at 09:30

## 2018-10-15 RX ADMIN — FINASTERIDE 5 MG: 5 TABLET, FILM COATED ORAL at 18:12

## 2018-10-15 RX ADMIN — Medication 100 MG: at 09:30

## 2018-10-15 RX ADMIN — LEVOTHYROXINE SODIUM 50 MCG: 50 TABLET ORAL at 06:18

## 2018-10-15 RX ADMIN — CLOPIDOGREL BISULFATE 75 MG: 75 TABLET, FILM COATED ORAL at 20:43

## 2018-10-15 RX ADMIN — HYDRALAZINE HYDROCHLORIDE 20 MG: 10 TABLET, FILM COATED ORAL at 20:43

## 2018-10-15 RX ADMIN — ACETAMINOPHEN 650 MG: 650 SOLUTION ORAL at 09:32

## 2018-10-15 RX ADMIN — METOPROLOL TARTRATE 25 MG: 25 TABLET ORAL at 20:43

## 2018-10-15 RX ADMIN — HEPARIN SODIUM 5000 UNITS: 5000 INJECTION, SOLUTION INTRAVENOUS; SUBCUTANEOUS at 06:17

## 2018-10-15 RX ADMIN — HYDRALAZINE HYDROCHLORIDE 20 MG: 10 TABLET, FILM COATED ORAL at 09:30

## 2018-10-15 RX ADMIN — AMLODIPINE BESYLATE 10 MG: 10 TABLET ORAL at 09:30

## 2018-10-15 RX ADMIN — DEXTROMETHORPHAN 30 MG: 30 SUSPENSION, EXTENDED RELEASE ORAL at 14:46

## 2018-10-15 RX ADMIN — PANTOPRAZOLE SODIUM 40 MG: 40 GRANULE, DELAYED RELEASE ORAL at 06:17

## 2018-10-15 RX ADMIN — TRAZODONE HYDROCHLORIDE 50 MG: 50 TABLET ORAL at 20:41

## 2018-10-15 RX ADMIN — VITAMIN D, TAB 1000IU (100/BT) 2000 UNITS: 25 TAB at 09:30

## 2018-10-15 RX ADMIN — VITAMIN D, TAB 1000IU (100/BT) 2000 UNITS: 25 TAB at 18:12

## 2018-10-15 RX ADMIN — Medication 1 TABLET: at 09:30

## 2018-10-15 RX ADMIN — GUAIFENESIN 100 MG: 100 SOLUTION ORAL at 21:00

## 2018-10-15 RX ADMIN — DEXTROMETHORPHAN 30 MG: 30 SUSPENSION, EXTENDED RELEASE ORAL at 20:45

## 2018-10-15 RX ADMIN — TIMOLOL MALEATE 1 DROP: 5 SOLUTION OPHTHALMIC at 09:00

## 2018-10-15 RX ADMIN — GUAIFENESIN 100 MG: 100 SOLUTION ORAL at 18:12

## 2018-10-15 RX ADMIN — MELATONIN TAB 3 MG 3 MG: 3 TAB at 20:43

## 2018-10-15 NOTE — INTERDISCIPLINARY ROUNDS
[address] 
[address] [place] [address], [address] INPATIENT REHABILITATION 
PRE-TEAM CONFERENCE SUMMARY Date of Conference: 10/16/18 Name: Aidan Fuentes Age / Sex: 80 y.o. / male CSN: [id number] MRN: [id number] Admit Date: 10/8/2018 Length of Stay: 7 days Primary Rehabilitation Diagnosis 1. Impaired Mobility and ADLs 2. 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 
   
Comorbidities  Urinary frequency R35.0  Nocturia R35.1  Glaucoma H40.9  History of kidney stones Z87.442  Cervical spinal stenosis M48.02  
 Meningioma  D32.9  Hypertensive heart disease without heart failure I11.9  Diastolic dysfunction without heart failure I51.89  
 Gastric ulcer K25.9  Coronary artery disease involving native coronary artery of native heart I25.10  Dyslipidemia E78.5  Chronic obstructive pulmonary disease (COPD)  J44.9  Status post insertion of percutaneous endoscopic gastrostomy (PEG) tube  Z93.1  Hypothyroidism E03.9  Benign prostatic hyperplasia N40.0  Bilateral hearing loss H91.93  
 History of transient ischemic attack (TIA) Z86.73  
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  
  
 
 
Therapy: FIM SCORES Initial Assessment Weekly Progress Assessment 10/15/2018 Eating Functional Level: 1 Comments: NPO  NPO; not tested Swallowing Grooming 4  Not tested Bathing 2   Upper Body Bathing Bathing Assistance, Upper: 4 (Minimal assistance) Position Performed: Long sitting on bed Comments: UB bathing with min A: Patient handed washcoth from basin, and verbal cues to wash chest, stomach and (L) arm. Requires assistance to wash (R) arm and back. Lower Body Bathing Bathing Assistance, Lower : 3 (Moderate assistance ) Position Performed: Long sitting on bed Comments: Mod A for LB bathing: Patient handed washcloth and cued to wash upper thighs, he is able to wash posterior (R) thigh and lower (R) leg. Requires assistance for washing (L) leg due to hemiparesis and for (B) feet, buttocks. Reported by CAM Tavarez/L, 10/14/18 Upper Body Dressing Functional Level: 2 Items Applied/Steps Completed: Pullover (4 steps) Comments: Pt doff/don pullover shirt with Max A and Max vcs. Pt required mod assist to lean forward; pt attempted to reach behind head to pull shirt overhead and required mod assist for this. Pt required mod assist to remove BUE from shirt. Pt required max assist to thread left arm through arm hole, and setup for him to thread right arm. Pt donned shirt overhead with min A. Pt requried max A to pull shirt down. Upper Body Dressing Dressing Assistance : 2 (Maximal assistance) Comments: Max A for UB dressing in long sitting - patient requires verbal cues to use (R) arm to remove shirt sleeve from (L) arm and when dressing in new shirt after bath, requires cues to pull (L) arm through sleeve. Reported by CAM Tavarez/L, 10/14/18 Lower Body Dressing Functional Level: 2 Items Applied/Steps Completed: Elastic waist pants (3 steps), Sock, left (1 step), Sock, right (1 step) Comments: Pt lying supine in bed; pt required total assist to thread legs in pants. Pt bend knees with max assist and stabilization for left leg; pt bridge with stabilization of left leg and requried min A to pull pants up to waist.    Lower Body Dressing Dressing Assistance : 2 (Maximal assistance) Leg Crossed Method Used: No 
Position Performed: Long sitting on bed Comments: Max A for LB 
 
 dressing for donning pants and is able to assist somewhat with rolling side to side (does better rolling left due to baseline strength in nonstroke affected UE, and is able to bridge (R) buttock and requires assist for (L). Reported by CAM Tavarez/HIGINIO, 10/14/18 Toileting Functional Level: 1 Comments: Pt require max A for clothing management and max A for maintaining midline sitting. Not tested Bladder  level of assist 1 2 Bladder  accident frequency score Bowel  level of assist 2 1 Bowel  accident frequency score   2 Toilet Transfer Blain Toilet Transfer Score: 2  Not tested Tub/Shower Transfer Blain Tub or Shower Type: Shower Tub/Shower Transfer Score: 0 Comments: NT due to safety  Not tested Comprehension Primary Mode of Comprehension: Auditory Score: 3 Comments:  (Hard of Hearing) Auditory 5 Expression Primary Mode of Expression: Verbal 
Score: 3 Comments: Needing min cues for speaking up to be able to be understood by therapist Verbal 
4 Social Interaction Score: 3 Comments: minimal encouragement to participate in therapy 4 Problem Solving Score: 2 Comments: Functional problem solving for initiating transfers, bed mobility needing moderate cues, also needing significant cues for attempting to sequence wheelchair mobility using right UE/LE 4 Memory Score: 2 Comments: able to recall information with only occasional reminder provided by spouse/therapist 4 FIM SCORES Initial Assessment Weekly Progress Assessment 10/15/2018 Bed/Chair/Wheelchair Transfers Transfer Type: SPT without device Other: stand pivot and squat pivot transfer Transfer Assistance : 2 (Maximal assistance) Sit to Stand Assistance: Maximum assistance Car Transfers: Not tested Car Type: N/A Transfer Type: SPT without device Transfer Assistance : 2 (Maximal assistance) Sit to Stand Assistance: Maximum assistance Car Transfers: Not tested Car Type: N/A Patient continues to require assistance with anterior and up boost, eccentric lowering, pivoting/stepping to transfer surface. Patient continues to push to his weaker left side during transfers. Bed Mobility Rolling Right 3 (Moderate assistance ) Rolling Left 3 (Moderate assistance ) Supine to Sit 2 (Maximal assistance) Sit to Stand Maximum assistance Sit to Supine 2 (Maximal assistance) Rolling Right 
 2 (Maximal assistance) Rolling Left 
 2 (Maximal assistance) Supine to Sit 
 2 (Maximal assistance) Sit to Stand Maximum assistance Sit to Supine 2 (Maximal assistance) Locomotion (W/C) Able to Propel (ft): 15 feet Functional Level: 1 Curbs/Ramps Assist Required (FIM Score): 0 (Not tested) Wheelchair Setup Assist Required : 2 (Maximal assistance) Wheelchair Management: Manages right brake Able to Propel (ft): 201 feet Functional Level: 4 Curbs/Ramps Assist Required (FIM Score): 0 (Not tested) Wheelchair Setup Assist Required : 2 (Maximal assistance) Wheelchair Management: Manages left brake;Manages right brake (with brake extender, VC's and occasional assistance placing the brake extender on brake ) Locomotion (W/C distance) 15 Feet 201 feet Locomotion (Walk) 1 (Dependent/total assistance) 1 (Dependent/total assistance) (none) Locomotion (Walk dist.) 2 Feet (ft)  (attempted stand step transfer - 1 to 2 Feet) Steps/Stairs Steps/Stairs Ambulated (#): 0 Level of Assist : 0 (Not tested) (not safe to assess at this time) Rail Use: Right  Steps/Stairs Ambulated (#): 0 Level of Assist : 0 (Not tested) (not safe to assess at this time) Rail Use: None Nursing:  
Neuro:   A&O x_3___ Respiratory:   [x] WNL   [] O2   [] LPM ______ Other: 
Peripheral Vascular:   [x] TEDS present   [] Edema present ____ Grade Cardiac:   [x] WNL   [] Other Genitourinary:   [] continent   [x] incontinent   [] mary  Abdominal _10/12/2018______ LBM 
GI: ___NPO____ Diet ______ Liquids __Jevity 1.5___ tube feeds Musculoskeletal: __Active and passive__ ROM Transfers __wheelchair___ Assistive Device Used _x2___ Level of Assistance Skin Integumentary:   [x] Intact   [] Not Intact   __repositioning ________Preventative Measures Details______________________________________________________________ Pain: [x] Controlled   [] Not Controlled   Pain Meds:   [] Scheduled   [x] PRN Registered Dietitian / Nutrition:  
No data found. Pt receiving bolus tube feeds; tolerating at goal rate. Water flushes increased to 150 mL before and after each bolus feed per RN report. Tube Feeding:  Bolus feeds of Jevity 1.5, 237 mL (1 can) x 5 times daily via PEG Water Flushes:  150 mL before and after each bolus feed Residuals: 0 mL Goal EN Regimen: Jevity 1.5, 240 mL (1 can) 5 times daily + 150 mL water flush before and after each bolus to provide: 1775 kcal, 76 gm protein, 59 gm fat, 256 gm CHO, 27 gm fiber, 900 mL free water, 2400 mL total water, 100% RDIs Supplements:          [] Yes   [x] No     
Amount of supplement consumed:  not applicable Intake/Output Summary (Last 24 hours) at 10/15/18 1431 Last data filed at 10/15/18 5369 Gross per 24 hour Intake             2034 ml Output              200 ml Net             1834 ml Last bowel movement: 10/12 Interdisciplinary Team Goals: 1. Discipline  Physical Therapy Goal  Patient will be able to propel his w/c 150 ft with S and minimal pathway deviations, demonstrating good scanning and left side attention. Barrier  Decreased right UE/LE strength, problem solving deficits Intervention  TE, TA Goals written by:   CAM Oshea 2. Discipline  Occupational Therapy Goal  Pt will perform upper body dressing with mod A and min vcs for sequencing. Barrier  Left hemiparesis, left inattention, decreased problem solving Intervention  TA, TE, NMRE, ADL re education Goals written by:  NINA Gaspar 3. Discipline  Speech Therapy Goal  Patient will participate in laryngeal strengthening exercises with mod cues. Barrier  Significant deconditioning, left inattention, dysarthria, dysphagia Intervention  participation in exercises to improve laryngeal strength for swallow, oral exercises, swallowing trials, cognitive retraining. Goals written by: 4. Discipline  Nursing Goal  Patient will be continent of bladder 75% of the time and will call prior to voiding. Barrier  Patient has a previous CVA and does not always realize that he needs to use the bathroom. Intervention  Patient will be offered the urinal every 2 hours for timed toileting. Goals written by:  Alex Servin RN  
 
5. Discipline  Clinical Psychology Goal  Maintain mood stability in recovery effort Barrier  Situational stress Intervention  Support , as tolerated Goals written by:  Marcelo Waddell, PhD  
 
6. Discipline  Nutrition / Dietetics Goal  1. Patient will tolerate enteral nutrition formula and regimen without difficulty within the next 7 days. Outcome:  [x] Met/Ongoing    []  Not Met    [] New/Initial Goal  
2. Patient will meet their estimated nutritional needs through adequate enteral nutrition within the next 7 days. Outcome:  [x] Met/Ongoing    []  Not Met    [] New/Initial Goal   
 Barrier  none known Intervention  - Continue bolus tube feeding: Jevity 1.5, 240 mL (1 can) x 5 times daily with water flush of 150 mL before and after each bolus feed - Suggested bolus tube feeding schedule as pt tolerates: 6am, 10am, 2pm, 6pm, 10pm  
 Goals written by:   Marisabel Harrison RD Disposition / Discharge Planning: Follow-up therapy services:  tbd  
DME recommendations:  tbd  
Estimated discharge date:  11/6/18 Discharge Location:  home Electronic Signatures:  
 
 Signature Date Signed Physical Therapist 
  CAM Harp PT, DPT 10/15/2018 
10/15/2018 Occupational Therapist 
  NINA Ford, OTR/HIGINIO  10/15/18 
10/16/2018 Speech Therapist 
 Marty Grady, CCC-SLP  10/15/18 Recreational Therapist 
  Milvia Garcia Claudio Palmag 10/16/18 Nursing Neil Moses, HARESH 10/15/2018 Dietitian Jose Roberto Mcleod, [address]  10/15/18 Clinical Psychologist 
  Cyndy Olson, PhD 10/15/18 Physician 
  Manish Glover MD   10/15/2018  Chris Wagner, FELICIANO  10/16/18 The above information has been reviewed with the patient in a language that they can understand. Opportunity for comments and questions has been provided and a signed attestation has been scanned into the \"media tab\" of the EMR. Patient Signature: ______________________________________________________ Date Signed: __________________________________________________________

## 2018-10-15 NOTE — PROGRESS NOTES
[address] 
[address] [place] [address] [address] INPATIENT REHABILITATION 
DAILY PROGRESS NOTE Date: 10/15/2018 Name: Quan Hernandez Age / Sex: 80 y.o. 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  
 
Patient seen and examined. Blood pressure controlled. Patient's Complaint:  
No significant medical complaints Pain Control: no current joint or muscle symptoms, essentially pain-free Objective:  
 
Vital Signs: 
Patient Vitals for the past 24 hrs: 
 BP Temp Pulse Resp SpO2  
10/15/18 0928 149/65 - 76 - -  
10/15/18 0800 136/58 97 °F (36.1 °C) 78 18 94 % 10/15/18 0617 137/61 - 72 - -  
10/14/18 2224 126/60 98 °F (36.7 °C) 80 18 97 % 10/14/18 1600 148/63 97.4 °F (36.3 °C) 72 18 96 % 10/14/18 1447 135/57 97.8 °F (36.6 °C) 74 20 97 % Physical Examination: 
GENERAL SURVEY: Patient is awake, alert, oriented x 3, sitting comfortably on the chair, not in acute respiratory distress. HEENT: pink palpebral conjunctivae, anicteric sclerae, no nasoaural discharge, moist oral mucosa NECK: supple, no jugular venous distention, no palpable lymph nodes CHEST/LUNGS: symmetrical chest expansion, good air entry, clear breath sounds HEART: adynamic precordium, good S1 S2, no S3, regular rhythm, no murmurs ABDOMEN: (+) PEG tube in place, flat, bowel sounds appreciated, soft, non-tender EXTREMITIES: (+) amputated distal phalanx of the thumb of the left hand, pink nailbeds, no edema, full and equal pulses, no calf tenderness NEUROLOGICAL EXAM: The patient is awake, alert and oriented x3, able to answer questions fairly appropriately, able to follow 1 and 2 step commands. Able to tell time from the wall clock. Cranial nerves II-XII are grossly intact except for slurred speech and dysphagia. No gross sensory deficit. Motor strength is 4+/5 on the RUE and RLE, 4/5 on the LUE, 4-/5 on the LLE. Current Medications: 
Current Facility-Administered Medications Medication Dose Route Frequency  traZODone (DESYREL) tablet 50 mg  50 mg Per G Tube QHS  modafinil (PROVIGIL) tablet 100 mg  100 mg Per G Tube DAILY  cholecalciferol (VITAMIN D3) tablet 2,000 Units  2,000 Units Oral BID  hydrALAZINE (APRESOLINE) tablet 20 mg  20 mg Per G Tube Q12H  pneumococcal 23-valent (PNEUMOVAX 23) injection 0.5 mL  0.5 mL IntraMUSCular PRIOR TO DISCHARGE  docusate sodium (COLACE) capsule 100 mg  100 mg Per G Tube BID  
 bisacodyl (DULCOLAX) suppository 10 mg  10 mg Rectal Q48H PRN  
 heparin (porcine) injection 5,000 Units  5,000 Units SubCUTAneous Q12H  
 acetaminophen (TYLENOL) solution 650 mg  650 mg Oral Q4H PRN  
 amLODIPine (NORVASC) tablet 10 mg  10 mg Per G Tube DAILY  co-enzyme Q-10 (CO Q-10) capsule 100 mg  100 mg Oral DAILY  vitamin B complex tablet  1 Tab Oral DAILY  melatonin tablet 3 mg  3 mg Per G Tube QHS  aspirin chewable tablet 81 mg  81 mg Per G Tube DAILY  atorvastatin (LIPITOR) tablet 80 mg  80 mg Per G Tube QHS  clopidogrel (PLAVIX) tablet 75 mg  75 mg PEG Tube DAILY  lisinopril (PRINIVIL, ZESTRIL) tablet 40 mg  40 mg Per G Tube DAILY  pantoprazole (PROTONIX) granules for oral suspension 40 mg  40 mg Per G Tube ACB  finasteride (PROSCAR) tablet 5 mg  5 mg Oral QPM  
 levothyroxine (SYNTHROID) tablet 50 mcg  50 mcg Per G Tube 6am  
 metoprolol tartrate (LOPRESSOR) tablet 25 mg  25 mg Per G Tube Q12H  tamsulosin (FLOMAX) capsule 0.4 mg  0.4 mg Oral QPM  
 timolol (TIMOPTIC) 0.5 % ophthalmic solution 1 Drop  1 Drop Both Eyes DAILY Allergies: Allergies Allergen Reactions  Codeine Other (comments) Lab/Data Review: 
Recent Results (from the past 24 hour(s)) METABOLIC PANEL, BASIC Collection Time: 10/15/18  6:14 AM  
Result Value Ref Range Sodium 137 136 - 145 mmol/L Potassium 4.7 3.5 - 5.5 mmol/L Chloride 99 (L) 100 - 108 mmol/L  
 CO2 30 21 - 32 mmol/L Anion gap 8 3.0 - 18 mmol/L Glucose 92 74 - 99 mg/dL BUN 34 (H) 7.0 - 18 MG/DL Creatinine 0.92 0.6 - 1.3 MG/DL  
 BUN/Creatinine ratio 37 (H) 12 - 20 GFR est AA >60 >60 ml/min/1.73m2 GFR est non-AA >60 >60 ml/min/1.73m2 Calcium 7.8 (L) 8.5 - 10.1 MG/DL  
HGB & HCT Collection Time: 10/15/18  6:14 AM  
Result Value Ref Range HGB 11.4 (L) 13.0 - 16.0 g/dL HCT 33.1 (L) 36.0 - 48.0 % PLATELET COUNT Collection Time: 10/15/18  6:14 AM  
Result Value Ref Range PLATELET 483 888 - 649 K/uL Estimated Glomerular Filtration Rate: On admission, estimated GFR based on a Creatinine of 0.77 mg/dl: 
 Using CKD-EPI = 81.6 mL/min/1.73m2 Using MDRD = 101.6 mL/min/1.73m2 Most recent estimated GFR, based on a Creatinine of 0.92 mg/dl on 10/15/2018: 
 Using CKD-EPI = 74.5 mL/min/1.73m2 Using MDRD = 82.7 mL/min/1.73m2 Assessment:  
 
Primary Rehabilitation Diagnosis 1. Impaired Mobility and ADLs 2. 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 
  
Comorbidities  Urinary frequency R35.0  Nocturia R35.1  Glaucoma H40.9  History of kidney stones Z87.442  Cervical spinal stenosis M48.02  
 Meningioma  D32.9  Hypertensive heart disease without heart failure I11.9  Diastolic dysfunction without heart failure I51.89  
 Gastric ulcer K25.9  Coronary artery disease involving native coronary artery of native heart I25.10  Dyslipidemia E78.5  Chronic obstructive pulmonary disease (COPD)  J44.9  Status post insertion of percutaneous endoscopic gastrostomy (PEG) tube  Z93.1  Hypothyroidism E03.9  Benign prostatic hyperplasia N40.0  Bilateral hearing loss H91.93  
 History of transient ischemic attack (TIA) Z86.73  
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  
  
 
Plan: 1. Justification for continued stay: Good progression towards established rehabilitation goals. 2. Medical Issues being followed closely: 
  [x]  Fall and safety precautions  
  []  Wound Care  
  [x]  Bowel and Bladder Function  
  [x]  Fluid Electrolyte and Nutrition Balance  
  []  Pain Control 3. Issues that 24 hour rehabilitation nursing is following: 
  [x]  Fall and safety precautions  
  []  Wound Care  
  [x]  Bowel and Bladder Function  
  [x]  Fluid Electrolyte and Nutrition Balance  
  []  Pain Control   
  [x]  Assistance with and education on in-room safety with transfers to and from the bed, wheelchair, toilet and shower. 4. Acute rehabilitation plan of care: 
  [x]  Continue current care and rehab. [x]  Physical Therapy [x]  Occupational Therapy [x]  Speech Therapy  
  []  Hold Rehab until further notice 5. Medications: 
  [x]  MAR Reviewed [x]  Continue Present Medications 6. DVT Prophylaxis:   
  []  Lovenox [x]  Unfractionated Heparin  
  []  Coumadin  
  []  NOAC  
  []  BECKY Stockings  
  []  Sequential Compression Device []  None 7.  Orders:  
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 
 > Continue: 
  > Aspirin 81 mg via PEG tube once daily in AM 
  > Atorvastatin 80 mg via PEG tube q HS 
  > Coenzyme Q10 100 mg via PEG tube once daily 
  > Clopidogrel 75 mg via PEG tube once daily in PM 
  > Vitamin B complex 1 tab via PEG tube once daily 
 
> Dysphagia as late effect of CVA; S/P Insertion of percutaneous endoscopic gastrostomy (PEG) tube (10/5/2018) 
 > NPO with tube feeding 
 > Jevity 1.5 237 ml via PEG tube 5 times daily (6AM, 10AM, 2PM, 6PM, 10PM) > Increase water flush from 100 ml to 150 ml via PEG tube vefore and after each bolus feeding 
 
> Hypertensive heart disease without heart failure 
 > On admission to the ARU, decreased Hydralazine from 50 mg to 25 mg via PEG tube q 8 hr (6AM, 2PM, 10PM) > On 10/10/2018, decrease Hydralazine from 25 mg via PEG tube q 8 hr (6AM, 2PM, 10PM) to 20 mg via PEG tube q 12 hr (9AM, 9PM) > Continue: 
  > Amlodipine 10 mg via PEG tube once daily (9AM) 
  > Hydralazine 20 mg via PEG tube q 12 hr (9AM, 9PM) > Lisinopril 40 mg via PEG tube once daily (6AM) 
  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 
 > On 10/11/2018, started Trazodone 50 mg via PEG tube q HS 
 > Continue Trazodone 50 mg via PEG tube q HS 
 
> Neurocognitive modulation 
 > On 10/11/2018, started: 
  > Trazodone 50 mg via PEG tube q HS 
  > Modafinil 100 mg via PEG tube q AM 
 > Continue: 
  > Trazodone 50 mg via PEG tube q HS  
  > Modafinil 100 mg via PEG tube q AM 
 
> Difficulty sleeping 
 > On admission to the ARU, started Melatonin 3 mg via PEG tube q HS 
 > On 10/11/2018, started Trazodone 50 mg via PEG tube q HS 
 > Continue: 
  > Melatonin 3 mg via PEG tube q HS 
   > Trazodone 50 mg via PEG tube q HS 
 
> Cough 
 > WBC count (10/9/2018) = 6.8 
 > Chest x-ray (10/12/2018) showed a small left pleural effusion; prominent interstitial lung markers could represent mild infiltration or chronic interstitial lung disease. On 10/14/2018, patient was given Furosemide 20 mg via PEG tube x 1 dose 
 > No fever or desaturation noted since admission 
 > Start: 
  > Dextromethorphan 30 mg via PEG tube q 12 hr 
  > Guaifenesin 100 mg via PEG tube 4 times daily 8.  Patient's progress in rehabilitation and medical issues discussed with the patient. All questions answered to the best of my ability. Care plan discussed with patient and nurse. Signed:    Marianela Mckeon MD 
  October 15, 2018

## 2018-10-15 NOTE — PROGRESS NOTES
Problem: Self Care Deficits Care Plan (Adult) Goal: *Therapy Goal (Edit Goal, Insert Text) Occupational Therapy Goals Long Term Goals Initiated 10/9/18 and to be accomplished within 4 week(s) 1. Pt will perform self-feeding with total dependence. 2. Pt will perform grooming with supervision. 3. Pt will perform UB bathing with Min A. 
4. Pt will perform LB bathing with Min A prn AE. 5. Pt will perform tub/shower transfer with Mod A.  
6. Pt will perform UB dressing with Setup. 7. Pt will perform LB dressing with Min A prn AE. 8. Pt will perform toileting task with Min A. 
9. Pt will perform toilet transfer with Mod A. Short Term Goals Initiated 10/9/18 and to be accomplished within 7 day(s) 10/16/18 1. Pt will perform grooming with CGA. 2. Pt will perform UB bathing with Mod A. 
3. Pt will perform LB bathing with Mod A prn AE. 4. Pt will perform tub/shower transfer with Mod A. 
5. Pt will perform UB dressing with Mod A. 
6. Pt will perform LB dressing with Mod A prn AE. 7. Pt will perform toileting task with Max A. 
8. Pt will perform toilet transfer with Mod A. Occupational Therapy TREATMENT Patient: Janiya Johnson 80 y.o. Patient identified with name and : yes Date: 10/15/2018 Time In: 7200 Time Out[de-identified] 4433 Diagnosis: R CVA Acute ischemic stroke ([facility] [address] Precautions: Aspiration, Fall (Head of bed 30-45 degrees at all times per speech therapist) Chart, occupational therapy assessment, plan of care, and goals were reviewed. Pain: 
Pt reports 0/10 pain or discomfort prior to treatment. Pt reports 0/10 pain or discomfort post treatment. Intervention Provided: None required SUBJECTIVE:  
Patient stated \"this ones not moving\" in regards to his left shoulder during shoulder elevation exercises. Pt was informed to watch it in the mirror and he will see it elevate; pt verbalized understanding and verbalized he saw movement.   
 
OBJECTIVE DATA SUMMARY:  
 
 NMRE Daily Assessment Pt stand at table top to complete parquetry puzzle using his right hand, while weight bearing through his left hand prn rest breaks. Pt required max A and max vcs to maintain standing in midline. Pt required blocking of left knee to prevent buckling and mod A to weight bear through left hand. Pt required max vcs to stand up straight and to keep head up. Rotation of pelvis observed. Pt seated in wc with back off back rest completed scapular exercises (elevation and retraction) with visual mirror feedback. Scapular elevation (5 sets of 4 reps): pt required right shoulder overload to facilitate moderate left scapular elevation. Scapular retraction (2 sets of 4 reps): pt required max vcs and tactile cues to facilitate minimal left scapular retraction. MOBILITY/TRANSFERS Daily Assessment Pt sit<>stand<>sit with max A. Pt required max vcs for hand placement and anterior lean for stand and descent. ASSESSMENT: 
Patient continues to demonstrate left UE hemiparesis. Patient demonstrates moderate scapular elevation and minimal scapular retraction. Patient demonstrates lateral lean to right side and requires max A and max vcs to  midline. On current date, patient demonstrates minimal subluxation of left shoulder. OTR/L consulted BOB, and BOB provided taping of joint to stabilize joint. Patient would benefit from continued skilled OT services to improve independence and safety with self care and functional transfers. Progression toward goals: 
[]          Improving appropriately and progressing toward goals [x]          Improving slowly and progressing toward goals 
[]          Not making progress toward goals and plan of care will be adjusted PLAN: 
Patient continues to benefit from skilled intervention to address the above impairments. Continue treatment per established plan of care. Discharge Recommendations: Home Health vs SNF 2/2 progress Further Equipment Recommendations for Discharge:  If d/c to Home recommend BSC, shower chair with back Activity Tolerance: 
Fair Estimated LOS: 11/6/18 Please refer to the flowsheet for vital signs taken during this treatment. After treatment:  
[x]  Patient left in no apparent distress sitting up in wheelchair  
[]  Patient left in no apparent distress in bed 
[x]  Call bell left within reach 
[]  Nursing notified 
[]  Caregiver present 
[]  Bed alarm activated COMMUNICATION/EDUCATION:  
[] Home safety education was provided and the patient/caregiver indicated understanding. [x] Patient/family have participated as able in goal setting and plan of care. [x] Patient/family agree to work toward stated goals and plan of care. [] Patient understands intent and goals of therapy, but is neutral about his/her participation. [] Patient is unable to participate in goal setting and plan of care.  
 
 
Kalie Jimenes OTS

## 2018-10-15 NOTE — ROUTINE PROCESS
SHIFT CHANGE NOTE FOR Miguel Hernández Bedside and Verbal shift change report given to Brent Pitt RN (oncoming nurse) by Shawn Luque RN 
 (offgoing nurse). Report included the following information SBAR, Kardex, MAR and Recent Results. Situation: 
 Code Status: DNR Reason for Admission: Right CVA Hospital Day: 7 Problem List:  
Hospital Problems  Date Reviewed: 10/12/2018 Codes Class Noted POA History of transient ischemic attack (TIA) ICD-10-CM: W23.82 
ICD-9-CM: V12.54  Unknown Yes Overview Signed 10/9/2018  3:28 PM by Caio Metzger MD  
  2x Vitamin D deficiency (Chronic) ICD-10-CM: E55.9 ICD-9-CM: 268.9  10/9/2018 Yes Overview Signed 10/10/2018  1:59 PM by Caio Metzger MD  
  Vitamin D 25-Hydroxy (10/10/2018) = 22.5 Hypertensive heart disease without heart failure (Chronic) ICD-10-CM: I11.9 ICD-9-CM: 402.90  Unknown Yes Status post [facility] of [facility] ICD-10-CM: Z93.1 ICD-9-CM: V44.1  10/5/2018 Yes * (Principal)Acute ischemic stroke ([facility] [address] ICD-10-CM: I63.9 ICD-9-CM: 434.91  10/2/2018 Yes Overview Signed 10/8/2018  6:32 PM by Caio Metzger MD  
  Acute Ischemic Stroke (acute infarct of the posterior superior right basal ganglia and adjacent right corona radiata) with residual left hemiparesis, dysphagia and dysarthria Impaired mobility and ADLs ICD-10-CM: Z74.09 
ICD-9-CM: 799.89  10/2/2018 Yes Hemiparesis affecting left side as late effect of cerebrovascular accident (CVA) ([facility] [address] ICD-10-CM: X33.415 ICD-9-CM: 438.20  10/2/2018 Yes Dysphagia as late effect of cerebrovascular accident (CVA) ICD-10-CM: E24.158 ICD-9-CM: 438.82  10/2/2018 Yes 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) ICD-10-CM: Z18.487 ICD-9-CM: 438.13  10/2/2018 Yes Traumatic amputation of left thumb ICD-10-CM: Y08.668V ICD-9-CM: 885.0  1/1/2011 Yes Background: 
 Past Medical History:  
Past Medical History:  
Diagnosis Date  Acute ischemic stroke ([facility] [address] 10/2/2018 Acute Ischemic Stroke (acute infarct of the posterior superior right basal ganglia and adjacent right corona radiata) with residual left hemiparesis, dysphagia and dysarthria  Benign prostatic hyperplasia  Bilateral hearing loss  Cervical spinal stenosis 10/2/2018  Chronic obstructive pulmonary disease (COPD) ([facility] [address]  Coronary artery disease involving native coronary artery of native heart  Diastolic dysfunction without heart failure  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) 10/2/2018  Dyslipidemia  Dysphagia as late effect of cerebrovascular accident (CVA) 10/2/2018  Gastric ulcer  Glaucoma  Hemiparesis affecting left side as late effect of cerebrovascular accident (CVA) ([facility] [address] 10/2/2018  History of kidney stones  History of transient ischemic attack (TIA) 2x  Hypertensive heart disease without heart failure  Hypothyroidism  Meningioma ([facility] [address] 10/3/2018 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  
 
Assessment: 
? Changes in Assessment throughout shift: none ? Patient has central line: no Reasons if yes: Insertion date: Last dressing date: 
? Patient has Suarez Cath: no Reasons if yes: Insertion date: 
 
? Last Vitals: 
  
Vitals:  
 10/14/18 2224 10/15/18 0617 10/15/18 0800 10/15/18 1888 BP: 126/60 137/61 136/58 149/65 Pulse: 80 72 78 76 Resp: 18  18 Temp: 98 °F (36.7 °C)  97 °F (36.1 °C) SpO2: 97%  94% Weight:      
Height:      
 
 
? PAIN Pain Assessment Pain Intensity 1: 0 (10/15/18 1011) Pain Intensity 1: 2 (12/29/14 1105) Pain Location 1: Neck Pain Location 1: Abdomen Pain Intervention(s) 1: Medication (see MAR) Pain Intervention(s) 1: Medication (see MAR) Patient Stated Pain Goal: 0 Patient Stated Pain Goal: 0 
o Intervention effective: yes   
o Other actions taken for pain: turn reposition rest 
 
? Skin Assessment Skin color Skin Color: Appropriate for ethnicity Condition/Temperature Skin Condition/Temp: Dry, Warm Integrity Skin Integrity: Incision (comment) Turgor Turgor: Non-tenting Weekly Pressure Ulcer Documentation  Pressure  Injury Documentation: No Pressure Injury Noted-Pressure Ulcer Prevention Initiated Wound Prevention & Protection Methods Orientation of wound Orientation of Wound Prevention: Posterior Location of Prevention Location of Wound Prevention: Sacrum/Coccyx Dressing Present Dressing Present : No 
Dressing Status Dressing Status: Intact Wound Offloading Wound Offloading (Prevention Methods): Bed, pressure redistribution/air, Repositioning ? INTAKE/OUPUT Date 10/14/18 0700 - 10/15/18 6132 10/15/18 0700 - 10/16/18 4393 Shift 0428-6678 8714-1753 24 Hour Total 0985-3948 6408-4834 24 Hour Total  
I 
N 
T 
A 
K 
E 
 NG/GT 4479 428 7957 840  840 Water Flush Volume (mL) (PEG/Gastrostomy Tube 10/06/18) 150 200 350 200  200 Medication Volume (PEG/Gastrostomy Tube 10/06/18) 150 120 270 100  100 Intake (ml) (PEG/Gastrostomy Tube 10/06/18)  540  540 Shift Total 
(mL/kg) 1080 
(16) 994 
(14.7) 2074 
(30.7) 840 
(12.4)  840 
(12.4) O 
U T 
P 
U Allegra Karst Urine (mL/kg/hr)  200 
(0.2) 200 
(0.1) Urine Voided  200 200 Urine Occurrence(s) 3 x 6 x 9 x 1 x  1 x Emesis/NG output  0 0 0  0 Output (ml) (PEG/Gastrostomy Tube 10/06/18)  0 0 0  0 Stool Stool Occurrence(s)  0 x 0 x 0 x  0 x Shift Total 
(mL/kg)  200 
(3) 200 
(3) 0 
(0)  0 
(0) NET 8301 327 4559 648  858 Weight (kg) 67.6 67.6 67.6 67.6 67.6 67.6 Recommendations: 1. Patient needs and requests: assist with feedings, ADl's, mobility 2. Diet: NPO with tube feeding bolus 3. Pending tests/procedures: none 4. Functional Level/Equipment: 1 person assist, w/c 
 
5. Estimated Discharge Date: TBD Posted on Whiteboard in Patients Room: no 
 
[facility] Safety Check A safety check occurred in the patient's room between off going nurse and oncoming nurse listed above. The safety check included the below items Area Items H High Alert Medications ? Verify all high alert medication drips (heparin, PCA, etc.) E Equipment ? Suction is set up for ALL patients (with sonia) ? Red plugs utilized for all equipment (IV pumps, etc.) ? WOWs wiped down at end of shift. ? Room stocked with oxygen, suction, and other unit-specific supplies A Alarms ? Bed alarm is set for fall risk patients ? Ensure chair alarm is in place and activated if patient is up in a chair L Lines ? Check IV for any infiltration ? Suarez bag is empty if patient has a Suarez ? Tubing and IV bags are labeled Lauretha Lard Safety ? Room is clean, patient is clean, and equipment is clean. ? Hallways are clear from equipment besides carts. ? Fall bracelet on for fall risk patients ? Ensure room is clear and free of clutter ? Suction is set up for ALL patients (with sonia) ? Hallways are clear from equipment besides carts. ? Isolation precautions followed, supplies available outside room, sign posted

## 2018-10-15 NOTE — ROUTINE PROCESS
SHIFT CHANGE NOTE FOR Lisa Francois Bedside and Verbal shift change report given to Susan Thomason (oncoming nurse) by Tang Fitzgerald RN (offgoing nurse). Report included the following information SBAR, Kardex, MAR and Recent Results. Situation: 
 Code Status: DNR Reason for Admission: Right CVA Hospital Day: 6 Problem List:  
Hospital Problems  Date Reviewed: 10/12/2018 Codes Class Noted POA History of transient ischemic attack (TIA) ICD-10-CM: Q03.45 
ICD-9-CM: V12.54  Unknown Yes Overview Signed 10/9/2018  3:28 PM by Floy Nyhan, MD  
  2x Vitamin D deficiency (Chronic) ICD-10-CM: E55.9 ICD-9-CM: 268.9  10/9/2018 Yes Overview Signed 10/10/2018  1:59 PM by Floy Nyhan, MD  
  Vitamin D 25-Hydroxy (10/10/2018) = 22.5 Hypertensive heart disease without heart failure (Chronic) ICD-10-CM: I11.9 ICD-9-CM: 402.90  Unknown Yes Status post insertion of [facility] ICD-10-CM: Z93.1 ICD-9-CM: V44.1  10/5/2018 Yes * (Principal)Ac[facility] ICD-10-CM: I63.9 ICD-9-CM: 434.91  10/2/2018 Yes Overview Signed 10/8/2018  6:32 PM by Floy Nyhan, MD  
  Acute Ischemic Stroke (acute infarct of the posterior superior right basal ganglia and adjacent right corona radiata) with residual left hemiparesis, dysphagia and dysarthria Impaired mobility and ADLs ICD-10-CM: Z74.09 
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 
 Past Medical History:  
Past Medical History:  
Diagnosis Date  [facility] 10/2/2018 Acute Ischemic Stroke (acute infarct of the posterior superior right basal ganglia and adjacent right corona radiata) with residual left hemiparesis, dysphagia and dysarthria  Benign prostatic hyperplasia  Bilateral hearing loss  Cervical spinal stenosis 10/2/2018  Chronic obstructive pulmonary disease (COPD) ([facility] [address]  Coronary artery disease involving native coronary artery of native heart  Diastolic dysfunction without heart failure  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) 10/2/2018  Dyslipidemia  Dysphagia as late effect of cerebrovascular accident (CVA) 10/2/2018  Gastric ulcer  Glaucoma  Hemiparesis affecting left side as late effect of cerebrovascular accident (CVA) ([facility] [address] 10/2/2018  History of kidney stones  History of transient ischemic attack (TIA) 2x  Hypertensive heart disease without heart failure  Hypothyroidism  [facility] 10/3/2018 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  
 
Assessment: 
? Changes in Assessment throughout shift: none ? Patient has central line: no Reasons if yes: Insertion date: Last dressing date: 
? Patient has Suarez Cath: no Reasons if yes: Insertion date: 
 
? Last Vitals: 
  
Vitals:  
 10/14/18 0704 10/14/18 0739 10/14/18 1447 10/14/18 1600 BP: 150/64 148/59 135/57 148/63 Pulse:  77 74 72 Resp:  16 20 18 Temp:  97.4 °F (36.3 °C) 97.8 °F (36.6 °C) 97.4 °F (36.3 °C) SpO2:  95% 97% 96% Weight:      
Height:      
 
 
? PAIN Pain Assessment Pain Intensity 1: 0 (10/14/18 1132) Pain Intensity 1: 2 (12/29/14 1105) Pain Location 1: Shoulder Pain Location 1: Abdomen Pain Intervention(s) 1: Medication (see MAR), Repositioned, Rest, Position, Elevation Pain Intervention(s) 1: Medication (see MAR) Patient Stated Pain Goal: 0 Patient Stated Pain Goal: 0 
o Intervention effective: yes   
o Other actions taken for pain: turn reposition rest 
 
? Skin Assessment Skin color Skin Color: Appropriate for ethnicity Condition/Temperature Skin Condition/Temp: Warm, Dry Integrity Skin Integrity: Incision (comment) Turgor Turgor: Non-tenting Weekly Pressure Ulcer Documentation Wound Prevention & Protection Methods Orientation of wound Orientation of Wound Prevention: Posterior Location of Prevention Location of Wound Prevention: Buttocks, Sacrum/Coccyx Dressing Present Dressing Present : Yes Dressing Status Dressing Status: Intact Wound Offloading Wound Offloading (Prevention Methods): Bed, pressure reduction mattress, Chair cushion, Pillows, Repositioning, Turning, Wheelchair ? INTAKE/OUPUT Date 10/13/18 1900 - 10/14/18 6924 10/14/18 0700 - 10/15/18 5044 Shift 1932-2290 24 Hour Total 1783-6423 5501-0447 24 Hour Total  
I 
N 
T 
A 
K 
E 
 P. O.  0     
   P. O.  0     
 NG/GT  980 1080  1080 Water Flush Volume (mL) (PEG/Gastrostomy Tube 10/06/18)  200 150  150 Medication Volume (PEG/Gastrostomy Tube 10/06/18)  50 150  150 Intake (ml) (PEG/Gastrostomy Tube 10/06/18)  730 780  780 Shift Total 
(mL/kg)  980 
(14.5) 1080 
(16)  1080 
(16) O 
U T 
P 
U [place] Public Urine (mL/kg/hr)  200 Urine Voided  200 Urine Occurrence(s) 4 x 8 x 3 x 2 x 5 x Stool Stool Occurrence(s) 0 x 0 x  0 x 0 x Shift Total 
(mL/kg)  200 
(3) NET  780 1080  1080 Weight (kg) 67.6 67.6 67.6 67.6 67.6 Recommendations: 1. Patient needs and requests: assist with feedings, ADl's, mobility 2. Diet: NPO with tube feeding bolus 3. Pending tests/procedures: none 4. Functional Level/Equipment: 1 person assist, w/c 
 
5. Estimated Discharge Date: TBD Posted on Whiteboard in Patients Room: no 
 
[facility] Safety Check A safety check occurred in the patient's room between off going nurse and oncoming nurse listed above. The safety check included the below items Area Items H High Alert Medications ? Verify all high alert medication drips (heparin, PCA, etc.) E Equipment ? Suction is set up for ALL patients (with sonia) ? Red plugs utilized for all equipment (IV pumps, etc.) ? WOWs wiped down at end of shift. ? Room stocked with oxygen, suction, and other unit-specific supplies A Alarms ? Bed alarm is set for fall risk patients ? Ensure chair alarm is in place and activated if patient is up in a chair L Lines ? Check IV for any infiltration ? Suarez bag is empty if patient has a Suarez ? Tubing and IV bags are labeled Estrella Puff Safety ? Room is clean, patient is clean, and equipment is clean. ? Hallways are clear from equipment besides carts. ? Fall bracelet on for fall risk patients ? Ensure room is clear and free of clutter ? Suction is set up for ALL patients (with sonia) ? Hallways are clear from equipment besides carts. ? Isolation precautions followed, supplies available outside room, sign posted

## 2018-10-15 NOTE — PROGRESS NOTES
Problem: Falls - Risk of 
Goal: *Absence of Falls Document April Kimberley Fall Risk and appropriate interventions in the flowsheet. Outcome: Progressing Towards Goal 
Fall Risk Interventions: 
Mobility Interventions: Bed/chair exit alarm Mentation Interventions: Bed/chair exit alarm Medication Interventions: Bed/chair exit alarm, Patient to call before getting OOB Elimination Interventions: Call light in reach, Bed/chair exit alarm, Patient to call for help with toileting needs History of Falls Interventions: Bed/chair exit alarm Problem: Pressure Injury - Risk of 
Goal: *Prevention of pressure injury Document Rupert Scale and appropriate interventions in the flowsheet. Outcome: Progressing Towards Goal 
Pressure Injury Interventions: 
Sensory Interventions: Assess changes in LOC, Check visual cues for pain Moisture Interventions: Absorbent underpads, Check for incontinence Q2 hours and as needed, Maintain skin hydration (lotion/cream) Activity Interventions: Pressure redistribution bed/mattress(bed type) Mobility Interventions: Pressure redistribution bed/mattress (bed type), HOB 30 degrees or less Nutrition Interventions: Document food/fluid/supplement intake Friction and Shear Interventions: HOB 30 degrees or less

## 2018-10-15 NOTE — PROGRESS NOTES
Problem: Dysphagia (Adult) Goal: *Speech Goal: (INSERT TEXT) Long term goals: 1. Patient will tolerate small amounts of PO using safe swallowing techniques without overt s/s of aspiration in 4/5 trials. 2. Patient will perform oral/pharyngeal strengthening exercises with supervision. 3. Patient will participate in laryngeal strengthening exercises and swallowing maneuvers, min cues - supervison. 4. Patient will recall 3 words after 5 minutes, min assist-supervision. 5. Patient will perform functional problem solving and reasoning tasks with supervision. Short term goals (by 10/16/18): 1. Patient will tolerate small amounts of PO puree with the SLP using safe swallowing techniques without overt s/s of aspiration in 4/5 trials. 2. Patient will perform oral/pharyngeal strengthening exercises with mod assist/cues. 3. Patient will participate in laryngeal strengthening exercises and swallowing maneuvers, mod cues. 4. Patient will recall 3 words after 5 minutes, mod-min assist. 
5. Patient will perform functional problem solving and reasoning tasks with 75-85% accuracy. Speech language pathology treatment Patient: Taylor Wen (67 y.o. male) Date: 10/15/2018 Diagnosis: R CVA Acute ischemic stroke ([facility] [address] Acute ischemic stroke ([facility] [address] SUBJECTIVE:  
Patient stated That about tore my throat up, referring to vowel prolongations. OBJECTIVE:  
Mental Status: 
Mr. Elvira Pepper was very drowsy in both sessions today, morning and afternoon. He did his best to participate with the SLP. The afternoon session was cut short due to difficulty remaining alert. Patient with crackling breath sounds on exhalation in vowel prolongation tasks. Treatment & Interventions: 
Mr. Elvira Pepper was seen for two sessions for oral pharyngeal exercises to improve speech ane swallowing. His wife was present in his room for both sessions.   SLP explained the exercises' purpose and clarified procedure for a few with her. Patient participated in the following activities. Motor Speech/Swallowing: 
Production of strong final /k/:  90% accuracy Vowel prolongations:   Patient held /i/ for up to 25 seconds This was followed by cough and wet exhalation Gargling exercise   Supervision Laryngeal valving:   Yielded throat clearing and cough Neuro-Linguistics: 
Orientation :  Supervision Recent memory: Supervision Response & Tolerance to Activities: 
Mr. Karen Randall was seen for two sessions, the latter one quite abbreviated. SLP is concerned for his coughing with deep beraths and coarse breath sounds Pain: 
Pain Scale 1: Numeric (0 - 10) Pain Orientation 1: Right Pain Description 1: Aching After treatment:  
[]       Patient left in no apparent distress sitting up in chair 
[x]       Patient left in no apparent distress in bed 
[x]       Call bell left within reach [x]       Nursing notified 
[]       Caregiver present 
[]       Bed alarm activated ASSESSMENT:  
Progression toward goals: 
[]       Improving appropriately and progressing toward goals [x]       Improving slowly and progressing toward goals 
[]       Not making progress toward goals and plan of care will be adjusted PLAN:  
Patient continues to benefit from skilled intervention to address the above impairments. Continue treatment per established plan of care. Discharge Recommendations:  Home Health Estimated LOS: Through 11/6/18 JAIME Harrison Time Calculation:  50 Minutes

## 2018-10-15 NOTE — PROGRESS NOTES
Bedside report received on pt, pt is alert and oriented, in no apparent discomfort, pt is [place] with yaneth hearing aid in place. Incontinence care administered. 2055: Hearing aid place in green labelled denture cup for safe keeping. 2230: Medications administered via peg tube, pt tolerated. 2752: Tube feeding administered per order, pt tolerated well, see flowsheet for documentation. 1853: Bedside shift change report given to Rosalba Weller RN (oncoming nurse) by Shane Oh RN (offgoing nurse). Report included the following information SBAR, Intake/Output, MAR and Recent Results.

## 2018-10-15 NOTE — PROGRESS NOTES
Problem: Mobility Impaired (Adult and Pediatric) Goal: *Therapy Goal (Edit Goal, Insert Text) Physical Therapy Goals Initiated 10/9/2018 and to be accomplished within 7 day(s) 10/15/2018 1. Patient will move from supine to sit and sit to supine , scoot up and down and roll side to side in bed with moderate assistance . 2.  Patient will transfer from bed to chair and chair to bed with moderate assistance  using the least restrictive device. 3.  Patient will perform sit to stand with moderate assistance . 4. Patient will ambulate with maximal assistance for 5 feet with the least restrictive device. 5.  Patient will ascend/descend 1 stair with 1-2 handrail(s) with maximal assistance. 6.  Patient will perform 50 ft of wheelchair mobility with modified technique moderate assist for steering and negotiation of obstacles. 7.  Patient will be able to maintain sitting balance without support for at least 5 minutes with verbal cues only for maintaining midline/upright position for ease of transfers. Physical Therapy Goals Initiated 10/9/2018 and to be accomplished within 28 day(s) on 11/6/2018 1. Patient will move from supine to sit and sit to supine , scoot up and down and roll side to side in bed with supervision/set-up. 2.  Patient will transfer from bed to chair and chair to bed with supervision/set-up using the least restrictive device. 3.  Patient will perform sit to stand with supervision/set-up. 4.  Patient will ambulate with minimal assistance/contact guard assist for 50 feet with the least restrictive device. 5.  Patient will ascend/descend 4 stairs with 2 handrail(s) with minimal assistance/contact guard assist. 
6.  Patient will perform 150 ft of wheelchair mobility at modified independent level. 7.  Patient will demonstrate improved postural control for ease of functional mobility as demonstrated by PASS score >16/36 physical Therapy weekly Progress note Patient: Jennifer Cummings (58 y.o. male) Date: 10/15/2018 Diagnosis: R CVA Acute ischemic stroke ([facility] Acute ischemic stroke ([facility] Precautions: Aspiration, Fall (Head of bed 30-45 degrees at all times per speech therapist) Chart, physical therapy assessment, plan of care and goals were reviewed. Time In: 6868 (-20 minutes for RN to provide patient his medications) Time Out: 1030 Patient Seen For: Transfer training; Wheelchair mobility; Bed mobility; Neuromuscular re-education Pain: 
Pt pain was reported as 4/10 pre-treatment. (right c/s, mily with rotation to right) Pt pain was reported as 0/10 post-treatment. Intervention: RN gave Tylenol at start of treatment session Patient identified with name and : Fernie Martinez SUBJECTIVE:  
 
\"My son told me he is going to build me a ramp. \" Patient reports he is dizzy, when questioned further he reports he almost always feels dizzy and everything moves. \"It's almost like I am drunk. Sometimes I walk in my yard and hope my neighbors are not watching. \" OBJECTIVE DATA SUMMARY:  
 
Intervention: 10/15/2018: Weekly assessment performed requiring patient to demonstrate all aspects of functional mobility at highest level of function. See below for current status. AROM: Grossly decreased, non-functional (left LE/UE) FIM SCORES Initial Assessment Weekly Progress Assessment 10/15/2018 Bed/Chair/Wheelchair Transfers 2 2 Wheelchair Mobility 1 4 Walking Raymond 1 1 Steps/Stairs 0 1 Please see [facility] Interdisciplinary Eval: Coordination/Balance Section for details regarding FIM score description. BED/CHAIR/WHEELCHAIR TRANSFERS Initial Assessment Weekly Progress Assessment 10/15/2018 Rolling Right 3 (Moderate assistance ) 2 (Maximal assistance) Rolling Left 3 (Moderate assistance ) 2 (Maximal assistance) Supine to Sit 2 (Maximal assistance) 2 (Maximal assistance) Sit to Stand Maximum assistance Maximum assistance Sit to Supine 2 (Maximal assistance) 2 (Maximal assistance) Transfer Type SPT without device SPT without device Comments needing assist with lifting,lowering,pivoting to transfer surface Patient pushing to his weaker left side during transfer, resulting in difficulty cueing patient to appropriately weightshift and pivot toward his right side when transferring to his right. Slightly better with transfers to his right side. Patient continues to require assistance with anterior and up boost, eccentric lowering, pivoting/stepping to transfer surface. Patient continues to push to his weaker left side during transfers. Car Transfer Not tested due to safety, will reassess when appropriate. Not tested Car Type N/A N/A  
 
GROSS ASSESSMENT Weekly Progress Assessment 10/15/2018 AROM Grossly decreased, non-functional (left LE/UE) Strength Grossly decreased, non-functional (left LE) Coordination Grossly decreased, non-functional  
Tone Abnormal  
Sensation Impaired PROM Generally decreased, functional (left LE) POSTURE Weekly Progress Assessment 10/15/2018 Posture (WDL) Exceptions to [facility] Posture Assessment Forward head;Rounded shoulders;Trunk flexion [facility] MOBILITY/MANAGEMENT Initial Assessment Weekly Progress Assessment 10/15/2018 Able to Propel 15 feet 201 feet Curbs/ramps assistance required 0 (Not tested) 0 (Not tested) Wheelchair set up assistance required 2 (Maximal assistance) 2 (Maximal assistance) Wheelchair management Manages right brake Manages left brake;Manages right brake (with brake extender, VC's and occasional assistance placing ) WALKING INDEPENDENCE Initial Assessment Weekly Progress Assessment 10/15/2018 Assistive device Other (comment) (parallel bars)  (none) Ambulation assistance - level surface 1 (Dependent/total assistance) Unable at this time. Distance 2 Feet (ft)  (attempted stand step transfer - 1 to 2 Feet) Comments Performing only a couple steps forward/backward assist x 2 , cues and assist for weightshift, upright and more midline position with decreased left lateral pushing to left side. Patient needing max/total assist for facilitating left LE forward/backward. Unable at this time. GAIT Weekly Progress Assessment 10/15/2018 Gait Description (WDL) Exceptions to [facility] Gait Abnormalities Decreased step clearance; Foot drop; Hemiplegic; Other (pushing to left side) STEPS/STAIRS Initial Assessment Weekly Progress Assessment 10/15/2018 Steps/Stairs ambulated 0 0 Rail Use N/A N/A Comments unable to safely assess at this time given significant assist required for transfers and for gait attempt. Not yet safe to assess Curbs/Ramps  Not yet safe to assess  Not yet safe to assess Neuro Re-Education: 
Patient participated in static sitting balance at edge of mat in the gym, with focus on maintaining upright posture/midline orientation. He was challenged to maintain his balance with S x 5'. He was able to maintain midline orientation, demonstrating one moderate LOB to the left requiring assistance from therapist to correct. Patient appeared to have dozed off. He required max verbal/tactile cuing for erect posture. ASSESSMENT: 
Patient has met 1/6 STGs, requiring Mod A for STS. He remaining Max A for bed mobility and transfers at this time. It was unsafe to assess ambulation goal as patient is unable to maintain standing balance. He is progressing with sitting balance and midline orientation, demonstrating his ability to maintain his balance for 5 minutes with S and intermittent CGA as patient is fatigued and will occasionally fall asleep mid treatment. He is also progressing with wheelchair mobility requiring Min A to steer 2/2 pathway deviations, mostly to the left and occasionally to the right. Progression toward goals: 
[]      Improving appropriately and progressing toward goals [x]      Improving slowly and progressing toward goals 
[]      Not making progress toward goals and plan of care will be adjusted PLAN: 
Patient continues to benefit from skilled intervention to address the above impairments. Continue treatment per established plan of care. Discharge Recommendations: [address] depending on patient's progress. Further Equipment Recommendations for Discharge: wheelchair required currently, may require more complex seating depending on patient's presentation and progression with therapy. Ongoing reassessment of most appropriate equipment needs for patient. Estimated LOS: 11/06/2018 Activity Tolerance:  
Fair, pt is fatigued and intermittently appears to fall asleep Please refer to the flowsheet for vital signs taken during this treatment. After treatment:  
[] Patient left in no apparent distress in bed 
[x] Patient left in no apparent distress sitting up in wheelchair 
[] Patient left in no apparent distress in w/c mobilizing under own power 
[] Patient left in no apparent distress dining area 
[] Patient left in no apparent distress mobilizing under own power [x] Call bell left within reach [x] Nursing notified 
[x] Wife present 
[] Bed alarm activated Harrold Curling, LPTA 
10/15/2018

## 2018-10-16 PROCEDURE — 65310000000 HC RM PRIVATE REHAB

## 2018-10-16 PROCEDURE — 97112 NEUROMUSCULAR REEDUCATION: CPT

## 2018-10-16 PROCEDURE — 97530 THERAPEUTIC ACTIVITIES: CPT

## 2018-10-16 PROCEDURE — 74011250636 HC RX REV CODE- 250/636: Performed by: INTERNAL MEDICINE

## 2018-10-16 PROCEDURE — 74011250637 HC RX REV CODE- 250/637: Performed by: INTERNAL MEDICINE

## 2018-10-16 PROCEDURE — 97535 SELF CARE MNGMENT TRAINING: CPT

## 2018-10-16 PROCEDURE — 92526 ORAL FUNCTION THERAPY: CPT

## 2018-10-16 RX ADMIN — GUAIFENESIN 100 MG: 100 SOLUTION ORAL at 13:14

## 2018-10-16 RX ADMIN — CLOPIDOGREL BISULFATE 75 MG: 75 TABLET, FILM COATED ORAL at 20:47

## 2018-10-16 RX ADMIN — HEPARIN SODIUM 5000 UNITS: 5000 INJECTION, SOLUTION INTRAVENOUS; SUBCUTANEOUS at 18:32

## 2018-10-16 RX ADMIN — DOCUSATE SODIUM 100 MG: 100 CAPSULE, LIQUID FILLED ORAL at 10:34

## 2018-10-16 RX ADMIN — DOCUSATE SODIUM 100 MG: 100 CAPSULE, LIQUID FILLED ORAL at 18:00

## 2018-10-16 RX ADMIN — DEXTROMETHORPHAN 30 MG: 30 SUSPENSION, EXTENDED RELEASE ORAL at 20:47

## 2018-10-16 RX ADMIN — VITAMIN D, TAB 1000IU (100/BT) 2000 UNITS: 25 TAB at 10:34

## 2018-10-16 RX ADMIN — METOPROLOL TARTRATE 25 MG: 25 TABLET ORAL at 20:47

## 2018-10-16 RX ADMIN — Medication 100 MG: at 10:34

## 2018-10-16 RX ADMIN — GUAIFENESIN 100 MG: 100 SOLUTION ORAL at 18:32

## 2018-10-16 RX ADMIN — LEVOTHYROXINE SODIUM 50 MCG: 50 TABLET ORAL at 05:10

## 2018-10-16 RX ADMIN — PANTOPRAZOLE SODIUM 40 MG: 40 GRANULE, DELAYED RELEASE ORAL at 06:30

## 2018-10-16 RX ADMIN — ASPIRIN 81 MG CHEWABLE TABLET 81 MG: 81 TABLET CHEWABLE at 10:34

## 2018-10-16 RX ADMIN — MODAFINIL 100 MG: 100 TABLET ORAL at 10:34

## 2018-10-16 RX ADMIN — HYDRALAZINE HYDROCHLORIDE 20 MG: 10 TABLET, FILM COATED ORAL at 20:47

## 2018-10-16 RX ADMIN — METOPROLOL TARTRATE 25 MG: 25 TABLET ORAL at 10:34

## 2018-10-16 RX ADMIN — FINASTERIDE 5 MG: 5 TABLET, FILM COATED ORAL at 18:33

## 2018-10-16 RX ADMIN — DEXTROMETHORPHAN 30 MG: 30 SUSPENSION, EXTENDED RELEASE ORAL at 10:34

## 2018-10-16 RX ADMIN — VITAMIN D, TAB 1000IU (100/BT) 2000 UNITS: 25 TAB at 18:33

## 2018-10-16 RX ADMIN — ATORVASTATIN CALCIUM 80 MG: 40 TABLET, FILM COATED ORAL at 20:47

## 2018-10-16 RX ADMIN — Medication 1 TABLET: at 10:34

## 2018-10-16 RX ADMIN — GUAIFENESIN 100 MG: 100 SOLUTION ORAL at 10:35

## 2018-10-16 RX ADMIN — GUAIFENESIN 100 MG: 100 SOLUTION ORAL at 23:36

## 2018-10-16 RX ADMIN — TRAZODONE HYDROCHLORIDE 50 MG: 50 TABLET ORAL at 20:48

## 2018-10-16 RX ADMIN — MELATONIN TAB 3 MG 3 MG: 3 TAB at 20:47

## 2018-10-16 RX ADMIN — LISINOPRIL 40 MG: 40 TABLET ORAL at 05:11

## 2018-10-16 RX ADMIN — HEPARIN SODIUM 5000 UNITS: 5000 INJECTION, SOLUTION INTRAVENOUS; SUBCUTANEOUS at 05:10

## 2018-10-16 RX ADMIN — TAMSULOSIN HYDROCHLORIDE 0.4 MG: 0.4 CAPSULE ORAL at 18:33

## 2018-10-16 RX ADMIN — HYDRALAZINE HYDROCHLORIDE 20 MG: 10 TABLET, FILM COATED ORAL at 10:34

## 2018-10-16 RX ADMIN — TIMOLOL MALEATE 1 DROP: 5 SOLUTION OPHTHALMIC at 10:36

## 2018-10-16 RX ADMIN — AMLODIPINE BESYLATE 10 MG: 10 TABLET ORAL at 10:34

## 2018-10-16 NOTE — PROGRESS NOTES
Problem: Dysphagia (Adult) Goal: *Speech Goal: (INSERT TEXT) Long term goals: 1. Patient will tolerate small amounts of PO using safe swallowing techniques without overt s/s of aspiration in 4/5 trials. 2. Patient will perform oral/pharyngeal strengthening exercises with supervision. 3. Patient will participate in laryngeal strengthening exercises and swallowing maneuvers, min cues - supervison. 4. Patient will recall 3 words after 5 minutes, min assist-supervision. 5. Patient will perform functional problem solving and reasoning tasks with supervision. Short term goals (by 10/16/18): 1. Patient will tolerate small amounts of PO puree with the SLP using safe swallowing techniques without overt s/s of aspiration in 4/5 trials. 2. Patient will perform oral/pharyngeal strengthening exercises with mod assist/cues. 3. Patient will participate in laryngeal strengthening exercises and swallowing maneuvers, mod cues. 4. Patient will recall 3 words after 5 minutes, mod-min assist. 
5. Patient will perform functional problem solving and reasoning tasks with 75-85% accuracy. Speech language pathology treatment Patient: Arcenio Pickens (23 y.o. male) Date: 10/16/2018 Diagnosis: R CVA Acute ischemic stroke ([facility] Acute ischemic stroke ([facility] SUBJECTIVE:  
Patient stated I didn't sleep at all last night. OBJECTIVE:  
Mental Status: 
Mr. Dimitri Pham was awakened from sleep and remained a bit groggy while SLP present. He reported frequent coughing throughout the night. Treatment & Interventions:  
Patient was seen at his bedside this morning. He was still asleep with the lights out when SLP arrived at 0930. SLP aroused patient and put in his hearing aids. Mr. Dimitri Pham 
was slow to awaken and was only minimally able to participate with the SLP. Much of the time was spent in making him more comfortable.   Patient stated that he was not sure if he could fully participate in therapy at that time. Patient's family arrived at 36 including is his granddaughter and young great-granddaughter. The distraction was overwhelming to him and the SLP ended the session. Response & Tolerance to Activities: 
Mr. Karen Randall was poorly able to participate today due to somnolence and discomfort. Pain: 
Pain Scale 1: Numeric (0 - 10) No specific report of pain After treatment:  
[]       Patient left in no apparent distress sitting up in chair 
[x]       Patient left in no apparent distress in bed 
[x]       Call bell left within reach 
[]       Nursing notified 
[x]       Caregiver present 
[]       Bed alarm activated ASSESSMENT:  
Progression toward goals: 
[]       Improving appropriately and progressing toward goals [x]       Improving slowly and progressing toward goals 
[]       Not making progress toward goals and plan of care will be adjusted PLAN:  
Patient continues to benefit from skilled intervention to address the above impairments. Continue treatment per established plan of care. Discharge Recommendations:  Home Health Estimated LOS:  Through 11/6/18 JAIME Harrison Time Calculation: 20 mins

## 2018-10-16 NOTE — ROUTINE PROCESS
SHIFT CHANGE NOTE FOR [facility] 
  
Bedside and Verbal shift change report given to Melba Pascual (oncoming nurse) by Cindy Morgan LPN 
 (offgoing nurse). Report included the following information SBAR, Kardex, MAR and Recent Results. 
  
Situation: 
                      Code Status: DNR Reason for Admission: Right CVA Hospital Day: 7 Problem List:  
Hospital Problems  Date Reviewed: 10/12/2018  
           
 Braden Quails    Codes Class Noted POA   
         
  History of transient ischemic attack (TIA) ICD-10-CM: Z86.73 
ICD-9-CM: V12.54   Unknown Yes   
  Overview Signed 10/9/2018  3:28 PM by Pam Dejesus MD   
    2x  
   
      
  Vitamin D deficiency (Chronic) ICD-10-CM: E55.9 ICD-9-CM: 722. 9   10/9/2018 Yes   
  Overview Signed 10/10/2018  1:59 PM by Pam Dejesus MD   
    Vitamin D 25-Hydroxy (10/10/2018) = 22.5   
   
      
  Hypertensive heart disease without heart failure (Chronic) ICD-10-CM: I11.9 ICD-9-CM: 402.90   Unknown Yes   
      
  Status post insertion of percutaneous endoscopic gastrostomy (PEG) tube ([facility] ICD-10-CM: Z93.1 ICD-9-CM: V44.1   10/5/2018 Yes   
      
  * (Principal)Acute ischemic stroke ([facility] ICD-10-CM: I63.9 ICD-9-CM: 434.91   10/2/2018 Yes   
  Overview Signed 10/8/2018  6:32 PM by Pam Dejesus MD   
    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  
   
      
  Impaired mobility and ADLs ICD-10-CM: Z74.09 
ICD-9-CM: 799.89   10/2/2018 Yes   
      
  Hemiparesis affecting left side as late effect of cerebrovascular accident (CVA) ([facility] ICD-10-CM: A26.288 ICD-9-CM: 438.20   10/2/2018 Yes   
      
  Dysphagia as late effect of cerebrovascular accident (CVA) ICD-10-CM: P93.953 ICD-9-CM: 438.82   10/2/2018 Yes   
      
  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) ICD-10-CM: M02.611 ICD-9-CM: 438.13   10/2/2018 Yes   
      
  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 0   1/1/2011 Yes   
      
  
  
  
Background: 
                      Past Medical History:  
    
Past Medical History:  
Diagnosis Date  Acute ischemic stroke ([facility] 10/2/2018  
  Acute Ischemic Stroke (acute infarct of the posterior superior right basal ganglia and adjacent right corona radiata) with residual left hemiparesis, dysphagia and dysarthria  Benign prostatic hyperplasia    
 Bilateral hearing loss    
 Cervical spinal stenosis 10/2/2018  Chronic obstructive pulmonary disease (COPD) ([facility]    
 Coronary artery disease involving native coronary artery of native heart    
 Diastolic dysfunction without heart failure    
 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) 10/2/2018  Dyslipidemia    
 Dysphagia as late effect of cerebrovascular accident (CVA) 10/2/2018  Gastric ulcer    
 Glaucoma    
 Hemiparesis affecting left side as late effect of cerebrovascular accident (CVA) ([facility] 10/2/2018  History of kidney stones    
 History of transient ischemic attack (TIA)    
  2x  Hypertensive heart disease without heart failure    
 Hypothyroidism    
 Meningioma ([facility] 10/3/2018  
  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    
 Traumatic amputation of left thumb 2011  Urinary frequency    
 Vitamin D deficiency 10/9/2018  
  Vitamin D 25-Hydroxy (10/10/2018) = 22.5   
  
                      Patient taking anticoagulants yes Heparin Patient has a defibrillator: no  
  
Assessment: 
¨ Changes in Assessment throughout shift: none 
  
¨ Patient has central line: no Reasons if yes: Insertion date: Last dressing date: 
¨ Patient has Suarez Cath: no Reasons if yes: Insertion date: 
  
¨ Last Vitals: 
   
      
Vitals:   10/14/18 2224 10/15/18 0617 10/15/18 0800 10/15/18 8186 BP: 126/60 137/61 136/58 149/65 Pulse: 80 72 78 76 Resp: 18   18    
Temp: 98 °F (36.7 °C)   97 °F (36.1 °C)    
SpO2: 97%   94%    
Weight:          
Height:          
  
  
· PAIN Pain Assessment Pain Intensity 1: 0 (10/15/18 1011) Pain Intensity 1: 2 (12/29/14 1105) Pain Location 1: Neck Pain Location 1: Abdomen Pain Intervention(s) 1: Medication (see MAR) Pain Intervention(s) 1: Medication (see MAR) Patient Stated Pain Goal: 0 Patient Stated Pain Goal: 0 
¨ Intervention effective: yes ¨ Other actions taken for pain: turn reposition rest 
  
· Skin Assessment Skin color Skin Color: Appropriate for ethnicity Condition/Temperature Skin Condition/Temp: Dry, Warm Integrity Skin Integrity: Incision (comment) Turgor Turgor: Non-tenting Weekly Pressure Ulcer Documentation  Pressure  Injury Documentation: No Pressure Injury Noted-Pressure Ulcer Prevention Initiated Wound Prevention & Protection Methods Orientation of wound Orientation of Wound Prevention: Posterior Location of Prevention Location of Wound Prevention: Sacrum/Coccyx Dressing Present Dressing Present : No 
Dressing Status Dressing Status: Intact Wound Offloading Wound Offloading (Prevention Methods): Bed, pressure redistribution/air, Repositioning 
  
· INTAKE/OUPUT 
  
Date 10/14/18 0700 - 10/15/18 9481 10/15/18 0700 - 10/16/18 1760 Shift 0612-6609 [id number] 24 Hour Total 8506-4144 7076-7106 24 Hour Total  
I 
N 
T 
A 
K 
E  NG/GT 6764 819 6415 840   840 Water Flush Volume (mL) (PEG/Gastrostomy Tube 10/06/18) 150 200 350 200   200 Medication Volume (PEG/Gastrostomy Tube 10/06/18) 150 120 270 100   100 Intake (ml) (PEG/Gastrostomy Tube 10/06/18)  540   540  Shift Total 
 (mL/kg) 1080 
(16) 994 
(14.7) 2074 
(30.7) 840 
(12.4)   840 
(12.4) O 
U T 
P 
U T  Urine (mL/kg/hr)   200 
(0.2) 200 
(0.1)        
   Urine Voided   200 200        
   Urine Occurrence(s) 3 x 6 x 9 x 1 x   1 x Emesis/NG output   0 0 0   0 Output (ml) (PEG/Gastrostomy Tube 10/06/18)   0 0 0   0 Stool              
   Stool Occurrence(s)   0 x 0 x 0 x   0 x Shift Total 
(mL/kg)   200 
(3) 200 
(3) 0 
(0)   0 
(0) NET 2700 621 4188 840   840 Weight (kg) 67.6 67.6 67.6 67.6 67.6 67.6  
  
  
Recommendations: 1. Patient needs and requests: assist with feedings, ADl's, mobility 
  
2. Diet: NPO with tube feeding bolus 
  
3. Pending tests/procedures: none  
  
4. Functional Level/Equipment: 1 person assist, w/c 
  
5. Estimated Discharge Date: TBD Posted on Whiteboard in Patients Room: no 
  
[facility] Safety Check 
  
A safety check occurred in the patient's room between off going nurse and oncoming nurse listed above. 
  
The safety check included the below items Area Items H High Alert Medications § Verify all high alert medication drips (heparin, PCA, etc.) E Equipment § Suction is set up for ALL patients (with sonia) § Red plugs utilized for all equipment (IV pumps, etc.) § WOWs wiped down at end of shift. § Room stocked with oxygen, suction, and other unit-specific supplies A Alarms § Bed alarm is set for fall risk patients § Ensure chair alarm is in place and activated if patient is up in a chair L Lines § Check IV for any infiltration § Suarez bag is empty if patient has a Suarez § Tubing and IV bags are labeled Luke Brown Safety § Room is clean, patient is clean, and equipment is clean. § Hallways are clear from equipment besides carts. § Fall bracelet on for fall risk patients § Ensure room is clear and free of clutter § Suction is set up for ALL patients (with sonia) § Hallways are clear from equipment besides carts. § Isolation precautions followed, supplies available outside room, sign posted

## 2018-10-16 NOTE — ROUTINE PROCESS
SHIFT CHANGE NOTE FOR [facility] 
  
Bedside and Verbal shift change report given to Lor Glynn LPN (oncoming nurse) by Anthony Michael RN 
 (offgoing nurse). Report included the following information SBAR, Kardex, MAR and Recent Results. 
  
Situation: 
                      Code Status: DNR Reason for Admission: Right CVA Hospital Day: 7 Problem List:  
Hospital Problems  Date Reviewed: 10/12/2018  
           
 Hang Axson    Codes Class Noted POA   
         
  History of transient ischemic attack (TIA) ICD-10-CM: Z86.73 
ICD-9-CM: V12.54   Unknown Yes   
  Overview Signed 10/9/2018  3:28 PM by Ramonita Buckner MD   
    2x  
   
      
  Vitamin D deficiency (Chronic) ICD-10-CM: E55.9 ICD-9-CM: 263. 9   10/9/2018 Yes   
  Overview Signed 10/10/2018  1:59 PM by Ramonita Buckner MD   
    Vitamin D 25-Hydroxy (10/10/2018) = 22.5   
   
      
  Hypertensive heart disease without heart failure (Chronic) ICD-10-CM: I11.9 ICD-9-CM: 402.90   Unknown Yes   
      
  Status post insertion of percutaneous endoscopic gastrostomy (PEG) tube ([facility] ICD-10-CM: Z93.1 ICD-9-CM: V44.1   10/5/2018 Yes   
      
  * (Principal)Acute ischemic stroke ([facility] ICD-10-CM: I63.9 ICD-9-CM: 434.91   10/2/2018 Yes   
  Overview Signed 10/8/2018  6:32 PM by Ramonita Buckner MD   
    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  
   
      
  Impaired mobility and ADLs ICD-10-CM: Z74.09 
ICD-9-CM: 799.89   10/2/2018 Yes   
      
  Hemiparesis affecting left side as late effect of cerebrovascular accident (CVA) ([facility] ICD-10-CM: H61.495 ICD-9-CM: 438.20   10/2/2018 Yes   
      
  Dysphagia as late effect of cerebrovascular accident (CVA) ICD-10-CM: E32.602 ICD-9-CM: 438.82   10/2/2018 Yes   
      
  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) ICD-10-CM: L87.720 ICD-9-CM: 438.13   10/2/2018 Yes   
      
  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 0   1/1/2011 Yes   
      
  
  
  
Background: 
                      Past Medical History:  
    
Past Medical History:  
Diagnosis Date  Acute ischemic stroke ([facility] 10/2/2018  
  Acute Ischemic Stroke (acute infarct of the posterior superior right basal ganglia and adjacent right corona radiata) with residual left hemiparesis, dysphagia and dysarthria  Benign prostatic hyperplasia    
 Bilateral hearing loss    
 Cervical spinal stenosis 10/2/2018  Chronic obstructive pulmonary disease (COPD) ([facility]    
 Coronary artery disease involving native coronary artery of native heart    
 Diastolic dysfunction without heart failure    
 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) 10/2/2018  Dyslipidemia    
 Dysphagia as late effect of cerebrovascular accident (CVA) 10/2/2018  Gastric ulcer    
 Glaucoma    
 Hemiparesis affecting left side as late effect of cerebrovascular accident (CVA) ([facility] 10/2/2018  History of kidney stones    
 History of transient ischemic attack (TIA)    
  2x  Hypertensive heart disease without heart failure    
 Hypothyroidism    
 Meningioma ([facility] 10/3/2018  
  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    
 Traumatic amputation of left thumb 2011  Urinary frequency    
 Vitamin D deficiency 10/9/2018  
  Vitamin D 25-Hydroxy (10/10/2018) = 22.5   
  
                      Patient taking anticoagulants yes Heparin Patient has a defibrillator: no  
  
Assessment: 
¨ Changes in Assessment throughout shift: none 
  
¨ Patient has central line: no Reasons if yes: Insertion date: Last dressing date: 
¨ Patient has Suarez Cath: no Reasons if yes: Insertion date: 
  
¨ Last Vitals: 
   
      
Vitals:   10/14/18 2224 10/15/18 0617 10/15/18 0800 10/15/18 6726 BP: 126/60 137/61 136/58 149/65 Pulse: 80 72 78 76 Resp: 18   18    
Temp: 98 °F (36.7 °C)   97 °F (36.1 °C)    
SpO2: 97%   94%    
Weight:          
Height:          
  
  
· PAIN Pain Assessment Pain Intensity 1: 0 (10/15/18 1011) Pain Intensity 1: 2 (12/29/14 1105) Pain Location 1: Neck Pain Location 1: Abdomen Pain Intervention(s) 1: Medication (see MAR) Pain Intervention(s) 1: Medication (see MAR) Patient Stated Pain Goal: 0 Patient Stated Pain Goal: 0 
¨ Intervention effective: yes ¨ Other actions taken for pain: turn reposition rest 
  
· Skin Assessment Skin color Skin Color: Appropriate for ethnicity Condition/Temperature Skin Condition/Temp: Dry, Warm Integrity Skin Integrity: Incision (comment) Turgor Turgor: Non-tenting Weekly Pressure Ulcer Documentation  Pressure  Injury Documentation: No Pressure Injury Noted-Pressure Ulcer Prevention Initiated Wound Prevention & Protection Methods Orientation of wound Orientation of Wound Prevention: Posterior Location of Prevention Location of Wound Prevention: Sacrum/Coccyx Dressing Present Dressing Present : No 
Dressing Status Dressing Status: Intact Wound Offloading Wound Offloading (Prevention Methods): Bed, pressure redistribution/air, Repositioning 
  
· INTAKE/OUPUT 
  
Date 10/14/18 0700 - 10/15/18 0502 10/15/18 0700 - 10/16/18 2066 Shift 9441-6280 7658-1835 24 Hour Total 3249-5427 2408-8181 24 Hour Total  
I 
N 
T 
A 
K 
E  NG/GT 4522 213 8651 840   840 Water Flush Volume (mL) (PEG/Gastrostomy Tube 10/06/18) 150 200 350 200   200 Medication Volume (PEG/Gastrostomy Tube 10/06/18) 150 120 270 100   100 Intake (ml) (PEG/Gastrostomy Tube 10/06/18)  540   540  Shift Total 
 (mL/kg) 1080 
(16) 994 
(14.7) 2074 
(30.7) 840 
(12.4)   840 
(12.4) O 
U T 
P 
U T  Urine (mL/kg/hr)   200 
(0.2) 200 
(0.1)        
   Urine Voided   200 200        
   Urine Occurrence(s) 3 x 6 x 9 x 1 x   1 x Emesis/NG output   0 0 0   0 Output (ml) (PEG/Gastrostomy Tube 10/06/18)   0 0 0   0 Stool              
   Stool Occurrence(s)   0 x 0 x 0 x   0 x Shift Total 
(mL/kg)   200 
(3) 200 
(3) 0 
(0)   0 
(0) NET 7395 390 8255 840   840 Weight (kg) 67.6 67.6 67.6 67.6 67.6 67.6  
  
  
Recommendations: 1. Patient needs and requests: assist with feedings, ADl's, mobility 
  
2. Diet: NPO with tube feeding bolus 
  
3. Pending tests/procedures: none  
  
4. Functional Level/Equipment: 1 person assist, w/c 
  
5. Estimated Discharge Date: TBD Posted on Whiteboard in Patients Room: no 
  
[facility] Safety Check 
  
A safety check occurred in the patient's room between off going nurse and oncoming nurse listed above. 
  
The safety check included the below items Area Items H High Alert Medications § Verify all high alert medication drips (heparin, PCA, etc.) E Equipment § Suction is set up for ALL patients (with sonia) § Red plugs utilized for all equipment (IV pumps, etc.) § WOWs wiped down at end of shift. § Room stocked with oxygen, suction, and other unit-specific supplies A Alarms § Bed alarm is set for fall risk patients § Ensure chair alarm is in place and activated if patient is up in a chair L Lines § Check IV for any infiltration § Suarez bag is empty if patient has a Suarez § Tubing and IV bags are labeled Jennifer Dye Safety § Room is clean, patient is clean, and equipment is clean. § Hallways are clear from equipment besides carts. § Fall bracelet on for fall risk patients § Ensure room is clear and free of clutter § Suction is set up for ALL patients (with sonia) § Hallways are clear from equipment besides carts. § Isolation precautions followed, supplies available outside room, sign posted

## 2018-10-16 NOTE — PROGRESS NOTES
Problem: Self Care Deficits Care Plan (Adult) Goal: *Therapy Goal (Edit Goal, Insert Text) Occupational Therapy Goals Long Term Goals Initiated 10/9/18 and to be accomplished within 4 week(s) 1. Pt will perform self-feeding with total dependence. 2. Pt will perform grooming with supervision. 3. Pt will perform UB bathing with Min A. 
4. Pt will perform LB bathing with Min A prn AE. 5. Pt will perform tub/shower transfer with Mod A.  
6. Pt will perform UB dressing with Setup. 7. Pt will perform LB dressing with Min A prn AE. 8. Pt will perform toileting task with Min A. 
9. Pt will perform toilet transfer with Mod A. Short Term Goals Initiated 10/9/18 and to be accomplished within 7 day(s) 10/16/18 1. Pt will perform grooming with CGA. 2. Pt will perform UB bathing with Mod A. 
3. Pt will perform LB bathing with Mod A prn AE. 4. Pt will perform tub/shower transfer with Mod A. 
5. Pt will perform UB dressing with Mod A. 
6. Pt will perform LB dressing with Mod A prn AE. 7. Pt will perform toileting task with Max A. 
8. Pt will perform toilet transfer with Mod A. 
  
OT WEEKLY PROGRESS NOTE Patient Name:Chapito Tinsley Time Spent With Patient First Session Time In: 1103 Time Out: 3889 Patient Seen For[de-identified] ADLs Second Session Time in: 1336 Time Out: 1430 Medical Diagnosis:  R CVA Acute ischemic stroke ([address] Acute ischemic stroke ([facility] Pain at start of tx:0/10 pain or discomfort. Pain at stop of tx:0/10 pain or discomfort. Patient identified with name and :Yes Subjective: Pt reported feeling his arm \"twisted\" when rolling on his side. OTS situated left UE. OTR/L asked pt if he could feel that his arm was uncomfortable, pt reported he could. Objective: Pt reclined in bed receiving tube feeding upon initiation of session. Patient informed of current date plan of care, pt receptive and agreeable, however reported he was tired. Outcome Measures: AROM: LUE hemiparesis COGNITION/PERCEPTION Initial Assessment Weekly Progress Assessment 10/16/2018 Premorbid Reading Status Unknown/unable to assess  Unknown Premorbid Writing Status Intact  Intact Arousal/Alertness Generalized responses  Generalized Responses Orientation Level Oriented X4 Oriented X4 Visual Fields  (Appears intact)  Intact Praxis Impaired  Impaired Body Scheme Cues to maintain midline in sitting, Cues to maintain midline in standing, Tactile, Verbal, Visual  Cues to maintain midline in sitting, cues to maintain midline in standing, tactile, verbal, visual  
COMPREHENSION MODE Initial Assessment Weekly Progress Assessment 10/16/2018 Primary Mode of Comprehension Auditory  Auditory Hearing Aide Bilateral Bilateral  
Corrective Lenses Glasses  Glasses Score 3  3 EXPRESSION Initial Assessment Weekly Progress Assessment 10/16/2018 Primary Mode of Expression Verbal Verbal  
Score 3 3 Comments SOCIAL INTERACTION/ PRAGMATICS Initial Assessment Weekly Progress Assessment 10/16/2018 Score 3 3 Comments PROBLEM SOLVING Initial Assessment Weekly Progress Assessment 10/16/2018 Score 2 2 Comments MEMORY Initial Assessment Weekly Progress Assessment 10/16/2018 Score 2 2 Comments EATING Initial Assessment Weekly Progress Assessment 10/16/2018 Functional Level 0 Feeding/Eating Feeding/Eating Assistance:  (NPO) Comments NPO    
 
GROOMING Initial Assessment Weekly Progress Assessment 10/16/2018 Functional Level 4 Grooming Grooming Assistance : 5 (Stand-by assistance) Comments: Pt longsitting in bed supported; pt wash face and brush teeth Tasks completed by patient Washed face, Washed hands Comments Pt long sitting in bed; washed face with mod vcs for sequencing (needed cues to remove glasses before washing face). Pt lying supine in bed performed hand hygeine with min A for thoroughness. BATHING Initial Assessment Weekly Progress Assessment 10/16/2018 Functional Level 2 Upper Body Bathing Bathing Assistance, Upper: 3 (Moderate assistance ) Position Performed: Long sitting on bed Comments: Pt wash chest, abdomen, right axillary, and left arm. Pt required total assist to lift left arm to wash left axillary with right hand. Pt required total assist to wash right arm. Lower Body Bathing Bathing Assistance, Lower : 3 (Moderate assistance ) Position Performed: Long sitting on bed; Other (comment) (Roll on side) Comments: Pt long sitting supported in bed washed left and right thigh and anterior pericare. Pt rolled on left side washed his right buttock cheek with right hand. Pt required total assist for left buttock cheek and posterior pericare. Lower legs and feet were not washed due to time constraint. Body parts patient bathed Arm, left, Buttocks, Chest, Maribel area, Thigh, left, Thigh, right Comments Pt completed bathing in bed; long sitting he washed left arm, chest, anterior pericare, and right and left thigh. Side lying he washed buttocks and posterior pericare. TUB/SHOWER TRANSFER INDEPENDENCE Initial Assessment Weekly Progress Assessment 10/16/2018 Score 0 Functional Transfers Amount of Assistance Required: 0 (Not tested) Amount of Assistance Required: 0 (Not tested) Comments NT due to safety UPPER BODY DRESSING/UNDRESSING Initial Assessment Weekly Progress Assessment 10/16/2018 Functional Level 2 Upper Body Dressing Dressing Assistance : 2 (Maximal assistance) Comments: Pt long sitting in bed required max cues for sequencing tyrell technique with donning and doffing pullover shirt. To don, Pt requried mod A to thread left arm in shirt and to pull shirt sleeve passed elbow, pt threaded right arm, pt needed max A to get shirt overhead and pull down.  To doff, pt required max A to pull shirt overhead and required mod A to unthread right arm, pt unthreaded left arm. Items applied/Steps completed Pullover (4 steps) Comments Pt doff/don pullover shirt with Max A and Max vcs. Pt required mod assist to lean forward; pt attempted to reach behind head to pull shirt overhead and required mod assist for this. Pt required mod assist to remove BUE from shirt. Pt required max assist to thread left arm through arm hole, and setup for him to thread right arm. Pt donned shirt overhead with min A. Pt requried max A to pull shirt down. LOWER BODY DRESSING/UNDRESSING Initial Assessment Weekly Progress Assessment 10/16/2018 Functional Level 2 Lower Body Dressing Dressing Assistance : 2 (Maximal assistance) Leg Crossed Method Used: No 
Position Performed: Long sitting on bed Comments: Pt long sitting in bed. to don, pt required total assist to thread left leg, and total to put in right foot, pt pulled to mid thigh. Pt required total assist to pull to waist. No doff tested due to not wearing pants. Items applied/Steps completed Elastic waist pants (3 steps), Sock, left (1 step), Sock, right (1 step) Comments Pt lying supine in bed; pt required total assist to thread legs in pants. Pt bend knees with max assist and stabilization for left leg; pt bridge with stabilization of left leg and requried min A to pull pants up to waist.     
 
TOILETING Initial Assessment Weekly Progress Assessment 10/16/2018 Functional Level 1 Toileting Toileting Assistance (FIM Score): 1 (Total assistance) (Pt used urinal in bed) Comments Pt require max A for clothing management and max A for maintaining midline sitting. TOILET TRANSFER INDEPENDENCE Initial Assessment Weekly Progress Assessment 10/16/2018 Transfer score 2 Functional Transfers Amount of Assistance Required: 0 (Not tested) Amount of Assistance Required: 0 (Not tested) Comments  THERAPEUTIC ACTIVITY/MOBILITY/ FUNCTIONAL TRANSFERS Second Session-- co-treat PT-- 
 Pt stand with parallel bars. Pt sit<>stand with min A with max vcs to push from sitting surface. When standing, pt required tactile and verbal cues for knee extension, scapular retraction and spine extension, and to prevent pelvic rotation. Pt required total A to extend left elbow to provide weight bearing through left UE. Pt squat pivot transfer eom<>wc<>eom with max A and max vcs for hand placement. ASSESSMENT: 
Patient has met 3/8 goals (1-3). Patient continues to require max A for dressing. On current date, pt required mod A for upper body and lower body bathing. Patient continues to demonstrate decreased balance sitting and standing. Patient requires min- mod A to stand for functional transfers and max A to  midline for functional tasks. Patient required max A for stand pivot transfers eob<>wc and eom<>wc. On current date, pt reported one time during each session (first and second) that he was dizzy. Pt's vitals were taken during second session; blood pressure was 130/49 mmHg, SpO2 was 96%, and heart rate was 63. On current date, pt demonstrated lethargy during morning ADL and required max rest breaks. Patient would benefit from continued skilled OT services to increase safety and independence in self care and functional transfers. Progression toward goals: 
[]          Improving appropriately and progressing toward goals [x]          Improving slowly and progressing toward goals 
[]          Not making progress toward goals and plan of care will be adjusted PLAN: 
Patient continues to benefit from skilled intervention to address the above impairments. Continue treatment per established plan of care. Discharge Recommendations:  Home Health vs SNF 2/2 progress Further Equipment Recommendations for Discharge: If d/c to Home recommend BSC, shower chair with back   
 
Please refer to the flowsheet for vital signs taken during this treatment. After treatment: [x]  Patient left in no apparent distress sitting up in wheelchair 
[]  Patient left in no apparent distress in bed 
[x]  Call bell left within reach 
[]  Nursing notified 
[]  Caregiver present 
[]  Bed alarm activated COMMUNICATION/EDUCATION:  
[] Home safety education was provided and the patient/caregiver indicated understanding. [x] Patient/family have participated as able in goal setting and plan of care. [x] Patient/family agree to work toward stated goals and plan of care. [] Patient understands intent and goals of therapy, but is neutral about his/her participation. [] Patient is unable to participate in goal setting and plan of care. Plan of Care: Please see Care Plan for updated LTGs. Family Training:  TBD Estimated LOS: 11/6/18 NINA Burgess 
10/16/2018

## 2018-10-16 NOTE — PROGRESS NOTES
conducted a Follow up consultation and Spiritual Assessment for Edgar Pelayo, who is a 80 y. o.,male. The  provided the following Interventions: 
Continued the relationship of care and support. Listened empathically. Offered prayer and assurance of continued prayer on patients behalf. Chart reviewed. The following outcomes were achieved: 
Patient expressed gratitude for 's visit. Assessment: 
There are no further spiritual or Shinto issues which require Spiritual Care Services interventions at this time. Plan: 
Chaplains will continue to follow and will provide pastoral care on an as needed/requested basis.  recommends bedside caregivers page  on duty if patient shows signs of acute spiritual or emotional distress. [id number] [facility]  
[phone]

## 2018-10-16 NOTE — INTERDISCIPLINARY ROUNDS
[id number] 
[address] [place] [address], [address] [facility] SUMMARY Date of Conference: 10/16/2018 Name: Aroldo Tripp Age / Sex: 80 y.o. / male CSN: [id number] MRN: [id number] Admit Date: 10/8/2018 Length of Stay: 8 days Primary Rehabilitation Diagnosis 1. Impaired Mobility and ADLs 2. 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 
   
Comorbidities  Urinary frequency R35.0  Nocturia R35.1  Glaucoma H40.9  History of kidney stones Z87.442  Cervical spinal stenosis M48.02  
 Meningioma  D32.9  Hypertensive heart disease without heart failure I11.9  Diastolic dysfunction without heart failure I51.89  
 Gastric ulcer K25.9  Coronary artery disease involving native coronary artery of native heart I25.10  Dyslipidemia E78.5  Chronic obstructive pulmonary disease (COPD)  J44.9  Status post insertion of percutaneous endoscopic gastrostomy (PEG) tube  Z93.1  Hypothyroidism E03.9  Benign prostatic hyperplasia N40.0  Bilateral hearing loss H91.93  
 History of transient ischemic attack (TIA) Z86.73  
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  
  
 
 
Therapy: FIM SCORES Initial Assessment Weekly Progress Assessment 10/16/2018 Eating Functional Level: 1 Comments: NPO Feeding/Eating Assistance:  (NPO)NPO; not tested Swallowing Grooming 4 Grooming Assistance : 5 (Stand-by assistance) Comments: Pt longsitting in bed supported; pt wash face and brush teethNot tested Bathing 2 Bathing Assistance, Upper: 3 (Moderate assistance ) Position Performed: Long sitting on bed Comments: Pt wash chest, abdomen, right axillary, and left arm.  Pt required total assist to lift left arm to wash left axillary with right hand. Pt required total assist to wash right arm. Upper Body Bathing Bathing Assistance, Upper: 4 (Minimal assistance) Position Performed: Long sitting on bed Comments: UB bathing with min A: Patient handed washcoth from basin, and verbal cues to wash chest, stomach and (L) arm. Requires assistance to wash (R) arm and back. Bathing Assistance, Lower : 3 (Moderate assistance ) Position Performed: Long sitting on bed; Other (comment) (Roll on side) Comments: Pt long sitting supported in bed washed left and right thigh and anterior pericare. Pt rolled on left side washed his right buttock cheek with right hand. Pt required total assist for left buttock cheek and posterior pericare. Lower legs and feet were not washed due to time constraint. Lower Body Bathing Bathing Assistance, Lower : 3 (Moderate assistance ) Position Performed: Long sitting on bed Comments: Mod A for LB bathing: Patient handed washcloth and cued to wash upper thighs, he is able to wash posterior (R) thigh and lower (R) leg. Requires assistance for washing (L) leg due to hemiparesis and for (B) feet, buttocks. Reported by CAM Asher/L, 10/14/18 Upper Body Dressing Functional Level: 2 Items Applied/Steps Completed: Pullover (4 steps) Comments: Pt doff/don pullover shirt with Max A and Max vcs. Pt required mod assist to lean forward; pt attempted to reach behind head to pull shirt overhead and required mod assist for this. Pt required mod assist to remove BUE from shirt. Pt required max assist to thread left arm through arm hole, and setup for him to thread right arm. Pt donned shirt overhead with min A. Pt requried max A to pull shirt down. Dressing Assistance : 2 (Maximal assistance) Comments: Pt long sitting in bed required max cues for sequencing tyrell technique with donning and doffing pullover shirt.  To don, Pt requried mod A to thread left arm in shirt and to pull shirt sleeve passed elbow, pt threaded right arm, pt needed max A to get shirt overhead and pull down. To doff, pt required max A to pull shirt overhead and required mod A to unthread right arm, pt unthreaded left arm. Upper Body Dressing Dressing Assistance : 2 (Maximal assistance) Comments: Max A for UB dressing in long sitting - patient requires verbal cues to use (R) arm to remove shirt sleeve from (L) arm and when dressing in new shirt after bath, requires cues to pull (L) arm through sleeve. Reported by Morgan Beard OTR/L, 10/14/18 Lower Body Dressing Functional Level: 2 Items Applied/Steps Completed: Elastic waist pants (3 steps), Sock, left (1 step), Sock, right (1 step) Comments: Pt lying supine in bed; pt required total assist to thread legs in pants. Pt bend knees with max assist and stabilization for left leg; pt bridge with stabilization of left leg and requried min A to pull pants up to waist.  Dressing Assistance : 2 (Maximal assistance) Leg Crossed Method Used: No 
Position Performed: Long sitting on bed Comments: Pt long sitting in bed. to don, pt required total assist to thread left leg, and total to put in right foot, pt pulled to mid thigh. Pt required total assist to pull to waist. No doff tested due to not wearing pants. Lower Body Dressing Dressing Assistance : 2 (Maximal assistance) Leg Crossed Method Used: No 
Position Performed: Long sitting on bed Comments: Max A for LB 
 
 dressing for donning pants and is able to assist somewhat with rolling side to side (does better rolling left due to baseline strength in nonstroke affected UE, and is able to bridge (R) buttock and requires assist for (L). Reported by CAM Tavarez/L, 10/14/18 Toileting Functional Level: 1 Comments: Pt require max A for clothing management and max A for maintaining midline sitting. Toileting Assistance (FIM Score): 1 (Total assistance) (Pt used urinal in bed)Not tested Bladder  level of assist 1 2 Bladder  accident frequency score Bowel  level of assist 2 1 Bowel  accident frequency score   2 Toilet Transfer Clinch Toilet Transfer Score: 2 0 (Not tested)Not tested Tub/Shower Transfer Clinch Tub or Shower Type: Shower Tub/Shower Transfer Score: 0 Comments: NT due to safety  Not tested 
0 (Not tested) Comprehension Primary Mode of Comprehension: Auditory Score: 3 Comments:  (Hard of Hearing) Expression Primary Mode of Expression: Verbal 
Score: 3 Comments: Needing min cues for speaking up to be able to be understood by therapist   
   
Social Interaction Score: 3 Comments: minimal encouragement to participate in therapy Problem Solving Score: 2 Comments: Functional problem solving for initiating transfers, bed mobility needing moderate cues, also needing significant cues for attempting to sequence wheelchair mobility using right UE/LE Memory Score: 2 Comments: able to recall information with only occasional reminder provided by spouse/therapist    
 
FIM SCORES Initial Assessment Weekly Progress Assessment 10/16/2018 Bed/Chair/Wheelchair Transfers Transfer Type: SPT without device Other: stand pivot and squat pivot transfer Transfer Assistance : 2 (Maximal assistance) Sit to Stand Assistance: Maximum assistance Car Transfers: Not tested Car Type: N/A Patient continues to require assistance with anterior and up boost, eccentric lowering, pivoting/stepping to transfer surface. Patient continues to push to his weaker left side during transfers. Bed Mobility Rolling Right 3 (Moderate assistance ) Rolling Left 3 (Moderate assistance ) Supine to Sit 2 (Maximal assistance) Sit to Stand Maximum assistance Sit to Supine 2 (Maximal assistance) Rolling Right Rolling Left Supine to Sit Sit to Stand Sit to Supine Locomotion (W/C) Able to Propel (ft): 15 feet Functional Level: 1 Curbs/Ramps Assist Required (FIM Score): 0 (Not tested) Wheelchair Setup Assist Required : 2 (Maximal assistance) Wheelchair Management: Manages right brake Able to Propel (ft): 201 feet Functional Level: 4 Curbs/Ramps Assist Required (FIM Score): 0 (Not tested) Wheelchair Setup Assist Required : 2 (Maximal assistance) Wheelchair Management: Manages left brake;Manages right brake (with brake extender, VC's and occasional assistance placing the brake extender on brake ) Locomotion (W/C distance) 15 Feet Locomotion (Walk) 1 (Dependent/total assistance) Locomotion (Walk dist.) 2 Feet (ft) Steps/Stairs Steps/Stairs Ambulated (#): 0 Level of Assist : 0 (Not tested) (not safe to assess at this time) Rail Use: Right  Steps/Stairs Ambulated (#): 0 Level of Assist : 0 (Not tested) (not safe to assess at this time) Rail Use: None Nursing:  
Neuro:   A&O x_3___ Respiratory:   [x] WNL   [] O2   [] LPM ______ Other: 
Peripheral Vascular:   [x] TEDS present   [] Edema present ____ Grade Cardiac:   [x] WNL   [] Other Genitourinary:   [] continent   [x] incontinent   [] mary  Abdominal _10/12/2018______ LBM 
GI: ___NPO____ Diet ______ Liquids __Jevity 1.5___ tube feeds Musculoskeletal: __Active and passive__ ROM Transfers __wheelchair___ Assistive Device Used _x2___ Level of Assistance Skin Integumentary:   [x] Intact   [] Not Intact   __repositioning ________Preventative Measures Details______________________________________________________________ Pain: [x] Controlled   [] Not Controlled   Pain Meds:   [] Scheduled   [x] PRN Registered Dietitian / Nutrition:  
No data found. Pt receiving bolus tube feeds; tolerating at goal rate. Water flushes increased to 150 mL before and after each bolus feed per RN report. Tube Feeding:  Bolus feeds of Jevity 1.5, 237 mL (1 can) x 5 times daily via PEG Water Flushes:  150 mL before and after each bolus feed Residuals: 0 mL Goal EN Regimen: Jevity 1.5, 240 mL (1 can) 5 times daily + 150 mL water flush before and after each bolus to provide: 1775 kcal, 76 gm protein, 59 gm fat, 256 gm CHO, 27 gm fiber, 900 mL free water, 2400 mL total water, 100% RDIs Supplements:          [] Yes   [x] No     
Amount of supplement consumed:  not applicable Intake/Output Summary (Last 24 hours) at 10/16/18 1640 Last data filed at 10/16/18 1830 Gross per 24 hour Intake             1432 ml Output                0 ml Net             1432 ml Last bowel movement: 10/12 Interdisciplinary Team Goals: 1. Discipline  Physical Therapy Goal  Patient will be able to propel his w/c 150 ft with S and minimal pathway deviations, demonstrating good scanning and left side attention. Barrier  Decreased right UE/LE strength, problem solving deficits Intervention  TE, TA Goals written by:   CAM Fletcher 2. Discipline  Occupational Therapy Goal  Pt will perform upper body dressing with mod A and min vcs for sequencing. Barrier  Left hemiparesis, left inattention, decreased problem solving Intervention  TA, TE, NMRE, ADL re education Goals written by:  NINA Fierro 3. Discipline  Speech Therapy Goal  Patient will participate in laryngeal strengthening exercises with mod cues. Barrier  Significant deconditioning, left inattention, dysarthria, dysphagia Intervention  participation in exercises to improve laryngeal strength for swallow, oral exercises, swallowing trials, cognitive retraining. Goals written by: 4. Discipline  Nursing Goal  Patient will be continent of bladder 75% of the time and will call prior to voiding. Barrier  Patient has a previous CVA and does not always realize that he needs to use the bathroom. Intervention  Patient will be offered the urinal every 2 hours for timed toileting. Goals written by:  Fernanda Mcnulty RN  
 
5. Discipline  Clinical Psychology Goal  Maintain mood stability in recovery effort Barrier  Situational stress Intervention  Support , as tolerated Goals written by:  Neftali Garcia, PhD  
 
6. Discipline  Nutrition / Dietetics Goal  1. Patient will tolerate enteral nutrition formula and regimen without difficulty within the next 7 days. Outcome:  [x] Met/Ongoing    []  Not Met    [] New/Initial Goal  
2. Patient will meet their estimated nutritional needs through adequate enteral nutrition within the next 7 days. Outcome:  [x] Met/Ongoing    []  Not Met    [] New/Initial Goal   
 Barrier  none known Intervention  - Continue bolus tube feeding: Jevity 1.5, 240 mL (1 can) x 5 times daily with water flush of 150 mL before and after each bolus feed - Suggested bolus tube feeding schedule as pt tolerates: 6am, 10am, 2pm, 6pm, 10pm  
 Goals written by:   Nikko Mariscal RD Disposition / Discharge Planning: Follow-up therapy services:  tbd  
DME recommendations:  tbd  
Estimated discharge date:  11/6/18 Discharge Location:  home Interdisciplinary team rounds were held this PM with the following team members:   
 
 Name Physical Therapist 
  Josue Ge PT, DPT Occupational Therapist 
  Willam Graves, OTR/L Speech Therapist 
  Jyoti Moraes, [id number] [facility] Recreational Therapist 
  Katlyn Robbins, [address] [place] [address] Nursing Eri Prado RN Dietitiflorence Mariscal RD Physician 
  Morro Johnson MD   
 Shireen Ruff MSW Signed:  Morro Johnson MD 
  October 16, 2018

## 2018-10-16 NOTE — PROGRESS NOTES
Problem: Mobility Impaired (Adult and Pediatric) Goal: *Therapy Goal (Edit Goal, Insert Text) Physical Therapy Goals Initiated 10/9/2018, updated 10/15/2018, and to be accomplished within 7 day(s) 10/22/2018 1. Patient will move from supine to sit and sit to supine , scoot up and down and roll side to side in bed with moderate assistance consistently . 2.  Patient will transfer from bed to chair and chair to bed with moderate assistance  using the least restrictive device consistently. 3.  Patient will perform sit to stand with moderate assistance consistently without pushing to left . 4. Patient will ambulate with maximal assistance for 5 feet with the least restrictive device. 5.  Patient will perform 100 ft of wheelchair mobility with modified technique min assist for steering and negotiation of obstacles. 6.  Patient will be able to maintain sitting balance without support for at least 5 minutes with verbal cues only for maintaining midline/upright position for ease of transfers consistently throughout the day. Physical Therapy Goals Initiated 10/9/2018 and to be accomplished within 28 day(s) on 11/6/2018 1. Patient will move from supine to sit and sit to supine , scoot up and down and roll side to side in bed with supervision/set-up. 2.  Patient will transfer from bed to chair and chair to bed with supervision/set-up using the least restrictive device. 3.  Patient will perform sit to stand with supervision/set-up. 4.  Patient will ambulate with minimal assistance/contact guard assist for 50 feet with the least restrictive device. 5.  Patient will ascend/descend 4 stairs with 2 handrail(s) with minimal assistance/contact guard assist. 
6.  Patient will perform 150 ft of wheelchair mobility at modified independent level. 7.  Patient will demonstrate improved postural control for ease of functional mobility as demonstrated by PASS score >16/36 physical Therapy TREATMENT Patient: Solange Mckeon (89 y.o. male) Date: 10/16/2018 Diagnosis: R CVA Acute ischemic stroke ([facility] [address] Acute ischemic stroke ([facility] [address] Precautions: Aspiration, Fall (Head of bed 30-45 degrees at all times per speech therapist) Chart, physical therapy assessment, plan of care and goals were reviewed. Time In: 1330 Time Out: 1430 Patient Seen For: Balance activities; Patient education;Transfer training; Wheelchair mobility Pain: No pain observed during treatment; patient denied pain. Patient identified with name and :yes SUBJECTIVE:  
 
\"I'm all right. .. Tired\". Patient more alert today but still at times observed to become more fatigued, closing his eyes needing cues to keep his eyes open. OBJECTIVE DATA SUMMARY:  
Objective:  
 
GAIT Daily Assessment Amount of Assistance: 0 (Not tested) BALANCE Daily Assessment Sitting - Static: Prop sitting Sitting - Dynamic: Poor (constant support) Standing - Static: Poor Standing - Dynamic : Impaired See neuro re-ed WHEELCHAIR MOBILITY Daily Assessment Able to Propel (ft): 100 feet (mod A) Functional Level: 2 Curbs/Ramps Assist Required (FIM Score): 0 (Not tested) Wheelchair Setup Assist Required : 2 (Maximal assistance) Wheelchair Management: Manages right brake Moderate assist for steering and propulsion but better ability to coordinate right UE and right LE to assist with wheelchair propulsion. Fatiguing after 100 ft. Neuro Re-Education: 
Partial co-treatment with OT as patient with complex presentation, requiring two skilled therapists to facilitate appropriate upright, midline posture to reduce pushing and improve midline orientation and ability to participate in transfers, standing tasks. PT emphasis on facilitating left LE and pelvis for better ability to accept weight left LE for ability to participate in stand pivot transfer. OT facilitating left UE and OT student assisting with facilitating upper trunk. ASSESSMENT: 
Patient would continue to benefit from skilled therapy to improve ability to perform safe functional mobility. Continues to require significant assist with transfers, particularly stand pivot transfers, as patient still pushes toward his weaker left side. Needing right LE positioned forward when attempting to stand up and transfer to his right to reduce his pushing. Progression toward goals: 
[]      Improving appropriately and progressing toward goals [x]      Improving slowly and progressing toward goals 
[]      Not making progress toward goals and plan of care will be adjusted PLAN: 
Patient continues to benefit from skilled intervention to address the above impairments. Continue treatment per established plan of care. Discharge Recommendations:  [address] depending on patient's progress and caregiver support. Further Equipment Recommendations for Discharge:  Currently needing wheelchair with wheelchair pummel cushion, arm tray, removable armrests, brake extenders depending on patient's progress. Will continue to reassess Estimated LOS:4 weeks Activity Tolerance:  
Fair. Patient reporting \"dizzy\" feeling toward end of session after multiple standing repetitions, BP taken. Please refer to the flowsheet for vital signs taken during this treatment. After treatment:  
Patient left seated in wheelchair, call button within reach, spouse visiting in room, active chair alarm set. Cornelius Quinn, PT 
10/16/2018

## 2018-10-16 NOTE — PROGRESS NOTES
[address] 
[address] [place] [address] [address] INPATIENT REHABILITATION 
DAILY PROGRESS NOTE Date: 10/16/2018 Name: Edgar Pelayo Age / Sex: 80 y.o. / male CSN: [id number] MRN: [id number] Admit Date: 10/8/2018 Length of Stay: 8 days Primary Rehab Diagnosis: Impaired Mobility and ADLs secondary to Acute Ischemic Stroke (acute infarct of the posterior superior right basal ganglia and adjacent right corona radiata) with residual left hemiparesis, dysphagia and dysarthria Subjective:  
 
Patient seen and examined. Blood pressure controlled. Team conference was held at bedside this PM.  
 
Patient's Complaint:  
No significant medical complaints Pain Control: no current joint or muscle symptoms, essentially pain-free Objective:  
 
Vital Signs: 
Patient Vitals for the past 24 hrs: 
 BP Temp Pulse Resp SpO2  
10/16/18 0734 116/55 97 °F (36.1 °C) 70 18 93 % 10/16/18 0510 123/58 - 75 - -  
10/15/18 2041 121/62 98.2 °F (36.8 °C) 73 18 98 % 10/15/18 1605 132/54 96.9 °F (36.1 °C) 61 18 99 % Physical Examination: 
GENERAL SURVEY: Patient is awake, alert, oriented x 3, sitting comfortably on the chair, not in acute respiratory distress. HEENT: pink palpebral conjunctivae, anicteric sclerae, no nasoaural discharge, moist oral mucosa NECK: supple, no jugular venous distention, no palpable lymph nodes CHEST/LUNGS: symmetrical chest expansion, good air entry, clear breath sounds HEART: adynamic precordium, good S1 S2, no S3, regular rhythm, no murmurs ABDOMEN: (+) PEG tube in place, flat, bowel sounds appreciated, soft, non-tender EXTREMITIES: (+) amputated distal phalanx of the thumb of the left hand, pink nailbeds, no edema, full and equal pulses, no calf tenderness NEUROLOGICAL EXAM: The patient is awake, alert and oriented x3, able to answer questions fairly appropriately, able to follow 1 and 2 step commands. Able to tell time from the wall clock. Cranial nerves II-XII are grossly intact except for slurred speech and dysphagia. No gross sensory deficit. Motor strength is 4+/5 on the RUE and RLE, 4/5 on the LUE, 4-/5 on the LLE. Current Medications: 
Current Facility-Administered Medications Medication Dose Route Frequency  guaiFENesin (ROBITUSSIN) 100 mg/5 mL oral liquid 100 mg  100 mg Per G Tube QID  dextromethorphan (DELSYM) 30 mg/5 mL syrup 30 mg  30 mg Per G Tube Q12H  
 traZODone (DESYREL) tablet 50 mg  50 mg Per G Tube QHS  modafinil (PROVIGIL) tablet 100 mg  100 mg Per G Tube DAILY  cholecalciferol (VITAMIN D3) tablet 2,000 Units  2,000 Units Oral BID  hydrALAZINE (APRESOLINE) tablet 20 mg  20 mg Per G Tube Q12H  pneumococcal 23-valent (PNEUMOVAX 23) injection 0.5 mL  0.5 mL IntraMUSCular PRIOR TO DISCHARGE  docusate sodium (COLACE) capsule 100 mg  100 mg Per G Tube BID  
 bisacodyl (DULCOLAX) suppository 10 mg  10 mg Rectal Q48H PRN  
 heparin (porcine) injection 5,000 Units  5,000 Units SubCUTAneous Q12H  
 acetaminophen (TYLENOL) solution 650 mg  650 mg Oral Q4H PRN  
 amLODIPine (NORVASC) tablet 10 mg  10 mg Per G Tube DAILY  co-enzyme Q-10 (CO Q-10) capsule 100 mg  100 mg Oral DAILY  vitamin B complex tablet  1 Tab Oral DAILY  melatonin tablet 3 mg  3 mg Per G Tube QHS  aspirin chewable tablet 81 mg  81 mg Per G Tube DAILY  atorvastatin (LIPITOR) tablet 80 mg  80 mg Per G Tube QHS  clopidogrel (PLAVIX) tablet 75 mg  75 mg PEG Tube DAILY  lisinopril (PRINIVIL, ZESTRIL) tablet 40 mg  40 mg Per G Tube DAILY  pantoprazole (PROTONIX) granules for oral suspension 40 mg  40 mg Per G Tube ACB  finasteride (PROSCAR) tablet 5 mg  5 mg Oral QPM  
 levothyroxine (SYNTHROID) tablet 50 mcg  50 mcg Per G Tube 6am  
 metoprolol tartrate (LOPRESSOR) tablet 25 mg  25 mg Per G Tube Q12H  tamsulosin (FLOMAX) capsule 0.4 mg  0.4 mg Oral QPM  
  timolol (TIMOPTIC) 0.5 % ophthalmic solution 1 Drop  1 Drop Both Eyes DAILY Allergies: Allergies Allergen Reactions  Codeine Other (comments) Functional Progress: OCCUPATIONAL THERAPY 
 
ON ADMISSION MOST RECENT Eating Functional Level: 1 Eating Functional Level: 0 Grooming Functional Level: 4 Grooming Functional Level: 4 Bathing Functional Level: 2 Bathing Functional Level: 2 Upper Body Dressing Functional Level: 2 Upper Body Dressing Functional Level: 2 Lower Body Dressing Functional Level: 2 Lower Body Dressing Functional Level: 2 Toileting Functional Level: 1 Toileting Functional Level: 1 Toilet Transfers Toilet Transfer Score: 2 Toilet Transfers Toilet Transfer Score: 2 Tub [place] [facility] Transfers Tub/Shower Transfer Score: 0 Tub/Shower Transfers Tub/Shower Transfer Score: 0 Legend: 
 7 - Independent 6 - Modified Independent 5 - Standby Assistance / Supervision / Elena Showers 4 - Minimum Assistance / [address] 3 - Moderate Assistance 2 - Maximum Assistance 1 - Total Assistance / Dependent Lab/Data Review: No results found for this or any previous visit (from the past 24 hour(s)). Estimated Glomerular Filtration Rate: On admission, estimated GFR based on a Creatinine of 0.77 mg/dl: 
 Using CKD-EPI = 81.6 mL/min/1.73m2 Using MDRD = 101.6 mL/min/1.73m2 Most recent estimated GFR, based on a Creatinine of 0.92 mg/dl on 10/15/2018: 
 Using CKD-EPI = 74.5 mL/min/1.73m2 Using MDRD = 82.7 mL/min/1.73m2 Assessment:  
 
Primary Rehabilitation Diagnosis 1. Impaired Mobility and ADLs 2. 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 
  
Comorbidities  Urinary frequency R35.0  Nocturia R35.1  Glaucoma H40.9  History of kidney stones Z87.442  Cervical spinal stenosis M48.02  
 Meningioma  D32.9  Hypertensive heart disease without heart failure I11.9  Diastolic dysfunction without heart failure I51.89  
 Gastric ulcer K25.9  Coronary artery disease involving native coronary artery of native heart I25.10  Dyslipidemia E78.5  Chronic obstructive pulmonary disease (COPD)  J44.9  Status post insertion of percutaneous endoscopic gastrostomy (PEG) tube  Z93.1  Hypothyroidism E03.9  Benign prostatic hyperplasia N40.0  Bilateral hearing loss H91.93  
 History of transient ischemic attack (TIA) Z86.73  
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  
  
 
Plan: 1. Justification for continued stay: Good progression towards established rehabilitation goals. 2. Medical Issues being followed closely: 
  [x]  Fall and safety precautions  
  []  Wound Care  
  [x]  Bowel and Bladder Function  
  [x]  Fluid Electrolyte and Nutrition Balance  
  []  Pain Control 3. Issues that 24 hour rehabilitation nursing is following: 
  [x]  Fall and safety precautions  
  []  Wound Care  
  [x]  Bowel and Bladder Function  
  [x]  Fluid Electrolyte and Nutrition Balance  
  []  Pain Control   
  [x]  Assistance with and education on in-room safety with transfers to and from the bed, wheelchair, toilet and shower. 4. Acute rehabilitation plan of care: 
  [x]  Continue current care and rehab. [x]  Physical Therapy [x]  Occupational Therapy [x]  Speech Therapy  
  []  Hold Rehab until further notice 5. Medications: 
  [x]  MAR Reviewed [x]  Continue Present Medications 6. DVT Prophylaxis:   
  []  Lovenox [x]  Unfractionated Heparin  
  []  Coumadin  
  []  NOAC  
  []  BECKY Stockings  
  []  Sequential Compression Device []  None 7.  Orders:  
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 
 > Continue: 
  > Aspirin 81 mg via PEG tube once daily in AM 
  > Atorvastatin 80 mg via PEG tube q HS 
  > Coenzyme Q10 100 mg via PEG tube once daily 
  > Clopidogrel 75 mg via PEG tube once daily in PM 
  > Vitamin B complex 1 tab via PEG tube once daily 
 
> Dysphagia as late effect of CVA; S/P Insertion of percutaneous endoscopic gastrostomy (PEG) tube (10/5/2018) 
 > NPO with tube feeding 
 > Jevity 1.5 237 ml via PEG tube 5 times daily (6AM, 10AM, 2PM, 6PM, 10PM) > On 10/15/2018, increased water flush from 100 ml to 150 ml via PEG tube vefore and after each bolus feeding 
 
> Hypertensive heart disease without heart failure 
 > On admission to the ARU, decreased Hydralazine from 50 mg to 25 mg via PEG tube q 8 hr (6AM, 2PM, 10PM) > On 10/10/2018, decrease Hydralazine from 25 mg via PEG tube q 8 hr (6AM, 2PM, 10PM) to 20 mg via PEG tube q 12 hr (9AM, 9PM) > Continue: 
  > Amlodipine 10 mg via PEG tube once daily (9AM) 
  > Hydralazine 20 mg via PEG tube q 12 hr (9AM, 9PM) > Lisinopril 40 mg via PEG tube once daily (6AM) 
  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 
 > On 10/11/2018, started Trazodone 50 mg via PEG tube q HS 
 > Continue Trazodone 50 mg via PEG tube q HS 
 
> Neurocognitive modulation 
 > On 10/11/2018, started: 
  > Trazodone 50 mg via PEG tube q HS 
  > Modafinil 100 mg via PEG tube q AM 
 > Continue: 
  > Trazodone 50 mg via PEG tube q HS  
  > Modafinil 100 mg via PEG tube q AM 
 
> Difficulty sleeping 
 > On admission to the ARU, started Melatonin 3 mg via PEG tube q HS 
 > On 10/11/2018, started Trazodone 50 mg via PEG tube q HS 
 > Continue: 
  > Melatonin 3 mg via PEG tube q HS 
   > Trazodone 50 mg via PEG tube q HS 
 
> Cough 
 > WBC count (10/9/2018) = 6.8 
 > Chest x-ray (10/12/2018) showed a small left pleural effusion; prominent interstitial lung markers could represent mild infiltration or chronic interstitial lung disease. 
 > On 10/14/2018, patient was given Furosemide 20 mg via PEG tube x 1 dose 
 > No fever or desaturation noted since admission 
 > On 10/15/2018, started: 
  > Dextromethorphan 30 mg via PEG tube q 12 hr 
  > Guaifenesin 100 mg via PEG tube 4 times daily 
 > Continue: 
  > Dextromethorphan 30 mg via PEG tube q 12 hr 
  > Guaifenesin 100 mg via PEG tube 4 times daily 8. Patient's progress in rehabilitation and medical issues discussed with the patient. All questions answered to the best of my ability. Care plan discussed with patient and nurse. Signed:    Mone Yu MD 
  October 16, 2018

## 2018-10-17 PROCEDURE — 92526 ORAL FUNCTION THERAPY: CPT

## 2018-10-17 PROCEDURE — 74011250636 HC RX REV CODE- 250/636: Performed by: INTERNAL MEDICINE

## 2018-10-17 PROCEDURE — 74011250637 HC RX REV CODE- 250/637: Performed by: INTERNAL MEDICINE

## 2018-10-17 PROCEDURE — 97530 THERAPEUTIC ACTIVITIES: CPT

## 2018-10-17 PROCEDURE — 97535 SELF CARE MNGMENT TRAINING: CPT

## 2018-10-17 PROCEDURE — 97112 NEUROMUSCULAR REEDUCATION: CPT

## 2018-10-17 PROCEDURE — 65310000000 HC RM PRIVATE REHAB

## 2018-10-17 RX ORDER — AMLODIPINE BESYLATE 10 MG/1
10 TABLET ORAL DAILY
Status: DISCONTINUED | OUTPATIENT
Start: 2018-10-18 | End: 2018-10-30 | Stop reason: HOSPADM

## 2018-10-17 RX ORDER — CHOLECALCIFEROL (VITAMIN D3) 125 MCG
5 CAPSULE ORAL
Status: DISCONTINUED | OUTPATIENT
Start: 2018-10-17 | End: 2018-10-30 | Stop reason: HOSPADM

## 2018-10-17 RX ADMIN — VITAMIN D, TAB 1000IU (100/BT) 2000 UNITS: 25 TAB at 09:44

## 2018-10-17 RX ADMIN — TRAZODONE HYDROCHLORIDE 50 MG: 50 TABLET ORAL at 21:48

## 2018-10-17 RX ADMIN — GUAIFENESIN 100 MG: 100 SOLUTION ORAL at 13:45

## 2018-10-17 RX ADMIN — GUAIFENESIN 100 MG: 100 SOLUTION ORAL at 09:43

## 2018-10-17 RX ADMIN — VITAMIN D, TAB 1000IU (100/BT) 2000 UNITS: 25 TAB at 18:22

## 2018-10-17 RX ADMIN — METOPROLOL TARTRATE 25 MG: 25 TABLET ORAL at 09:43

## 2018-10-17 RX ADMIN — LEVOTHYROXINE SODIUM 50 MCG: 50 TABLET ORAL at 06:07

## 2018-10-17 RX ADMIN — MODAFINIL 100 MG: 100 TABLET ORAL at 09:44

## 2018-10-17 RX ADMIN — LISINOPRIL 40 MG: 40 TABLET ORAL at 06:06

## 2018-10-17 RX ADMIN — ACETAMINOPHEN 650 MG: 650 SOLUTION ORAL at 23:54

## 2018-10-17 RX ADMIN — TIMOLOL MALEATE 1 DROP: 5 SOLUTION OPHTHALMIC at 09:48

## 2018-10-17 RX ADMIN — AMLODIPINE BESYLATE 10 MG: 10 TABLET ORAL at 09:44

## 2018-10-17 RX ADMIN — HEPARIN SODIUM 5000 UNITS: 5000 INJECTION, SOLUTION INTRAVENOUS; SUBCUTANEOUS at 18:22

## 2018-10-17 RX ADMIN — PANTOPRAZOLE SODIUM 40 MG: 40 GRANULE, DELAYED RELEASE ORAL at 06:07

## 2018-10-17 RX ADMIN — ASPIRIN 81 MG CHEWABLE TABLET 81 MG: 81 TABLET CHEWABLE at 09:43

## 2018-10-17 RX ADMIN — HYDRALAZINE HYDROCHLORIDE 20 MG: 10 TABLET, FILM COATED ORAL at 21:48

## 2018-10-17 RX ADMIN — GUAIFENESIN 100 MG: 100 SOLUTION ORAL at 18:21

## 2018-10-17 RX ADMIN — METOPROLOL TARTRATE 25 MG: 25 TABLET ORAL at 21:48

## 2018-10-17 RX ADMIN — HYDRALAZINE HYDROCHLORIDE 20 MG: 10 TABLET, FILM COATED ORAL at 09:45

## 2018-10-17 RX ADMIN — Medication 100 MG: at 09:43

## 2018-10-17 RX ADMIN — MELATONIN TAB 5 MG 5 MG: 5 TAB at 21:48

## 2018-10-17 RX ADMIN — GUAIFENESIN 100 MG: 100 SOLUTION ORAL at 21:51

## 2018-10-17 RX ADMIN — DOCUSATE SODIUM 100 MG: 100 CAPSULE, LIQUID FILLED ORAL at 09:44

## 2018-10-17 RX ADMIN — ATORVASTATIN CALCIUM 80 MG: 40 TABLET, FILM COATED ORAL at 21:48

## 2018-10-17 RX ADMIN — FINASTERIDE 5 MG: 5 TABLET, FILM COATED ORAL at 18:22

## 2018-10-17 RX ADMIN — DEXTROMETHORPHAN 30 MG: 30 SUSPENSION, EXTENDED RELEASE ORAL at 09:44

## 2018-10-17 RX ADMIN — HEPARIN SODIUM 5000 UNITS: 5000 INJECTION, SOLUTION INTRAVENOUS; SUBCUTANEOUS at 06:09

## 2018-10-17 RX ADMIN — DEXTROMETHORPHAN 30 MG: 30 SUSPENSION, EXTENDED RELEASE ORAL at 21:48

## 2018-10-17 RX ADMIN — TAMSULOSIN HYDROCHLORIDE 0.4 MG: 0.4 CAPSULE ORAL at 18:22

## 2018-10-17 RX ADMIN — CLOPIDOGREL BISULFATE 75 MG: 75 TABLET, FILM COATED ORAL at 21:48

## 2018-10-17 RX ADMIN — Medication 1 TABLET: at 09:43

## 2018-10-17 RX ADMIN — DOCUSATE SODIUM 100 MG: 100 CAPSULE, LIQUID FILLED ORAL at 18:22

## 2018-10-17 NOTE — PROGRESS NOTES
Problem: Mobility Impaired (Adult and Pediatric) Goal: *Therapy Goal (Edit Goal, Insert Text) Physical Therapy Goals Initiated 10/9/2018, updated 10/15/2018, and to be accomplished within 7 day(s) 10/22/2018 1. Patient will move from supine to sit and sit to supine , scoot up and down and roll side to side in bed with moderate assistance consistently . 2.  Patient will transfer from bed to chair and chair to bed with moderate assistance  using the least restrictive device consistently. 3.  Patient will perform sit to stand with moderate assistance consistently without pushing to left . 4. Patient will ambulate with maximal assistance for 5 feet with the least restrictive device. 5.  Patient will perform 100 ft of wheelchair mobility with modified technique min assist for steering and negotiation of obstacles. 6.  Patient will be able to maintain sitting balance without support for at least 5 minutes with verbal cues only for maintaining midline/upright position for ease of transfers consistently throughout the day. Physical Therapy Goals Initiated 10/9/2018 and to be accomplished within 28 day(s) on 11/6/2018 1. Patient will move from supine to sit and sit to supine , scoot up and down and roll side to side in bed with supervision/set-up. 2.  Patient will transfer from bed to chair and chair to bed with supervision/set-up using the least restrictive device. 3.  Patient will perform sit to stand with supervision/set-up. 4.  Patient will ambulate with minimal assistance/contact guard assist for 50 feet with the least restrictive device. 5.  Patient will ascend/descend 4 stairs with 2 handrail(s) with minimal assistance/contact guard assist. 
6.  Patient will perform 150 ft of wheelchair mobility at modified independent level. 7.  Patient will demonstrate improved postural control for ease of functional mobility as demonstrated by PASS score >16/36 Outcome: Progressing Towards Goal 
physical Therapy TREATMENT Patient: David López (43 y.o. male) Date: 10/17/2018 Diagnosis: R CVA Acute ischemic stroke ([facility] Acute ischemic stroke ([facility] Precautions: Aspiration, Fall(Head of bed 30-45 degrees at all times per speech therapist) Chart, physical therapy assessment, plan of care and goals were reviewed. Time In: 1000 Time Out: 1100 Patient Seen For: Balance activities;Transfer training;Patient education (Co-treatment with OT [phone], see below for details.) Pain: No pain reported. Patient identified with name and :yes SUBJECTIVE:  
 
Patient reporting that he gets lightheaded at times, particularly when first getting up. No significant increased dizziness/lightheadedness during treatment today. Continues to report feeling tired but motivated to participate in therapy. OBJECTIVE DATA SUMMARY:  
Objective: BED/MAT MOBILITY Daily Assessment Rolling Right : 3 (Moderate assistance ) Rolling Left : 3 (Moderate assistance) Supine to Sit : 2 (Maximal assistance) Sit to Supine : 3 (Moderate assistance) Wedge and pillows in place on mat; needing assist at trunk, left LE for supine->sit, assist at bilateral LE and slight steadying at trunk for sit->supine. Needing moderate assistance for rolling, needing assist for achieving hooklying position with left LE and cues for rolling completely to his side. TRANSFERS Daily Assessment Transfer Type: SPT without device Other: stand pivot transfer Transfer Assistance : 2 (Maximal assistance) Sit to Stand Assistance: Maximum assistance Car Transfers: Not tested(not emphasis of treatment) Car Type: N/A Needing to cue patient to use his right UE to support his left UE when transferring to right side for SPT as patient otherwise pushing too much to left and unable to safely be facilitated with pivoting toward right side. Patient performing partial stand pivot transfer to go to his left side, using right UE to assist with pushing himself to his left. Needing left knee blocked for safety during transfer. GAIT Daily Assessment Amount of Assistance: 0 (Not tested) Not yet safe to progress. STEPS or STAIRS Daily Assessment Level of Assist : 0 (Not tested) Not yet safe to progress. BALANCE Daily Assessment Sitting - Static: Fair (occasional) Sitting - Dynamic: Fair (occasional) Standing - Static: Poor Standing - Dynamic : Impaired See neuro re-ed. Neuro Re-Education: 
Partial co-treatment with OT as patient requiring two skilled therapists to facilitate upright, midline trunk position along with facilitating appropriate weightbearing for both left LE and UE. Physical therapist working toward goal of improving left LE weight acceptance to better participate in transfers, standing in preparation for pre-gait and gait as appropriate. PT facilitating hips/pelvis, blocking left LE while OT and OT student facilitating upper trunk position and left UE weightbearing. Able to participate in standing bouts 2-4 minutes at a time, occasionally PT able to back away from blocking left knee for ~ 3 sec at a time with increased quad activation and engagement observed for extending left knee. Continued to require significant assist and cues for balance, midline and upright posture. Performing total of 3 bouts. Also performing supine bridges 3 x 6 reps emphasis on symmetrical lifting of hips/pelvis to improve neuromuscular control left LE and to reduce tendency of right LE to push patient toward his weaker left side. Performing seated lateral trunk leans onto right and left elbows (more facilitation by therapist toward left side) with goal of improving trunk strength and improving midline orientation upon return to upright position.  Using mirror for feedback, cues to patient to self-correct posture without therapist having to intervene. Patient observed to start using mirror for correcting himself more frequently without therapist having to remind him. Patient observed to have 2/5 knee ext strength, 2-/5 ankle DF and hip flex. ASSESSMENT: 
Patient continues to benefit from skilled therapy to improve postural control required for ability to participate in seated activities, transfers, and standing but still requires quite a bit of assist for transfers and standing. Recommend ongoing co-treatment with OT to improve ability to participate in standing tasks. Patient more alert today during therapy, observed to have better ability to move left LE with some quad ms activation and engagement observed during session. Progression toward goals: 
[x]      Improving appropriately and progressing toward goals 
[]      Improving slowly and progressing toward goals 
[]      Not making progress toward goals and plan of care will be adjusted PLAN: 
Patient continues to benefit from skilled intervention to address the above impairments. Continue treatment per established plan of care. Discharge Recommendations:  [address] depending on patient's progress. Further Equipment Recommendations for Discharge:  wheelchair and wheelchair cushion, arm tray, brake extenders currently used but to be reassessed as patient continues to progress. Estimated LOS:4 weeks Activity Tolerance:  
Fair to good, better able to tolerate standing bouts today. After treatment:  
Patient left seated in wheelchair and handed off to speech therapist.  
   
Christian Valle, PT 
10/17/2018

## 2018-10-17 NOTE — REHAB NOTE
Bedside and Verbal shift change report given to RN (oncoming nurse) by Fernando Hamilton LPN (offgoing nurse). Report included the following information SBAR, Kardex, MAR and Recent Results. SITUATION:  
? Code Status: DNR Reason for Admission: R CVA ? Acute ischemic stroke ([facility] ? Hospital day: 9 
? Problem List:  
   
Hospital Problems  Date Reviewed: 10/16/2018 Codes Class Noted POA History of transient ischemic attack (TIA) ICD-10-CM: A06.93 
ICD-9-CM: V12.54  Unknown Yes Overview Signed 10/9/2018  3:28 PM by Dania Almaraz MD  
  2x Vitamin D deficiency (Chronic) ICD-10-CM: E55.9 ICD-9-CM: 268.9  10/9/2018 Yes Overview Signed 10/10/2018  1:59 PM by Dania Almaraz MD  
  Vitamin D 25-Hydroxy (10/10/2018) = 22.5 Hypertensive heart disease without heart failure (Chronic) ICD-10-CM: I11.9 ICD-9-CM: 402.90  Unknown Yes Status post insertion of percutaneous endoscopic gastrostomy (PEG) tube ([facility] ICD-10-CM: Z93.1 ICD-9-CM: V44.1  10/5/2018 Yes * (Principal) Acute ischemic stroke ([facility] ICD-10-CM: I63.9 ICD-9-CM: 434.91  10/2/2018 Yes Overview Signed 10/8/2018  6:32 PM by Dania Almaraz MD  
  Acute Ischemic Stroke (acute infarct of the posterior superior right basal ganglia and adjacent right corona radiata) with residual left hemiparesis, dysphagia and dysarthria Impaired mobility and ADLs ICD-10-CM: Z74.09 
ICD-9-CM: 799.89  10/2/2018 Yes Hemiparesis affecting left side as late effect of cerebrovascular accident (CVA) ([facility] ICD-10-CM: N23.088 ICD-9-CM: 438.20  10/2/2018 Yes Dysphagia as late effect of cerebrovascular accident (CVA) ICD-10-CM: J18.612 ICD-9-CM: 438.82  10/2/2018 Yes 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) ICD-10-CM: N32.425 ICD-9-CM: 438.13  10/2/2018 Yes Traumatic amputation of left thumb ICD-10-CM: U15.130X ICD-9-CM: 885.0  1/1/2011 Yes BACKGROUND:  
 Past Medical History:  
Past Medical History:  
Diagnosis Date  Acute ischemic stroke ([facility] 10/2/2018 Acute Ischemic Stroke (acute infarct of the posterior superior right basal ganglia and adjacent right corona radiata) with residual left hemiparesis, dysphagia and dysarthria  Benign prostatic hyperplasia  Bilateral hearing loss  Cervical spinal stenosis 10/2/2018  Chronic obstructive pulmonary disease (COPD) ([facility]  Coronary artery disease involving native coronary artery of native heart  Diastolic dysfunction without heart failure  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) 10/2/2018  Dyslipidemia  Dysphagia as late effect of cerebrovascular accident (CVA) 10/2/2018  Gastric ulcer  Glaucoma  Hemiparesis affecting left side as late effect of cerebrovascular accident (CVA) ([facility] 10/2/2018  History of kidney stones  History of transient ischemic attack (TIA) 2x  Hypertensive heart disease without heart failure  Hypothyroidism  Meningioma ([facility] 10/3/2018 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  
? Changes in Assessment Throughout Shift: none ? Patient has Central Line: no Reasons if yes:  
? Patient has Suarez Cath: no Reasons if yes:   
 
? Last Vitals: 
  
Vitals:  
 10/16/18 2047 10/16/18 2200 10/17/18 0606 10/17/18 6561 BP: 141/61 141/61 140/62 170/73 Pulse: 72 72 70 71 Resp:  17  16 Temp:  98 °F (36.7 °C)  97.2 °F (36.2 °C) SpO2:  96%  97% Weight:      
Height:      
 
 
? IV and DRAINS (will only show if present) PEG/Gastrostomy Tube 10/06/18-Site Assessment: Clean, dry, & intact ? WOUND (if present) Wound Type:  none Dressing present Dressing Present : Yes Wound Concerns/Notes:  none ? PAIN Pain Assessment Pain Intensity 1: 0 (10/17/18 0757) Pain Location 1: Neck Pain Intervention(s) 1: Medication (see MAR) Patient Stated Pain Goal: 0 
o Interventions for Pain:  none 
o Intervention effective: no 
o Time of last intervention: see flowsheet  
o Reassessment Completed: yes ? Last 3 Weights: 
Last 3 Recorded Weights in this Encounter 10/08/18 2046 Weight: 67.6 kg (149 lb) Weight change: ? INTAKE/OUPUT Current Shift: No intake/output data recorded. Last three shifts: 10/15 1901 - 10/17 0700 In: 2097 Out: 795 [Urine:795] ? LAB RESULTS Recent Labs 10/15/18 
2365 HGB 11.4* HCT 33.1*  
 Recent Labs 10/15/18 
5143   
K 4.7 GLU 92 BUN 34* CREA 0.92  
CA 7.8* RECOMMENDATIONS AND DISCHARGE PLANNING 1. Pending tests/procedures/ Plan of Care or Other Needs: none 2. Discharge plan for patient and Needs/Barriers: none 3. Estimated Discharge Date: 11/6 18 Posted on Whiteboard in [facility]: yes 4. The patient's care plan was reviewed with the oncoming nurse. \"HEALS\" SAFETY CHECK Fall Risk Total Score: 7 Safety Measures: Safety Measures: Bed/Chair-Wheels locked, Bed/Chair alarm on, Bed in low position, Call light within reach, Fall prevention (comment) A safety check occurred in the patient's room between off going nurse and oncoming nurse listed above. The safety check included the below items Area Items H High Alert Medications ? Verify all high alert medication drips (heparin, PCA, etc.) E Equipment ? Suction is set up for ALL patients (with yanker) ? Red plugs utilized for all equipment (IV pumps, etc.) ? WOWs wiped down at end of shift. ? Room stocked with oxygen, suction, and other unit-specific supplies A Alarms ? Bed alarm is set for fall risk patients ? Ensure chair alarm is in place and activated if patient is up in a chair L Lines ? Check IV for any infiltration ? Suarez bag is empty if patient has a Suarez ? Tubing and IV bags are labeled Krishna Comfort Safety ? Room is clean, patient is clean, and equipment is clean. ? Hallways are clear from equipment besides carts. ? Fall bracelet on for fall risk patients ? Ensure room is clear and free of clutter ? Suction is set up for ALL patients (with sonia) ? Hallways are clear from equipment besides carts. ? Isolation precautions followed, supplies available outside room, sign posted Demian Steele LPN

## 2018-10-17 NOTE — PROGRESS NOTES
Problem: Dysphagia (Adult) Goal: *Speech Goal: (INSERT TEXT) Long term goals: 1. Patient will tolerate small amounts of PO using safe swallowing techniques without overt s/s of aspiration in 4/5 trials. 2. Patient will perform oral/pharyngeal strengthening exercises with supervision. 3. Patient will participate in laryngeal strengthening exercises and swallowing maneuvers, min cues - supervison. 4. Patient will recall 3 words after 5 minutes, min assist-supervision. 5. Patient will perform functional problem solving and reasoning tasks with supervision. Short term goals (by 10/23/18): 1. Patient will tolerate small amounts of PO puree with the SLP using safe swallowing techniques without overt s/s of aspiration in 4/5 trials. 2. Patient will perform oral/pharyngeal strengthening exercises with mod assist/cues. 3. Patient will participate in laryngeal strengthening exercises and swallowing maneuvers, mod cues. 4. Patient will recall 3 words after 5 minutes, mod-min assist. 
5. Patient will perform functional problem solving and reasoning tasks with 75-85% accuracy. Speech language pathology treatment Patient: Cindy Oneal (74 y.o. male) Date: 10/17/2018 Diagnosis: R CVA Acute ischemic stroke ([facility] [address] Acute ischemic stroke ([facility] [address] SUBJECTIVE:  
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 OBJECTIVE:  
Mental Status: 
Patient tired very quickly this am following his PT session. He was not allowed to rest in bed between sessions and was exhausted when SLP returned in the afternoon. Nevertheless, he tried to participate in tasks presented Treatment & Interventions:  
Mr. Kingston Norwood was seen for two speech therapy sessions, morning and afternoon. The following treatment activities were presented: Motor Speech/Dysphagia:  
Vowel prolongations:  15-16 seconds (2 pitches) Pitch glides:   Limited range Production of final /k/:  100% accuracy for strong consonantOral strengthening ex:  Reviewed 5x each Swallowing maneuvers: Presented and demonstrated for the patient. Significant difficulty in patient's productions Neuro-Linguistics:  
Orientation:   Min assist 
Response & Tolerance to Activities: 
Mr. Lacey Gutierrez consistently tries to perform activities presented by the SLP. Unfortunately, patient was exhausted after PT and did not get an opportunity to rest prior to the second session. Pain: 
Pain Scale 1: Numeric (0 - 10) No report of pain After treatment:  
[x]       Patient left in no apparent distress sitting up in chair 
[]       Patient left in no apparent distress in bed 
[x]       Call bell left within reach [x]       Nursing notified,and SLP requested that he be returned to bed to rest 
x_      Caregiver present 
[]       Bed alarm activated ASSESSMENT:  
Progression toward goals: 
[]       Improving appropriately and progressing toward goals [x]       Improving slowly and progressing toward goals 
[]       Not making progress toward goals and plan of care will be adjusted PLAN:  
Patient continues to benefit from skilled intervention to address the above impairments. Continue treatment per established plan of care. Discharge Recommendations:  Home Health Estimated LOS: Through 11/6/18 JAIME Arzate Time Calculation:  60 Minutes

## 2018-10-17 NOTE — PROGRESS NOTES
Problem: Self Care Deficits Care Plan (Adult) Goal: *Therapy Goal (Edit Goal, Insert Text) Occupational Therapy Goals Long Term Goals Initiated 10/9/18 and to be accomplished within 4 week(s) 1. Pt will perform self-feeding with total dependence. 2. Pt will perform grooming with supervision. 3. Pt will perform UB bathing with Min A. 
4. Pt will perform LB bathing with Min A prn AE. 5. Pt will perform tub/shower transfer with Mod A.  
6. Pt will perform UB dressing with Setup. 7. Pt will perform LB dressing with Min A prn AE. 8. Pt will perform toileting task with Min A. 
9. Pt will perform toilet transfer with Mod A. Short Term Goals Initiated 10/16/18 and to be accomplished within 7 day(s) 10/23/18 1. Pt will perform grooming with Setup. 2. Pt will perform UB bathing with Min A. 
3. Pt will perform LB bathing with Min A prn AE. 4. Pt will perform tub/shower transfer with Mod A. 
5. Pt will perform UB dressing with Mod A. 
6. Pt will perform LB dressing with Mod A prn AE. 7. Pt will perform toileting task with Max A. 
8. Pt will perform toilet transfer with Mod A. Occupational Therapy TREATMENT Patient: Arcenio Sportsman 80 y.o. Patient identified with name and : yes Date: 10/17/2018 Time In: 807 Time Out[de-identified] [id number] Diagnosis: R CVA Acute ischemic stroke ([facility] [address] Precautions: Aspiration, Fall(Head of bed 30-45 degrees at all times per speech therapist) Chart, occupational therapy assessment, plan of care, and goals were reviewed. Pain: 
Pt reports 0/10 pain or discomfort prior to treatment. Pt reports 0/10 pain or discomfort post treatment. Intervention Provided: None required SUBJECTIVE:  
Patient stated \"That's all I've been having recently, obstacles\". Pt encouraged he is doing the best he can and he is progressing. Pt nodded. OBJECTIVE DATA SUMMARY:  
 
THERAPEUTIC ACTIVITY Daily Assessment Pt stand with min-mod A with support of parallel bars. Pt required mod A to  midline. Pt required tactile cues to facilitate knee extension, scapular retraction and spine extension, and left elbow extension for weight bearing. TOILETING Daily Assessment Toileting Toileting Assistance (FIM Score): 2 (Maximal assistance) Cues: Verbal cues provided Pt bed level. Pt void urine in depends. Pt roll with min A; total A for buttocks and posterior tania care hygiene. Pt wiped anterior pericare long sitting in bed. LOWER BODY DRESSING Daily Assessment Pt bridge with stabilization of left foot and knee. Pt pulled elastic waist pants over right hip with right hand; pt total A to pull elastic waist pants over left hip. MOBILITY/TRANSFERS Daily Assessment Pt sidelying<>eob with max A and max vcs to push with right hand. Pt sit with max vcs and mod A to sit in midline. Pt eob<>stand with mod A. Pt stand pivot transfer eob<>wc with mod A to  midline and max vcs to step with left and right foot. ASSESSMENT: 
Patient continues to demonstrate left UE hemiparesis and decreased awareness of left UE. Patient requires mod A for functional stand pivot transfers. On current date, moderate tone observed in left UE elbow extension. Pt left UE reached full ROM elbow extension with bicep inhibition and stretch. Patient continues to demonstrate decreased independence in self care. Patient would benefit from continued skilled OT services to improve independence and safety with self care and functional transfers. Progression toward goals: 
[]          Improving appropriately and progressing toward goals [x]          Improving slowly and progressing toward goals 
[]          Not making progress toward goals and plan of care will be adjusted PLAN: 
Patient continues to benefit from skilled intervention to address the above impairments. Continue treatment per established plan of care. Discharge Recommendations: SNF 2/2 progress Further Equipment Recommendations for Discharge:  If d/c to Home recommend BSC, shower chair with back    
 
Activity Tolerance: 
Poor Estimated LOS: 11/6/18 Please refer to the flowsheet for vital signs taken during this treatment. After treatment:  
[x]  Patient left in no apparent distress sitting up in wheelchair  
[]  Patient left in no apparent distress in bed 
[]  Call bell left within reach 
[]  Nursing notified 
[]  Caregiver present [x]  Transition to PT 
 
COMMUNICATION/EDUCATION:  
[] Home safety education was provided and the patient/caregiver indicated understanding. [x] Patient/family have participated as able in goal setting and plan of care. [x] Patient/family agree to work toward stated goals and plan of care. [] Patient understands intent and goals of therapy, but is neutral about his/her participation. [] Patient is unable to participate in goal setting and plan of care.  
 
 
Nemo Asper , OTS

## 2018-10-17 NOTE — PROGRESS NOTES
[address] 
[address] [place] [address], [address] INPATIENT REHABILITATION 
DAILY PROGRESS NOTE Date: 10/17/2018 Name: Jennifer Cummings Age / Sex: 80 y.o. / male CSN: [id number] MRN: [id number] Admit Date: 10/8/2018 Length of Stay: 9 days Primary Rehab Diagnosis: Impaired Mobility and ADLs secondary to Acute Ischemic Stroke (acute infarct of the posterior superior right basal ganglia and adjacent right corona radiata) with residual left hemiparesis, dysphagia and dysarthria Subjective:  
 
Patient seen and examined. Blood pressure fairly controlled. Patient's Complaint:  
Has problem sleeping Pain Control: no current joint or muscle symptoms, essentially pain-free Objective:  
 
Vital Signs: 
Patient Vitals for the past 24 hrs: 
 BP Temp Pulse Resp SpO2  
10/17/18 0735 170/73 97.2 °F (36.2 °C) 71 16 97 % 10/17/18 0606 140/62  70    
10/16/18 2200 141/61 98 °F (36.7 °C) 72 17 96 % 10/16/18 2047 141/61  72    
10/16/18 1559 136/58 97.8 °F (36.6 °C) 68 18 98 % 10/16/18 1420 130/49  63  96 % Physical Examination: 
GENERAL SURVEY: Patient is awake, alert, oriented x 3, sitting comfortably on the chair, not in acute respiratory distress. HEENT: pink palpebral conjunctivae, anicteric sclerae, no nasoaural discharge, moist oral mucosa NECK: supple, no jugular venous distention, no palpable lymph nodes CHEST/LUNGS: symmetrical chest expansion, good air entry, clear breath sounds HEART: adynamic precordium, good S1 S2, no S3, regular rhythm, no murmurs ABDOMEN: (+) PEG tube in place, flat, bowel sounds appreciated, soft, non-tender EXTREMITIES: (+) amputated distal phalanx of the thumb of the left hand, pink nailbeds, no edema, full and equal pulses, no calf tenderness NEUROLOGICAL EXAM: The patient is awake, alert and oriented x3, able to answer questions fairly appropriately, able to follow 1 and 2 step commands. Able to tell time from the wall clock. Cranial nerves II-XII are grossly intact except for slurred speech and dysphagia. No gross sensory deficit. Motor strength is 4+/5 on the RUE and RLE, 4/5 on the LUE, 4-/5 on the LLE. Current Medications: 
Current Facility-Administered Medications Medication Dose Route Frequency  guaiFENesin (ROBITUSSIN) 100 mg/5 mL oral liquid 100 mg  100 mg Per G Tube QID  dextromethorphan (DELSYM) 30 mg/5 mL syrup 30 mg  30 mg Per G Tube Q12H  
 traZODone (DESYREL) tablet 50 mg  50 mg Per G Tube QHS  modafinil (PROVIGIL) tablet 100 mg  100 mg Per G Tube DAILY  cholecalciferol (VITAMIN D3) tablet 2,000 Units  2,000 Units Oral BID  hydrALAZINE (APRESOLINE) tablet 20 mg  20 mg Per G Tube Q12H  pneumococcal 23-valent (PNEUMOVAX 23) injection 0.5 mL  0.5 mL IntraMUSCular PRIOR TO DISCHARGE  docusate sodium (COLACE) capsule 100 mg  100 mg Per G Tube BID  
 bisacodyl (DULCOLAX) suppository 10 mg  10 mg Rectal Q48H PRN  
 heparin (porcine) injection 5,000 Units  5,000 Units SubCUTAneous Q12H  
 acetaminophen (TYLENOL) solution 650 mg  650 mg Oral Q4H PRN  
 amLODIPine (NORVASC) tablet 10 mg  10 mg Per G Tube DAILY  co-enzyme Q-10 (CO Q-10) capsule 100 mg  100 mg Oral DAILY  vitamin B complex tablet  1 Tab Oral DAILY  melatonin tablet 3 mg  3 mg Per G Tube QHS  aspirin chewable tablet 81 mg  81 mg Per G Tube DAILY  atorvastatin (LIPITOR) tablet 80 mg  80 mg Per G Tube QHS  clopidogrel (PLAVIX) tablet 75 mg  75 mg PEG Tube DAILY  lisinopril (PRINIVIL, ZESTRIL) tablet 40 mg  40 mg Per G Tube DAILY  pantoprazole (PROTONIX) granules for oral suspension 40 mg  40 mg Per G Tube ACB  finasteride (PROSCAR) tablet 5 mg  5 mg Oral QPM  
 levothyroxine (SYNTHROID) tablet 50 mcg  50 mcg Per G Tube 6am  
 metoprolol tartrate (LOPRESSOR) tablet 25 mg  25 mg Per G Tube Q12H  tamsulosin (FLOMAX) capsule 0.4 mg  0.4 mg Oral QPM  
  timolol (TIMOPTIC) 0.5 % ophthalmic solution 1 Drop  1 Drop Both Eyes DAILY Allergies: Allergies Allergen Reactions  Codeine Other (comments) Functional Progress: PHYSICAL THERAPY 
 
ON ADMISSION MOST RECENT Wheelchair Mobility/Management Able to Propel (ft): 15 feet Functional Level: 1 Curbs/Ramps Assist Required (FIM Score): 0 (Not tested) Wheelchair Setup Assist Required : 2 (Maximal assistance) Wheelchair Management: Manages right brake Wheelchair Mobility/Management Able to Propel (ft): 100 feet(mod A) Functional Level: 2 Curbs/Ramps Assist Required (FIM Score): 0 (Not tested) Wheelchair Setup Assist Required : 2 (Maximal assistance) Wheelchair Management: Manages right brake Gait Amount of Assistance: 1 (Dependent/total assistance) Distance (ft): 2 Feet (ft) Assistive Device: Other (comment)(parallel bars) Gait Amount of Assistance: 0 (Not tested) Distance (ft): (attempted stand step transfer - 1 to 2 Feet) Assistive Device: (none) Balance-Sitting/Standing Sitting - Static: Fair (occasional) Sitting - Dynamic: Poor (constant support) Standing - Static: Poor, Constant support Standing - Dynamic : Impaired Other (comment): Postural Assessment Scale for Stroke Patients (PASS): 6/36 Indicating significant difficulty maintaining and changing postures Balance-Sitting/Standing Sitting - Static: Prop sitting Sitting - Dynamic: Poor (constant support) Standing - Static: Poor Standing - Dynamic : Impaired Other (comment): Postural Assessment Scale for Stroke Patients (PASS): 6/36 Indicating significant difficulty maintaining and changing postures Bed/Mat Mobility Rolling Right : 3 (Moderate assistance ) Rolling Left : 3 (Moderate assistance ) Supine to Sit : 2 (Maximal assistance) Sit to Supine : 2 (Maximal assistance) Bed/Mat Mobility Rolling Right : 2 (Maximal assistance) Rolling Left : 2 (Maximal assistance) Supine to Sit : 2 (Maximal assistance) Sit to Supine : 2 (Maximal assistance) Transfers Transfer Type: SPT without device Other: stand pivot and squat pivot transfer Transfer Assistance : 2 (Maximal assistance) Sit to Stand Assistance: Maximum assistance Car Transfers: Not tested Car Type: N/A Transfers Transfer Type: SPT without device Other: stand pivot and squat pivot transfer Transfer Assistance : 2 (Maximal assistance) Sit to Stand Assistance: Maximum assistance Car Transfers: Not tested Car Type: N/A Steps or Stairs Steps/Stairs Ambulated (#): 0 Level of Assist : 0 (Not tested)(not safe to assess at this time) Rail Use: Right  Steps or Stairs Steps/Stairs Ambulated (#): 0 Level of Assist : 0 (Not tested) Rail Use: Right Lab/Data Review: No results found for this or any previous visit (from the past 24 hour(s)). Estimated Glomerular Filtration Rate: On admission, estimated GFR based on a Creatinine of 0.77 mg/dl: 
 Using CKD-EPI = 81.6 mL/min/1.73m2 Using MDRD = 101.6 mL/min/1.73m2 Most recent estimated GFR, based on a Creatinine of 0.92 mg/dl on 10/15/2018: 
 Using CKD-EPI = 74.5 mL/min/1.73m2 Using MDRD = 82.7 mL/min/1.73m2 Assessment:  
 
Primary Rehabilitation Diagnosis 1. Impaired Mobility and ADLs 2. 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 
  
Comorbidities  Urinary frequency R35.0  Nocturia R35.1  Glaucoma H40.9  History of kidney stones Z87.442  Cervical spinal stenosis M48.02  
 Meningioma  D32.9  Hypertensive heart disease without heart failure I11.9  Diastolic dysfunction without heart failure I51.89  
 Gastric ulcer K25.9  Coronary artery disease involving native coronary artery of native heart I25.10  Dyslipidemia E78.5  Chronic obstructive pulmonary disease (COPD)  J44.9  Status post insertion of percutaneous endoscopic gastrostomy (PEG) tube  Z93.1  Hypothyroidism E03.9  Benign prostatic hyperplasia N40.0  Bilateral hearing loss H91.93  
 History of transient ischemic attack (TIA) Z86.73  
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  
  
 
Plan: 1. Justification for continued stay: Good progression towards established rehabilitation goals. 2. Medical Issues being followed closely: 
  [x]  Fall and safety precautions  
  []  Wound Care  
  [x]  Bowel and Bladder Function  
  [x]  Fluid Electrolyte and Nutrition Balance  
  []  Pain Control 3. Issues that 24 hour rehabilitation nursing is following: 
  [x]  Fall and safety precautions  
  []  Wound Care  
  [x]  Bowel and Bladder Function  
  [x]  Fluid Electrolyte and Nutrition Balance  
  []  Pain Control   
  [x]  Assistance with and education on in-room safety with transfers to and from the bed, wheelchair, toilet and shower. 4. Acute rehabilitation plan of care: 
  [x]  Continue current care and rehab. [x]  Physical Therapy [x]  Occupational Therapy [x]  Speech Therapy  
  []  Hold Rehab until further notice 5. Medications: 
  [x]  MAR Reviewed [x]  Continue Present Medications 6. DVT Prophylaxis:   
  []  Lovenox [x]  Unfractionated Heparin  
  []  Coumadin  
  []  NOAC  
  []  BECKY Stockings  
  []  Sequential Compression Device []  None 7.  Orders:  
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 
 > Continue: 
  > Aspirin 81 mg via PEG tube once daily in AM 
  > Atorvastatin 80 mg via PEG tube q HS 
  > Coenzyme Q10 100 mg via PEG tube once daily 
  > Clopidogrel 75 mg via PEG tube once daily in PM 
  > Vitamin B complex 1 tab via PEG tube once daily 
 
> Dysphagia as late effect of CVA; S/P Insertion of percutaneous endoscopic gastrostomy (PEG) tube (10/5/2018) 
 > NPO with tube feeding 
 > Jevity 1.5 237 ml via PEG tube 5 times daily (6AM, 10AM, 2PM, 6PM, 10PM) > On 10/15/2018, increased water flush from 100 ml to 150 ml via PEG tube vefore and after each bolus feeding 
 
> Hypertensive heart disease without heart failure 
 > On admission to the ARU, decreased Hydralazine from 50 mg to 25 mg via PEG tube q 8 hr (6AM, 2PM, 10PM) > On 10/10/2018, decrease Hydralazine from 25 mg via PEG tube q 8 hr (6AM, 2PM, 10PM) to 20 mg via PEG tube q 12 hr (9AM, 9PM) > Continue: 
  > Amlodipine 10 mg via PEG tube once daily (change from 9AM to 6AM) > Hydralazine 20 mg via PEG tube q 12 hr (9AM, 9PM) > Lisinopril 40 mg via PEG tube once daily (6AM) 
  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 
 > On 10/11/2018, started Trazodone 50 mg via PEG tube q HS 
 > Continue Trazodone 50 mg via PEG tube q HS 
 
> Neurocognitive modulation 
 > On 10/11/2018, started: 
  > Trazodone 50 mg via PEG tube q HS 
  > Modafinil 100 mg via PEG tube q AM 
 > Continue: 
  > Trazodone 50 mg via PEG tube q HS  
  > Modafinil 100 mg via PEG tube q AM 
 
> Difficulty sleeping 
 > On admission to the ARU, started Melatonin 3 mg via PEG tube q HS 
 > On 10/11/2018, started Trazodone 50 mg via PEG tube q HS 
 > Continue: 
  > Increase Melatonin from 3 mg to 5 mg via PEG tube q HS 
   > Trazodone 50 mg via PEG tube q HS 
 
> Cough 
 > WBC count (10/9/2018) = 6.8 
 > Chest x-ray (10/12/2018) showed a small left pleural effusion; prominent interstitial lung markers could represent mild infiltration or chronic interstitial lung disease. 
 > On 10/14/2018, patient was given Furosemide 20 mg via PEG tube x 1 dose 
 > No fever or desaturation noted since admission 
 > On 10/15/2018, started: 
  > Dextromethorphan 30 mg via PEG tube q 12 hr 
  > Guaifenesin 100 mg via PEG tube 4 times daily 
 > Continue: > Dextromethorphan 30 mg via PEG tube q 12 hr 
  > Guaifenesin 100 mg via PEG tube 4 times daily 8. Patient's progress in rehabilitation and medical issues discussed with the patient. All questions answered to the best of my ability. Care plan discussed with patient and nurse. Signed:    Grisel Grady MD 
  October 17, 2018

## 2018-10-17 NOTE — REHAB NOTE
Bedside and Verbal shift change report given to Khalida Cline LPN (oncoming nurse) by Vj Norman RN (offgoing nurse). Report included the following information SBAR, Kardex, MAR and Recent Results. SITUATION:  
? Code Status: DNR 
? Reason for Admission: R CVA ? Acute ischemic stroke ([facility] ? Hospital day: 9 
? Problem List:  
   
Hospital Problems  Date Reviewed: 10/16/2018 Codes Class Noted POA History of transient ischemic attack (TIA) ICD-10-CM: V21.37 
ICD-9-CM: V12.54  Unknown Yes Overview Signed 10/9/2018  3:28 PM by Sandy Gilbert MD  
  2x Vitamin D deficiency (Chronic) ICD-10-CM: E55.9 ICD-9-CM: 268.9  10/9/2018 Yes Overview Signed 10/10/2018  1:59 PM by Sandy Gilbert MD  
  Vitamin D 25-Hydroxy (10/10/2018) = 22.5 Hypertensive heart disease without heart failure (Chronic) ICD-10-CM: I11.9 ICD-9-CM: 402.90  Unknown Yes Status post insertion of percutaneous endoscopic gastrostomy (PEG) tube ([facility] ICD-10-CM: Z93.1 ICD-9-CM: V44.1  10/5/2018 Yes * (Principal) Acute ischemic stroke ([facility] ICD-10-CM: I63.9 ICD-9-CM: 434.91  10/2/2018 Yes Overview Signed 10/8/2018  6:32 PM by Sandy Gilbert MD  
  Acute Ischemic Stroke (acute infarct of the posterior superior right basal ganglia and adjacent right corona radiata) with residual left hemiparesis, dysphagia and dysarthria Impaired mobility and ADLs ICD-10-CM: Z74.09 
ICD-9-CM: 799.89  10/2/2018 Yes Hemiparesis affecting left side as late effect of cerebrovascular accident (CVA) ([facility] ICD-10-CM: W70.446 ICD-9-CM: 438.20  10/2/2018 Yes Dysphagia as late effect of cerebrovascular accident (CVA) ICD-10-CM: D82.032 ICD-9-CM: 438.82  10/2/2018 Yes 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) ICD-10-CM: C55.179 ICD-9-CM: 438.13  10/2/2018 Yes Traumatic amputation of left thumb ICD-10-CM: A36.598C ICD-9-CM: 885.0  1/1/2011 Yes BACKGROUND:  
 Past Medical History:  
Past Medical History:  
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) 10/2/2018  Dyslipidemia  Dysphagia as late effect of cerebrovascular accident (CVA) 10/2/2018  Gastric ulcer  Glaucoma  Hemiparesis affecting left side as late effect of cerebrovascular accident (CVA) ([facility] 10/2/2018  History of kidney stones  History of transient ischemic attack (TIA) 2x  Hypertensive heart disease without heart failure  Hypothyroidism  Meningioma ([facility] 10/3/2018 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  
? Changes in Assessment Throughout Shift: none ? Patient has Central Line: no Reasons if yes:  
? Patient has Suarez Cath: no Reasons if yes:   
 
? Last Vitals: 
  
Vitals:  
 10/16/18 1559 10/16/18 2047 10/16/18 2200 10/17/18 0362 BP: 136/58 141/61 141/61 140/62 Pulse: 68 72 72 70 Resp: 18  17 Temp: 97.8 °F (36.6 °C)  98 °F (36.7 °C) SpO2: 98%  96% Weight:      
Height:      
 
 
? IV and DRAINS (will only show if present) PEG/Gastrostomy Tube 10/06/18-Site Assessment: Clean, dry, & intact ? WOUND (if present) Wound Type:  none Dressing present Dressing Present : Yes Wound Concerns/Notes:  none ? PAIN Pain Assessment Pain Intensity 1: 0 (10/17/18 0400) Pain Location 1: Neck Pain Intervention(s) 1: Medication (see MAR) Patient Stated Pain Goal: 0 
o Interventions for Pain:  none 
o Intervention effective: no 
o Time of last intervention: see flowsheet  
o Reassessment Completed: yes ? Last 3 Weights: 
Last 3 Recorded Weights in this Encounter 10/08/18 2046 Weight: 67.6 kg (149 lb) Weight change: ? INTAKE/OUPUT Current Shift: No intake/output data recorded. Last three shifts: 10/15 1901 - 10/17 0700 In: 2097 Out: 795 [Urine:795] ? LAB RESULTS Recent Labs 10/15/18 
0956 HGB 11.4* HCT 33.1*  
 Recent Labs 10/15/18 
3955   
K 4.7 GLU 92 BUN 34* CREA 0.92  
CA 7.8* RECOMMENDATIONS AND DISCHARGE PLANNING 1. Pending tests/procedures/ Plan of Care or Other Needs: none 2. Discharge plan for patient and Needs/Barriers: none 3. Estimated Discharge Date: 11/6 18 Posted on Whiteboard in [facility]: yes 4. The patient's care plan was reviewed with the oncoming nurse. \"HEALS\" SAFETY CHECK Fall Risk Total Score: 7 Safety Measures: Safety Measures: Bed/Chair alarm on, Bed/Chair-Wheels locked, Bed in low position, Call light within reach A safety check occurred in the patient's room between off going nurse and oncoming nurse listed above. The safety check included the below items Area Items H High Alert Medications ? Verify all high alert medication drips (heparin, PCA, etc.) E Equipment ? Suction is set up for ALL patients (with yanker) ? Red plugs utilized for all equipment (IV pumps, etc.) ? WOWs wiped down at end of shift. ? Room stocked with oxygen, suction, and other unit-specific supplies A Alarms ? Bed alarm is set for fall risk patients ? Ensure chair alarm is in place and activated if patient is up in a chair L Lines ? Check IV for any infiltration ? Suarez bag is empty if patient has a Suarez ? Tubing and IV bags are labeled Teri Garland Safety ? Room is clean, patient is clean, and equipment is clean. ? Hallways are clear from equipment besides carts. ? Fall bracelet on for fall risk patients ? Ensure room is clear and free of clutter ? Suction is set up for ALL patients (with sonia) ? Hallways are clear from equipment besides carts. ? Isolation precautions followed, supplies available outside room, sign posted Duane Pill, RN

## 2018-10-17 NOTE — REHAB NOTE
Bedside and Verbal shift change report given to Martín Mcclure (oncoming nurse) by Terry Silva LPN (offgoing nurse). Report included the following information SBAR, Kardex, MAR and Recent Results. SITUATION:  
? Code Status: DNR Reason for Admission: R CVA ? Acute ischemic stroke ([facility] ? Hospital day: 9 
? Problem List:  
   
Hospital Problems  Date Reviewed: 10/17/2018 Codes Class Noted POA History of transient ischemic attack (TIA) ICD-10-CM: W45.84 
ICD-9-CM: V12.54  Unknown Yes Overview Signed 10/9/2018  3:28 PM by Ami Lorenzana MD  
  2x Vitamin D deficiency (Chronic) ICD-10-CM: E55.9 ICD-9-CM: 268.9  10/9/2018 Yes Overview Signed 10/10/2018  1:59 PM by Ami Lorenzana MD  
  Vitamin D 25-Hydroxy (10/10/2018) = 22.5 Hypertensive heart disease without heart failure (Chronic) ICD-10-CM: I11.9 ICD-9-CM: 402.90  Unknown Yes Status post insertion of percutaneous endoscopic gastrostomy (PEG) tube ([facility] ICD-10-CM: Z93.1 ICD-9-CM: V44.1  10/5/2018 Yes * (Principal) Acute ischemic stroke ([facility] ICD-10-CM: I63.9 ICD-9-CM: 434.91  10/2/2018 Yes Overview Signed 10/8/2018  6:32 PM by Ami Lorenzana MD  
  Acute Ischemic Stroke (acute infarct of the posterior superior right basal ganglia and adjacent right corona radiata) with residual left hemiparesis, dysphagia and dysarthria Impaired mobility and ADLs ICD-10-CM: Z74.09 
ICD-9-CM: 799.89  10/2/2018 Yes Hemiparesis affecting left side as late effect of cerebrovascular accident (CVA) ([facility] ICD-10-CM: I66.036 ICD-9-CM: 438.20  10/2/2018 Yes Dysphagia as late effect of cerebrovascular accident (CVA) ICD-10-CM: P22.648 ICD-9-CM: 438.82  10/2/2018 Yes 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) ICD-10-CM: P85.258 ICD-9-CM: 438.13  10/2/2018 Yes Traumatic amputation of left thumb ICD-10-CM: F24.581Q ICD-9-CM: 885.0  1/1/2011 Yes BACKGROUND:  
 Past Medical History:  
Past Medical History:  
Diagnosis Date  Acute ischemic stroke ([facility] 10/2/2018 Acute Ischemic Stroke (acute infarct of the posterior superior right basal ganglia and adjacent right corona radiata) with residual left hemiparesis, dysphagia and dysarthria  Benign prostatic hyperplasia  Bilateral hearing loss  Cervical spinal stenosis 10/2/2018  Chronic obstructive pulmonary disease (COPD) ([facility]  Coronary artery disease involving native coronary artery of native heart  Diastolic dysfunction without heart failure  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) 10/2/2018  Dyslipidemia  Dysphagia as late effect of cerebrovascular accident (CVA) 10/2/2018  Gastric ulcer  Glaucoma  Hemiparesis affecting left side as late effect of cerebrovascular accident (CVA) ([facility] 10/2/2018  History of kidney stones  History of transient ischemic attack (TIA) 2x  Hypertensive heart disease without heart failure  Hypothyroidism  Meningioma ([facility] 10/3/2018 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  
? Changes in Assessment Throughout Shift: none ? Patient has Central Line: no Reasons if yes:  
? Patient has Suarez Cath: no Reasons if yes:   
 
? Last Vitals: 
  
Vitals:  
 10/16/18 2200 10/17/18 0606 10/17/18 0735 10/17/18 1518 BP: 141/61 140/62 170/73 114/50 Pulse: 72 70 71 62 Resp: 17  16 15 Temp: 98 °F (36.7 °C)  97.2 °F (36.2 °C) 97 °F (36.1 °C) SpO2: 96%  97% 98% Weight:      
Height:      
 
 
? IV and DRAINS (will only show if present) PEG/Gastrostomy Tube 10/06/18-Site Assessment: Clean, dry, & intact ? WOUND (if present) Wound Type:  none Dressing present Dressing Present : Yes Wound Concerns/Notes:  none ? PAIN Pain Assessment Pain Intensity 1: 0 (10/17/18 1600) Pain Location 1: Neck Pain Intervention(s) 1: Medication (see MAR) Patient Stated Pain Goal: 0 
o Interventions for Pain:  none 
o Intervention effective: no 
o Time of last intervention: see flowsheet  
o Reassessment Completed: yes ? Last 3 Weights: 
Last 3 Recorded Weights in this Encounter 10/08/18 2046 Weight: 67.6 kg (149 lb) Weight change: ? INTAKE/OUPUT Current Shift: No intake/output data recorded. Last three shifts: 10/16 0701 - 10/17 1900 In: 1280 Out: 2833 [IVAQB:3890] ? LAB RESULTS Recent Labs 10/15/18 
0611 HGB 11.4* HCT 33.1*  
 Recent Labs 10/15/18 
4534   
K 4.7 GLU 92 BUN 34* CREA 0.92  
CA 7.8* RECOMMENDATIONS AND DISCHARGE PLANNING 1. Pending tests/procedures/ Plan of Care or Other Needs: none 2. Discharge plan for patient and Needs/Barriers: none 3. Estimated Discharge Date: 11/6 18 Posted on Whiteboard in [facility]: yes 4. The patient's care plan was reviewed with the oncoming nurse. \"HEALS\" SAFETY CHECK Fall Risk Total Score: 7 Safety Measures: Safety Measures: Bed in low position, Caregiver at bedside, Bed/Chair-Wheels locked, Call light within reach A safety check occurred in the patient's room between off going nurse and oncoming nurse listed above. The safety check included the below items Area Items H High Alert Medications ? Verify all high alert medication drips (heparin, PCA, etc.) E Equipment ? Suction is set up for ALL patients (with yanker) ? Red plugs utilized for all equipment (IV pumps, etc.) ? WOWs wiped down at end of shift. ? Room stocked with oxygen, suction, and other unit-specific supplies A Alarms ? Bed alarm is set for fall risk patients ? Ensure chair alarm is in place and activated if patient is up in a chair L Lines ? Check IV for any infiltration ? Suarez bag is empty if patient has a Suarez ? Tubing and IV bags are labeled [employer] Safety ? Room is clean, patient is clean, and equipment is clean. ? Hallways are clear from equipment besides carts. ? Fall bracelet on for fall risk patients ? Ensure room is clear and free of clutter ? Suction is set up for ALL patients (with sonia) ? Hallways are clear from equipment besides carts. ? Isolation precautions followed, supplies available outside room, sign posted Selma Mendoza LPN

## 2018-10-17 NOTE — REHAB NOTE
Bedside and Verbal shift change report given to Martín Mcclure (oncoming nurse) by Steve Gordon LPN (offgoing nurse). Report included the following information SBAR, Kardex, MAR and Recent Results. SITUATION:  
? Code Status: DNR Reason for Admission: R CVA ? Acute ischemic stroke ([facility] ? Hospital day: 9 
? Problem List:  
   
Hospital Problems  Date Reviewed: 10/17/2018 Codes Class Noted POA History of transient ischemic attack (TIA) ICD-10-CM: L39.28 
ICD-9-CM: V12.54  Unknown Yes Overview Signed 10/9/2018  3:28 PM by Denton Wesley MD  
  2x Vitamin D deficiency (Chronic) ICD-10-CM: E55.9 ICD-9-CM: 268.9  10/9/2018 Yes Overview Signed 10/10/2018  1:59 PM by Denton Wesley MD  
  Vitamin D 25-Hydroxy (10/10/2018) = 22.5 Hypertensive heart disease without heart failure (Chronic) ICD-10-CM: I11.9 ICD-9-CM: 402.90  Unknown Yes Status post insertion of percutaneous endoscopic gastrostomy (PEG) tube ([facility] ICD-10-CM: Z93.1 ICD-9-CM: V44.1  10/5/2018 Yes * (Principal) [facility] ICD-10-CM: I63.9 ICD-9-CM: 434.91  10/2/2018 Yes Overview Signed 10/8/2018  6:32 PM by Denton Wesley MD  
  Acute Ischemic Stroke (acute infarct of the posterior superior right basal ganglia and adjacent right corona radiata) with residual left hemiparesis, dysphagia and dysarthria Impaired mobility and ADLs ICD-10-CM: Z74.09 
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  
 Past Medical History:  
Past Medical History:  
Diagnosis Date  Acute ischemic stroke ([facility] 10/2/2018 Acute Ischemic Stroke (acute infarct of the posterior superior right basal ganglia and adjacent right corona radiata) with residual left hemiparesis, dysphagia and dysarthria  Benign prostatic hyperplasia  Bilateral hearing loss  Cervical spinal stenosis 10/2/2018  Chronic obstructive pulmonary disease (COPD) ([facility]  Coronary artery disease involving native coronary artery of native heart  Diastolic dysfunction without heart failure  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) 10/2/2018  Dyslipidemia  Dysphagia as late effect of cerebrovascular accident (CVA) 10/2/2018  Gastric ulcer  Glaucoma  Hemiparesis affecting left side as late effect of cerebrovascular accident (CVA) ([facility] 10/2/2018  History of kidney stones  History of transient ischemic attack (TIA) 2x  Hypertensive heart disease without heart failure  Hypothyroidism  Meningioma ([facility] 10/3/2018 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  
? Changes in Assessment Throughout Shift: none ? Patient has Central Line: no Reasons if yes:  
? Patient has Suarez Cath: no Reasons if yes:   
 
? Last Vitals: 
  
Vitals:  
 10/16/18 2200 10/17/18 0606 10/17/18 0735 10/17/18 1518 BP: 141/61 140/62 170/73 114/50 Pulse: 72 70 71 62 Resp: 17  16 15 Temp: 98 °F (36.7 °C)  97.2 °F (36.2 °C) 97 °F (36.1 °C) SpO2: 96%  97% 98% Weight:      
Height:      
 
 
? IV and DRAINS (will only show if present) PEG/Gastrostomy Tube 10/06/18-Site Assessment: Clean, dry, & intact ? WOUND (if present) Wound Type:  none Dressing present Dressing Present : Yes Wound Concerns/Notes:  none ? PAIN Pain Assessment Pain Intensity 1: 0 (10/17/18 1600) Pain Location 1: Neck Pain Intervention(s) 1: Medication (see MAR) Patient Stated Pain Goal: 0 
o Interventions for Pain:  none 
o Intervention effective: no 
o Time of last intervention: see flowsheet  
o Reassessment Completed: yes ? Last 3 Weights: 
Last 3 Recorded Weights in this Encounter 10/08/18 2046 Weight: 67.6 kg (149 lb) Weight change: ? INTAKE/OUPUT Current Shift: No intake/output data recorded. Last three shifts: 10/16 0701 - 10/17 1900 In: 1280 Out: 5179 [OUGTB:7723] ? LAB RESULTS Recent Labs 10/15/18 
7200 HGB 11.4* HCT 33.1*  
 Recent Labs 10/15/18 
9578   
K 4.7 GLU 92 BUN 34* CREA 0.92  
CA 7.8* RECOMMENDATIONS AND DISCHARGE PLANNING 1. Pending tests/procedures/ Plan of Care or Other Needs: none 2. Discharge plan for patient and Needs/Barriers: none 3. Estimated Discharge Date: 11/6 18 Posted on Whiteboard in [facility]: yes 4. The patient's care plan was reviewed with the oncoming nurse. \"HEALS\" SAFETY CHECK Fall Risk Total Score: 7 Safety Measures: Safety Measures: Bed in low position, Caregiver at bedside, Bed/Chair-Wheels locked, Call light within reach A safety check occurred in the patient's room between off going nurse and oncoming nurse listed above. The safety check included the below items Area Items H High Alert Medications ? Verify all high alert medication drips (heparin, PCA, etc.) E Equipment ? Suction is set up for ALL patients (with yanker) ? Red plugs utilized for all equipment (IV pumps, etc.) ? WOWs wiped down at end of shift. ? Room stocked with oxygen, suction, and other unit-specific supplies A Alarms ? Bed alarm is set for fall risk patients ? Ensure chair alarm is in place and activated if patient is up in a chair L Lines ? Check IV for any infiltration ? Suarez bag is empty if patient has a Suarez ? Tubing and IV bags are labeled Miriam Diop Safety ? Room is clean, patient is clean, and equipment is clean. ? Hallways are clear from equipment besides carts. ? Fall bracelet on for fall risk patients ? Ensure room is clear and free of clutter ? Suction is set up for ALL patients (with sonia) ? Hallways are clear from equipment besides carts. ? Isolation precautions followed, supplies available outside room, sign posted Mauro Rockwell LPN

## 2018-10-18 LAB
HCT VFR BLD AUTO: 30.1 % (ref 36–48)
HGB BLD-MCNC: 10.7 G/DL (ref 13–16)
PLATELET # BLD AUTO: 214 K/UL (ref 135–420)

## 2018-10-18 PROCEDURE — 85018 HEMOGLOBIN: CPT | Performed by: INTERNAL MEDICINE

## 2018-10-18 PROCEDURE — 97530 THERAPEUTIC ACTIVITIES: CPT

## 2018-10-18 PROCEDURE — 74011250637 HC RX REV CODE- 250/637: Performed by: INTERNAL MEDICINE

## 2018-10-18 PROCEDURE — 85049 AUTOMATED PLATELET COUNT: CPT | Performed by: INTERNAL MEDICINE

## 2018-10-18 PROCEDURE — 65310000000 HC RM PRIVATE REHAB

## 2018-10-18 PROCEDURE — 36415 COLL VENOUS BLD VENIPUNCTURE: CPT | Performed by: INTERNAL MEDICINE

## 2018-10-18 PROCEDURE — 74011250636 HC RX REV CODE- 250/636: Performed by: INTERNAL MEDICINE

## 2018-10-18 PROCEDURE — 97535 SELF CARE MNGMENT TRAINING: CPT

## 2018-10-18 PROCEDURE — 97112 NEUROMUSCULAR REEDUCATION: CPT

## 2018-10-18 PROCEDURE — 92526 ORAL FUNCTION THERAPY: CPT

## 2018-10-18 RX ADMIN — GUAIFENESIN 100 MG: 100 SOLUTION ORAL at 09:49

## 2018-10-18 RX ADMIN — VITAMIN D, TAB 1000IU (100/BT) 2000 UNITS: 25 TAB at 18:01

## 2018-10-18 RX ADMIN — LISINOPRIL 40 MG: 40 TABLET ORAL at 06:13

## 2018-10-18 RX ADMIN — GUAIFENESIN 100 MG: 100 SOLUTION ORAL at 18:01

## 2018-10-18 RX ADMIN — TAMSULOSIN HYDROCHLORIDE 0.4 MG: 0.4 CAPSULE ORAL at 18:01

## 2018-10-18 RX ADMIN — PANTOPRAZOLE SODIUM 40 MG: 40 GRANULE, DELAYED RELEASE ORAL at 07:30

## 2018-10-18 RX ADMIN — GUAIFENESIN 100 MG: 100 SOLUTION ORAL at 21:48

## 2018-10-18 RX ADMIN — TRAZODONE HYDROCHLORIDE 50 MG: 50 TABLET ORAL at 21:51

## 2018-10-18 RX ADMIN — Medication 1 TABLET: at 09:50

## 2018-10-18 RX ADMIN — HYDRALAZINE HYDROCHLORIDE 20 MG: 10 TABLET, FILM COATED ORAL at 21:57

## 2018-10-18 RX ADMIN — LEVOTHYROXINE SODIUM 50 MCG: 50 TABLET ORAL at 06:10

## 2018-10-18 RX ADMIN — MELATONIN TAB 5 MG 5 MG: 5 TAB at 21:49

## 2018-10-18 RX ADMIN — METOPROLOL TARTRATE 25 MG: 25 TABLET ORAL at 09:50

## 2018-10-18 RX ADMIN — GUAIFENESIN 100 MG: 100 SOLUTION ORAL at 14:00

## 2018-10-18 RX ADMIN — Medication 100 MG: at 09:50

## 2018-10-18 RX ADMIN — TIMOLOL MALEATE 1 DROP: 5 SOLUTION OPHTHALMIC at 09:49

## 2018-10-18 RX ADMIN — DOCUSATE SODIUM 100 MG: 100 CAPSULE, LIQUID FILLED ORAL at 18:01

## 2018-10-18 RX ADMIN — PANTOPRAZOLE SODIUM 40 MG: 40 GRANULE, DELAYED RELEASE ORAL at 06:10

## 2018-10-18 RX ADMIN — HEPARIN SODIUM 5000 UNITS: 5000 INJECTION, SOLUTION INTRAVENOUS; SUBCUTANEOUS at 06:12

## 2018-10-18 RX ADMIN — HYDRALAZINE HYDROCHLORIDE 20 MG: 10 TABLET, FILM COATED ORAL at 09:50

## 2018-10-18 RX ADMIN — HEPARIN SODIUM 5000 UNITS: 5000 INJECTION, SOLUTION INTRAVENOUS; SUBCUTANEOUS at 18:01

## 2018-10-18 RX ADMIN — METOPROLOL TARTRATE 25 MG: 25 TABLET ORAL at 21:56

## 2018-10-18 RX ADMIN — DEXTROMETHORPHAN 30 MG: 30 SUSPENSION, EXTENDED RELEASE ORAL at 09:49

## 2018-10-18 RX ADMIN — VITAMIN D, TAB 1000IU (100/BT) 2000 UNITS: 25 TAB at 09:49

## 2018-10-18 RX ADMIN — MODAFINIL 100 MG: 100 TABLET ORAL at 09:50

## 2018-10-18 RX ADMIN — DEXTROMETHORPHAN 30 MG: 30 SUSPENSION, EXTENDED RELEASE ORAL at 21:45

## 2018-10-18 RX ADMIN — DOCUSATE SODIUM 100 MG: 100 CAPSULE, LIQUID FILLED ORAL at 09:50

## 2018-10-18 RX ADMIN — CLOPIDOGREL BISULFATE 75 MG: 75 TABLET, FILM COATED ORAL at 21:45

## 2018-10-18 RX ADMIN — FINASTERIDE 5 MG: 5 TABLET, FILM COATED ORAL at 18:01

## 2018-10-18 RX ADMIN — ASPIRIN 81 MG CHEWABLE TABLET 81 MG: 81 TABLET CHEWABLE at 09:50

## 2018-10-18 RX ADMIN — ATORVASTATIN CALCIUM 80 MG: 40 TABLET, FILM COATED ORAL at 21:45

## 2018-10-18 RX ADMIN — AMLODIPINE BESYLATE 10 MG: 10 TABLET ORAL at 06:13

## 2018-10-18 NOTE — PROGRESS NOTES
Problem: Self Care Deficits Care Plan (Adult) Goal: *Therapy Goal (Edit Goal, Insert Text) Occupational Therapy Goals Long Term Goals Initiated 10/9/18 and to be accomplished within 4 week(s) 1. Pt will perform self-feeding with total dependence. 2. Pt will perform grooming with supervision. 3. Pt will perform UB bathing with Min A. 
4. Pt will perform LB bathing with Min A prn AE. 5. Pt will perform tub/shower transfer with Mod A.  
6. Pt will perform UB dressing with Setup. 7. Pt will perform LB dressing with Min A prn AE. 8. Pt will perform toileting task with Min A. 
9. Pt will perform toilet transfer with Mod A. Short Term Goals Initiated 10/16/18 and to be accomplished within 7 day(s) 10/23/18 1. Pt will perform grooming with Setup. 2. Pt will perform UB bathing with Min A. 
3. Pt will perform LB bathing with Min A prn AE. 4. Pt will perform tub/shower transfer with Mod A. 
5. Pt will perform UB dressing with Mod A. 
6. Pt will perform LB dressing with Mod A prn AE. 7. Pt will perform toileting task with Max A. 
8. Pt will perform toilet transfer with Mod A. Occupational Therapy TREATMENT Patient: Aroldo Tripp 80 y.o. Patient identified with name and : Yes 
 
Date: 10/18/2018 Time In: 930 Time Out[de-identified] 1100 Diagnosis: R CVA Acute ischemic stroke ([facility] [address] Precautions: Aspiration, Fall(Head of bed 30-45 degrees at all times per speech therapist) Chart, occupational therapy assessment, plan of care, and goals were reviewed. Pain: 
Pt reports 0/10 pain or discomfort prior to treatment. Pt reports 0/10 pain or discomfort post treatment. Intervention Provided: None required SUBJECTIVE:  
Patient reported he was dizzy during functional transfer side lying<>eob in preparation to transfer to . OTR/L instructed patient to take deep breaths. Pt reported dizziness passed after a few minutes.   
 
OBJECTIVE DATA SUMMARY:  
 
 THERAPEUTIC ACTIVITY Daily Assessment Co-treat with PT -- 
Pt stand with support of parallel bars prn rest breaks. Pt required max tactile cues and max vcs to facilitate left knee extension, scapular retraction and spine extension, left elbow extension to provide weight bearing through left UE, and for hip extension. OT goal is to improve posture and endurance for increased function in self care and transfers. TOILETING Daily Assessment Toileting Toileting Assistance (FIM Score): 3 (Moderate assistance ) Cues: Tactile cues provided;Verbal cues provided Pt bed level; pt bridge with stabilization of left knee and foot for clothing management. Pt pulled down/up right side of elastic waist pants with right hand; pt total A to pull up/down left side. Pt wipe anterior and posterior pericare and right side of buttocks with right UE. Pt total A to wipe left buttocks. MOBILITY/TRANSFERS Daily Assessment Pt side lying<>eob<>side lying Max A; transfer performed x 2 trials. Pt eob<>stand<>eob mod A and max vcs for hand placement; transfer performed x 2 trials. Pt stand step eob<>wc<>eob Max A x 2 trials. ASSESSMENT: 
Pt continues to require Max A with functional stand step transfers. On current date, pt required mod A for clothing management for clean up after toileting bed level. On current date, pt required mod A to stand for functional transfer. Pt continues to demonstrate left UE hemiparesis and decreased balance when sitting and standing. Pt continues to demonstrate significant lethargy during therapy sessions. Pt would benefit from continued skilled OT services to increase safety and independence with self care and functional transfers. Progression toward goals: 
[]          Improving appropriately and progressing toward goals [x]          Improving slowly and progressing toward goals 
[]          Not making progress toward goals and plan of care will be adjusted PLAN: 
 Patient continues to benefit from skilled intervention to address the above impairments. Continue treatment per established plan of care. Discharge Recommendations:  SNF 2/2 progress Further Equipment Recommendations for Discharge:   If d/c to Home recommend BSC, shower chair with back   
 
Activity Tolerance: 
Poor Estimated LOS: 11/6/18 Please refer to the flowsheet for vital signs taken during this treatment. After treatment:  
[x]  Patient left in no apparent distress sitting up in wheelchair  
[]  Patient left in no apparent distress in bed 
[]  Call bell left within reach 
[]  Nursing notified 
[]  Caregiver present [x]  Transition to PT 
 
COMMUNICATION/EDUCATION:  
[] Home safety education was provided and the patient/caregiver indicated understanding. [x] Patient/family have participated as able in goal setting and plan of care. [x] Patient/family agree to work toward stated goals and plan of care. [] Patient understands intent and goals of therapy, but is neutral about his/her participation. [] Patient is unable to participate in goal setting and plan of care.  
 
 
Madonna Palencia , OTS

## 2018-10-18 NOTE — PROGRESS NOTES
Problem: Mobility Impaired (Adult and Pediatric) Goal: *Therapy Goal (Edit Goal, Insert Text) Physical Therapy Goals Initiated 10/9/2018, updated 10/15/2018, and to be accomplished within 7 day(s) 10/22/2018 1. Patient will move from supine to sit and sit to supine , scoot up and down and roll side to side in bed with moderate assistance consistently . 2.  Patient will transfer from bed to chair and chair to bed with moderate assistance  using the least restrictive device consistently. 3.  Patient will perform sit to stand with moderate assistance consistently without pushing to left . 4. Patient will ambulate with maximal assistance for 5 feet with the least restrictive device. 5.  Patient will perform 100 ft of wheelchair mobility with modified technique min assist for steering and negotiation of obstacles. 6.  Patient will be able to maintain sitting balance without support for at least 5 minutes with verbal cues only for maintaining midline/upright position for ease of transfers consistently throughout the day. Physical Therapy Goals Initiated 10/9/2018 and to be accomplished within 28 day(s) on 11/6/2018 1. Patient will move from supine to sit and sit to supine , scoot up and down and roll side to side in bed with supervision/set-up. 2.  Patient will transfer from bed to chair and chair to bed with supervision/set-up using the least restrictive device. 3.  Patient will perform sit to stand with supervision/set-up. 4.  Patient will ambulate with minimal assistance/contact guard assist for 50 feet with the least restrictive device. 5.  Patient will ascend/descend 4 stairs with 2 handrail(s) with minimal assistance/contact guard assist. 
6.  Patient will perform 150 ft of wheelchair mobility at modified independent level. 7.  Patient will demonstrate improved postural control for ease of functional mobility as demonstrated by PASS score >16/36 physical Therapy TREATMENT Patient: Taylor Wen (44 y.o. male) Date: 10/18/2018 Diagnosis: R CVA Acute ischemic stroke ([facility] [address] Acute ischemic stroke ([facility] [address] Precautions: Aspiration, Fall(Head of bed 30-45 degrees at all times per speech therapist) Chart, physical therapy assessment, plan of care and goals were reviewed. Time In: 1030 Time Out: 1135 Patient Seen For: Balance activities;Transfer training; Wheelchair mobility Pain: No pain indicated during treatment. Patient identified with name and :yes SUBJECTIVE:  
 
Patient reporting that he slept better last night but was having more difficulty with attempting to move his left LE today. OBJECTIVE DATA SUMMARY:  
Objective:  
 
GROSS ASSESSMENT Daily Assessment Observed today to have more left lateral lean in sitting initially than yesterday. Also noted to have decreased AROM left LE in comparison, however, was more fatigued when seen today compared to yesterday's treatment. TRANSFERS Daily Assessment Transfer Type: SPT without device Transfer Assistance : 2 (Maximal assistance) Sit to Stand Assistance: Maximum assistance Car Transfers: Not tested BALANCE Daily Assessment Sitting - Static: Fair (occasional) Sitting - Dynamic: Supported sitting Standing - Static: Poor Standing - Dynamic : Impaired See Neuro Re-ed WHEELCHAIR MOBILITY Daily Assessment Able to Propel (ft): 40 feet Functional Level: 1 Curbs/Ramps Assist Required (FIM Score): 0 (Not tested) Wheelchair Setup Assist Required : 2 (Maximal assistance) Wheelchair Management: Manages left brake;Manages right brake(assist required for foot rest, arm tray, left brake at times) Difficulty coordinating right LE and UE together to propel forward and steer straight needing assist frequently during task. Fatiguing quickly.    
Neuro Re-Education: 
Partial co-treatment with OT with PT facilitating left LE and hips/pelvis for neutral rotation, midline position and hip/knee extension while OT facilitating UE weightbearing and OT student facilitating upper trunk. Patient needing frequent cues for keeping eyes open, using freya line in front of patient for midline/upright positioning in standing. Performing 3 bouts of standing for 2-3 minute bouts before fatiguing. Needing significant assist for lowering back into sitting more in midline positioning. OT/PT needing to facilitate more midline positioning with sit<->stand transitions. Sitting balance, postural awareness training emphasis during individual treatment, practicing lateral trunk leans both to right and left with cues for using trunk to assist with returning back to midline position only. Also performing PNF rhythmic stabilizations emphasis on maintaining upright, midline posture with minimal resistance provided at upper trunk in anterior/posterior/lateral directions. Fatiguing quickly needing rests back on pillows positioned behind patient when fatigued (therapist assisting with supporting patient and providing gentle pec stretch during these rest periods). ASSESSMENT: 
Patient demonstrating improved awareness of midline during individual session but slightly more assist and facilitation required today during standing bouts and co-treatment. Will continue to assess patient each day and his progress. Continues to have difficulty coordinating w/c mobility using tyrell-technique. Progression toward goals: 
[]      Improving appropriately and progressing toward goals [x]      Improving slowly and progressing toward goals 
[]      Not making progress toward goals and plan of care will be adjusted PLAN: 
Patient continues to benefit from skilled intervention to address the above impairments. Continue treatment per established plan of care.  
Discharge Recommendations:  [address] depending on patient's progress and caregiver assist required/availability of caregiver. Further Equipment Recommendations for Discharge:  Wheelchair with wheelchair cushion, swing away armrests, brake extenders, arm tray currently required but will continue to assess most appropriate equipment based on patient's progress. Estimated LOS:4 weeks Activity Tolerance:  
Fair, continues to require frequent rests. Seated /53 HR 61. After treatment:  
Patient left seated in wheelchair, call button within reach and spouse visiting in room. Ayla Griffith, PT 
10/18/2018

## 2018-10-18 NOTE — PROGRESS NOTES
[id number] 
[address] [place] [address] [address] INPATIENT REHABILITATION 
DAILY PROGRESS NOTE Date: 10/18/2018 Name: Rigoberto Tom Age / Sex: 80 y.o. / male CSN: [id number] MRN: [id number] Admit Date: 10/8/2018 Length of Stay: 10 days Primary Rehab Diagnosis: Impaired Mobility and ADLs secondary to Acute Ischemic Stroke (acute infarct of the posterior superior right basal ganglia and adjacent right corona radiata) with residual left hemiparesis, dysphagia and dysarthria Subjective:  
 
Patient seen and examined. Blood pressure controlled. Patient's Complaint:  
No significant medical complaints Pain Control: no current joint or muscle symptoms, essentially pain-free Objective:  
 
Vital Signs: 
Patient Vitals for the past 24 hrs: 
 BP Temp Pulse Resp SpO2  
10/18/18 0803 136/52 97.7 °F (36.5 °C) 67 18 97 % 10/18/18 0613 150/62  62    
10/17/18 2148 139/54  68    
10/17/18 2132 121/46 97.5 °F (36.4 °C) 68 17 97 % 10/17/18 1518 114/50 97 °F (36.1 °C) 62 15 98 % Physical Examination: 
GENERAL SURVEY: Patient is awake, alert, oriented x 3, sitting comfortably on the chair, not in acute respiratory distress. HEENT: pink palpebral conjunctivae, anicteric sclerae, no nasoaural discharge, moist oral mucosa NECK: supple, no jugular venous distention, no palpable lymph nodes CHEST/LUNGS: symmetrical chest expansion, good air entry, clear breath sounds HEART: adynamic precordium, good S1 S2, no S3, regular rhythm, no murmurs ABDOMEN: (+) PEG tube in place, flat, bowel sounds appreciated, soft, non-tender EXTREMITIES: (+) amputated distal phalanx of the thumb of the left hand, pink nailbeds, no edema, full and equal pulses, no calf tenderness NEUROLOGICAL EXAM: The patient is awake, alert and oriented x3, able to answer questions fairly appropriately, able to follow 1 and 2 step commands. Able to tell time from the wall clock. Cranial nerves II-XII are grossly intact except for slurred speech and dysphagia. No gross sensory deficit. Motor strength is 4+/5 on the RUE and RLE, 4/5 on the LUE, 4-/5 on the LLE. Current Medications: 
Current Facility-Administered Medications Medication Dose Route Frequency  amLODIPine (NORVASC) tablet 10 mg  10 mg Per G Tube DAILY  melatonin tablet 5 mg  5 mg Per G Tube QHS  guaiFENesin (ROBITUSSIN) 100 mg/5 mL oral liquid 100 mg  100 mg Per G Tube QID  dextromethorphan (DELSYM) 30 mg/5 mL syrup 30 mg  30 mg Per G Tube Q12H  
 traZODone (DESYREL) tablet 50 mg  50 mg Per G Tube QHS  modafinil (PROVIGIL) tablet 100 mg  100 mg Per G Tube DAILY  cholecalciferol (VITAMIN D3) tablet 2,000 Units  2,000 Units Oral BID  hydrALAZINE (APRESOLINE) tablet 20 mg  20 mg Per G Tube Q12H  pneumococcal 23-valent (PNEUMOVAX 23) injection 0.5 mL  0.5 mL IntraMUSCular PRIOR TO DISCHARGE  docusate sodium (COLACE) capsule 100 mg  100 mg Per G Tube BID  
 bisacodyl (DULCOLAX) suppository 10 mg  10 mg Rectal Q48H PRN  
 heparin (porcine) injection 5,000 Units  5,000 Units SubCUTAneous Q12H  
 acetaminophen (TYLENOL) solution 650 mg  650 mg Oral Q4H PRN  
 co-enzyme Q-10 (CO Q-10) capsule 100 mg  100 mg Oral DAILY  vitamin B complex tablet  1 Tab Oral DAILY  aspirin chewable tablet 81 mg  81 mg Per G Tube DAILY  atorvastatin (LIPITOR) tablet 80 mg  80 mg Per G Tube QHS  clopidogrel (PLAVIX) tablet 75 mg  75 mg PEG Tube DAILY  lisinopril (PRINIVIL, ZESTRIL) tablet 40 mg  40 mg Per G Tube DAILY  pantoprazole (PROTONIX) granules for oral suspension 40 mg  40 mg Per G Tube ACB  finasteride (PROSCAR) tablet 5 mg  5 mg Oral QPM  
 levothyroxine (SYNTHROID) tablet 50 mcg  50 mcg Per G Tube 6am  
 metoprolol tartrate (LOPRESSOR) tablet 25 mg  25 mg Per G Tube Q12H  tamsulosin (FLOMAX) capsule 0.4 mg  0.4 mg Oral QPM  
  timolol (TIMOPTIC) 0.5 % ophthalmic solution 1 Drop  1 Drop Both Eyes DAILY Allergies: Allergies Allergen Reactions  Codeine Other (comments) Functional Progress: SPEECH AND LANGUAGE PATHOLOGY 
 
ON ADMISSION MOST RECENT Comprehension (Native Language) Primary Mode of Comprehension: Auditory Score: 3 Comments: (Hard of Hearing) Comprehension (Native Language) Primary Mode of Comprehension: Auditory Score: 5 Comments: hard of hearing needing min repetition for full understanding Expression (Native Language) Primary Mode of Expression: Verbal 
Score: 3 Comments: Needing min cues for speaking up to be able to be understood by therapist 
 Expression (Native Language) Primary Mode of Expression: Verbal 
Score: 4 Comments: Needing min cues for speaking up to be able to be understood by therapist 
  
Social Interaction/Pragmatics Score: 3 Comments: minimal encouragement to participate in therapy Social Interaction/Pragmatics Score: 4 Comments: minimal encouragement to participate in therapy Problem Solving Score: 2 Comments: Functional problem solving for initiating transfers, bed mobility needing moderate cues, also needing significant cues for attempting to sequence wheelchair mobility using right UE/LE Problem Solving Score: 4 Comments: Functional problem solving for initiating transfers, bed mobility needing moderate cues, also needing significant cues for attempting to sequence wheelchair mobility using right UE/LE Memory Score: 2 Comments: able to recall information with only occasional reminder provided by spouse/therapist Memory Score: 4 Comments: able to recall information with only occasional reminder provided by spouse/therapist 
  
 
Legend: 
 7 - Independent 6 - Modified Independent 5 - Standby Assistance / Supervision / Joy Faye 4 - Minimum Assistance / [address] 3 - Moderate Assistance 2 - Maximum Assistance 1 - Total Assistance / Dependent Lab/Data Review: 
Recent Results (from the past 24 hour(s)) HGB & HCT Collection Time: 10/18/18  6:16 AM  
Result Value Ref Range HGB 10.7 (L) 13.0 - 16.0 g/dL HCT 30.1 (L) 36.0 - 48.0 % PLATELET COUNT Collection Time: 10/18/18  6:16 AM  
Result Value Ref Range PLATELET 193 939 - 789 K/uL Estimated Glomerular Filtration Rate: On admission, estimated GFR based on a Creatinine of 0.77 mg/dl: 
 Using CKD-EPI = 81.6 mL/min/1.73m2 Using MDRD = 101.6 mL/min/1.73m2 Most recent estimated GFR, based on a Creatinine of 0.92 mg/dl on 10/15/2018: 
 Using CKD-EPI = 74.5 mL/min/1.73m2 Using MDRD = 82.7 mL/min/1.73m2 Assessment:  
 
Primary Rehabilitation Diagnosis 1. Impaired Mobility and ADLs 2. 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 
  
Comorbidities  Urinary frequency R35.0  Nocturia R35.1  Glaucoma H40.9  History of kidney stones Z87.442  Cervical spinal stenosis M48.02  
 Meningioma  D32.9  Hypertensive heart disease without heart failure I11.9  Diastolic dysfunction without heart failure I51.89  
 Gastric ulcer K25.9  Coronary artery disease involving native coronary artery of native heart I25.10  Dyslipidemia E78.5  Chronic obstructive pulmonary disease (COPD)  J44.9  Status post insertion of percutaneous endoscopic gastrostomy (PEG) tube  Z93.1  Hypothyroidism E03.9  Benign prostatic hyperplasia N40.0  Bilateral hearing loss H91.93  
 History of transient ischemic attack (TIA) Z86.73  
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  
  
 
Plan: 1. Justification for continued stay: Good progression towards established rehabilitation goals. 2. Medical Issues being followed closely: 
  [x]  Fall and safety precautions  
  []  Wound Care [x]  Bowel and Bladder Function  
  [x]  Fluid Electrolyte and Nutrition Balance  
  []  Pain Control 3. Issues that 24 hour rehabilitation nursing is following: 
  [x]  Fall and safety precautions  
  []  Wound Care  
  [x]  Bowel and Bladder Function  
  [x]  Fluid Electrolyte and Nutrition Balance  
  []  Pain Control   
  [x]  Assistance with and education on in-room safety with transfers to and from the bed, wheelchair, toilet and shower. 4. Acute rehabilitation plan of care: 
  [x]  Continue current care and rehab. [x]  Physical Therapy [x]  Occupational Therapy [x]  Speech Therapy  
  []  Hold Rehab until further notice 5. Medications: 
  [x]  MAR Reviewed [x]  Continue Present Medications 6. DVT Prophylaxis:   
  []  Lovenox [x]  Unfractionated Heparin  
  []  Coumadin  
  []  NOAC  
  []  BECKY Stockings  
  []  Sequential Compression Device []  None 7. Orders:  
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 
 > Continue: 
  > Aspirin 81 mg via PEG tube once daily in AM 
  > Atorvastatin 80 mg via PEG tube q HS 
  > Coenzyme Q10 100 mg via PEG tube once daily 
  > Clopidogrel 75 mg via PEG tube once daily in PM 
  > Vitamin B complex 1 tab via PEG tube once daily 
 
> Dysphagia as late effect of CVA; S/P Insertion of percutaneous endoscopic gastrostomy (PEG) tube (10/5/2018) 
 > NPO with tube feeding 
 > Jevity 1.5 237 ml via PEG tube 5 times daily (6AM, 10AM, 2PM, 6PM, 10PM) > On 10/15/2018, increased water flush from 100 ml to 150 ml via PEG tube vefore and after each bolus feeding 
 
> Hypertensive heart disease without heart failure 
 > On admission to the ARU, decreased Hydralazine from 50 mg to 25 mg via PEG tube q 8 hr (6AM, 2PM, 10PM)  > On 10/10/2018, decrease Hydralazine from 25 mg via PEG tube q 8 hr (6AM, 2PM, 10PM) to 20 mg via PEG tube q 12 hr (9AM, 9PM) > Continue: 
  > Amlodipine 10 mg via PEG tube once daily (change from 9AM to 6AM) > Hydralazine 20 mg via PEG tube q 12 hr (9AM, 9PM) > Lisinopril 40 mg via PEG tube once daily (6AM) 
  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 
 > On 10/11/2018, started Trazodone 50 mg via PEG tube q HS 
 > Continue Trazodone 50 mg via PEG tube q HS 
 
> Neurocognitive modulation 
 > On 10/11/2018, started: 
  > Trazodone 50 mg via PEG tube q HS 
  > Modafinil 100 mg via PEG tube q AM 
 > Continue: 
  > Trazodone 50 mg via PEG tube q HS  
  > Modafinil 100 mg via PEG tube q AM 
 
> Difficulty sleeping 
 > On admission to the ARU, started Melatonin 3 mg via PEG tube q HS 
 > On 10/11/2018, started Trazodone 50 mg via PEG tube q HS 
 > On 10/17/2018, increased Melatonin from 3 mg to 5 mg via PEG tube q HS 
 > Continue: 
  > Melatonin 5 mg via PEG tube q HS 
   > Trazodone 50 mg via PEG tube q HS 
 
> Cough 
 > WBC count (10/9/2018) = 6.8 
 > Chest x-ray (10/12/2018) showed a small left pleural effusion; prominent interstitial lung markers could represent mild infiltration or chronic interstitial lung disease. 
 > On 10/14/2018, patient was given Furosemide 20 mg via PEG tube x 1 dose 
 > No fever or desaturation noted since admission 
 > On 10/15/2018, started: 
  > Dextromethorphan 30 mg via PEG tube q 12 hr 
  > Guaifenesin 100 mg via PEG tube 4 times daily 
 > Continue: 
  > Dextromethorphan 30 mg via PEG tube q 12 hr 
  > Guaifenesin 100 mg via PEG tube 4 times daily 8. Patient's progress in rehabilitation and medical issues discussed with the patient. All questions answered to the best of my ability. Care plan discussed with patient and nurse. Signed:    Gloria Bush MD 
  October 18, 2018

## 2018-10-18 NOTE — REHAB NOTE
Bedside and Verbal shift change report given to Elle Celaya, HARESH (oncoming nurse) by Wilhelminia Bloch, RN (offgoing nurse). Report included the following information SBAR, Kardex, MAR and Recent Results. SITUATION:  
? Code Status: DNR Reason for Admission: R CVA ? Acute ischemic stroke ([facility] ? Hospital day: 9 
? Problem List:  
   
Hospital Problems  Date Reviewed: 10/17/2018 Codes Class Noted POA History of transient ischemic attack (TIA) ICD-10-CM: B86.07 
ICD-9-CM: V12.54  Unknown Yes Overview Signed 10/9/2018  3:28 PM by Sanjay Bautista MD  
  2x Vitamin D deficiency (Chronic) ICD-10-CM: E55.9 ICD-9-CM: 268.9  10/9/2018 Yes Overview Signed 10/10/2018  1:59 PM by Sanjay Bautista MD  
  Vitamin D 25-Hydroxy (10/10/2018) = 22.5 Hypertensive heart disease without heart failure (Chronic) ICD-10-CM: I11.9 ICD-9-CM: 402.90  Unknown Yes Status post insertion of percutaneous endoscopic gastrostomy (PEG) tube ([facility] ICD-10-CM: Z93.1 ICD-9-CM: V44.1  10/5/2018 Yes * (Principal) Acute ischemic stroke ([facility] ICD-10-CM: I63.9 ICD-9-CM: 434.91  10/2/2018 Yes Overview Signed 10/8/2018  6:32 PM by Sanjay Bautista MD  
  Acute Ischemic Stroke (acute infarct of the posterior superior right basal ganglia and adjacent right corona radiata) with residual left hemiparesis, dysphagia and dysarthria Impaired mobility and ADLs ICD-10-CM: Z74.09 
ICD-9-CM: 799.89  10/2/2018 Yes Hemiparesis affecting left side as late effect of cerebrovascular accident (CVA) ([facility] ICD-10-CM: L65.993 ICD-9-CM: 438.20  10/2/2018 Yes Dysphagia as late effect of cerebrovascular accident (CVA) ICD-10-CM: U04.937 ICD-9-CM: 438.82  10/2/2018 Yes 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) ICD-10-CM: C25.345 ICD-9-CM: 438.13  10/2/2018 Yes Traumatic amputation of left thumb ICD-10-CM: X01.112T ICD-9-CM: 885.0  1/1/2011 Yes BACKGROUND:  
 Past Medical History:  
Past Medical History:  
Diagnosis Date  Acute ischemic stroke ([facility] 10/2/2018 Acute Ischemic Stroke (acute infarct of the posterior superior right basal ganglia and adjacent right corona radiata) with residual left hemiparesis, dysphagia and dysarthria  Benign prostatic hyperplasia  Bilateral hearing loss  Cervical spinal stenosis 10/2/2018  Chronic obstructive pulmonary disease (COPD) ([facility]  Coronary artery disease involving native coronary artery of native heart  Diastolic dysfunction without heart failure  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) 10/2/2018  Dyslipidemia  Dysphagia as late effect of cerebrovascular accident (CVA) 10/2/2018  Gastric ulcer  Glaucoma  Hemiparesis affecting left side as late effect of cerebrovascular accident (CVA) ([facility] 10/2/2018  History of kidney stones  History of transient ischemic attack (TIA) 2x  Hypertensive heart disease without heart failure  Hypothyroidism  Meningioma ([facility] 10/3/2018 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  
? Changes in Assessment Throughout Shift: none ? Patient has Central Line: no Reasons if yes:  
? Patient has Suarez Cath: no Reasons if yes:   
 
? Last Vitals: 
  
Vitals:  
 10/16/18 2200 10/17/18 0606 10/17/18 0735 10/17/18 1518 BP: 141/61 140/62 170/73 114/50 Pulse: 72 70 71 62 Resp: 17  16 15 Temp: 98 °F (36.7 °C)  97.2 °F (36.2 °C) 97 °F (36.1 °C) SpO2: 96%  97% 98% Weight:      
Height:      
 
 
? IV and DRAINS (will only show if present) PEG/Gastrostomy Tube 10/06/18-Site Assessment: Clean, dry, & intact ? WOUND (if present) Wound Type:  none Dressing present Dressing Present : Yes Wound Concerns/Notes:  none ? PAIN Pain Assessment Pain Intensity 1: 0 (10/17/18 2000) Pain Location 1: Neck Pain Intervention(s) 1: Medication (see MAR) Patient Stated Pain Goal: 0 
o Interventions for Pain:  none 
o Intervention effective: no 
o Time of last intervention: see flowsheet  
o Reassessment Completed: yes ? Last 3 Weights: 
Last 3 Recorded Weights in this Encounter 10/08/18 2046 Weight: 67.6 kg (149 lb) Weight change: ? INTAKE/OUPUT Current Shift: No intake/output data recorded. Last three shifts: 10/16 0701 - 10/17 1900 In: 1280 Out: 3391 [JEASF:2876] ? LAB RESULTS Recent Labs 10/15/18 
4623 HGB 11.4* HCT 33.1*  
 Recent Labs 10/15/18 
4947   
K 4.7 GLU 92 BUN 34* CREA 0.92  
CA 7.8* RECOMMENDATIONS AND DISCHARGE PLANNING 1. Pending tests/procedures/ Plan of Care or Other Needs: none 2. Discharge plan for patient and Needs/Barriers: none 3. Estimated Discharge Date: 11/6 18 Posted on Whiteboard in [facility] [place]: yes 4. The patient's care plan was reviewed with the oncoming nurse. \"HEALS\" SAFETY CHECK Fall Risk Total Score: 7 Safety Measures: Safety Measures: Bed/Chair alarm on, Bed/Chair-Wheels locked, Bed in low position, Call light within reach A safety check occurred in the patient's room between off going nurse and oncoming nurse listed above. The safety check included the below items Area Items H High Alert Medications ? Verify all high alert medication drips (heparin, PCA, etc.) E Equipment ? Suction is set up for ALL patients (with yanker) ? Red plugs utilized for all equipment (IV pumps, etc.) ? WOWs wiped down at end of shift. ? Room stocked with oxygen, suction, and other unit-specific supplies A Alarms ? Bed alarm is set for fall risk patients ? Ensure chair alarm is in place and activated if patient is up in a chair L Lines ? Check IV for any infiltration ? Suarez bag is empty if patient has a Suarez ? Tubing and IV bags are labeled [employer] Safety ? Room is clean, patient is clean, and equipment is clean. ? Hallways are clear from equipment besides carts. ? Fall bracelet on for fall risk patients ? Ensure room is clear and free of clutter ? Suction is set up for ALL patients (with sonia) ? Hallways are clear from equipment besides carts. ? Isolation precautions followed, supplies available outside room, sign posted Bryn Haynes RN

## 2018-10-18 NOTE — REHAB NOTE
Bedside and Verbal shift change report given to Paul Mccollum RN (oncoming nurse) by Katlyn Elias RN (offgoing nurse). Report included the following information SBAR, Kardex, MAR and Recent Results. SITUATION:  
? Code Status: DNR Reason for Admission: R CVA ? Acute ischemic stroke ([facility] ? Hospital day: 10 
? Problem List:  
   
Hospital Problems  Date Reviewed: 10/18/2018 Codes Class Noted POA History of transient ischemic attack (TIA) ICD-10-CM: D79.95 
ICD-9-CM: V12.54  Unknown Yes Overview Signed 10/9/2018  3:28 PM by Darshana Linares MD  
  2x Vitamin D deficiency (Chronic) ICD-10-CM: E55.9 ICD-9-CM: 268.9  10/9/2018 Yes Overview Signed 10/10/2018  1:59 PM by Darshana Linares MD  
  Vitamin D 25-Hydroxy (10/10/2018) = 22.5 Hypertensive heart disease without heart failure (Chronic) ICD-10-CM: I11.9 ICD-9-CM: 402.90  Unknown Yes Status post insertion of percutaneous endoscopic gastrostomy (PEG) tube ([facility] ICD-10-CM: Z93.1 ICD-9-CM: V44.1  10/5/2018 Yes * (Principal) Acute ischemic stroke ([facility] ICD-10-CM: I63.9 ICD-9-CM: 434.91  10/2/2018 Yes Overview Signed 10/8/2018  6:32 PM by Darshana Linares MD  
  Acute Ischemic Stroke (acute infarct of the posterior superior right basal ganglia and adjacent right corona radiata) with residual left hemiparesis, dysphagia and dysarthria Impaired mobility and ADLs ICD-10-CM: Z74.09 
ICD-9-CM: 799.89  10/2/2018 Yes Hemiparesis affecting left side as late effect of cerebrovascular accident (CVA) ([facility] ICD-10-CM: N07.343 ICD-9-CM: 438.20  10/2/2018 Yes Dysphagia as late effect of cerebrovascular accident (CVA) ICD-10-CM: O92.777 ICD-9-CM: 438.82  10/2/2018 Yes 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) ICD-10-CM: R92.268 ICD-9-CM: 438.13  10/2/2018 Yes Traumatic amputation of left thumb ICD-10-CM: A74.719N ICD-9-CM: 885.0  1/1/2011 Yes BACKGROUND:  
 Past Medical History:  
Past Medical History:  
Diagnosis Date  Acute ischemic stroke ([facility] 10/2/2018 Acute Ischemic Stroke (acute infarct of the posterior superior right basal ganglia and adjacent right corona radiata) with residual left hemiparesis, dysphagia and dysarthria  Benign prostatic hyperplasia  Bilateral hearing loss  Cervical spinal stenosis 10/2/2018  Chronic obstructive pulmonary disease (COPD) ([facility]  Coronary artery disease involving native coronary artery of native heart  Diastolic dysfunction without heart failure  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) 10/2/2018  Dyslipidemia  Dysphagia as late effect of cerebrovascular accident (CVA) 10/2/2018  Gastric ulcer  Glaucoma  Hemiparesis affecting left side as late effect of cerebrovascular accident (CVA) ([facility] 10/2/2018  History of kidney stones  History of transient ischemic attack (TIA) 2x  Hypertensive heart disease without heart failure  Hypothyroidism  Meningioma ([facility] 10/3/2018 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  
? Changes in Assessment Throughout Shift: none ? Patient has Central Line: no Reasons if yes:  
? Patient has Suarez Cath: no Reasons if yes:   
 
? Last Vitals: 
  
Vitals:  
 10/17/18 2148 10/18/18 1624 10/18/18 0803 10/18/18 1603 BP: 139/54 150/62 136/52 126/51 Pulse: 68 62 67 65 Resp:   18 18 Temp:   97.7 °F (36.5 °C) 97.8 °F (36.6 °C) SpO2:   97% 95% Weight:      
Height:      
 
 
? IV and DRAINS (will only show if present) PEG/Gastrostomy Tube 10/06/18-Site Assessment: Clean, dry, & intact ? WOUND (if present) Wound Type:  none Dressing present Dressing Present : Yes Wound Concerns/Notes:  none ? PAIN Pain Assessment Pain Intensity 1: 0 (10/18/18 1603) Pain Location 1: Leg 
  Pain Intervention(s) 1: Medication (see MAR) Patient Stated Pain Goal: 0 
o Interventions for Pain:  none 
o Intervention effective: no 
o Time of last intervention: see flowsheet  
o Reassessment Completed: yes ? Last 3 Weights: 
Last 3 Recorded Weights in this Encounter 10/08/18 2046 Weight: 67.6 kg (149 lb) Weight change: ? INTAKE/OUPUT Current Shift: No intake/output data recorded. Last three shifts: 10/16 1901 - 10/18 0700 In: 2320 Out: 1195 [[id number]] ? LAB RESULTS Recent Labs 10/18/18 
4483 HGB 10.7* HCT 30.1*  
 No results for input(s): NA, K, GLU, BUN, CREA, CA, MG, INR in the last 72 hours. No lab exists for component: PT, PTT, INREXT, INREXT 
 
RECOMMENDATIONS AND DISCHARGE PLANNING 1. Pending tests/procedures/ Plan of Care or Other Needs: none 2. Discharge plan for patient and Needs/Barriers: none 3. Estimated Discharge Date: 11/6 18 Posted on Whiteboard in [facility]: yes 4. The patient's care plan was reviewed with the oncoming nurse. \"HEALS\" SAFETY CHECK Fall Risk Total Score: 7 Safety Measures: Safety Measures: Bed/Chair alarm on, Bed/Chair-Wheels locked, Bed in low position, Call light within reach A safety check occurred in the patient's room between off going nurse and oncoming nurse listed above. The safety check included the below items Area Items H High Alert Medications ? Verify all high alert medication drips (heparin, PCA, etc.) E Equipment ? Suction is set up for ALL patients (with yanker) ? Red plugs utilized for all equipment (IV pumps, etc.) ? WOWs wiped down at end of shift. ? Room stocked with oxygen, suction, and other unit-specific supplies A 
 Alarms ? Bed alarm is set for fall risk patients ? Ensure chair alarm is in place and activated if patient is up in a chair L Lines ? Check IV for any infiltration ? Suarez bag is empty if patient has a Suarez ? Tubing and IV bags are labeled [employer]i [employer] Safety ? Room is clean, patient is clean, and equipment is clean. ? Hallways are clear from equipment besides carts. ? Fall bracelet on for fall risk patients ? Ensure room is clear and free of clutter ? Suction is set up for ALL patients (with sonia) ? Hallways are clear from equipment besides carts. ? Isolation precautions followed, supplies available outside room, sign posted Sherman Brenner RN

## 2018-10-18 NOTE — PROGRESS NOTES
Problem: Dysphagia (Adult) Goal: *Speech Goal: (INSERT TEXT) Long term goals: 1. Patient will tolerate small amounts of PO using safe swallowing techniques without overt s/s of aspiration in 4/5 trials. 2. Patient will perform oral/pharyngeal strengthening exercises with supervision. 3. Patient will participate in laryngeal strengthening exercises and swallowing maneuvers, min cues - supervison. 4. Patient will recall 3 words after 5 minutes, min assist-supervision. 5. Patient will perform functional problem solving and reasoning tasks with supervision. Short term goals (by 10/23/18): 1. Patient will tolerate small amounts of PO puree with the SLP using safe swallowing techniques without overt s/s of aspiration in 4/5 trials. 2. Patient will perform oral/pharyngeal strengthening exercises with mod assist/cues. 3. Patient will participate in laryngeal strengthening exercises and swallowing maneuvers, mod cues. 4. Patient will recall 3 words after 5 minutes, mod-min assist. 
5. Patient will perform functional problem solving and reasoning tasks with 75-85% accuracy. Speech language pathology treatment Patient: Lalo Colón (14 y.o. male) Date: 10/18/2018 Diagnosis: R CVA Acute ischemic stroke ([address] Acute ischemic stroke ([address] SUBJECTIVE:  
Patient stated I slept better last night than I have since I got here. OBJECTIVE:  
Mental Status: 
Mr. Macrina Rubin demonstrated increased stamina in therapy today. Although fatigued after OT and PT this morning, he was able to participate with the SLP. He also did well in an afternoon session after being given the opportunity to rest in bed. Treatment & Interventions:  
Mr. Macrina Rubin was seen for two thirty minute sessions. The following treatment tasks were presented: Motor Speech/Dysphagia:  
Vowel prolongations /a/: 12-15 seconds Vowel prolongations /i/: 12-17 seconds Laryngeal valving: Mod cues for success Strong final /k/:  100% accuracy Strong medial \"ng\":  85% accuracy Oral strengthening ex:  Min assist 
Neuro-Linguistics:  
Orientation:   Min assist 
Recent memory:  Min-mod assist 
Response & Tolerance to Activities: 
Participation was much improved today as patient was better rested and demonstrated increased stamina. Pain: 
Pain Scale 1: Numeric (0 - 10) No report of pain After treatment:  
[x]       Patient left in no apparent distress sitting up in chair 
[x]       Patient left in no apparent distress in bed 
[x]       Call bell left within reach 
[]       Nursing notified 
[]       Caregiver present 
[]       Bed alarm activated ASSESSMENT:  
Progression toward goals: 
[]       Improving appropriately and progressing toward goals [x]       Improving slowly and progressing toward goals 
[]       Not making progress toward goals and plan of care will be adjusted PLAN:  
Patient continues to benefit from skilled intervention to address the above impairments. Continue treatment per established plan of care. Discharge Recommendations:  Home Health Estimated LOS: Through 11/6/18 JAIME Carias Time Calculation:  60 Minutes

## 2018-10-19 PROCEDURE — 74011250637 HC RX REV CODE- 250/637: Performed by: INTERNAL MEDICINE

## 2018-10-19 PROCEDURE — 97112 NEUROMUSCULAR REEDUCATION: CPT

## 2018-10-19 PROCEDURE — 92526 ORAL FUNCTION THERAPY: CPT

## 2018-10-19 PROCEDURE — 97530 THERAPEUTIC ACTIVITIES: CPT

## 2018-10-19 PROCEDURE — 77030011256 HC DRSG MEPILEX <16IN NO BORD MOLN -A

## 2018-10-19 PROCEDURE — 74011250636 HC RX REV CODE- 250/636: Performed by: INTERNAL MEDICINE

## 2018-10-19 PROCEDURE — 65310000000 HC RM PRIVATE REHAB

## 2018-10-19 RX ADMIN — METOPROLOL TARTRATE 25 MG: 25 TABLET ORAL at 20:02

## 2018-10-19 RX ADMIN — TAMSULOSIN HYDROCHLORIDE 0.4 MG: 0.4 CAPSULE ORAL at 17:32

## 2018-10-19 RX ADMIN — DEXTROMETHORPHAN 30 MG: 30 SUSPENSION, EXTENDED RELEASE ORAL at 19:57

## 2018-10-19 RX ADMIN — MELATONIN TAB 5 MG 5 MG: 5 TAB at 20:02

## 2018-10-19 RX ADMIN — PANTOPRAZOLE SODIUM 40 MG: 40 GRANULE, DELAYED RELEASE ORAL at 05:16

## 2018-10-19 RX ADMIN — LEVOTHYROXINE SODIUM 50 MCG: 50 TABLET ORAL at 05:16

## 2018-10-19 RX ADMIN — FINASTERIDE 5 MG: 5 TABLET, FILM COATED ORAL at 17:32

## 2018-10-19 RX ADMIN — TIMOLOL MALEATE 1 DROP: 5 SOLUTION OPHTHALMIC at 08:42

## 2018-10-19 RX ADMIN — HYDRALAZINE HYDROCHLORIDE 20 MG: 10 TABLET, FILM COATED ORAL at 08:28

## 2018-10-19 RX ADMIN — GUAIFENESIN 100 MG: 100 SOLUTION ORAL at 17:29

## 2018-10-19 RX ADMIN — DEXTROMETHORPHAN 30 MG: 30 SUSPENSION, EXTENDED RELEASE ORAL at 08:29

## 2018-10-19 RX ADMIN — HEPARIN SODIUM 5000 UNITS: 5000 INJECTION, SOLUTION INTRAVENOUS; SUBCUTANEOUS at 17:29

## 2018-10-19 RX ADMIN — CLOPIDOGREL BISULFATE 75 MG: 75 TABLET, FILM COATED ORAL at 20:01

## 2018-10-19 RX ADMIN — TRAZODONE HYDROCHLORIDE 50 MG: 50 TABLET ORAL at 20:02

## 2018-10-19 RX ADMIN — Medication 1 TABLET: at 08:28

## 2018-10-19 RX ADMIN — LISINOPRIL 40 MG: 40 TABLET ORAL at 05:16

## 2018-10-19 RX ADMIN — HEPARIN SODIUM 5000 UNITS: 5000 INJECTION, SOLUTION INTRAVENOUS; SUBCUTANEOUS at 05:17

## 2018-10-19 RX ADMIN — VITAMIN D, TAB 1000IU (100/BT) 2000 UNITS: 25 TAB at 17:32

## 2018-10-19 RX ADMIN — ASPIRIN 81 MG CHEWABLE TABLET 81 MG: 81 TABLET CHEWABLE at 08:28

## 2018-10-19 RX ADMIN — MODAFINIL 100 MG: 100 TABLET ORAL at 08:29

## 2018-10-19 RX ADMIN — GUAIFENESIN 100 MG: 100 SOLUTION ORAL at 21:17

## 2018-10-19 RX ADMIN — HYDRALAZINE HYDROCHLORIDE 20 MG: 10 TABLET, FILM COATED ORAL at 20:02

## 2018-10-19 RX ADMIN — METOPROLOL TARTRATE 25 MG: 25 TABLET ORAL at 08:27

## 2018-10-19 RX ADMIN — GUAIFENESIN 100 MG: 100 SOLUTION ORAL at 14:04

## 2018-10-19 RX ADMIN — ATORVASTATIN CALCIUM 80 MG: 40 TABLET, FILM COATED ORAL at 20:02

## 2018-10-19 RX ADMIN — DOCUSATE SODIUM 100 MG: 100 CAPSULE, LIQUID FILLED ORAL at 17:32

## 2018-10-19 RX ADMIN — Medication 100 MG: at 08:28

## 2018-10-19 RX ADMIN — GUAIFENESIN 100 MG: 100 SOLUTION ORAL at 09:34

## 2018-10-19 RX ADMIN — VITAMIN D, TAB 1000IU (100/BT) 2000 UNITS: 25 TAB at 08:28

## 2018-10-19 RX ADMIN — AMLODIPINE BESYLATE 10 MG: 10 TABLET ORAL at 05:16

## 2018-10-19 RX ADMIN — DOCUSATE SODIUM 100 MG: 100 CAPSULE, LIQUID FILLED ORAL at 08:29

## 2018-10-19 NOTE — REHAB NOTE
Bedside and Verbal shift change report given to Parmjit RN (oncoming nurse) by Shawn Luque RN (offgoing nurse). Report included the following information SBAR, Kardex, MAR and Recent Results. SITUATION:  
? Code Status: DNR Reason for Admission: R CVA ? Acute ischemic stroke ([facility] ? Hospital day: 10 
? Problem List:  
   
Hospital Problems  Date Reviewed: 10/18/2018 Codes Class Noted POA History of transient ischemic attack (TIA) ICD-10-CM: P06.25 
ICD-9-CM: V12.54  Unknown Yes Overview Signed 10/9/2018  3:28 PM by Denton Wesley MD  
  2x Vitamin D deficiency (Chronic) ICD-10-CM: E55.9 ICD-9-CM: 268.9  10/9/2018 Yes Overview Signed 10/10/2018  1:59 PM by Denton Wesley MD  
  Vitamin D 25-Hydroxy (10/10/2018) = 22.5 Hypertensive heart disease without heart failure (Chronic) ICD-10-CM: I11.9 ICD-9-CM: 402.90  Unknown Yes Status post insertion of percutaneous endoscopic gastrostomy (PEG) tube ([facility] ICD-10-CM: Z93.1 ICD-9-CM: V44.1  10/5/2018 Yes * (Principal) Acute ischemic stroke ([facility] ICD-10-CM: I63.9 ICD-9-CM: 434.91  10/2/2018 Yes Overview Signed 10/8/2018  6:32 PM by Denton Wesley MD  
  Acute Ischemic Stroke (acute infarct of the posterior superior right basal ganglia and adjacent right corona radiata) with residual left hemiparesis, dysphagia and dysarthria Impaired mobility and ADLs ICD-10-CM: Z74.09 
ICD-9-CM: 799.89  10/2/2018 Yes Hemiparesis affecting left side as late effect of cerebrovascular accident (CVA) ([facility] ICD-10-CM: U33.738 ICD-9-CM: 438.20  10/2/2018 Yes Dysphagia as late effect of cerebrovascular accident (CVA) ICD-10-CM: E41.242 ICD-9-CM: 438.82  10/2/2018 Yes 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) ICD-10-CM: R56.639 ICD-9-CM: 438.13  10/2/2018 Yes Traumatic amputation of left thumb ICD-10-CM: C11.979C ICD-9-CM: 885.0  1/1/2011 Yes BACKGROUND:  
 Past Medical History:  
Past Medical History:  
Diagnosis Date  Acute ischemic stroke ([facility] 10/2/2018 Acute Ischemic Stroke (acute infarct of the posterior superior right basal ganglia and adjacent right corona radiata) with residual left hemiparesis, dysphagia and dysarthria  Benign prostatic hyperplasia  Bilateral hearing loss  Cervical spinal stenosis 10/2/2018  Chronic obstructive pulmonary disease (COPD) ([facility]  Coronary artery disease involving native coronary artery of native heart  Diastolic dysfunction without heart failure  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) 10/2/2018  Dyslipidemia  Dysphagia as late effect of cerebrovascular accident (CVA) 10/2/2018  Gastric ulcer  Glaucoma  Hemiparesis affecting left side as late effect of cerebrovascular accident (CVA) ([facility] 10/2/2018  History of kidney stones  History of transient ischemic attack (TIA) 2x  Hypertensive heart disease without heart failure  Hypothyroidism  Meningioma ([facility] 10/3/2018 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  
? Changes in Assessment Throughout Shift: none ? Patient has Central Line: no Reasons if yes:  
? Patient has Suarez Cath: no Reasons if yes:   
 
? Last Vitals: 
  
Vitals:  
 10/18/18 7803 10/18/18 1603 10/18/18 2100 10/18/18 2157 BP: 136/52 126/51 126/60 153/58 Pulse: 67 65 86 69 Resp: 18 18 18 Temp: 97.7 °F (36.5 °C) 97.8 °F (36.6 °C) 98.6 °F (37 °C) SpO2: 97% 95% 97% Weight:      
Height:      
 
 
? IV and DRAINS (will only show if present) PEG/Gastrostomy Tube 10/06/18-Site Assessment: Clean, dry, & intact ? WOUND (if present) Wound Type:  none Dressing present Dressing Present : No 
 Wound Concerns/Notes:  none ? PAIN Pain Assessment Pain Intensity 1: 0 (10/18/18 2000) Pain Location 1: Leg 
  Pain Intervention(s) 1: Medication (see MAR) Patient Stated Pain Goal: 0 
o Interventions for Pain:  none 
o Intervention effective: no 
o Time of last intervention: see flowsheet  
o Reassessment Completed: yes ? Last 3 Weights: 
Last 3 Recorded Weights in this Encounter 10/08/18 2046 Weight: 67.6 kg (149 lb) Weight change: ? INTAKE/OUPUT Current Shift: 10/18 1901 - 10/19 0700 In: 400 Out: - Last three shifts: 10/17 0701 - 10/18 1900 In: 9430 Out: 400 [Urine:400] ? LAB RESULTS Recent Labs 10/18/18 
1448 HGB 10.7* HCT 30.1*  
 No results for input(s): NA, K, GLU, BUN, CREA, CA, MG, INR in the last 72 hours. No lab exists for component: PT, PTT, INREXT, INREXT 
 
RECOMMENDATIONS AND DISCHARGE PLANNING 1. Pending tests/procedures/ Plan of Care or Other Needs: none 2. Discharge plan for patient and Needs/Barriers: none 3. Estimated Discharge Date: 11/6 18 Posted on Whiteboard in [facility]: yes 4. The patient's care plan was reviewed with the oncoming nurse. \"HEALS\" SAFETY CHECK Fall Risk Total Score: 7 Safety Measures: Safety Measures: Bed/Chair alarm on, Bed/Chair-Wheels locked, Bed in low position, Call light within reach A safety check occurred in the patient's room between off going nurse and oncoming nurse listed above. The safety check included the below items Area Items H High Alert Medications ? Verify all high alert medication drips (heparin, PCA, etc.) E Equipment ? Suction is set up for ALL patients (with yanker) ? Red plugs utilized for all equipment (IV pumps, etc.) ? WOWs wiped down at end of shift. ? Room stocked with oxygen, suction, and other unit-specific supplies A Alarms ? Bed alarm is set for fall risk patients ? Ensure chair alarm is in place and activated if patient is up in a chair L Lines ? Check IV for any infiltration ? Suarez bag is empty if patient has a Suarez ? Tubing and IV bags are labeled [employer] Safety ? Room is clean, patient is clean, and equipment is clean. ? Hallways are clear from equipment besides carts. ? Fall bracelet on for fall risk patients ? Ensure room is clear and free of clutter ? Suction is set up for ALL patients (with sonia) ? Hallways are clear from equipment besides carts. ? Isolation precautions followed, supplies available outside room, sign posted Ian Mendez RN

## 2018-10-19 NOTE — PROGRESS NOTES
Patient is not available to be assessed at this time. Patient was in therapy. [address] [facility]  
[phone]

## 2018-10-19 NOTE — REHAB NOTE
Bedside and Verbal shift change report given to Brad Mane RN (oncoming nurse) by Randall Murphy RN (offgoing nurse). Report included the following information SBAR, Kardex, MAR and Recent Results. SITUATION:  
? Code Status: DNR Reason for Admission: R CVA ? Acute ischemic stroke ([facility] ? Hospital day: 6 
? Problem List:  
   
Hospital Problems  Date Reviewed: 10/19/2018 Codes Class Noted POA History of transient ischemic attack (TIA) ICD-10-CM: I43.21 
ICD-9-CM: V12.54  Unknown Yes Overview Signed 10/9/2018  3:28 PM by Hasmukh Jain MD  
  2x Vitamin D deficiency (Chronic) ICD-10-CM: E55.9 ICD-9-CM: 268.9  10/9/2018 Yes Overview Signed 10/10/2018  1:59 PM by Hasmukh Jain MD  
  Vitamin D 25-Hydroxy (10/10/2018) = 22.5 Hypertensive heart disease without heart failure (Chronic) ICD-10-CM: I11.9 ICD-9-CM: 402.90  Unknown Yes Status post insertion of percutaneous endoscopic gastrostomy (PEG) tube ([facility] ICD-10-CM: Z93.1 ICD-9-CM: V44.1  10/5/2018 Yes * (Principal) Acute ischemic stroke ([facility] ICD-10-CM: I63.9 ICD-9-CM: 434.91  10/2/2018 Yes Overview Signed 10/8/2018  6:32 PM by Hasmukh Jain MD  
  Acute Ischemic Stroke (acute infarct of the posterior superior right basal ganglia and adjacent right corona radiata) with residual left hemiparesis, dysphagia and dysarthria Impaired mobility and ADLs ICD-10-CM: Z74.09 
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  
 Past Medical History:  
Past Medical History:  
Diagnosis Date  Acute ischemic stroke ([facility] 10/2/2018 Acute Ischemic Stroke (acute infarct of the posterior superior right basal ganglia and adjacent right corona radiata) with residual left hemiparesis, dysphagia and dysarthria  Benign prostatic hyperplasia  Bilateral hearing loss  Cervical spinal stenosis 10/2/2018  Chronic obstructive pulmonary disease (COPD) ([facility]  Coronary artery disease involving native coronary artery of native heart  Diastolic dysfunction without heart failure  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) 10/2/2018  Dyslipidemia  Dysphagia as late effect of cerebrovascular accident (CVA) 10/2/2018  Gastric ulcer  Glaucoma  Hemiparesis affecting left side as late effect of cerebrovascular accident (CVA) ([facility] 10/2/2018  History of kidney stones  History of transient ischemic attack (TIA) 2x  Hypertensive heart disease without heart failure  Hypothyroidism  Meningioma ([facility] 10/3/2018 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  
? Changes in Assessment Throughout Shift: none ? Patient has Central Line: no Reasons if yes:  
? Patient has Suarez Cath: no Reasons if yes:   
 
? Last Vitals: 
  
Vitals:  
 10/18/18 2157 10/19/18 6680 10/19/18 5914 10/19/18 1557 BP: 153/58 155/65 161/63 144/60 Pulse: 69 68 76 66 Resp:   16 17 Temp:   96.8 °F (36 °C) 96.8 °F (36 °C) SpO2:   93% 91% Weight:      
Height:      
 
 
? IV and DRAINS (will only show if present) PEG/Gastrostomy Tube 10/06/18-Site Assessment: Clean, dry, & intact ? WOUND (if present) Wound Type:  none Dressing present Dressing Present : No 
 Wound Concerns/Notes:  none ? PAIN Pain Assessment Pain Intensity 1: 0 (10/19/18 1557) Pain Location 1: Leg 
  Pain Intervention(s) 1: Medication (see MAR) Patient Stated Pain Goal: 0 
o Interventions for Pain:  none 
o Intervention effective: no 
o Time of last intervention: see flowsheet  
o Reassessment Completed: yes ? Last 3 Weights: 
Last 3 Recorded Weights in this Encounter 10/08/18 2046 Weight: 67.6 kg (149 lb) Weight change: ? INTAKE/OUPUT Current Shift: No intake/output data recorded. Last three shifts: 10/18 0701 - 10/19 1900 In: 3468 Out: 150 [Urine:150] ? LAB RESULTS Recent Labs 10/18/18 
5181 HGB 10.7* HCT 30.1*  
 No results for input(s): NA, K, GLU, BUN, CREA, CA, MG, INR in the last 72 hours. No lab exists for component: PT, PTT, INREXT, INREXT 
 
RECOMMENDATIONS AND DISCHARGE PLANNING 1. Pending tests/procedures/ Plan of Care or Other Needs: none 2. Discharge plan for patient and Needs/Barriers: none 3. Estimated Discharge Date: 11/6 18 Posted on Whiteboard in [facility]: yes 4. The patient's care plan was reviewed with the oncoming nurse. \"HEALS\" SAFETY CHECK Fall Risk Total Score: 7 Safety Measures: Safety Measures: Bed/Chair alarm on, Bed/Chair-Wheels locked, Bed in low position, Call light within reach, Gripper socks, Side rails X 3 A safety check occurred in the patient's room between off going nurse and oncoming nurse listed above. The safety check included the below items Area Items H High Alert Medications ? Verify all high alert medication drips (heparin, PCA, etc.) E Equipment ? Suction is set up for ALL patients (with yanker) ? Red plugs utilized for all equipment (IV pumps, etc.) ? WOWs wiped down at end of shift. ? Room stocked with oxygen, suction, and other unit-specific supplies A Alarms ? Bed alarm is set for fall risk patients ? Ensure chair alarm is in place and activated if patient is up in a chair L Lines ? Check IV for any infiltration ? Suarez bag is empty if patient has a Suarez ? Tubing and IV bags are labeled [employer] Safety ? Room is clean, patient is clean, and equipment is clean. ? Hallways are clear from equipment besides carts. ? Fall bracelet on for fall risk patients ? Ensure room is clear and free of clutter ? Suction is set up for ALL patients (with sonia) ? Hallways are clear from equipment besides carts. ? Isolation precautions followed, supplies available outside room, sign posted Leila Preston RN

## 2018-10-19 NOTE — PROGRESS NOTES
Problem: Mobility Impaired (Adult and Pediatric) Goal: *Therapy Goal (Edit Goal, Insert Text) Physical Therapy Goals Initiated 10/9/2018, updated 10/15/2018, and to be accomplished within 7 day(s) 10/22/2018 1. Patient will move from supine to sit and sit to supine , scoot up and down and roll side to side in bed with moderate assistance consistently . 2.  Patient will transfer from bed to chair and chair to bed with moderate assistance  using the least restrictive device consistently. 3.  Patient will perform sit to stand with moderate assistance consistently without pushing to left . 4. Patient will ambulate with maximal assistance for 5 feet with the least restrictive device. 5.  Patient will perform 100 ft of wheelchair mobility with modified technique min assist for steering and negotiation of obstacles. 6.  Patient will be able to maintain sitting balance without support for at least 5 minutes with verbal cues only for maintaining midline/upright position for ease of transfers consistently throughout the day. Physical Therapy Goals Initiated 10/9/2018 and to be accomplished within 28 day(s) on 11/6/2018 1. Patient will move from supine to sit and sit to supine , scoot up and down and roll side to side in bed with supervision/set-up. 2.  Patient will transfer from bed to chair and chair to bed with supervision/set-up using the least restrictive device. 3.  Patient will perform sit to stand with supervision/set-up. 4.  Patient will ambulate with minimal assistance/contact guard assist for 50 feet with the least restrictive device. 5.  Patient will ascend/descend 4 stairs with 2 handrail(s) with minimal assistance/contact guard assist. 
6.  Patient will perform 150 ft of wheelchair mobility at modified independent level. 7.  Patient will demonstrate improved postural control for ease of functional mobility as demonstrated by PASS score >16/36 Outcome: Progressing Towards Goal 
physical Therapy TREATMENT Patient: Taina Courtney (64 y.o. male) Date: 10/19/2018 Diagnosis: R CVA Acute ischemic stroke ([facility] Acute ischemic stroke ([facility] Precautions: Aspiration, Fall(Head of bed 30-45 degrees at all times per speech therapist) Chart, physical therapy assessment, plan of care and goals were reviewed. Time In: 1130 Time Out: 1230 Patient Seen For: AM;Balance activities; Patient education;Transfer training; Therapeutic exercise; Wheelchair mobility Pain: 
Pt pain was reported as  0 pre-treatment. Pt pain was reported as 0 post-treatment. Intervention: none Patient identified with name and :yes SUBJECTIVE:  
 
I have not been sleeping real good lately. OBJECTIVE DATA SUMMARY:  
Objective:  
 
 
 
TRANSFERS Daily Assessment Transfer Type: SPT without device Transfer Assistance : 2 (Maximal assistance) Sit to Stand Assistance: Maximum assistance Car Transfers: Not tested GAIT Daily Assessment Amount of Assistance: 0 (Not tested) STEPS or STAIRS Daily Assessment Steps/Stairs Ambulated (#): (NT)  
 
 
BALANCE Daily Assessment Sitting - Static: Fair (occasional) Sitting - Dynamic: Fair (occasional) Standing - Static: Poor Standing - Dynamic : Impaired WHEELCHAIR MOBILITY Daily Assessment Able to Propel (ft): (25 ft) Functional Level: (2) Curbs/Ramps Assist Required (FIM Score): 0 (Not tested) Wheelchair Setup Assist Required : 1 (Dependent/total assistance) Wheelchair Management: Manages right brake THERAPEUTIC EXERCISES Daily Assessment Extremity: Left Exercise Type #1: Seated lower extremity strengthening Sets Performed: 1 Reps Performed: 10 Level of Assist: Minimal assistance Neuro Re-Education: 
Pt challenged with 1/2 sit to stand progression from the 6 inch box step to improve weight shifting through B LEs equally.  Pt was able to initiate this activity with moderate assistance with trial 1 and 2 and with minimal assistance with trial 3. ASSESSMENT:Pt is seen for deficits in strength, mobility, transfers, balance and safety awareness. Pt was agreeable to session even after he reports that he is extremely fatigued as he did not sleep well last night. Pt participated in transfer training activities from the [facility] to the mat table with maximum assistance and maximum VCs for progression to mat table safely. Pt appears anxious upon initiation of transfer from the [facility] to the [facility] table during attempts 1 and 2 and he required being returned to the [facility] for safety. Pt was instructed with VCs and TCs with transfer progression on attempt 3 and he was able to perform transfer with maximum assistance and maximum VCs for hand and foot placement for safety transfers. Challenged pt's dynamic sitting balance with reaching all different directions and with CGA to return pt to midline. Pt was able to hold upright posture with use of mirror for feedback and with constant VCs to focus and keep his eyes opened for improved seated posture x 9 minutes successfully. Sit to stand transfers from mat with use of NDT technique of RUE over LUE on LLE to activate LLE weight bearing. Pt required blocking of L knee and proprioceptive input through L heel to remain flat on floor for improved standing. Pt will continue to improve with additional skilled therapy sessions. Progression toward goals: 
[]      Improving appropriately and progressing toward goals [x]      Improving slowly and progressing toward goals 
[]      Not making progress toward goals and plan of care will be adjusted PLAN: 
Patient continues to benefit from skilled intervention to address the above impairments. Continue treatment per established plan of care. Discharge Recommendations:  Home health vs SNF Further Equipment Recommendations for Discharge:    
 
Estimated LOS:4 weeks Activity Tolerance: Pt with fair tolerance to session, however he did report feeling more awake and feeling better after session. Please refer to the flowsheet for vital signs taken during this treatment. After treatment:  
Patient left in room with call bell and suction within reach. Ange Shyla 10/19/2018

## 2018-10-19 NOTE — PROGRESS NOTES
Problem: Self Care Deficits Care Plan (Adult) Goal: *Therapy Goal (Edit Goal, Insert Text) Occupational Therapy Goals Long Term Goals Initiated 10/9/18 and to be accomplished within 4 week(s) 1. Pt will perform self-feeding with total dependence. 2. Pt will perform grooming with supervision. 3. Pt will perform UB bathing with Min A. 
4. Pt will perform LB bathing with Min A prn AE. 5. Pt will perform tub/shower transfer with Mod A.  
6. Pt will perform UB dressing with Setup. 7. Pt will perform LB dressing with Min A prn AE. 8. Pt will perform toileting task with Min A. 
9. Pt will perform toilet transfer with Mod A. Short Term Goals Initiated 10/16/18 and to be accomplished within 7 day(s) 10/23/18 1. Pt will perform grooming with Setup. 2. Pt will perform UB bathing with Min A. 
3. Pt will perform LB bathing with Min A prn AE. 4. Pt will perform tub/shower transfer with Mod A. 
5. Pt will perform UB dressing with Mod A. 
6. Pt will perform LB dressing with Mod A prn AE. 7. Pt will perform toileting task with Max A. 
8. Pt will perform toilet transfer with Mod A. Occupational Therapy TREATMENT Patient: Xiao Paez 80 y.o. Patient identified with name and : yes Date: 10/19/2018 Time In: 165 Time Out[de-identified] 6322 Diagnosis: R CVA Acute ischemic stroke ([facility] Precautions: Aspiration, Fall(Head of bed 30-45 degrees at all times per speech therapist) Chart, occupational therapy assessment, plan of care, and goals were reviewed. Pain: 
Pt reports 0/10 pain or discomfort prior to treatment. Pt reports 0/10 pain or discomfort post treatment. Intervention Provided: None required SUBJECTIVE:  
Patient stated I can't wait to get to that place, upon initiation of session when told he was going to the therapy gym. OBJECTIVE DATA SUMMARY:  
 
NMRE Daily Assessment  Pt seated in wc with back off backrest to complete scapular exercises (elevation and retraction) prn rest breaks with visual mirror feedback. Pt required right shoulder overload to facilitate moderate left scapular elevation (3 sets x 4 reps). Pt required moderate tactile and verbal cues to facilitate moderate left scapular retraction (2 sets x 4 reps). Pt seated in wc with back off backrest to complete left shoulder abduction and adduction (2 sets x 4 reps) with visual mirror feedback. Pt required max A for left shoulder abduction and adduction; minimal initiation detected for left shoulder adduction and abduction. Pt seated in wc with back off backrest to complete left shoulder internal and external rotation (2 sets x 4 reps) with visual mirror feedback. Pt required moderate assistance for left shoulder internal rotation and pt required stabilization of left hand/wrist and elbow. Pt required max A for left shoulder external rotation and pt required stabilization of left hand/wrist and elbow. Minimal initiation observed for left shoulder external rotation. Pt seated in wc with back off backrest performed left shoulder adduction and abduction with and without self assist with right UE via skateboard. Pt required max A for abduction, and mod A for adduction. MOBILITY/TRANSFERS Daily Assessment Pt eob<>wc with max A stand pivot. ASSESSMENT: 
Pt continues to require max A for functional transfers. Pt continues to demonstrate left UE hemiparesis and continues to demonstrate significant lethargy during therapy session. Pt requires max vcs to keep head up and keep eyes open. Pt continues to demonstrate moderate scapular retraction and elevation. Pt would benefit from continued skilled OT services to increase independence and safety with self care and functional transfers. Progression toward goals: 
[]          Improving appropriately and progressing toward goals [x]          Improving slowly and progressing toward goals []          Not making progress toward goals and plan of care will be adjusted PLAN: 
Patient continues to benefit from skilled intervention to address the above impairments. Continue treatment per established plan of care. Discharge Recommendations: SNF 2/2 progress Further Equipment Recommendations for Discharge: If d/c to Home recommend BSC, shower chair with back   
 
Activity Tolerance: 
Poor Estimated LOS: 11/6/18 Please refer to the flowsheet for vital signs taken during this treatment. After treatment:  
[x]  Patient left in no apparent distress sitting up in wheelchair  
[]  Patient left in no apparent distress in bed 
[x]  Call bell left within reach 
[]  Nursing notified 
[]  Caregiver present 
[]  Bed alarm activated COMMUNICATION/EDUCATION:  
[] Home safety education was provided and the patient/caregiver indicated understanding. [x] Patient/family have participated as able in goal setting and plan of care. [x] Patient/family agree to work toward stated goals and plan of care. [] Patient understands intent and goals of therapy, but is neutral about his/her participation. [] Patient is unable to participate in goal setting and plan of care.  
 
 
Michelle Jain, OTS

## 2018-10-19 NOTE — PROGRESS NOTES
[id number] 
[address] [place] [address] [address] INPATIENT REHABILITATION 
DAILY PROGRESS NOTE Date: 10/19/2018 Name: Deshawn Bartlett Age / Sex: 80 y.o. / male CSN: [id number] MRN: [id number] Admit Date: 10/8/2018 Length of Stay: 11 days Primary Rehab Diagnosis: Impaired Mobility and ADLs secondary to Acute Ischemic Stroke (acute infarct of the posterior superior right basal ganglia and adjacent right corona radiata) with residual left hemiparesis, dysphagia and dysarthria Subjective:  
 
Patient seen and examined. Blood pressure controlled. Patient's Complaint:  
No significant medical complaints Pain Control: no current joint or muscle symptoms, essentially pain-free Objective:  
 
Vital Signs: 
Patient Vitals for the past 24 hrs: 
 BP Temp Pulse Resp SpO2  
10/19/18 0733 161/63 96.8 °F (36 °C) 76 16 93 % 10/19/18 0516 155/65  68    
10/18/18 2157 153/58  69    
10/18/18 2100 126/60 98.6 °F (37 °C) 86 18 97 % 10/18/18 1603 126/51 97.8 °F (36.6 °C) 65 18 95 % Physical Examination: 
GENERAL SURVEY: Patient is awake, alert, oriented x 3, sitting comfortably on the chair, not in acute respiratory distress. HEENT: pink palpebral conjunctivae, anicteric sclerae, no nasoaural discharge, moist oral mucosa NECK: supple, no jugular venous distention, no palpable lymph nodes CHEST/LUNGS: symmetrical chest expansion, good air entry, clear breath sounds HEART: adynamic precordium, good S1 S2, no S3, regular rhythm, no murmurs ABDOMEN: (+) PEG tube in place, flat, bowel sounds appreciated, soft, non-tender EXTREMITIES: (+) amputated distal phalanx of the thumb of the left hand, pink nailbeds, no edema, full and equal pulses, no calf tenderness NEUROLOGICAL EXAM: The patient is awake, alert and oriented x3, able to answer questions fairly appropriately, able to follow 1 and 2 step commands. Able to tell time from the wall clock. Cranial nerves II-XII are grossly intact except for slurred speech and dysphagia. No gross sensory deficit. Motor strength is 4+/5 on the RUE and RLE, 4/5 on the LUE, 4-/5 on the LLE. Current Medications: 
Current Facility-Administered Medications Medication Dose Route Frequency  amLODIPine (NORVASC) tablet 10 mg  10 mg Per G Tube DAILY  melatonin tablet 5 mg  5 mg Per G Tube QHS  guaiFENesin (ROBITUSSIN) 100 mg/5 mL oral liquid 100 mg  100 mg Per G Tube QID  dextromethorphan (DELSYM) 30 mg/5 mL syrup 30 mg  30 mg Per G Tube Q12H  
 traZODone (DESYREL) tablet 50 mg  50 mg Per G Tube QHS  modafinil (PROVIGIL) tablet 100 mg  100 mg Per G Tube DAILY  cholecalciferol (VITAMIN D3) tablet 2,000 Units  2,000 Units Oral BID  hydrALAZINE (APRESOLINE) tablet 20 mg  20 mg Per G Tube Q12H  pneumococcal 23-valent (PNEUMOVAX 23) injection 0.5 mL  0.5 mL IntraMUSCular PRIOR TO DISCHARGE  docusate sodium (COLACE) capsule 100 mg  100 mg Per G Tube BID  
 bisacodyl (DULCOLAX) suppository 10 mg  10 mg Rectal Q48H PRN  
 heparin (porcine) injection 5,000 Units  5,000 Units SubCUTAneous Q12H  
 acetaminophen (TYLENOL) solution 650 mg  650 mg Oral Q4H PRN  
 co-enzyme Q-10 (CO Q-10) capsule 100 mg  100 mg Oral DAILY  vitamin B complex tablet  1 Tab Oral DAILY  aspirin chewable tablet 81 mg  81 mg Per G Tube DAILY  atorvastatin (LIPITOR) tablet 80 mg  80 mg Per G Tube QHS  clopidogrel (PLAVIX) tablet 75 mg  75 mg PEG Tube DAILY  lisinopril (PRINIVIL, ZESTRIL) tablet 40 mg  40 mg Per G Tube DAILY  pantoprazole (PROTONIX) granules for oral suspension 40 mg  40 mg Per G Tube ACB  finasteride (PROSCAR) tablet 5 mg  5 mg Oral QPM  
 levothyroxine (SYNTHROID) tablet 50 mcg  50 mcg Per G Tube 6am  
 metoprolol tartrate (LOPRESSOR) tablet 25 mg  25 mg Per G Tube Q12H  tamsulosin (FLOMAX) capsule 0.4 mg  0.4 mg Oral QPM  
  timolol (TIMOPTIC) 0.5 % ophthalmic solution 1 Drop  1 Drop Both Eyes DAILY Allergies: Allergies Allergen Reactions  Codeine Other (comments) Lab/Data Review: No results found for this or any previous visit (from the past 24 hour(s)). Estimated Glomerular Filtration Rate: On admission, estimated GFR based on a Creatinine of 0.77 mg/dl: 
 Using CKD-EPI = 81.6 mL/min/1.73m2 Using MDRD = 101.6 mL/min/1.73m2 Most recent estimated GFR, based on a Creatinine of 0.92 mg/dl on 10/15/2018: 
 Using CKD-EPI = 74.5 mL/min/1.73m2 Using MDRD = 82.7 mL/min/1.73m2 Assessment:  
 
Primary Rehabilitation Diagnosis 1. Impaired Mobility and ADLs 2. 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 
  
Comorbidities  Urinary frequency R35.0  Nocturia R35.1  Glaucoma H40.9  History of kidney stones Z87.442  Cervical spinal stenosis M48.02  
 Meningioma  D32.9  Hypertensive heart disease without heart failure I11.9  Diastolic dysfunction without heart failure I51.89  
 Gastric ulcer K25.9  Coronary artery disease involving native coronary artery of native heart I25.10  Dyslipidemia E78.5  Chronic obstructive pulmonary disease (COPD)  J44.9  Status post insertion of percutaneous endoscopic gastrostomy (PEG) tube  Z93.1  Hypothyroidism E03.9  Benign prostatic hyperplasia N40.0  Bilateral hearing loss H91.93  
 History of transient ischemic attack (TIA) Z86.73  
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  
  
 
Plan: 1. Justification for continued stay: Good progression towards established rehabilitation goals. 2. Medical Issues being followed closely: 
  [x]  Fall and safety precautions  
  []  Wound Care  
  [x]  Bowel and Bladder Function  
  [x]  Fluid Electrolyte and Nutrition Balance  
  []  Pain Control 3. Issues that 24 hour rehabilitation nursing is following: 
  [x]  Fall and safety precautions  
  []  Wound Care  
  [x]  Bowel and Bladder Function  
  [x]  Fluid Electrolyte and Nutrition Balance  
  []  Pain Control   
  [x]  Assistance with and education on in-room safety with transfers to and from the bed, wheelchair, toilet and shower. 4. Acute rehabilitation plan of care: 
  [x]  Continue current care and rehab. [x]  Physical Therapy [x]  Occupational Therapy [x]  Speech Therapy  
  []  Hold Rehab until further notice 5. Medications: 
  [x]  MAR Reviewed [x]  Continue Present Medications 6. DVT Prophylaxis:   
  []  Lovenox [x]  Unfractionated Heparin  
  []  Coumadin  
  []  NOAC  
  []  BECKY Stockings  
  []  Sequential Compression Device []  None 7. Orders:  
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 
 > Continue: 
  > Aspirin 81 mg via PEG tube once daily in AM 
  > Atorvastatin 80 mg via PEG tube q HS 
  > Coenzyme Q10 100 mg via PEG tube once daily 
  > Clopidogrel 75 mg via PEG tube once daily in PM 
  > Vitamin B complex 1 tab via PEG tube once daily 
 
> Dysphagia as late effect of CVA; S/P Insertion of percutaneous endoscopic gastrostomy (PEG) tube (10/5/2018) 
 > NPO with tube feeding 
 > Jevity 1.5 237 ml via PEG tube 5 times daily (6AM, 10AM, 2PM, 6PM, 10PM) > On 10/15/2018, increased water flush from 100 ml to 150 ml via PEG tube vefore and after each bolus feeding 
 
> Hypertensive heart disease without heart failure 
 > On admission to the ARU, decreased Hydralazine from 50 mg to 25 mg via PEG tube q 8 hr (6AM, 2PM, 10PM) > On 10/10/2018, decrease Hydralazine from 25 mg via PEG tube q 8 hr (6AM, 2PM, 10PM) to 20 mg via PEG tube q 12 hr (9AM, 9PM) > Continue: > Amlodipine 10 mg via PEG tube once daily (change from 9AM to 6AM) > Hydralazine 20 mg via PEG tube q 12 hr (9AM, 9PM) > Lisinopril 40 mg via PEG tube once daily (6AM) 
  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 
 > On 10/11/2018, started Trazodone 50 mg via PEG tube q HS 
 > Continue Trazodone 50 mg via PEG tube q HS 
 
> Neurocognitive modulation 
 > On 10/11/2018, started: 
  > Trazodone 50 mg via PEG tube q HS 
  > Modafinil 100 mg via PEG tube q AM 
 > Continue: 
  > Trazodone 50 mg via PEG tube q HS  
  > Modafinil 100 mg via PEG tube q AM 
 
> Difficulty sleeping 
 > On admission to the ARU, started Melatonin 3 mg via PEG tube q HS 
 > On 10/11/2018, started Trazodone 50 mg via PEG tube q HS 
 > On 10/17/2018, increased Melatonin from 3 mg to 5 mg via PEG tube q HS 
 > Continue: 
  > Melatonin 5 mg via PEG tube q HS 
   > Trazodone 50 mg via PEG tube q HS 
 
> Cough 
 > WBC count (10/9/2018) = 6.8 
 > Chest x-ray (10/12/2018) showed a small left pleural effusion; prominent interstitial lung markers could represent mild infiltration or chronic interstitial lung disease. 
 > On 10/14/2018, patient was given Furosemide 20 mg via PEG tube x 1 dose 
 > No fever or desaturation noted since admission 
 > On 10/15/2018, started: 
  > Dextromethorphan 30 mg via PEG tube q 12 hr 
  > Guaifenesin 100 mg via PEG tube 4 times daily 
 > Continue: 
  > Dextromethorphan 30 mg via PEG tube q 12 hr 
  > Guaifenesin 100 mg via PEG tube 4 times daily 8. Patient's progress in rehabilitation and medical issues discussed with the patient. All questions answered to the best of my ability. Care plan discussed with patient and nurse. Signed:    Lenora Saeed MD 
  October 19, 2018

## 2018-10-19 NOTE — PROGRESS NOTES
Problem: Dysphagia (Adult) Goal: *Speech Goal: (INSERT TEXT) Long term goals: 1. Patient will tolerate small amounts of PO using safe swallowing techniques without overt s/s of aspiration in 4/5 trials. 2. Patient will perform oral/pharyngeal strengthening exercises with supervision. 3. Patient will participate in laryngeal strengthening exercises and swallowing maneuvers, min cues - supervison. 4. Patient will recall 3 words after 5 minutes, min assist-supervision. 5. Patient will perform functional problem solving and reasoning tasks with supervision. Short term goals (by 10/23/18): 1. Patient will tolerate small amounts of PO puree with the SLP using safe swallowing techniques without overt s/s of aspiration in 4/5 trials. 2. Patient will perform oral/pharyngeal strengthening exercises with mod assist/cues. 3. Patient will participate in laryngeal strengthening exercises and swallowing maneuvers, mod cues. 4. Patient will recall 3 words after 5 minutes, mod-min assist. 
5. Patient will perform functional problem solving and reasoning tasks with 75-85% accuracy. Speech language pathology treatment Patient: Chase Lennon (81 y.o. male) Date: 10/19/2018 Diagnosis: R CVA Acute ischemic stroke ([facility] [address] Acute ischemic stroke ([facility] [address] SUBJECTIVE:  
Patient stated I sure couldn't go back to sleep. OBJECTIVE:  
Mental Status: 
Patient was drowsy, but participated well in treatment tasks. Treatment & Interventions:  
Mr. Winter Mendoza was seen in his room for a thirty minute speech therapy session this morning. The following treatment activities were presented: Motor Speech/Dysphagia:  
Oral exercises:  Reviewed x 5 each Patient still with significant L facial and tongue weakness Laryngeal valvin-7 repetitions with vocal fold closure between (breathy) Vowel prolongations:  8-18 seconds with strong cough after several 
 
 Response & Tolerance to Activities:Mr. Coleen Ormond continues to be very fatigued due to poor sleep and exertion with OT and PT. Pain: 
Pain Scale 1: Numeric (0 - 10) No report of pain After treatment:  
[x]       Patient left in no apparent distress sitting up in chair 
[]       Patient left in no apparent distress in bed 
[x]       Call bell left within reach 
[]       Nursing notified 
[]       Caregiver present 
[]       Bed alarm activated ASSESSMENT:  
Progression toward goals: 
[]       Improving appropriately and progressing toward goals [x]       Improving slowly and progressing toward goals 
[]       Not making progress toward goals and plan of care will be adjusted PLAN:  
Patient continues to benefit from skilled intervention to address the above impairments. Continue treatment per established plan of care. Discharge Recommendations:  Home Health Estimated LOS: Through 11/6/18 [facility] Time Calculation: 30 mins

## 2018-10-20 PROCEDURE — 97535 SELF CARE MNGMENT TRAINING: CPT

## 2018-10-20 PROCEDURE — 74011250637 HC RX REV CODE- 250/637: Performed by: INTERNAL MEDICINE

## 2018-10-20 PROCEDURE — 97112 NEUROMUSCULAR REEDUCATION: CPT

## 2018-10-20 PROCEDURE — 97530 THERAPEUTIC ACTIVITIES: CPT

## 2018-10-20 PROCEDURE — 74011250636 HC RX REV CODE- 250/636: Performed by: INTERNAL MEDICINE

## 2018-10-20 PROCEDURE — 65310000000 HC RM PRIVATE REHAB

## 2018-10-20 RX ADMIN — LISINOPRIL 40 MG: 40 TABLET ORAL at 06:20

## 2018-10-20 RX ADMIN — GUAIFENESIN 100 MG: 100 SOLUTION ORAL at 17:22

## 2018-10-20 RX ADMIN — GUAIFENESIN 100 MG: 100 SOLUTION ORAL at 14:26

## 2018-10-20 RX ADMIN — DOCUSATE SODIUM 100 MG: 100 CAPSULE, LIQUID FILLED ORAL at 08:57

## 2018-10-20 RX ADMIN — LEVOTHYROXINE SODIUM 50 MCG: 50 TABLET ORAL at 06:20

## 2018-10-20 RX ADMIN — VITAMIN D, TAB 1000IU (100/BT) 2000 UNITS: 25 TAB at 08:57

## 2018-10-20 RX ADMIN — PANTOPRAZOLE SODIUM 40 MG: 40 GRANULE, DELAYED RELEASE ORAL at 06:32

## 2018-10-20 RX ADMIN — HEPARIN SODIUM 5000 UNITS: 5000 INJECTION, SOLUTION INTRAVENOUS; SUBCUTANEOUS at 06:21

## 2018-10-20 RX ADMIN — MELATONIN TAB 5 MG 5 MG: 5 TAB at 21:00

## 2018-10-20 RX ADMIN — Medication 100 MG: at 08:57

## 2018-10-20 RX ADMIN — HYDRALAZINE HYDROCHLORIDE 20 MG: 10 TABLET, FILM COATED ORAL at 08:57

## 2018-10-20 RX ADMIN — CLOPIDOGREL BISULFATE 75 MG: 75 TABLET, FILM COATED ORAL at 21:00

## 2018-10-20 RX ADMIN — TAMSULOSIN HYDROCHLORIDE 0.4 MG: 0.4 CAPSULE ORAL at 17:23

## 2018-10-20 RX ADMIN — DEXTROMETHORPHAN 30 MG: 30 SUSPENSION, EXTENDED RELEASE ORAL at 21:59

## 2018-10-20 RX ADMIN — HEPARIN SODIUM 5000 UNITS: 5000 INJECTION, SOLUTION INTRAVENOUS; SUBCUTANEOUS at 17:22

## 2018-10-20 RX ADMIN — TIMOLOL MALEATE 1 DROP: 5 SOLUTION OPHTHALMIC at 08:58

## 2018-10-20 RX ADMIN — METOPROLOL TARTRATE 25 MG: 25 TABLET ORAL at 08:58

## 2018-10-20 RX ADMIN — METOPROLOL TARTRATE 25 MG: 25 TABLET ORAL at 21:00

## 2018-10-20 RX ADMIN — DEXTROMETHORPHAN 30 MG: 30 SUSPENSION, EXTENDED RELEASE ORAL at 08:56

## 2018-10-20 RX ADMIN — FINASTERIDE 5 MG: 5 TABLET, FILM COATED ORAL at 17:22

## 2018-10-20 RX ADMIN — ASPIRIN 81 MG CHEWABLE TABLET 81 MG: 81 TABLET CHEWABLE at 08:57

## 2018-10-20 RX ADMIN — VITAMIN D, TAB 1000IU (100/BT) 2000 UNITS: 25 TAB at 17:22

## 2018-10-20 RX ADMIN — DOCUSATE SODIUM 100 MG: 100 CAPSULE, LIQUID FILLED ORAL at 17:22

## 2018-10-20 RX ADMIN — Medication 1 TABLET: at 08:57

## 2018-10-20 RX ADMIN — GUAIFENESIN 100 MG: 100 SOLUTION ORAL at 09:00

## 2018-10-20 RX ADMIN — HYDRALAZINE HYDROCHLORIDE 20 MG: 10 TABLET, FILM COATED ORAL at 22:15

## 2018-10-20 RX ADMIN — MODAFINIL 100 MG: 100 TABLET ORAL at 08:58

## 2018-10-20 RX ADMIN — ATORVASTATIN CALCIUM 80 MG: 40 TABLET, FILM COATED ORAL at 21:00

## 2018-10-20 RX ADMIN — GUAIFENESIN 100 MG: 100 SOLUTION ORAL at 21:59

## 2018-10-20 RX ADMIN — TRAZODONE HYDROCHLORIDE 50 MG: 50 TABLET ORAL at 21:00

## 2018-10-20 RX ADMIN — AMLODIPINE BESYLATE 10 MG: 10 TABLET ORAL at 06:20

## 2018-10-20 NOTE — PROGRESS NOTES
Problem: Mobility Impaired (Adult and Pediatric) Goal: *Therapy Goal (Edit Goal, Insert Text) Physical Therapy Goals Initiated 10/9/2018, updated 10/15/2018, and to be accomplished within 7 day(s) 10/22/2018 1. Patient will move from supine to sit and sit to supine , scoot up and down and roll side to side in bed with moderate assistance consistently . 2.  Patient will transfer from bed to chair and chair to bed with moderate assistance  using the least restrictive device consistently. 3.  Patient will perform sit to stand with moderate assistance consistently without pushing to left . 4. Patient will ambulate with maximal assistance for 5 feet with the least restrictive device. 5.  Patient will perform 100 ft of wheelchair mobility with modified technique min assist for steering and negotiation of obstacles. 6.  Patient will be able to maintain sitting balance without support for at least 5 minutes with verbal cues only for maintaining midline/upright position for ease of transfers consistently throughout the day. Physical Therapy Goals Initiated 10/9/2018 and to be accomplished within 28 day(s) on 11/6/2018 1. Patient will move from supine to sit and sit to supine , scoot up and down and roll side to side in bed with supervision/set-up. 2.  Patient will transfer from bed to chair and chair to bed with supervision/set-up using the least restrictive device. 3.  Patient will perform sit to stand with supervision/set-up. 4.  Patient will ambulate with minimal assistance/contact guard assist for 50 feet with the least restrictive device. 5.  Patient will ascend/descend 4 stairs with 2 handrail(s) with minimal assistance/contact guard assist. 
6.  Patient will perform 150 ft of wheelchair mobility at modified independent level. 7.  Patient will demonstrate improved postural control for ease of functional mobility as demonstrated by PASS score >16/36 Outcome: Progressing Towards Goal 
physical Therapy TREATMENT Patient: Narendra De Souza (8 y.o. male) Date: 10/20/2018 Diagnosis: R CVA Acute ischemic stroke ([facility] [address] Acute ischemic stroke ([facility] [address] Precautions: Aspiration, Fall(Head of bed 30-45 degrees at all times per speech therapist) Chart, physical therapy assessment, plan of care and goals were reviewed. Time In: 830 Time Out: 930 Patient Seen For: Balance activities; Patient education;Transfer training; Wheelchair mobility; Therapeutic exercise Pain: 
Pt pain was reported as  0/10 pre-treatment. Pt pain was reported as 0/10 post-treatment. Intervention: n/a Patient identified with name and : yes SUBJECTIVE:  
 
\"I need help to get dressed\". Pt c/o coughing fit at night. OBJECTIVE DATA SUMMARY:  
Objective:  
 
GROSS ASSESSMENT Daily Assessment BED/MAT MOBILITY Daily Assessment Rolling to R with mod assist and vc's/mc's for increased initiation and use of LUE/LE. Roll to L with min assist. 
Pt able to bridge to clear hips minimally off mattress to assist with lower body dressing with PT providing manual stabilization to facilitate wb'ing through LLE. Sup->sit with max assist from L S/L position. Rolling Right : 3 (Moderate assistance ) Rolling Left : 4 (Minimal assistance) Supine to Sit : 2 (Maximal assistance) TRANSFERS Daily Assessment Sit<->stand with max assist, NDT for RUE over LUE which is placed on LLE for improved wb'ing during trf. L knee block and cues for increased fwd wt shift and once standing increased trunk/LLE extension. Heavy cues to promote RUE reaching back to transfer surface during eccentric lowering. STS trials x4. Stand pivot with max assist, L knee block, minimal R foot clearance to step pivot, requiring max assist to step LLE back towards transfer surface. Cues to maintain upright posture and eyes open during transfer. SPT trials x 3. Transfer Type: SPT without device Transfer Assistance : 2 (Maximal assistance) Sit to Stand Assistance: Maximum assistance Car Transfers: Not tested GAIT Daily Assessment STEPS or STAIRS Daily Assessment BALANCE Daily Assessment Sitting - Static: Fair (occasional) Sitting - Dynamic: Fair (occasional) Standing - Static: Poor Standing - Dynamic : Impaired WHEELCHAIR MOBILITY Daily Assessment Increased w/c propulsion distance this session vs. Previously. Pt did require close supervision and vc's for safety. Pt tendency to veer to the right causing one instance in which he did not avoid object in the shi without min assist.  Increased difficulty negotiating L vs. R turns in w/c. Supervision and reminders for brake management bilaterally. Able to Propel (ft): 70 feet Functional Level: 2 Curbs/Ramps Assist Required (FIM Score): 0 (Not tested) Wheelchair Setup Assist Required : 1 (Dependent/total assistance) Wheelchair Management: Manages right brake;Manages left brake THERAPEUTIC EXERCISES Daily Assessment Neuro Re-Education: 
-Maintained sitting EOB with RUE support and supervision x ~5 minutes. -Sitting edge of mat with min assist to facilitate neutral pelvic alignment and gradual progression to supervision assist.  Pt maintains static sitting ~7 minutes. -Dynamic sitting balance with CG to occasional min assist, OT facilitating LUE wb'ing during RUE reaching tasks crossing midline and reaching laterally to Right. Pt with (+) LOB during left reaching crossing midline requiring min assist to correct.   Reaching tasks performed ~5 minutes consecutively and intermittently throughout session when requesting Kleenex. 
-Standing balance in // bars with mod/max assist x1, OT facilitating LUE wb'ing, PT providing L knee block and manual stabilization at L hip to promote improved erect trunk posture and vc's for increased L glute/quad activation for standing stability. Pt progressed to minimal lateral wt shifting in standing with mod assist when shifted to RLE, max assist when shifted towards LLE. Total time in standing 4 minutes. ASSESSMENT: 
Pt co-treated today with OT to promote improved safety and functional use of UE and LE during functional mobility/transfers. Pt demonstrates improving w/c mobility, however continues to require further training to improve obstacle negotiation and negotiation in a straight path using tyrell-technique. Sitting balance and tolerance appears improved from previous sessions, however remains with fair trunk control and poor standing balance. Standing tolerance and transfers remain mod to max assist and require further progression to promote ability to progress safely towards pre-gait activities. Progression toward goals: 
[]      Improving appropriately and progressing toward goals [x]      Improving slowly and progressing toward goals 
[]      Not making progress toward goals and plan of care will be adjusted PLAN: 
Patient continues to benefit from skilled intervention to address the above impairments. Continue treatment per established plan of care. Discharge Recommendations:  Home Health and [place] Further Equipment Recommendations for Discharge:  Wheelchair with wheelchair cushion, swing away armrests, brake extenders, arm tray Estimated LOS:4 weeks Activity Tolerance:  
fair Please refer to the flowsheet for vital signs taken during this treatment. After treatment:  
Patient left in room, seated in w/c with L foot rest and lap tray. Needs in reach and family at bedside. Cindy Quevedo, PT 
10/20/2018

## 2018-10-20 NOTE — ROUTINE PROCESS
SHIFT CHANGE NOTE FOR Yvette Galeano Bedside and Verbal shift change report given to Sae Pichardo RN (oncoming nurse) by Errol Wayne RN (offgoing nurse). Report included the following information SBAR, Kardex, MAR and Recent Results. Situation: 
 Code Status: DNR Reason for Admission: Right ischemic stroke Hospital Day: 12 Problem List:  
Hospital Problems  Date Reviewed: 10/19/2018 Codes Class Noted POA History of transient ischemic attack (TIA) ICD-10-CM: M51.57 
ICD-9-CM: V12.54  Unknown Yes Overview Signed 10/9/2018  3:28 PM by Denzel Francisco MD  
  2x Vitamin D deficiency (Chronic) ICD-10-CM: E55.9 ICD-9-CM: 268.9  10/9/2018 Yes Overview Signed 10/10/2018  1:59 PM by Denzel Francisco MD  
  Vitamin D 25-Hydroxy (10/10/2018) = 22.5 Hypertensive heart disease without heart failure (Chronic) ICD-10-CM: I11.9 ICD-9-CM: 402.90  Unknown Yes Status post [facility] ICD-10-CM: Z93.1 ICD-9-CM: V44.1  10/5/2018 Yes * (Principal) Acute ischemic stroke ([facility] ICD-10-CM: I63.9 ICD-9-CM: 434.91  10/2/2018 Yes Overview Signed 10/8/2018  6:32 PM by Denzel Francisco MD  
  Acute Ischemic Stroke (acute infarct of the posterior superior right basal ganglia and adjacent right corona radiata) with residual left hemiparesis, dysphagia and dysarthria Impaired mobility and ADLs ICD-10-CM: Z74.09 
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 
 Past Medical History:  
Past Medical History:  
Diagnosis Date  Acute ischemic stroke ([facility] 10/2/2018 Acute Ischemic Stroke (acute infarct of the posterior superior right basal ganglia and adjacent right corona radiata) with residual left hemiparesis, dysphagia and dysarthria  Benign prostatic hyperplasia  Bilateral hearing loss  Cervical spinal stenosis 10/2/2018  Chronic obstructive pulmonary disease (COPD) ([facility]  Coronary artery disease involving native coronary artery of native heart  Diastolic dysfunction without heart failure  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) 10/2/2018  Dyslipidemia  Dysphagia as late effect of cerebrovascular accident (CVA) 10/2/2018  Gastric ulcer  Glaucoma  Hemiparesis affecting left side as late effect of cerebrovascular accident (CVA) ([facility] 10/2/2018  History of kidney stones  History of transient ischemic attack (TIA) 2x  Hypertensive heart disease without heart failure  Hypothyroidism  Meningioma ([facility] 10/3/2018 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  
 
Assessment: 
? Changes in Assessment throughout shift: none ? Patient has central line: no Reasons if yes: Insertion date: Last dressing date: 
? Patient has Suarez Cath: no Reasons if yes: Insertion date: 
 
? Last Vitals: 
  
Vitals:  
 10/19/18 6988 10/19/18 1557 10/19/18 1957 10/19/18 2258 BP: 161/63 144/60 124/57 126/66 Pulse: 76 66 69 70 Resp: 16 17  18 Temp: 96.8 °F (36 °C) 96.8 °F (36 °C)  98.4 °F (36.9 °C) SpO2: 93% 91%  95% Weight:      
Height:      
 
 
? PAIN Pain Assessment Pain Intensity 1: 0 (10/20/18 0331) Pain Intensity 1: 2 (12/29/14 1105) Pain Location 1: Leg Pain Location 1: Abdomen Pain Intervention(s) 1: Medication (see MAR) Pain Intervention(s) 1: Medication (see MAR) Patient Stated Pain Goal: 0 Patient Stated Pain Goal: 0 
o Intervention effective: yes   
o Other actions taken for pain: turn reposition ? Skin Assessment Skin color Skin Color: Ecchymosis (comment)(scattered over body) Condition/Temperature Skin Condition/Temp: Warm, Dry Integrity Skin Integrity: Other (comment)(pegtube) Turgor Turgor: Non-tenting Weekly Pressure Ulcer Documentation  Pressure  Injury Documentation: No Pressure Injury Noted-Pressure Ulcer Prevention Initiated Wound Prevention & Protection Methods Orientation of wound Orientation of Wound Prevention: Posterior Location of Prevention Location of Wound Prevention: Buttocks, Back, Heel, Sacrum/Coccyx, Elbow Dressing Present Dressing Present : Yes Dressing Status Dressing Status: Intact Wound Offloading Wound Offloading (Prevention Methods): Bed, pressure reduction mattress, Foam silicone, Repositioning, Pillows, Turning ? INTAKE/OUPUT Date 10/19/18 0700 - 10/20/18 2546 10/20/18 0700 - 10/21/18 1166 Shift 5200-0543 6084-1693 24 Hour Total 7253-9228 5279-0919 24 Hour Total  
INTAKE  
P.O.  0 0     
  P. O.  0 0 NG/ 200 110 Water Flush Volume (mL) (PEG/Gastrostomy Tube 10/06/18)  Medication Volume (PEG/Gastrostomy Tube 10/06/18)  90 90 Intake (ml) (PEG/Gastrostomy Tube 10/06/18) 9353 099 6049 Shift Total(mL/kg) 9142(41.3) [id number]) [id number])     
OUTPUT Urine(mL/kg/hr) 150(0.2)  150 Urine Voided 150  150 Urine Occurrence(s) 4 x 4 x 8 x Stool Stool Occurrence(s) 0 x 0 x 0 x Shift Total(mL/kg) 150(2.2)  150(2.2)  694 58 60 Weight (kg) 67.6 67.6 67.6 67.6 67.6 67.6 Recommendations: 1. Patient needs and requests: Assist with ADL's 2. Diet: NPO with bolus tube feedings 3. Pending tests/procedures: none 4. Functional Level/Equipment: w/c 
 
5. Estimated Discharge Date: TBD Posted on Whiteboard in Patients Room: yes HEALS Safety Check A safety check occurred in the patient's room between off going nurse and oncoming nurse listed above. The safety check included the below items Area Items H High Alert Medications ? Verify all high alert medication drips (heparin, PCA, etc.) E Equipment ? Suction is set up for ALL patients (with sonia) ? Red plugs utilized for all equipment (IV pumps, etc.) ? WOWs wiped down at end of shift. ? Room stocked with oxygen, suction, and other unit-specific supplies A Alarms ? Bed alarm is set for fall risk patients ? Ensure chair alarm is in place and activated if patient is up in a chair L Lines ? Check IV for any infiltration ? Suarez bag is empty if patient has a Suarez ? Tubing and IV bags are labeled Alexander Jani Safety ? Room is clean, patient is clean, and equipment is clean. ? Hallways are clear from equipment besides carts. ? Fall bracelet on for fall risk patients ? Ensure room is clear and free of clutter ? Suction is set up for ALL patients (with sonia) ? Hallways are clear from equipment besides carts. ? Isolation precautions followed, supplies available outside room, sign posted

## 2018-10-20 NOTE — PROGRESS NOTES
Patients family member assisted patient to the bathroom, w/c was not locked and foot rest was not moved. Transfer was unsafe. Alarm sounded when patient stood, nurse went into the room and found patient sitting on the toilet, family member coming out of bathroom. Patient stated that she rang bell and no one answered. Call bell had not rung for that room, was tested by this nurse and is working. Education provided regarding safe transfers and calling prior to any transfer.

## 2018-10-20 NOTE — PROGRESS NOTES
Problem: Self Care Deficits Care Plan (Adult) Goal: *Therapy Goal (Edit Goal, Insert Text) Occupational Therapy Goals Long Term Goals Initiated 10/9/18 and to be accomplished within 4 week(s) 1. Pt will perform self-feeding with total dependence. 2. Pt will perform grooming with supervision. 3. Pt will perform UB bathing with Min A. 
4. Pt will perform LB bathing with Min A prn AE. 5. Pt will perform tub/shower transfer with Mod A.  
6. Pt will perform UB dressing with Setup. 7. Pt will perform LB dressing with Min A prn AE. 8. Pt will perform toileting task with Min A. 
9. Pt will perform toilet transfer with Mod A. Short Term Goals Initiated 10/16/18 and to be accomplished within 7 day(s) 10/23/18 1. Pt will perform grooming with Setup. 2. Pt will perform UB bathing with Min A. 
3. Pt will perform LB bathing with Min A prn AE. 4. Pt will perform tub/shower transfer with Mod A. 
5. Pt will perform UB dressing with Mod A. 
6. Pt will perform LB dressing with Mod A prn AE. 7. Pt will perform toileting task with Max A. 
8. Pt will perform toilet transfer with Mod A. Outcome: Progressing Towards Goal 
Occupational Therapy TREATMENT Patient: Taina Courtney 80 y.o. Patient identified with name and : yes Date: 10/20/2018 First Tx Session Time In: 830 Time Out[de-identified] 555 Diagnosis: R CVA Acute ischemic stroke ([facility] Precautions: Aspiration, Fall(Head of bed 30-45 degrees at all times per speech therapist) Chart, occupational therapy assessment, plan of care, and goals were reviewed. Pain: 
Pt reports 0/10 pain or discomfort prior to treatment. Pt reports 0/10 pain or discomfort post treatment. Intervention Provided: NA 
 
 
SUBJECTIVE:  
Patient stated Ami Montague had a bad coughing fit last night.  OBJECTIVE DATA SUMMARY:  
 
THERAPEUTIC ACTIVITY Daily Assessment  Pt participated in seated activity on mat to challenge dynamic sitting balance and to increase core strength. Pt reached with right UE across midline to retrieve item and reach away from midline to place on mat. Pt maintained weight bearing through left UE during activity. Min A required during reaching to maintain upright posture. Pt was also required to reach in different planes of movement to retrieve facial tissue when he needed one. Pt sat EOM ~12 minutes. Pt participated in standing activity with parallel bars. Pt stood with PT blocking left knee and having pt weight shift left/right while OT helped pt maintain proper upper alighnment body (head up, shoulders back). Pt stood ~5 minutes requiring Mod/Max A. NMRE Daily Assessment Weight bearing through left UE while seated EOM to facilitate muscle memory during reaching. Pt then performed weight bearing through left UE during standing activity. TOILETING Daily Assessment Toileting Toileting Assistance (FIM Score): 3 (Moderate assistance ) Cues: Verbal cues provided LOWER BODY DRESSING Daily Assessment Lower Body Dressing Dressing Assistance : 2 (Maximal assistance) Leg Crossed Method Used: No 
Position Performed: Long sitting on bed Comments: Required Max A to thread BLE into pants and bring to knees. Blocking left foot, pt bridged to bring pants up over right hip/buttocks. Pt required assistance to bring up over left hip/buttocks. ASSESSMENT: 
Pt presented supine in bed with HOB elevated requiring change of adult brief and LB dressing assistance. Pt seen with PT to facilitate increased safety and improve pt participation with therapy. Pt demonstrates increased sitting tolerance. Progression toward goals: 
[]          Improving appropriately and progressing toward goals [x]          Improving slowly and progressing toward goals 
[]          Not making progress toward goals and plan of care will be adjusted PLAN: 
Patient continues to benefit from skilled intervention to address the above impairments. Continue treatment per established plan of care. Discharge Recommendations:  R[address] Terrell Further Equipment Recommendations for Discharge:  N/A Activity Tolerance: 
Poor Estimated LOS:11/6/18 Please refer to the flowsheet for vital signs taken during this treatment. After treatment:  
[x]  Patient left in no apparent distress sitting up in chair  
[]  Patient left in no apparent distress in bed 
[]  Call bell left within reach 
[]  Nursing notified 
[]  Caregiver present 
[]  Bed alarm activated LISBETH Bridges/HIGINIO

## 2018-10-20 NOTE — PROGRESS NOTES
[address] 
[address] [place] [address], [address] INPATIENT REHABILITATION 
DAILY PROGRESS NOTE Date: 10/20/2018 Name: Cynthia Evans Age / Sex: 80 y.o. / male CSN: [id number] MRN: [id number] Admit Date: 10/8/2018 Length of Stay: 12 days Primary Rehab Diagnosis: Impaired Mobility and ADLs secondary to Acute Ischemic Stroke (acute infarct of the posterior superior right basal ganglia and adjacent right corona radiata) with residual left hemiparesis, dysphagia and dysarthria Subjective: No new issues or problems reported. Blood pressure controlled. Objective:  
 
Vital Signs: 
Patient Vitals for the past 24 hrs: 
 BP Temp Pulse Resp SpO2  
10/20/18 0719 149/61 98.4 °F (36.9 °C) 72 18 94 % 10/20/18 0620 139/62  70    
10/19/18 2258 126/66 98.4 °F (36.9 °C) 70 18 95 % 10/19/18 1957 124/57  69    
10/19/18 1557 144/60 96.8 °F (36 °C) 66 17 91 % Current Medications: 
Current Facility-Administered Medications Medication Dose Route Frequency  amLODIPine (NORVASC) tablet 10 mg  10 mg Per G Tube DAILY  melatonin tablet 5 mg  5 mg Per G Tube QHS  guaiFENesin (ROBITUSSIN) 100 mg/5 mL oral liquid 100 mg  100 mg Per G Tube QID  dextromethorphan (DELSYM) 30 mg/5 mL syrup 30 mg  30 mg Per G Tube Q12H  
 traZODone (DESYREL) tablet 50 mg  50 mg Per G Tube QHS  modafinil (PROVIGIL) tablet 100 mg  100 mg Per G Tube DAILY  cholecalciferol (VITAMIN D3) tablet 2,000 Units  2,000 Units Oral BID  hydrALAZINE (APRESOLINE) tablet 20 mg  20 mg Per G Tube Q12H  pneumococcal 23-valent (PNEUMOVAX 23) injection 0.5 mL  0.5 mL IntraMUSCular PRIOR TO DISCHARGE  docusate sodium (COLACE) capsule 100 mg  100 mg Per G Tube BID  
 bisacodyl (DULCOLAX) suppository 10 mg  10 mg Rectal Q48H PRN  
 heparin (porcine) injection 5,000 Units  5,000 Units SubCUTAneous Q12H  
 acetaminophen (TYLENOL) solution 650 mg  650 mg Oral Q4H PRN  
  co-enzyme Q-10 (CO Q-10) capsule 100 mg  100 mg Oral DAILY  vitamin B complex tablet  1 Tab Oral DAILY  aspirin chewable tablet 81 mg  81 mg Per G Tube DAILY  atorvastatin (LIPITOR) tablet 80 mg  80 mg Per G Tube QHS  clopidogrel (PLAVIX) tablet 75 mg  75 mg PEG Tube DAILY  lisinopril (PRINIVIL, ZESTRIL) tablet 40 mg  40 mg Per G Tube DAILY  pantoprazole (PROTONIX) granules for oral suspension 40 mg  40 mg Per G Tube ACB  finasteride (PROSCAR) tablet 5 mg  5 mg Oral QPM  
 levothyroxine (SYNTHROID) tablet 50 mcg  50 mcg Per G Tube 6am  
 metoprolol tartrate (LOPRESSOR) tablet 25 mg  25 mg Per G Tube Q12H  tamsulosin (FLOMAX) capsule 0.4 mg  0.4 mg Oral QPM  
 timolol (TIMOPTIC) 0.5 % ophthalmic solution 1 Drop  1 Drop Both Eyes DAILY Allergies: Allergies Allergen Reactions  Codeine Other (comments) Lab/Data Review: No results found for this or any previous visit (from the past 24 hour(s)). Estimated Glomerular Filtration Rate: On admission, estimated GFR based on a Creatinine of 0.77 mg/dl: 
 Using CKD-EPI = 81.6 mL/min/1.73m2 Using MDRD = 101.6 mL/min/1.73m2 Most recent estimated GFR, based on a Creatinine of 0.92 mg/dl on 10/15/2018: 
 Using CKD-EPI = 74.5 mL/min/1.73m2 Using MDRD = 82.7 mL/min/1.73m2 Assessment:  
 
Primary Rehabilitation Diagnosis 1. Impaired Mobility and ADLs 2. 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 
  
Comorbidities  Urinary frequency R35.0  Nocturia R35.1  Glaucoma H40.9  History of kidney stones Z87.442  Cervical spinal stenosis M48.02  
 Meningioma  D32.9  Hypertensive heart disease without heart failure I11.9  Diastolic dysfunction without heart failure I51.89  
 Gastric ulcer K25.9  Coronary artery disease involving native coronary artery of native heart I25.10  Dyslipidemia E78.5  Chronic obstructive pulmonary disease (COPD)  J44.9  Status post insertion of percutaneous endoscopic gastrostomy (PEG) tube  Z93.1  Hypothyroidism E03.9  Benign prostatic hyperplasia N40.0  Bilateral hearing loss H91.93  
 History of transient ischemic attack (TIA) Z86.73  
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  
  
 
Plan: 1. Justification for continued stay: Good progression towards established rehabilitation goals. 2. Medical Issues being followed closely: 
  [x]  Fall and safety precautions  
  []  Wound Care  
  [x]  Bowel and Bladder Function  
  [x]  Fluid Electrolyte and Nutrition Balance  
  []  Pain Control 3. Issues that 24 hour rehabilitation nursing is following: 
  [x]  Fall and safety precautions  
  []  Wound Care  
  [x]  Bowel and Bladder Function  
  [x]  Fluid Electrolyte and Nutrition Balance  
  []  Pain Control   
  [x]  Assistance with and education on in-room safety with transfers to and from the bed, wheelchair, toilet and shower. 4. Acute rehabilitation plan of care: 
  [x]  Continue current care and rehab. [x]  Physical Therapy [x]  Occupational Therapy [x]  Speech Therapy  
  []  Hold Rehab until further notice 5. Medications: 
  [x]  MAR Reviewed [x]  Continue Present Medications 6. DVT Prophylaxis:   
  []  Lovenox [x]  Unfractionated Heparin  
  []  Coumadin  
  []  NOAC  
  []  BECKY Stockings  
  []  Sequential Compression Device []  None 7.  Orders:  
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 
 > Continue: 
  > Aspirin 81 mg via PEG tube once daily in AM 
  > Atorvastatin 80 mg via PEG tube q HS 
  > Coenzyme Q10 100 mg via PEG tube once daily 
  > Clopidogrel 75 mg via PEG tube once daily in PM 
 > Vitamin B complex 1 tab via PEG tube once daily 
 
> Dysphagia as late effect of CVA; S/P Insertion of percutaneous endoscopic gastrostomy (PEG) tube (10/5/2018) 
 > NPO with tube feeding 
 > Jevity 1.5 237 ml via PEG tube 5 times daily (6AM, 10AM, 2PM, 6PM, 10PM) > On 10/15/2018, increased water flush from 100 ml to 150 ml via PEG tube vefore and after each bolus feeding 
 
> Hypertensive heart disease without heart failure 
 > On admission to the ARU, decreased Hydralazine from 50 mg to 25 mg via PEG tube q 8 hr (6AM, 2PM, 10PM) > On 10/10/2018, decrease Hydralazine from 25 mg via PEG tube q 8 hr (6AM, 2PM, 10PM) to 20 mg via PEG tube q 12 hr (9AM, 9PM) > On 10/18/2018, Amlodipine 10 mg via PEG tube once daily (changed from 9AM to 6AM) > Continue: 
  > Amlodipine 10 mg via PEG tube once daily (6AM) 
  > Hydralazine 20 mg via PEG tube q 12 hr (9AM, 9PM) > Lisinopril 40 mg via PEG tube once daily (6AM) 
  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 
 > On 10/11/2018, started Trazodone 50 mg via PEG tube q HS 
 > Continue Trazodone 50 mg via PEG tube q HS 
 
> Neurocognitive modulation 
 > On 10/11/2018, started: 
  > Trazodone 50 mg via PEG tube q HS 
  > Modafinil 100 mg via PEG tube q AM 
 > Continue: 
  > Trazodone 50 mg via PEG tube q HS  
  > Modafinil 100 mg via PEG tube q AM 
 
> Difficulty sleeping 
 > On admission to the ARU, started Melatonin 3 mg via PEG tube q HS 
 > On 10/11/2018, started Trazodone 50 mg via PEG tube q HS 
 > On 10/17/2018, increased Melatonin from 3 mg to 5 mg via PEG tube q HS 
 > Continue: 
  > Melatonin 5 mg via PEG tube q HS 
   > Trazodone 50 mg via PEG tube q HS 
 
> Cough 
 > WBC count (10/9/2018) = 6.8 
 > Chest x-ray (10/12/2018) showed a small left pleural effusion; prominent interstitial lung markers could represent mild infiltration or chronic interstitial lung disease. > On 10/14/2018, patient was given Furosemide 20 mg via PEG tube x 1 dose 
 > No fever or desaturation noted since admission 
 > On 10/15/2018, started: 
  > Dextromethorphan 30 mg via PEG tube q 12 hr 
  > Guaifenesin 100 mg via PEG tube 4 times daily 
 > Continue: 
  > Dextromethorphan 30 mg via PEG tube q 12 hr 
  > Guaifenesin 100 mg via PEG tube 4 times daily Signed:    Tyson Cushing, MD 
  October 20, 2018

## 2018-10-20 NOTE — ROUTINE PROCESS
SHIFT CHANGE NOTE FOR Tobias Cuevas Bedside and Verbal shift change report given to Cindy Siddiqui RN (oncoming nurse) by Sushila Nagel RN (offgoing nurse). Report included the following information SBAR, Kardex, MAR and Recent Results. Situation: 
 Code Status: DNR Reason for Admission: Right ischemic stroke Hospital Day: 12 Problem List:  
Hospital Problems  Date Reviewed: 10/20/2018 Codes Class Noted POA History of transient ischemic attack (TIA) ICD-10-CM: H87.47 
ICD-9-CM: V12.54  Unknown Yes Overview Signed 10/9/2018  3:28 PM by Ana Clarke MD  
  2x Vitamin D deficiency (Chronic) ICD-10-CM: E55.9 ICD-9-CM: 268.9  10/9/2018 Yes Overview Signed 10/10/2018  1:59 PM by Ana Clarke MD  
  Vitamin D 25-Hydroxy (10/10/2018) = 22.5 Hypertensive heart disease without heart failure (Chronic) ICD-10-CM: I11.9 ICD-9-CM: 402.90  Unknown Yes Status post [facility] ICD-10-CM: Z93.1 ICD-9-CM: V44.1  10/5/2018 Yes * (Principal) Acute ischemic stroke ([facility] ICD-10-CM: I63.9 ICD-9-CM: 434.91  10/2/2018 Yes Overview Signed 10/8/2018  6:32 PM by Ana Clarke MD  
  Acute Ischemic Stroke (acute infarct of the posterior superior right basal ganglia and adjacent right corona radiata) with residual left hemiparesis, dysphagia and dysarthria Impaired mobility and ADLs ICD-10-CM: Z74.09 
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 
 Past Medical History:  
Past Medical History:  
Diagnosis Date  Acute ischemic stroke ([facility] 10/2/2018 Acute Ischemic Stroke (acute infarct of the posterior superior right basal ganglia and adjacent right corona radiata) with residual left hemiparesis, dysphagia and dysarthria  Benign prostatic hyperplasia  Bilateral hearing loss  Cervical spinal stenosis 10/2/2018  Chronic obstructive pulmonary disease (COPD) ([facility]  Coronary artery disease involving native coronary artery of native heart  Diastolic dysfunction without heart failure  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) 10/2/2018  Dyslipidemia  Dysphagia as late effect of cerebrovascular accident (CVA) 10/2/2018  Gastric ulcer  Glaucoma  Hemiparesis affecting left side as late effect of cerebrovascular accident (CVA) ([facility] 10/2/2018  History of kidney stones  History of transient ischemic attack (TIA) 2x  Hypertensive heart disease without heart failure  Hypothyroidism  Meningioma ([facility] 10/3/2018 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  
 
Assessment: 
? Changes in Assessment throughout shift: none ? Patient has central line: no Reasons if yes: Insertion date: Last dressing date: 
? Patient has Suarez Cath: no Reasons if yes: Insertion date: 
 
? Last Vitals: 
  
Vitals:  
 10/19/18 2258 10/20/18 0881 10/20/18 0719 10/20/18 1554 BP: 126/66 139/62 149/61 139/59 Pulse: 70 70 72 69 Resp: 18 18 19 Temp: 98.4 °F (36.9 °C)  98.4 °F (36.9 °C) 97 °F (36.1 °C) SpO2: 95%  94% 94% Weight:      
Height:      
 
 
? PAIN Pain Assessment Pain Intensity 1: 0 (10/20/18 1210) Pain Intensity 1: 2 (12/29/14 1105) Pain Location 1: Leg Pain Location 1: Abdomen Pain Intervention(s) 1: Medication (see MAR) Pain Intervention(s) 1: Medication (see MAR) Patient Stated Pain Goal: 0 Patient Stated Pain Goal: 0 
o Intervention effective: yes   
o Other actions taken for pain: turn reposition ? Skin Assessment Skin color Skin Color: Ecchymosis (comment) Condition/Temperature Skin Condition/Temp: Warm Integrity Skin Integrity: Other (comment) Turgor Turgor: Non-tenting Weekly Pressure Ulcer Documentation  Pressure  Injury Documentation: No Pressure Injury Noted-Pressure Ulcer Prevention Initiated Wound Prevention & Protection Methods Orientation of wound Orientation of Wound Prevention: Posterior Location of Prevention Location of Wound Prevention: Sacrum/Coccyx Dressing Present Dressing Present : No 
Dressing Status Dressing Status: Intact Wound Offloading Wound Offloading (Prevention Methods): Bed, pressure redistribution/air ? INTAKE/OUPUT Date 10/19/18 0700 - 10/20/18 3792 10/20/18 0700 - 10/21/18 5192 Shift 6816-1981 8797-5135 24 Hour Total 4689-0973 4243-8588 24 Hour Total  
INTAKE  
P.O.  0 0     
  P. O.  0 0 NG/ 127 921 Water Flush Volume (mL) (PEG/Gastrostomy Tube 10/06/18)  900  900 Medication Volume (PEG/Gastrostomy Tube 10/06/18)  150 150 240  240 Intake (ml) (PEG/Gastrostomy Tube 10/06/18) 1620 1074 2694 1620  1620 Shift Total(mL/kg) [id number]) 7429(56) 9867(97.2) 3971(74.3)  5931(75.5) OUTPUT Urine(mL/kg/hr) 150(0.2)  150(0.1) 300  300 Urine Voided 150  150 300  300 Urine Occurrence(s) 4 x 4 x 8 x 1 x  1 x Stool Stool Occurrence(s) 0 x 0 x 0 x 0 x  0 x Shift Total(mL/kg) 150(2.2)  150(2.2) 300(4.4)  300(4.4) NET 8014 2164 7203 2257 7052 Weight (kg) 67.6 67.6 67.6 67.6 67.6 67.6 Recommendations: 1. Patient needs and requests: Assist with ADL's 2. Diet: NPO with bolus tube feedings 3. Pending tests/procedures: none 4. Functional Level/Equipment: w/c 
 
5. Estimated Discharge Date: TBD Posted on Whiteboard in Patients Room: yes HEALS Safety Check A safety check occurred in the patient's room between off going nurse and oncoming nurse listed above. The safety check included the below items Area Items H High Alert Medications ? Verify all high alert medication drips (heparin, PCA, etc.) E Equipment ? Suction is set up for ALL patients (with sonia) ? Red plugs utilized for all equipment (IV pumps, etc.) ? WOWs wiped down at end of shift. ? Room stocked with oxygen, suction, and other unit-specific supplies A Alarms ? Bed alarm is set for fall risk patients ? Ensure chair alarm is in place and activated if patient is up in a chair L Lines ? Check IV for any infiltration ? Suarez bag is empty if patient has a Suarez ? Tubing and IV bags are labeled Aba Hoof Safety ? Room is clean, patient is clean, and equipment is clean. ? Hallways are clear from equipment besides carts. ? Fall bracelet on for fall risk patients ? Ensure room is clear and free of clutter ? Suction is set up for ALL patients (with sonia) ? Hallways are clear from equipment besides carts. ? Isolation precautions followed, supplies available outside room, sign posted

## 2018-10-21 PROCEDURE — 65310000000 HC RM PRIVATE REHAB

## 2018-10-21 PROCEDURE — 97110 THERAPEUTIC EXERCISES: CPT

## 2018-10-21 PROCEDURE — 74011250636 HC RX REV CODE- 250/636: Performed by: INTERNAL MEDICINE

## 2018-10-21 PROCEDURE — 97112 NEUROMUSCULAR REEDUCATION: CPT

## 2018-10-21 PROCEDURE — 74011250637 HC RX REV CODE- 250/637: Performed by: INTERNAL MEDICINE

## 2018-10-21 RX ADMIN — METOPROLOL TARTRATE 25 MG: 25 TABLET ORAL at 20:50

## 2018-10-21 RX ADMIN — LISINOPRIL 40 MG: 40 TABLET ORAL at 05:10

## 2018-10-21 RX ADMIN — DEXTROMETHORPHAN 30 MG: 30 SUSPENSION, EXTENDED RELEASE ORAL at 08:58

## 2018-10-21 RX ADMIN — TRAZODONE HYDROCHLORIDE 50 MG: 50 TABLET ORAL at 20:50

## 2018-10-21 RX ADMIN — MELATONIN TAB 5 MG 5 MG: 5 TAB at 20:50

## 2018-10-21 RX ADMIN — DEXTROMETHORPHAN 30 MG: 30 SUSPENSION, EXTENDED RELEASE ORAL at 20:49

## 2018-10-21 RX ADMIN — VITAMIN D, TAB 1000IU (100/BT) 2000 UNITS: 25 TAB at 17:31

## 2018-10-21 RX ADMIN — ATORVASTATIN CALCIUM 80 MG: 40 TABLET, FILM COATED ORAL at 20:50

## 2018-10-21 RX ADMIN — Medication 1 TABLET: at 08:59

## 2018-10-21 RX ADMIN — PANTOPRAZOLE SODIUM 40 MG: 40 GRANULE, DELAYED RELEASE ORAL at 05:11

## 2018-10-21 RX ADMIN — FINASTERIDE 5 MG: 5 TABLET, FILM COATED ORAL at 17:31

## 2018-10-21 RX ADMIN — GUAIFENESIN 100 MG: 100 SOLUTION ORAL at 14:14

## 2018-10-21 RX ADMIN — HYDRALAZINE HYDROCHLORIDE 20 MG: 10 TABLET, FILM COATED ORAL at 20:50

## 2018-10-21 RX ADMIN — HEPARIN SODIUM 5000 UNITS: 5000 INJECTION, SOLUTION INTRAVENOUS; SUBCUTANEOUS at 17:30

## 2018-10-21 RX ADMIN — Medication 100 MG: at 08:59

## 2018-10-21 RX ADMIN — DOCUSATE SODIUM 100 MG: 100 CAPSULE, LIQUID FILLED ORAL at 08:59

## 2018-10-21 RX ADMIN — AMLODIPINE BESYLATE 10 MG: 10 TABLET ORAL at 05:10

## 2018-10-21 RX ADMIN — GUAIFENESIN 100 MG: 100 SOLUTION ORAL at 20:49

## 2018-10-21 RX ADMIN — HEPARIN SODIUM 5000 UNITS: 5000 INJECTION, SOLUTION INTRAVENOUS; SUBCUTANEOUS at 05:10

## 2018-10-21 RX ADMIN — TIMOLOL MALEATE 1 DROP: 5 SOLUTION OPHTHALMIC at 08:59

## 2018-10-21 RX ADMIN — MODAFINIL 100 MG: 100 TABLET ORAL at 08:59

## 2018-10-21 RX ADMIN — VITAMIN D, TAB 1000IU (100/BT) 2000 UNITS: 25 TAB at 08:58

## 2018-10-21 RX ADMIN — METOPROLOL TARTRATE 25 MG: 25 TABLET ORAL at 08:59

## 2018-10-21 RX ADMIN — HYDRALAZINE HYDROCHLORIDE 20 MG: 10 TABLET, FILM COATED ORAL at 08:58

## 2018-10-21 RX ADMIN — LEVOTHYROXINE SODIUM 50 MCG: 50 TABLET ORAL at 05:10

## 2018-10-21 RX ADMIN — CLOPIDOGREL BISULFATE 75 MG: 75 TABLET, FILM COATED ORAL at 20:50

## 2018-10-21 RX ADMIN — GUAIFENESIN 100 MG: 100 SOLUTION ORAL at 17:30

## 2018-10-21 RX ADMIN — DOCUSATE SODIUM 100 MG: 100 CAPSULE, LIQUID FILLED ORAL at 17:31

## 2018-10-21 RX ADMIN — GUAIFENESIN 100 MG: 100 SOLUTION ORAL at 09:00

## 2018-10-21 RX ADMIN — ASPIRIN 81 MG CHEWABLE TABLET 81 MG: 81 TABLET CHEWABLE at 08:58

## 2018-10-21 RX ADMIN — TAMSULOSIN HYDROCHLORIDE 0.4 MG: 0.4 CAPSULE ORAL at 17:31

## 2018-10-21 NOTE — PROGRESS NOTES
Problem: Self Care Deficits Care Plan (Adult) Goal: *Therapy Goal (Edit Goal, Insert Text) Occupational Therapy Goals Long Term Goals Initiated 10/9/18 and to be accomplished within 4 week(s) 1. Pt will perform self-feeding with total dependence. 2. Pt will perform grooming with supervision. 3. Pt will perform UB bathing with Min A. 
4. Pt will perform LB bathing with Min A prn AE. 5. Pt will perform tub/shower transfer with Mod A.  
6. Pt will perform UB dressing with Setup. 7. Pt will perform LB dressing with Min A prn AE. 8. Pt will perform toileting task with Min A. 
9. Pt will perform toilet transfer with Mod A. Short Term Goals Initiated 10/16/18 and to be accomplished within 7 day(s) 10/23/18 1. Pt will perform grooming with Setup. 2. Pt will perform UB bathing with Min A. 
3. Pt will perform LB bathing with Min A prn AE. 4. Pt will perform tub/shower transfer with Mod A. 
5. Pt will perform UB dressing with Mod A. 
6. Pt will perform LB dressing with Mod A prn AE. 7. Pt will perform toileting task with Max A. 
8. Pt will perform toilet transfer with Mod A. Occupational Therapy TREATMENT Patient: Xiao Paez 80 y.o. Patient identified with name and : yes Date: 10/21/2018 First Tx Session Time In: 11:45 Time Out[de-identified] 12:45 Diagnosis: R CVA Acute ischemic stroke ([facility] Precautions: Aspiration, Fall(Head of bed 30-45 degrees at all times per speech therapist) Chart, occupational therapy assessment, plan of care, and goals were reviewed. Pain: 
Pt reports 0/10 pain or discomfort prior to treatment. Pt reports 0/10 pain or discomfort post treatment. Intervention Provided: na 
 
 
SUBJECTIVE:  
Patient stated I am a little tired.  OBJECTIVE DATA SUMMARY:  
 
THERAPEUTIC EXERCISE Daily Assessment SROM with holding his hands together for ROM exercises 8 reps of 6 exercises. NMRE Daily Assessment  PROM L UE & hand while seated in his wc  
 ASSESSMENT: 
Wife present & brought her newspaper. Pt participated well today with min vc to direction with exercises. \"My eyes are not what they use to be. \" He completed all requested tasks w/o complaint. Progression toward goals: 
[x]          Improving appropriately and progressing toward goals 
[]          Improving slowly and progressing toward goals 
[]          Not making progress toward goals and plan of care will be adjusted PLAN: 
Patient continues to benefit from skilled intervention to address the above impairments. Continue treatment per established plan of care. Discharge Recommendations: To Be Determined Further Equipment Recommendations for Discharge:  N/A Activity Tolerance: 
Fair. OT attempted to tx patient earlier this am & he was sleeping therefore shifted to see him later in the morning. Nursing got him up & into the w/c this am. 
 
Estimated LOS:2 weeks Please refer to the flowsheet for vital signs taken during this treatment. After treatment:  
[x]  Patient left in no apparent distress sitting up in chair  
[]  Patient left in no apparent distress in bed 
[x]  Call bell left within reach 
[]  Nursing notified 
[x]  Caregiver present 
[]  Bed alarm activated COMMUNICATION/EDUCATION:  
[] Home safety education was provided and the patient/caregiver indicated understanding. [x] Patient/family have participated as able in goal setting and plan of care. [] Patient/family agree to work toward stated goals and plan of care. [] Patient understands intent and goals of therapy, but is neutral about his/her participation. [] Patient is unable to participate in goal setting and plan of care.  
 
 
Michael Hernandez, OTR/L

## 2018-10-21 NOTE — ROUTINE PROCESS
SHIFT CHANGE NOTE FOR Zoraida Colon Bedside and Verbal shift change report given to Early Kaz (oncoming nurse) by Nixon Somers RN (offgoing nurse). Report included the following information SBAR, Kardex, MAR and Recent Results. Situation: 
 Code Status: DNR Reason for Admission: Right ischemic stroke Hospital Day: 13 Problem List:  
Hospital Problems  Date Reviewed: 10/21/2018 Codes Class Noted POA History of transient ischemic attack (TIA) ICD-10-CM: M79.62 
ICD-9-CM: V12.54  Unknown Yes Overview Signed 10/9/2018  3:28 PM by Adina Harrison MD  
  2x Vitamin D deficiency (Chronic) ICD-10-CM: E55.9 ICD-9-CM: 268.9  10/9/2018 Yes Overview Signed 10/10/2018  1:59 PM by Adina Harrison MD  
  Vitamin D 25-Hydroxy (10/10/2018) = 22.5 Hypertensive heart disease without heart failure (Chronic) ICD-10-CM: I11.9 ICD-9-CM: 402.90  Unknown Yes Status [facility] ICD-10-CM: Z93.1 ICD-9-CM: V44.1  10/5/2018 Yes * (Principal) Acute [facility] ICD-10-CM: I63.9 ICD-9-CM: 434.91  10/2/2018 Yes Overview Signed 10/8/2018  6:32 PM by Adina Harrison MD  
  Acute Ischemic Stroke (acute infarct of the posterior superior right basal ganglia and adjacent right corona radiata) with residual left hemiparesis, dysphagia and dysarthria Impaired mobility and ADLs ICD-10-CM: Z74.09 
ICD-9-CM: 799.89  10/2/2018 Yes Hemiparesis affecting left side as late effect of cerebrovascular accident (CVA) ([facility] ICD-10-CM: A53.872 ICD-9-CM: 438.20  10/2/2018 Yes Dysphagia as late effect of cerebrovascular accident (CVA) ICD-10-CM: L52.097 ICD-9-CM: 438.82  10/2/2018 Yes 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) ICD-10-CM: A39.363 ICD-9-CM: 438.13  10/2/2018 Yes Traumatic amputation of left thumb ICD-10-CM: V46.546Q ICD-9-CM: 885.0  1/1/2011 Yes Background: 
 Past Medical History:  
Past Medical History:  
Diagnosis Date  Acute ischemic stroke ([facility] 10/2/2018 Acute Ischemic Stroke (acute infarct of the posterior superior right basal ganglia and adjacent right corona radiata) with residual left hemiparesis, dysphagia and dysarthria  Benign prostatic hyperplasia  Bilateral hearing loss  Cervical spinal stenosis 10/2/2018  Chronic obstructive pulmonary disease (COPD) ([facility]  Coronary artery disease involving native coronary artery of native heart  Diastolic dysfunction without heart failure  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) 10/2/2018  Dyslipidemia  Dysphagia as late effect of cerebrovascular accident (CVA) 10/2/2018  Gastric ulcer  Glaucoma  Hemiparesis affecting left side as late effect of cerebrovascular accident (CVA) ([facility] 10/2/2018  History of kidney stones  History of transient ischemic attack (TIA) 2x  Hypertensive heart disease without heart failure  Hypothyroidism  Meningioma ([facility] 10/3/2018 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  
 
Assessment: 
? Changes in Assessment throughout shift: none ? Patient has central line: no Reasons if yes: Insertion date: Last dressing date: 
? Patient has Suarez Cath: no Reasons if yes: Insertion date: 
 
? Last Vitals: 
  
Vitals:  
 10/20/18 2215 10/21/18 0510 10/21/18 4212 10/21/18 1604 BP: 136/60 142/66 169/63 145/74 Pulse: 72 77 71 75 Resp: 18  20 19 Temp: 98.5 °F (36.9 °C)  98.1 °F (36.7 °C) 98.9 °F (37.2 °C) SpO2: 98%  93% 95% Weight:      
Height:      
 
 
? PAIN Pain Assessment Pain Intensity 1: 0 (10/21/18 1553) Pain Intensity 1: 2 (12/29/14 1105) Pain Location 1: Leg Pain Location 1: Abdomen Pain Intervention(s) 1: Medication (see MAR) Pain Intervention(s) 1: Medication (see MAR) Patient Stated Pain Goal: 0 Patient Stated Pain Goal: 0 
o Intervention effective: yes   
o Other actions taken for pain: turn reposition ? Skin Assessment Skin color Skin Color: Appropriate for ethnicity Condition/Temperature Skin Condition/Temp: Warm Integrity Skin Integrity: Abrasion Turgor Turgor: Non-tenting Weekly Pressure Ulcer Documentation  Pressure  Injury Documentation: No Pressure Injury Noted-Pressure Ulcer Prevention Initiated Wound Prevention & Protection Methods Orientation of wound Orientation of Wound Prevention: Posterior Location of Prevention Location of Wound Prevention: Sacrum/Coccyx Dressing Present Dressing Present : No 
Dressing Status Dressing Status: Intact Wound Offloading Wound Offloading (Prevention Methods): Bed, pressure reduction mattress ? INTAKE/OUPUT Date 10/20/18 0700 - 10/21/18 2275 10/21/18 0700 - 10/22/18 7830 Shift 8242-3100 6910-9071 24 Hour Total 6625-1086 0058-3834 24 Hour Total  
INTAKE  
NG/GT 2760 1394 4154 2820  2820 Water Flush Volume (mL) (PEG/Gastrostomy Tube 10/06/18)  900  900 Medication Volume (PEG/Gastrostomy Tube 10/06/18) 240 60 300 300  300 Intake (ml) (PEG/Gastrostomy Tube 10/06/18) 3787 173 3599 1620  1620 Shift Total(mL/kg) 7371(45.9) [id number]) 5913(41.5) 4530(49.4)  4730(99.1) OUTPUT Urine(mL/kg/hr) 300(0.4) 350(0.4) 650(0.4) Urine Voided 300 350 650 Urine Occurrence(s) 1 x 3 x 4 x 2 x  2 x Stool Stool Occurrence(s) 0 x 0 x 0 x 0 x  0 x Shift Total(mL/kg) 300(4.4) 350(5.2) 650(9.6) NET 2464 2535 0270 9587 1767 Weight (kg) 67.6 67.6 67.6 67.6 67.6 67.6 Recommendations: 1.  Patient needs and requests: Assist with ADL's 
 
 2. Diet: NPO with bolus tube feedings 3. Pending tests/procedures: none 4. Functional Level/Equipment: w/c 
 
5. Estimated Discharge Date: TBD Posted on Whiteboard in Patients Room: yes HEALS Safety Check A safety check occurred in the patient's room between off going nurse and oncoming nurse listed above. The safety check included the below items Area Items H High Alert Medications ? Verify all high alert medication drips (heparin, PCA, etc.) E Equipment ? Suction is set up for ALL patients (with sonia) ? Red plugs utilized for all equipment (IV pumps, etc.) ? WOWs wiped down at end of shift. ? Room stocked with oxygen, suction, and other unit-specific supplies A Alarms ? Bed alarm is set for fall risk patients ? Ensure chair alarm is in place and activated if patient is up in a chair L Lines ? Check IV for any infiltration ? Suarez bag is empty if patient has a Suarez ? Tubing and IV bags are labeled Jennifer Dye Safety ? Room is clean, patient is clean, and equipment is clean. ? Hallways are clear from equipment besides carts. ? Fall bracelet on for fall risk patients ? Ensure room is clear and free of clutter ? Suction is set up for ALL patients (with osnia) ? Hallways are clear from equipment besides carts. ? Isolation precautions followed, supplies available outside room, sign posted

## 2018-10-21 NOTE — PROGRESS NOTES
Problem: Pressure Injury - Risk of 
Goal: *Prevention of pressure injury Document Rupert Scale and appropriate interventions in the flowsheet. Outcome: Progressing Towards Goal 
Pressure Injury Interventions: 
Sensory Interventions: Assess changes in LOC, Avoid rigorous massage over bony prominences, Chair cushion, Check visual cues for pain, Float heels, Keep linens dry and wrinkle-free, Maintain/enhance activity level, Minimize linen layers, Monitor skin under medical devices, Pad between skin to skin, Pressure redistribution bed/mattress (bed type), Sit a 90-degree angle/use footstool if needed, Turn and reposition approx. every two hours (pillows and wedges if needed), Use 30-degree side-lying position Moisture Interventions: Absorbent underpads, Apply protective barrier, creams and emollients, Check for incontinence Q2 hours and as needed, Maintain skin hydration (lotion/cream), Minimize layers, Moisture barrier, Offer toileting Q_hr Activity Interventions: Chair cushion, Increase time out of bed, Pressure redistribution bed/mattress(bed type), PT/OT evaluation Mobility Interventions: Chair cushion, HOB 30 degrees or less, Pressure redistribution bed/mattress (bed type), PT/OT evaluation, Turn and reposition approx. every two hours(pillow and wedges) Nutrition Interventions: Document food/fluid/supplement intake, Offer support with meals,snacks and hydration Friction and Shear Interventions: Apply protective barrier, creams and emollients, Feet elevated on foot rest, Foam dressings/transparent film/skin sealants, HOB 30 degrees or less, Minimize layers, Sit at 90-degree angle, Transfer aides:transfer board/Nalini lift/ceiling lift, Transferring/repositioning devices

## 2018-10-21 NOTE — PROGRESS NOTES
Problem: Falls - Risk of 
Goal: *Absence of Falls Document Dilma Rodriguez Fall Risk and appropriate interventions in the flowsheet. Outcome: Progressing Towards Goal 
Fall Risk Interventions: 
Mobility Interventions: Bed/chair exit alarm, Communicate number of staff needed for ambulation/transfer, OT consult for ADLs, Patient to call before getting OOB, PT Consult for mobility concerns Mentation Interventions: Adequate sleep, hydration, pain control, Bed/chair exit alarm, Door open when patient unattended, Evaluate medications/consider consulting pharmacy, Eyeglasses and hearing aids, Familiar objects from home, Family/sitter at bedside Medication Interventions: Bed/chair exit alarm, Evaluate medications/consider consulting pharmacy, Patient to call before getting OOB, Teach patient to arise slowly Elimination Interventions: Bed/chair exit alarm, Call light in reach, Patient to call for help with toileting needs, Toileting schedule/hourly rounds, Urinal in reach History of Falls Interventions: Bed/chair exit alarm, Consult care management for discharge planning, Door open when patient unattended, Evaluate medications/consider consulting pharmacy, Room close to nurse's station

## 2018-10-21 NOTE — PROGRESS NOTES
[address] 
[address] [place] [address] [address] INPATIENT REHABILITATION 
DAILY PROGRESS NOTE Date: 10/21/2018 Name: Jeancarlos Santacruz Age / Sex: 80 y.o. / male CSN: [id number] MRN: [id number] Admit Date: 10/8/2018 Length of Stay: 13 days Primary Rehab Diagnosis: Impaired Mobility and ADLs secondary to Acute Ischemic Stroke (acute infarct of the posterior superior right basal ganglia and adjacent right corona radiata) with residual left hemiparesis, dysphagia and dysarthria Subjective: No new issues or problems reported. Blood pressure controlled. Objective:  
 
Vital Signs: 
Patient Vitals for the past 24 hrs: 
 BP Temp Pulse Resp SpO2  
10/21/18 0748 169/63 98.1 °F (36.7 °C) 71 20 93 % 10/21/18 0510 142/66  77    
10/20/18 2215 136/60 98.5 °F (36.9 °C) 72 18 98 % 10/20/18 1554 139/59 97 °F (36.1 °C) 69 19 94 % Current Medications: 
Current Facility-Administered Medications Medication Dose Route Frequency  amLODIPine (NORVASC) tablet 10 mg  10 mg Per G Tube DAILY  melatonin tablet 5 mg  5 mg Per G Tube QHS  guaiFENesin (ROBITUSSIN) 100 mg/5 mL oral liquid 100 mg  100 mg Per G Tube QID  dextromethorphan (DELSYM) 30 mg/5 mL syrup 30 mg  30 mg Per G Tube Q12H  
 traZODone (DESYREL) tablet 50 mg  50 mg Per G Tube QHS  modafinil (PROVIGIL) tablet 100 mg  100 mg Per G Tube DAILY  cholecalciferol (VITAMIN D3) tablet 2,000 Units  2,000 Units Oral BID  hydrALAZINE (APRESOLINE) tablet 20 mg  20 mg Per G Tube Q12H  pneumococcal 23-valent (PNEUMOVAX 23) injection 0.5 mL  0.5 mL IntraMUSCular PRIOR TO DISCHARGE  docusate sodium (COLACE) capsule 100 mg  100 mg Per G Tube BID  
 bisacodyl (DULCOLAX) suppository 10 mg  10 mg Rectal Q48H PRN  
 heparin (porcine) injection 5,000 Units  5,000 Units SubCUTAneous Q12H  
 acetaminophen (TYLENOL) solution 650 mg  650 mg Oral Q4H PRN  
  co-enzyme Q-10 (CO Q-10) capsule 100 mg  100 mg Oral DAILY  vitamin B complex tablet  1 Tab Oral DAILY  aspirin chewable tablet 81 mg  81 mg Per G Tube DAILY  atorvastatin (LIPITOR) tablet 80 mg  80 mg Per G Tube QHS  clopidogrel (PLAVIX) tablet 75 mg  75 mg PEG Tube DAILY  lisinopril (PRINIVIL, ZESTRIL) tablet 40 mg  40 mg Per G Tube DAILY  pantoprazole (PROTONIX) granules for oral suspension 40 mg  40 mg Per G Tube ACB  finasteride (PROSCAR) tablet 5 mg  5 mg Oral QPM  
 levothyroxine (SYNTHROID) tablet 50 mcg  50 mcg Per G Tube 6am  
 metoprolol tartrate (LOPRESSOR) tablet 25 mg  25 mg Per G Tube Q12H  tamsulosin (FLOMAX) capsule 0.4 mg  0.4 mg Oral QPM  
 timolol (TIMOPTIC) 0.5 % ophthalmic solution 1 Drop  1 Drop Both Eyes DAILY Allergies: Allergies Allergen Reactions  Codeine Other (comments) Lab/Data Review: No results found for this or any previous visit (from the past 24 hour(s)). Estimated Glomerular Filtration Rate: On admission, estimated GFR based on a Creatinine of 0.77 mg/dl: 
 Using CKD-EPI = 81.6 mL/min/1.73m2 Using MDRD = 101.6 mL/min/1.73m2 Most recent estimated GFR, based on a Creatinine of 0.92 mg/dl on 10/15/2018: 
 Using CKD-EPI = 74.5 mL/min/1.73m2 Using MDRD = 82.7 mL/min/1.73m2 Assessment:  
 
Primary Rehabilitation Diagnosis 1. Impaired Mobility and ADLs 2. 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 
  
Comorbidities  Urinary frequency R35.0  Nocturia R35.1  Glaucoma H40.9  History of kidney stones Z87.442  Cervical spinal stenosis M48.02  
 Meningioma  D32.9  Hypertensive heart disease without heart failure I11.9  Diastolic dysfunction without heart failure I51.89  
 Gastric ulcer K25.9  Coronary artery disease involving native coronary artery of native heart I25.10  Dyslipidemia E78.5  Chronic obstructive pulmonary disease (COPD)  J44.9  Status post insertion of percutaneous endoscopic gastrostomy (PEG) tube  Z93.1  Hypothyroidism E03.9  Benign prostatic hyperplasia N40.0  Bilateral hearing loss H91.93  
 History of transient ischemic attack (TIA) Z86.73  
 Traumatic amputation of left thumb R78.814T  
  
 
Plan: 1. Justification for continued stay: Good progression towards established rehabilitation goals. 2. Medical Issues being followed closely: 
  [x]  Fall and safety precautions  
  []  Wound Care  
  [x]  Bowel and Bladder Function  
  [x]  Fluid Electrolyte and Nutrition Balance  
  []  Pain Control 3. Issues that 24 hour rehabilitation nursing is following: 
  [x]  Fall and safety precautions  
  []  Wound Care  
  [x]  Bowel and Bladder Function  
  [x]  Fluid Electrolyte and Nutrition Balance  
  []  Pain Control   
  [x]  Assistance with and education on in-room safety with transfers to and from the bed, wheelchair, toilet and shower. 4. Acute rehabilitation plan of care: 
  [x]  Continue current care and rehab. [x]  Physical Therapy [x]  Occupational Therapy [x]  Speech Therapy  
  []  Hold Rehab until further notice 5. Medications: 
  [x]  MAR Reviewed [x]  Continue Present Medications 6. DVT Prophylaxis:   
  []  Lovenox [x]  Unfractionated Heparin  
  []  Coumadin  
  []  NOAC  
  []  BECKY Stockings  
  []  Sequential Compression Device []  None 7.  Orders:  
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 
 > Continue: 
  > Aspirin 81 mg via PEG tube once daily in AM 
  > Atorvastatin 80 mg via PEG tube q HS 
  > Coenzyme Q10 100 mg via PEG tube once daily 
  > Clopidogrel 75 mg via PEG tube once daily in PM 
 > Vitamin B complex 1 tab via PEG tube once daily 
 
> Dysphagia as late effect of CVA; S/P Insertion of percutaneous endoscopic gastrostomy (PEG) tube (10/5/2018) 
 > NPO with tube feeding 
 > Jevity 1.5 237 ml via PEG tube 5 times daily (6AM, 10AM, 2PM, 6PM, 10PM) > On 10/15/2018, increased water flush from 100 ml to 150 ml via PEG tube vefore and after each bolus feeding 
 
> Hypertensive heart disease without heart failure 
 > On admission to the ARU, decreased Hydralazine from 50 mg to 25 mg via PEG tube q 8 hr (6AM, 2PM, 10PM) > On 10/10/2018, decrease Hydralazine from 25 mg via PEG tube q 8 hr (6AM, 2PM, 10PM) to 20 mg via PEG tube q 12 hr (9AM, 9PM) > On 10/18/2018, Amlodipine 10 mg via PEG tube once daily (changed from 9AM to 6AM) > Continue: 
  > Amlodipine 10 mg via PEG tube once daily (6AM) 
  > Hydralazine 20 mg via PEG tube q 12 hr (9AM, 9PM) > Lisinopril 40 mg via PEG tube once daily (6AM) 
  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 
 > On 10/11/2018, started Trazodone 50 mg via PEG tube q HS 
 > Continue Trazodone 50 mg via PEG tube q HS 
 
> Neurocognitive modulation 
 > On 10/11/2018, started: 
  > Trazodone 50 mg via PEG tube q HS 
  > Modafinil 100 mg via PEG tube q AM 
 > Continue: 
  > Trazodone 50 mg via PEG tube q HS  
  > Modafinil 100 mg via PEG tube q AM 
 
> Difficulty sleeping 
 > On admission to the ARU, started Melatonin 3 mg via PEG tube q HS 
 > On 10/11/2018, started Trazodone 50 mg via PEG tube q HS 
 > On 10/17/2018, increased Melatonin from 3 mg to 5 mg via PEG tube q HS 
 > Continue: 
  > Melatonin 5 mg via PEG tube q HS 
   > Trazodone 50 mg via PEG tube q HS 
 
> Cough 
 > WBC count (10/9/2018) = 6.8 
 > Chest x-ray (10/12/2018) showed a small left pleural effusion; prominent interstitial lung markers could represent mild infiltration or chronic interstitial lung disease. > On 10/14/2018, patient was given Furosemide 20 mg via PEG tube x 1 dose 
 > No fever or desaturation noted since admission 
 > On 10/15/2018, started: 
  > Dextromethorphan 30 mg via PEG tube q 12 hr 
  > Guaifenesin 100 mg via PEG tube 4 times daily 
 > Continue: 
  > Dextromethorphan 30 mg via PEG tube q 12 hr 
  > Guaifenesin 100 mg via PEG tube 4 times daily Signed:    Lucy Patino MD 
  October 21, 2018

## 2018-10-21 NOTE — ROUTINE PROCESS
SHIFT CHANGE NOTE FOR Elenore Dose Bedside and Verbal shift change report given to Betty No RN (oncoming nurse) by Verito Jacobson RN (offgoing nurse). Report included the following information SBAR, Kardex, MAR and Recent Results. Situation: 
 Code Status: DNR Reason for Admission: Right ischemic stroke Hospital Day: 13 Problem List:  
Hospital Problems  Date Reviewed: 10/20/2018 Codes Class Noted POA History of transient ischemic attack (TIA) ICD-10-CM: Q04.65 
ICD-9-CM: V12.54  Unknown Yes Overview Signed 10/9/2018  3:28 PM by Radha Bolaños MD  
  2x Vitamin D deficiency (Chronic) ICD-10-CM: E55.9 ICD-9-CM: 268.9  10/9/2018 Yes Overview Signed 10/10/2018  1:59 PM by Radha Bolaños MD  
  Vitamin D 25-Hydroxy (10/10/2018) = 22.5 Hypertensive heart disease without heart failure (Chronic) ICD-10-CM: I11.9 ICD-9-CM: 402.90  Unknown Yes Status post [facility] ICD-10-CM: Z93.1 ICD-9-CM: V44.1  10/5/2018 Yes * (Principal) Acute ischemic stroke ([facility] ICD-10-CM: I63.9 ICD-9-CM: 434.91  10/2/2018 Yes Overview Signed 10/8/2018  6:32 PM by Radha Bolaños MD  
  Acute Ischemic Stroke (acute infarct of the posterior superior right basal ganglia and adjacent right corona radiata) with residual left hemiparesis, dysphagia and dysarthria Impaired mobility and ADLs ICD-10-CM: Z74.09 
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 
 Past Medical History:  
Past Medical History:  
Diagnosis Date  Acute ischemic stroke ([facility] [facility] 10/2/2018 Acute Ischemic Stroke (acute infarct of the posterior superior right basal ganglia and adjacent right corona radiata) with residual left hemiparesis, dysphagia and dysarthria  Benign prostatic hyperplasia  Bilateral hearing loss  Cervical spinal stenosis 10/2/2018  Chronic obstructive pulmonary disease (COPD) ([facility] [facility]  Coronary artery disease involving native coronary artery of native heart  Diastolic dysfunction without heart failure  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) 10/2/2018  Dyslipidemia  Dysphagia as late effect of cerebrovascular accident (CVA) 10/2/2018  Gastric ulcer  Glaucoma  Hemiparesis affecting left side as late effect of cerebrovascular accident (CVA) ([facility] 10/2/2018  History of kidney stones  History of transient ischemic attack (TIA) 2x  Hypertensive heart disease without heart failure  Hypothyroidism  Meningioma ([facility] [address] [facility] 10/3/2018 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  
 
Assessment: 
? Changes in Assessment throughout shift: none ? Patient has central line: no Reasons if yes: Insertion date: Last dressing date: 
? Patient has Suarez Cath: no Reasons if yes: Insertion date: 
 
? Last Vitals: 
  
Vitals:  
 10/20/18 3148 10/20/18 1554 10/20/18 2215 10/21/18 0510 BP: 149/61 139/59 136/60 142/66 Pulse: 72 69 72 77 Resp: 18 19 18 Temp: 98.4 °F (36.9 °C) 97 °F (36.1 °C) 98.5 °F (36.9 °C) SpO2: 94% 94% 98% Weight:      
Height:      
 
 
? PAIN Pain Assessment Pain Intensity 1: 0 (10/21/18 0354) Pain Intensity 1: 2 (12/29/14 9845) Pain Location 1: Leg Pain Location 1: Abdomen Pain Intervention(s) 1: Medication (see MAR) Pain Intervention(s) 1: Medication (see MAR) Patient Stated Pain Goal: 0 Patient Stated Pain Goal: 0 
o Intervention effective: yes   
o Other actions taken for pain: turn reposition ? Skin Assessment Skin color Skin Color: Appropriate for ethnicity Condition/Temperature Skin Condition/Temp: Dry Integrity Skin Integrity: Abrasion Turgor Turgor: Non-tenting Weekly Pressure Ulcer Documentation  Pressure  Injury Documentation: No Pressure Injury Noted-Pressure Ulcer Prevention Initiated Wound Prevention & Protection Methods Orientation of wound Orientation of Wound Prevention: Posterior Location of Prevention Location of Wound Prevention: Sacrum/Coccyx Dressing Present Dressing Present : No 
Dressing Status Dressing Status: Intact Wound Offloading Wound Offloading (Prevention Methods): Bed, pressure redistribution/air, Chair cushion ? INTAKE/OUPUT Date 10/20/18 0700 - 10/21/18 0035 10/21/18 0700 - 10/22/18 9944 Shift 8223-5754 4502-0016 24 Hour Total 9031-7377 1642-5526 24 Hour Total  
INTAKE  
NG/GT 2760 757 3517 Water Flush Volume (mL) (PEG/Gastrostomy Tube 10/06/18)  Medication Volume (PEG/Gastrostomy Tube 10/06/18) 240 60 300 Intake (ml) (PEG/Gastrostomy Tube 10/06/18) 4588 862 0008 Shift Total(mL/kg) 3687(01.7) [id number]) [id number])     
OUTPUT Urine(mL/kg/hr) 300(0.4) 350 650 Urine Voided 300 350 650 Urine Occurrence(s) 1 x 3 x 4 x Stool Stool Occurrence(s) 0 x 0 x 0 x Shift Total(mL/kg) 300(4.4) 350(5.2) 650(9.6) NET 3560 995 2146 Weight (kg) 67.6 67.6 67.6 67.6 67.6 67.6 Recommendations: 1. Patient needs and requests: Assist with ADL's 2. Diet: NPO with bolus tube feedings 3. Pending tests/procedures: none 4. Functional Level/Equipment: w/c 
 
5. Estimated Discharge Date: TBD Posted on Whiteboard in Patients Room: yes HEALS Safety Check A safety check occurred in the patient's room between off going nurse and oncoming nurse listed above. The safety check included the below items Area Items H High Alert Medications ? Verify all high alert medication drips (heparin, PCA, etc.) E Equipment ? Suction is set up for ALL patients (with sonia) ? Red plugs utilized for all equipment (IV pumps, etc.) ? WOWs wiped down at end of shift. ? Room stocked with oxygen, suction, and other unit-specific supplies A Alarms ? Bed alarm is set for fall risk patients ? Ensure chair alarm is in place and activated if patient is up in a chair L Lines ? Check IV for any infiltration ? Suarez bag is empty if patient has a Suarez ? Tubing and IV bags are labeled Nitish Deed Safety ? Room is clean, patient is clean, and equipment is clean. ? Hallways are clear from equipment besides carts. ? Fall bracelet on for fall risk patients ? Ensure room is clear and free of clutter ? Suction is set up for ALL patients (with sonia) ? Hallways are clear from equipment besides carts. ? Isolation precautions followed, supplies available outside room, sign posted

## 2018-10-22 PROBLEM — E87.1 HYPONATREMIA: Status: ACTIVE | Noted: 2018-10-22

## 2018-10-22 LAB
ANION GAP SERPL CALC-SCNC: 8 MMOL/L (ref 3–18)
APPEARANCE UR: CLEAR
BACTERIA URNS QL MICRO: NEGATIVE /HPF
BASOPHILS # BLD: 0 K/UL (ref 0–0.1)
BASOPHILS NFR BLD: 0 % (ref 0–2)
BILIRUB UR QL: NEGATIVE
BUN SERPL-MCNC: 22 MG/DL (ref 7–18)
BUN/CREAT SERPL: 29 (ref 12–20)
CALCIUM SERPL-MCNC: 7.7 MG/DL (ref 8.5–10.1)
CHLORIDE SERPL-SCNC: 92 MMOL/L (ref 100–108)
CO2 SERPL-SCNC: 27 MMOL/L (ref 21–32)
COLOR UR: ABNORMAL
CREAT SERPL-MCNC: 0.76 MG/DL (ref 0.6–1.3)
DIFFERENTIAL METHOD BLD: ABNORMAL
EOSINOPHIL # BLD: 0.1 K/UL (ref 0–0.4)
EOSINOPHIL NFR BLD: 2 % (ref 0–5)
EPITH CASTS URNS QL MICRO: ABNORMAL /LPF (ref 0–5)
ERYTHROCYTE [DISTWIDTH] IN BLOOD BY AUTOMATED COUNT: 13.1 % (ref 11.6–14.5)
GLUCOSE SERPL-MCNC: 88 MG/DL (ref 74–99)
GLUCOSE UR STRIP.AUTO-MCNC: NEGATIVE MG/DL
HCT VFR BLD AUTO: 29.8 % (ref 36–48)
HGB BLD-MCNC: 10.6 G/DL (ref 13–16)
HGB UR QL STRIP: NEGATIVE
KETONES UR QL STRIP.AUTO: NEGATIVE MG/DL
LEUKOCYTE ESTERASE UR QL STRIP.AUTO: NEGATIVE
LYMPHOCYTES # BLD: 1 K/UL (ref 0.9–3.6)
LYMPHOCYTES NFR BLD: 16 % (ref 21–52)
MCH RBC QN AUTO: 31 PG (ref 24–34)
MCHC RBC AUTO-ENTMCNC: 35.6 G/DL (ref 31–37)
MCV RBC AUTO: 87.1 FL (ref 74–97)
MONOCYTES # BLD: 0.9 K/UL (ref 0.05–1.2)
MONOCYTES NFR BLD: 15 % (ref 3–10)
NEUTS SEG # BLD: 4.1 K/UL (ref 1.8–8)
NEUTS SEG NFR BLD: 67 % (ref 40–73)
NITRITE UR QL STRIP.AUTO: NEGATIVE
OSMOLALITY SERPL: 264 MOSM/KG H2O (ref 280–300)
OSMOLALITY UR: 404 MOSM/KG H2O (ref 300–900)
PH UR STRIP: 7 [PH] (ref 5–8)
PLATELET # BLD AUTO: 260 K/UL (ref 135–420)
PMV BLD AUTO: 11.2 FL (ref 9.2–11.8)
POTASSIUM SERPL-SCNC: 4.9 MMOL/L (ref 3.5–5.5)
PROT UR STRIP-MCNC: NEGATIVE MG/DL
RBC # BLD AUTO: 3.42 M/UL (ref 4.7–5.5)
RBC #/AREA URNS HPF: ABNORMAL /HPF (ref 0–5)
SODIUM SERPL-SCNC: 124 MMOL/L (ref 136–145)
SODIUM SERPL-SCNC: 127 MMOL/L (ref 136–145)
SODIUM UR-SCNC: 20 MMOL/L (ref 20–110)
SP GR UR REFRACTOMETRY: 1.01 (ref 1–1.03)
UROBILINOGEN UR QL STRIP.AUTO: >8 EU/DL (ref 0.2–1)
WBC # BLD AUTO: 6.1 K/UL (ref 4.6–13.2)
WBC URNS QL MICRO: ABNORMAL /HPF (ref 0–5)

## 2018-10-22 PROCEDURE — 80048 BASIC METABOLIC PNL TOTAL CA: CPT | Performed by: INTERNAL MEDICINE

## 2018-10-22 PROCEDURE — 92526 ORAL FUNCTION THERAPY: CPT

## 2018-10-22 PROCEDURE — 77030011256 HC DRSG MEPILEX <16IN NO BORD MOLN -A

## 2018-10-22 PROCEDURE — 85025 COMPLETE CBC W/AUTO DIFF WBC: CPT | Performed by: INTERNAL MEDICINE

## 2018-10-22 PROCEDURE — 74011000250 HC RX REV CODE- 250: Performed by: INTERNAL MEDICINE

## 2018-10-22 PROCEDURE — 84300 ASSAY OF URINE SODIUM: CPT | Performed by: INTERNAL MEDICINE

## 2018-10-22 PROCEDURE — 74011250637 HC RX REV CODE- 250/637: Performed by: INTERNAL MEDICINE

## 2018-10-22 PROCEDURE — 97535 SELF CARE MNGMENT TRAINING: CPT

## 2018-10-22 PROCEDURE — 97542 WHEELCHAIR MNGMENT TRAINING: CPT

## 2018-10-22 PROCEDURE — 36415 COLL VENOUS BLD VENIPUNCTURE: CPT | Performed by: INTERNAL MEDICINE

## 2018-10-22 PROCEDURE — 74011250636 HC RX REV CODE- 250/636: Performed by: INTERNAL MEDICINE

## 2018-10-22 PROCEDURE — 81001 URINALYSIS AUTO W/SCOPE: CPT | Performed by: INTERNAL MEDICINE

## 2018-10-22 PROCEDURE — 97530 THERAPEUTIC ACTIVITIES: CPT

## 2018-10-22 PROCEDURE — 77030013140 HC MSK NEB VYRM -A

## 2018-10-22 PROCEDURE — 65310000000 HC RM PRIVATE REHAB

## 2018-10-22 PROCEDURE — 84295 ASSAY OF SERUM SODIUM: CPT | Performed by: INTERNAL MEDICINE

## 2018-10-22 PROCEDURE — 97112 NEUROMUSCULAR REEDUCATION: CPT

## 2018-10-22 PROCEDURE — 83930 ASSAY OF BLOOD OSMOLALITY: CPT | Performed by: INTERNAL MEDICINE

## 2018-10-22 PROCEDURE — 83935 ASSAY OF URINE OSMOLALITY: CPT | Performed by: INTERNAL MEDICINE

## 2018-10-22 RX ORDER — ALBUTEROL SULFATE 0.83 MG/ML
2.5 SOLUTION RESPIRATORY (INHALATION)
Status: DISCONTINUED | OUTPATIENT
Start: 2018-10-22 | End: 2018-10-22

## 2018-10-22 RX ORDER — ALBUTEROL SULFATE 1.25 MG/3ML
1.25 SOLUTION RESPIRATORY (INHALATION)
Status: DISCONTINUED | OUTPATIENT
Start: 2018-10-22 | End: 2018-10-22 | Stop reason: RX

## 2018-10-22 RX ORDER — ALBUTEROL SULFATE 0.83 MG/ML
2.5 SOLUTION RESPIRATORY (INHALATION)
Status: DISCONTINUED | OUTPATIENT
Start: 2018-10-22 | End: 2018-10-30 | Stop reason: HOSPADM

## 2018-10-22 RX ORDER — SODIUM CHLORIDE TAB 1 GM 1 G
1 TAB MISCELLANEOUS EVERY 8 HOURS
Status: DISCONTINUED | OUTPATIENT
Start: 2018-10-22 | End: 2018-10-24

## 2018-10-22 RX ADMIN — ASPIRIN 81 MG CHEWABLE TABLET 81 MG: 81 TABLET CHEWABLE at 08:09

## 2018-10-22 RX ADMIN — FINASTERIDE 5 MG: 5 TABLET, FILM COATED ORAL at 17:29

## 2018-10-22 RX ADMIN — DEXTROMETHORPHAN 30 MG: 30 SUSPENSION, EXTENDED RELEASE ORAL at 08:10

## 2018-10-22 RX ADMIN — ALBUTEROL SULFATE 2.5 MG: 2.5 SOLUTION RESPIRATORY (INHALATION) at 06:43

## 2018-10-22 RX ADMIN — GUAIFENESIN 100 MG: 100 SOLUTION ORAL at 09:34

## 2018-10-22 RX ADMIN — AMLODIPINE BESYLATE 10 MG: 10 TABLET ORAL at 05:12

## 2018-10-22 RX ADMIN — METOPROLOL TARTRATE 25 MG: 25 TABLET ORAL at 08:09

## 2018-10-22 RX ADMIN — HYDRALAZINE HYDROCHLORIDE 20 MG: 10 TABLET, FILM COATED ORAL at 20:54

## 2018-10-22 RX ADMIN — GUAIFENESIN 100 MG: 100 SOLUTION ORAL at 21:10

## 2018-10-22 RX ADMIN — PANTOPRAZOLE SODIUM 40 MG: 40 GRANULE, DELAYED RELEASE ORAL at 05:12

## 2018-10-22 RX ADMIN — SODIUM CHLORIDE TAB 1 GM 1 G: 1 TAB at 14:14

## 2018-10-22 RX ADMIN — DOCUSATE SODIUM 100 MG: 100 CAPSULE, LIQUID FILLED ORAL at 08:10

## 2018-10-22 RX ADMIN — Medication 1 TABLET: at 08:10

## 2018-10-22 RX ADMIN — TIMOLOL MALEATE 1 DROP: 5 SOLUTION OPHTHALMIC at 08:29

## 2018-10-22 RX ADMIN — Medication 100 MG: at 08:09

## 2018-10-22 RX ADMIN — HYDRALAZINE HYDROCHLORIDE 20 MG: 10 TABLET, FILM COATED ORAL at 08:10

## 2018-10-22 RX ADMIN — LEVOTHYROXINE SODIUM 50 MCG: 50 TABLET ORAL at 05:12

## 2018-10-22 RX ADMIN — ALBUTEROL SULFATE 2.5 MG: 2.5 SOLUTION RESPIRATORY (INHALATION) at 17:27

## 2018-10-22 RX ADMIN — CLOPIDOGREL BISULFATE 75 MG: 75 TABLET, FILM COATED ORAL at 20:54

## 2018-10-22 RX ADMIN — TRAZODONE HYDROCHLORIDE 50 MG: 50 TABLET ORAL at 20:54

## 2018-10-22 RX ADMIN — ATORVASTATIN CALCIUM 80 MG: 40 TABLET, FILM COATED ORAL at 20:54

## 2018-10-22 RX ADMIN — GUAIFENESIN 100 MG: 100 SOLUTION ORAL at 14:12

## 2018-10-22 RX ADMIN — HEPARIN SODIUM 5000 UNITS: 5000 INJECTION, SOLUTION INTRAVENOUS; SUBCUTANEOUS at 05:11

## 2018-10-22 RX ADMIN — SODIUM CHLORIDE TAB 1 GM 1 G: 1 TAB at 21:09

## 2018-10-22 RX ADMIN — LISINOPRIL 40 MG: 40 TABLET ORAL at 05:12

## 2018-10-22 RX ADMIN — TAMSULOSIN HYDROCHLORIDE 0.4 MG: 0.4 CAPSULE ORAL at 17:29

## 2018-10-22 RX ADMIN — DEXTROMETHORPHAN 30 MG: 30 SUSPENSION, EXTENDED RELEASE ORAL at 20:54

## 2018-10-22 RX ADMIN — VITAMIN D, TAB 1000IU (100/BT) 2000 UNITS: 25 TAB at 08:09

## 2018-10-22 RX ADMIN — MODAFINIL 100 MG: 100 TABLET ORAL at 08:09

## 2018-10-22 RX ADMIN — HEPARIN SODIUM 5000 UNITS: 5000 INJECTION, SOLUTION INTRAVENOUS; SUBCUTANEOUS at 17:16

## 2018-10-22 RX ADMIN — VITAMIN D, TAB 1000IU (100/BT) 2000 UNITS: 25 TAB at 17:29

## 2018-10-22 RX ADMIN — GUAIFENESIN 100 MG: 100 SOLUTION ORAL at 17:18

## 2018-10-22 RX ADMIN — MELATONIN TAB 5 MG 5 MG: 5 TAB at 20:54

## 2018-10-22 RX ADMIN — DOCUSATE SODIUM 100 MG: 100 CAPSULE, LIQUID FILLED ORAL at 17:29

## 2018-10-22 RX ADMIN — BISACODYL 10 MG: 10 SUPPOSITORY RECTAL at 17:26

## 2018-10-22 RX ADMIN — METOPROLOL TARTRATE 25 MG: 25 TABLET ORAL at 20:54

## 2018-10-22 NOTE — PROGRESS NOTES
Problem: Mobility Impaired (Adult and Pediatric) Goal: *Therapy Goal (Edit Goal, Insert Text) Physical Therapy Goals Initiated 10/9/2018, updated 10/15/2018, and to be accomplished within 7 day(s) 10/22/2018 1. Patient will move from supine to sit and sit to supine , scoot up and down and roll side to side in bed with moderate assistance consistently . 2.  Patient will transfer from bed to chair and chair to bed with moderate assistance  using the least restrictive device consistently. 3.  Patient will perform sit to stand with moderate assistance consistently without pushing to left . 4. Patient will ambulate with maximal assistance for 5 feet with the least restrictive device. 5.  Patient will perform 100 ft of wheelchair mobility with modified technique min assist for steering and negotiation of obstacles. 6.  Patient will be able to maintain sitting balance without support for at least 5 minutes with verbal cues only for maintaining midline/upright position for ease of transfers consistently throughout the day. Physical Therapy Goals Initiated 10/9/2018 and to be accomplished within 28 day(s) on 11/6/2018 1. Patient will move from supine to sit and sit to supine , scoot up and down and roll side to side in bed with supervision/set-up. 2.  Patient will transfer from bed to chair and chair to bed with supervision/set-up using the least restrictive device. 3.  Patient will perform sit to stand with supervision/set-up. 4.  Patient will ambulate with minimal assistance/contact guard assist for 50 feet with the least restrictive device. 5.  Patient will ascend/descend 4 stairs with 2 handrail(s) with minimal assistance/contact guard assist. 
6.  Patient will perform 150 ft of wheelchair mobility at modified independent level. 7.  Patient will demonstrate improved postural control for ease of functional mobility as demonstrated by PASS score >16/36 physical Therapy weekly Progress note Patient: Humberto Culver (63 y.o. male) Date: 10/22/2018 Diagnosis: R CVA Acute ischemic stroke ([facility] [address] Acute ischemic stroke ([facility] Precautions: Aspiration, Fall(Head of bed 30-45 degrees at all times per speech therapist) Chart, physical therapy assessment, plan of care and goals were reviewed. Time In: 1033 Time Out: 1135 Time In: 1330 Time Out: 1410 Pain: No pain reported. Patient identified with name and : yes SUBJECTIVE:  
 
Patient reporting that he felt a little better today, but that he did have some trouble during the night. Per nurse, patient had decreased oxygen saturation requiring supplemental O2, however, at beginning of session on room air and was 96%. OBJECTIVE DATA SUMMARY:  
AROM: decreased Left LE due to weakness. FIM SCORES Initial Assessment Weekly Progress Assessment 10/22/2018 Bed/Chair/Wheelchair Transfers 2 2 Wheelchair Mobility 1 2 Walking [place] 1 1 (not able to assess due to safety concern) Steps/Stairs 0 1(not able to assess due to safety concern) Please see [facility] Interdisciplinary Eval: Coordination/Balance Section for details regarding FIM score description. BED/CHAIR/WHEELCHAIR TRANSFERS Initial Assessment Weekly Progress Assessment 10/22/2018 Rolling Right 3 (Moderate assistance ) 2 (Maximal assistance) Rolling Left 3 (Moderate assistance ) 3 (Moderate assistance ) Supine to Sit 2 (Maximal assistance) 2 (Maximal assistance) Sit to Stand Maximum assistance Maximum assistance Sit to Supine 2 (Maximal assistance) 2 (Maximal assistance) Transfer Type SPT without device Other Comments needing assist with lifting,lowering,pivoting to transfer surface stand step transfer to left, needing left knee blocked, stand pivot transfer to right with patient better able to shift to his right side Car Transfer Not tested Not tested Car Type N/A N/A  
 
 GROSS ASSESSMENT Weekly Progress Assessment 10/22/2018 AROM Grossly decreased, non-functional(left LE and left UE affected) Strength Grossly decreased, non-functional(left LE) Coordination Grossly decreased, non-functional(left LE affected) Tone Abnormal(Increased flexor synergy left UE observed) Sensation Impaired(left side affected) PROM Generally decreased, functional  
 
POSTURE Weekly Progress Assessment 10/22/2018 Posture (WDL) Exceptions to [facility] Posture Assessment Forward head;Rounded shoulders [facility] MOBILITY/MANAGEMENT Initial Assessment Weekly Progress Assessment 10/22/2018 Able to Propel 15 feet 140 feet(min A); primarily using right LE to propel with right UE to assist at times Curbs/ramps assistance required 0 (Not tested) 0 (Not tested) Wheelchair set up assistance required 2 (Maximal assistance)  Max assist  
Wheelchair management Manages right brake  manages right brake with cues WALKING INDEPENDENCE Initial Assessment Weekly Progress Assessment 10/22/2018 Assistive device Other (comment)(parallel bars) Ambulation assistance - level surface Distance 2 Feet (ft) Comments Performing only a couple steps forward/backward assist x 2 Not safe to assess today GAIT Weekly Progress Assessment 10/22/2018 Gait Description (WDL)  Not yet safe to assess Gait Abnormalities  Not yet safe to assess STEPS/STAIRS Initial Assessment Weekly Progress Assessment 10/22/2018 Steps/Stairs ambulated 0  0 Rail Use Right Comments unable to safely assess at this time given significant assist required for transfers and for gait attempt. Unable to safely assess at this time Curbs/Ramps Neuro Re-Education: 
Standing in standing frame with support at hips, blocked knees bilaterally, emphasis on upright, midline posture, performing x 3 bouts for up to 3-4.5 min each bout.  Performing sit<->stand x 1 at mat, but for static standing needing second person to assist with upright standing and midline posture. Sitting at edge of mat emphasis on midline, upright posture with fine motor activity to improve ability to dual task and maintain appropriate midline and upright posture, able to shift appropriately to reach forward across table. Performing for ~ 15 min duration. ASSESSMENT: 
Patient continues to fatigue easily and needing frequent reminders to keep eyes open during tasks, however, demonstrating improved standing endurance, sitting endurance over the past week. Slightly decreased pushing behaviors observed but patient still needing frequent reminders to use environmental cues for upright and to avoid pushing over to weaker left side. Educated patient's spouse that patient not yet demonstrating significant functional change with ability to perform transfer, but that he was demonstrating small changes. Also improving with wheelchair mobility endurance, particularly when shoe donned on right foot to assist with ability to propel/steer more effectively. Goals to be updated as patient not yet meeting above STG consistently, though patient has met w/c mobility goal and sitting balance goal.  
Progression toward goals: 
[]      Improving appropriately and progressing toward goals [x]      Improving slowly and progressing toward goals 
[]      Not making progress toward goals and plan of care will be adjusted PLAN: 
Patient continues to benefit from skilled intervention to address the above impairments. Continue treatment per established plan of care. Discharge Recommendations:  [address] depending on patient's progress and caregiver availability, currently requiring significant assist for transfers. Further Equipment Recommendations for Discharge:  wheelchair with wheelchair cushion, arm tray, brake extenders currently needed; will continue to reassess patient's progress. Estimated LOS: 4 weeks Activity Tolerance:  
Fair needing frequent rests. Observed to have seated /55 HR 57 bpm, standing /51, HR 59 bpm.  
 
After treatment:  
[] Patient left in no apparent distress in bed 
[x] Patient left in no apparent distress sitting up in chair 
[] Patient left in no apparent distress in w/c mobilizing under own power 
[] Patient left in no apparent distress dining area 
[] Patient left in no apparent distress mobilizing under own power [x] Call bell left within reach [x] Nursing notified 
[] Caregiver present 
[] Bed alarm activated Tevin Jenkins, PT 
10/22/2018

## 2018-10-22 NOTE — ROUTINE PROCESS
SHIFT CHANGE NOTE FOR Diego Pablo Bedside and Verbal shift change report given to [employer] (oncoming nurse) by Patricia Farfan RN (offgoing nurse). Report included the following information SBAR, Kardex, MAR and Recent Results. Situation: 
 Code Status: DNR Reason for Admission: Right ischemic stroke Hospital Day: 14 Problem List:  
Hospital Problems  Date Reviewed: 10/22/2018 Codes Class Noted POA Hyponatremia ICD-10-CM: E87.1 ICD-9-CM: 276.1  10/22/2018 No  
   
 History of transient ischemic attack (TIA) ICD-10-CM: G58.98 
ICD-9-CM: V12.54  Unknown Yes Overview Signed 10/9/2018  3:28 PM by Celestino White MD  
  2x Vitamin D deficiency (Chronic) ICD-10-CM: E55.9 ICD-9-CM: 268.9  10/9/2018 Yes Overview Signed 10/10/2018  1:59 PM by Celestino White MD  
  Vitamin D 25-Hydroxy (10/10/2018) = 22.5 Hypertensive heart disease without heart failure (Chronic) ICD-10-CM: I11.9 ICD-9-CM: 402.90  Unknown Yes Status post insertion of percutaneous [facility]st[facility] ([facility] ICD-10-CM: Z93.1 ICD-9-CM: V44.1  10/5/2018 Yes * (Principal) Acute ischemic stroke ([facility] ICD-10-CM: I63.9 ICD-9-CM: 434.91  10/2/2018 Yes Overview Signed 10/8/2018  6:32 PM by Celestino White MD  
  Acute Ischemic Stroke (acute infarct of the posterior superior right basal ganglia and adjacent right corona radiata) with residual left hemiparesis, dysphagia and dysarthria Impaired mobility and ADLs ICD-10-CM: Z74.09 
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 
 Past Medical History:  
Past Medical History:  
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) 10/2/2018  Dyslipidemia  Dysphagia as late effect of cerebrovascular accident (CVA) 10/2/2018  Gastric ulcer  Glaucoma  Hemiparesis affecting left side as late effect of cerebrovascular accident (CVA) ([facility] 10/2/2018  History of kidney stones  History of transient ischemic attack (TIA) 2x  Hypertensive heart disease without heart failure  Hypothyroidism  Meningioma ([facility] 10/3/2018 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  
 
Assessment: 
? Changes in Assessment throughout shift: see notes ? Patient has central line: no Reasons if yes: Insertion date: Last dressing date: 
? Patient has Suarez Cath: no Reasons if yes: Insertion date: 
 
? Last Vitals: 
  
Vitals:  
 10/22/18 7193 10/22/18 5957 10/22/18 4566 10/22/18 1554 BP: 156/68 142/86 151/61 146/62 Pulse: 76 77 74 71 Resp:   18 18 Temp:   97.1 °F (36.2 °C) 97.5 °F (36.4 °C) SpO2:   94% 96% Weight:      
Height: ? PAIN Pain Assessment Pain Intensity 1: 0 (10/22/18 1542) Pain Intensity 1: 2 (12/29/14 1105) Pain Location 1: Leg Pain Location 1: Abdomen Pain Intervention(s) 1: Medication (see MAR) Pain Intervention(s) 1: Medication (see MAR) Patient Stated Pain Goal: 0 Patient Stated Pain Goal: 0 
o Intervention effective: yes   
o Other actions taken for pain: turn reposition ? Skin Assessment Skin color Skin Color: Appropriate for ethnicity Condition/Temperature Skin Condition/Temp: Warm, Dry Integrity Skin Integrity: Abrasion, Incision (comment) Turgor Turgor: Non-tenting Weekly Pressure Ulcer Documentation  Pressure  Injury Documentation: No Pressure Injury Noted-Pressure Ulcer Prevention Initiated Wound Prevention & Protection Methods Orientation of wound Orientation of Wound Prevention: Posterior Location of Prevention Location of Wound Prevention: Sacrum/Coccyx Dressing Present Dressing Present : No 
Dressing Status Dressing Status: Intact Wound Offloading Wound Offloading (Prevention Methods): Bed, pressure reduction mattress, Chair cushion, Pillows, Repositioning, Turning, Wheelchair ? INTAKE/OUPUT Date 10/21/18 1900 - 10/22/18 3151 10/22/18 0700 - 10/23/18 4117 Shift 7917-4731 24 Hour Total 9133-6647 6017-4468 24 Hour Total  
INTAKE  
NG/ 3757 1820  1820 Water Flush Volume (mL) (PEG/Gastrostomy Tube 10/06/18) 350 1250 450  450 Medication Volume (PEG/Gastrostomy Tube 10/06/18)  300 100  100 Intake (ml) (PEG/Gastrostomy Tube 10/06/18) 587 2207 1270  1270 Shift Total(mL/kg) 937(13.9) 3757(55.6) 1820(26.9)  1820(26.9) OUTPUT Urine(mL/kg/hr) Urine Occurrence(s) 3 x 6 x 4 x  4 x Stool Stool Occurrence(s) 0 x 0 x 1 x  1 x Shift Total(mL/kg) NET 21  Weight (kg) 67.6 67.6 67.6 67.6 67.6 Recommendations: 1. Patient needs and requests: Assist with ADL's 2. Diet: NPO with bolus tube feedings 3. Pending tests/procedures: none 4. Functional Level/Equipment: w/c 
 
5. Estimated Discharge Date: TBD Posted on Whiteboard in Patients Room: yes HEALS Safety Check A safety check occurred in the patient's room between off going nurse and oncoming nurse listed above. The safety check included the below items Area Items H High Alert Medications ? Verify all high alert medication drips (heparin, PCA, etc.) E Equipment ? Suction is set up for ALL patients (with sonia) ? Red plugs utilized for all equipment (IV pumps, etc.) ? WOWs wiped down at end of shift. ? Room stocked with oxygen, suction, and other unit-specific supplies A Alarms ? Bed alarm is set for fall risk patients ? Ensure chair alarm is in place and activated if patient is up in a chair L Lines ? Check IV for any infiltration ? Suarez bag is empty if patient has a Suarez ? Tubing and IV bags are labeled [place] Safety ? Room is clean, patient is clean, and equipment is clean. ? Hallways are clear from equipment besides carts. ? Fall bracelet on for fall risk patients ? Ensure room is clear and free of clutter ? Suction is set up for ALL patients (with sonia) ? Hallways are clear from equipment besides carts. ? Isolation precautions followed, supplies available outside room, sign posted

## 2018-10-22 NOTE — PROGRESS NOTES
Patient has been suctioning and coughing on and off for hours. When sleeping he is breathing easy and regular . Did some mouth care for patient. Took vital signs. They are within patient's norm . Vital signs are 98.9 , 71, 151/63 , 93% , 18 .

## 2018-10-22 NOTE — PROGRESS NOTES
Problem: Self Care Deficits Care Plan (Adult) Goal: *Therapy Goal (Edit Goal, Insert Text) Occupational Therapy Goals Long Term Goals Initiated 10/9/18 and to be accomplished within 4 week(s) 1. Pt will perform self-feeding with total dependence. 2. Pt will perform grooming with supervision. 3. Pt will perform UB bathing with Min A. 
4. Pt will perform LB bathing with Min A prn AE. 5. Pt will perform tub/shower transfer with Mod A.  
6. Pt will perform UB dressing with Setup. 7. Pt will perform LB dressing with Min A prn AE. 8. Pt will perform toileting task with Min A. 
9. Pt will perform toilet transfer with Mod A. Short Term Goals Initiated 10/16/18 and to be accomplished within 7 day(s) 10/23/18 1. Pt will perform grooming with Setup. 2. Pt will perform UB bathing with Min A. 
3. Pt will perform LB bathing with Min A prn AE. 4. Pt will perform tub/shower transfer with Mod A. 
5. Pt will perform UB dressing with Mod A. 
6. Pt will perform LB dressing with Mod A prn AE. 7. Pt will perform toileting task with Max A. 
8. Pt will perform toilet transfer with Mod A. 
   
OT WEEKLY PROGRESS NOTE Patient Name:Chapito Acevedo Time Spent With Patient Time In: 5727 Time Out: 1234 Patient Seen For[de-identified] ADLs Medical Diagnosis:  R CVA Acute ischemic stroke ([facility] Acute ischemic stroke ([facility] Pain at start of tx:0/10 pain or discomfort. Pain at stop of tx:0/10 pain or discomfort. Patient identified with name and :Yes Subjective: Pt stated \"I'm here\" when asked how he was on current date at initiation of session. Objective: Pt seated in wc in therapy gym upon initiation of session. Pt given current date plan of care; pt receptive and agreeable. Outcome Measures: AROM: left UE hemiparesis COGNITION/PERCEPTION Initial Assessment Weekly Progress Assessment 10/22/2018 Premorbid Reading Status Unknown/unable to assess  Unknown Premorbid Writing Status Intact  Intact Arousal/Alertness Generalized responses  Generalized responses Orientation Level Oriented X4 Oriented X4;Oriented to person;Oriented to place;Oriented to situation Visual Fields (Appears intact)  Intact Praxis Impaired  Impaired Body Scheme Cues to maintain midline in sitting, Cues to maintain midline in standing, Tactile, Verbal, Visual   Cues to maintain midline in sitting, Cues to maintain midline in standing, Tactile, Verbal, Visual  
COMPREHENSION MODE Initial Assessment Weekly Progress Assessment 10/22/2018 Primary Mode of Comprehension Auditory Auditory Hearing Aide Bilateral Bilateral  
Corrective Lenses Glasses Score 3 5 EXPRESSION Initial Assessment Weekly Progress Assessment 10/22/2018 Primary Mode of Expression Verbal Verbal  
Score 3 4 Comments SOCIAL INTERACTION/ PRAGMATICS Initial Assessment Weekly Progress Assessment 10/22/2018 Score 3 5 Comments PROBLEM SOLVING Initial Assessment Weekly Progress Assessment 10/22/2018 Score 2 2 Comments MEMORY Initial Assessment Weekly Progress Assessment 10/22/2018 Score 2 2 Comments EATING Initial Assessment Weekly Progress Assessment 10/22/2018 Functional Level 1 Feeding/Eating Feeding/Eating Assistance: 1 (Dependent/total assistance)(NPO) Comments NPO Comments: tube feeding GROOMING Initial Assessment Weekly Progress Assessment 10/22/2018 Functional Level 4 Grooming Grooming Assistance : 5 (Supervision) Comments: Pt seated in wc at sink; pt washed face. Tasks completed by patient Washed face, Washed hands Comments Pt long sitting in bed; washed face with mod vcs for sequencing (needed cues to remove glasses before washing face). Pt lying supine in bed performed hand hygeine with min A for thoroughness. BATHING Initial Assessment Weekly Progress Assessment 10/22/2018 Functional Level 2 Upper Body Bathing Bathing Assistance, Upper: 3 (Moderate assistance ) Position Performed: Seated in chair Comments: Pt seated in wc at sink; pt wash chest, abdomen, and left arm. Pt required total A to hold left arm to wash left axillary with right UE. Pt wash right shoulder with right UE. Pt total A to wash right UE. Pt washed right hand via placing wash cloth on thigh and rubbing. Lower Body Bathing Bathing Assistance, Lower : 0 (Not tested) Comments: NT due to slow completion of UB selfcare and time constraints. Body parts patient bathed Arm, left, Buttocks, Chest, Maribel area, Thigh, left, Thigh, right Comments Pt completed bathing in bed; long sitting he washed left arm, chest, anterior pericare, and right and left thigh. Side lying he washed buttocks and posterior pericare. TUB/SHOWER TRANSFER INDEPENDENCE Initial Assessment Weekly Progress Assessment 10/22/2018 Score 0 Functional Transfers Amount of Assistance Required: 0 (Not tested) Amount of Assistance Required: 0 (Not tested) Comments NT due to safety UPPER BODY DRESSING/UNDRESSING Initial Assessment Weekly Progress Assessment 10/22/2018 Functional Level 2 Upper Body Dressing Dressing Assistance : 3 (Moderate assistance) Comments: Pt seated in wc; pt doff pullover shirt x2 with min vcs for hand placement and min A to unthread left arm. To don pullover shirt, pt required min vcs to pull shirt past left elbow. Pt required max A to pull shirt down in back. Pt demonstrated knowledge of tyrell technique sequence. Items applied/Steps completed Pullover (4 steps) Comments Pt doff/don pullover shirt with Max A and Max vcs. Pt required mod assist to lean forward; pt attempted to reach behind head to pull shirt overhead and required mod assist for this. Pt required mod assist to remove BUE from shirt. Pt required max assist to thread left arm through arm hole, and setup for him to thread right arm.  Pt donned shirt overhead with min A. Pt requried max A to pull shirt down. LOWER BODY DRESSING/UNDRESSING Initial Assessment Weekly Progress Assessment 10/22/2018 Functional Level 2 Lower Body Dressing Dressing Assistance : 0 (Not tested) Comments: NT due to slow completion of UB selfcare and time constraints. Items applied/Steps completed Elastic waist pants (3 steps), Sock, left (1 step), Sock, right (1 step) Comments Pt lying supine in bed; pt required total assist to thread legs in pants. Pt bend knees with max assist and stabilization for left leg; pt bridge with stabilization of left leg and requried min A to pull pants up to waist.     
 
TOILETING Initial Assessment Weekly Progress Assessment 10/22/2018 Functional Level 1 Toileting Toileting Assistance (FIM Score): 3 (Moderate assistance ) Comments Pt require max A for clothing management and max A for maintaining midline sitting. TOILET TRANSFER INDEPENDENCE Initial Assessment Weekly Progress Assessment 10/22/2018 Transfer score 2 Functional Transfers Amount of Assistance Required: 0 (Not tested) Amount of Assistance Required: 0 (Not tested) Comments ASSESSMENT: 
Pt has met 2/8 goals (5,7). On current date, upper body self care was observed due to slow completion and time constraints. Pt continues to require mod A for upper body bathing. However, on current date pt required mod A for upper body dressing. Pt continues to demonstrate left UE hemiparesis and moderate tone in left UE elbow extension. On current date, left UE reached full PROM following bicep inhibition and slow stretch. Pt continues to demonstrate significant lethargy during therapy sessions. Pt would benefit from continued skilled OT services to increase independence with self care and functional transfers. Progression toward goals: 
[]          Improving appropriately and progressing toward goals [x]          Improving slowly and progressing toward goals []          Not making progress toward goals and plan of care will be adjusted PLAN: 
Patient continues to benefit from skilled intervention to address the above impairments. Continue treatment per established plan of care. Discharge Recommendations:  SNF 2/2 progress Further Equipment Recommendations for Discharge: If d/c to Home recommend BSC, shower chair with back   
 
Please refer to the flowsheet for vital signs taken during this treatment. After treatment:  
[x]  Patient left in no apparent distress sitting up in wheelchair 
[]  Patient left in no apparent distress in bed 
[]  Call bell left within reach 
[]  Nursing notified 
[]  Caregiver present 
[]  Bed alarm activated COMMUNICATION/EDUCATION:  
[] Home safety education was provided and the patient/caregiver indicated understanding. [x] Patient/family have participated as able in goal setting and plan of care. [x] Patient/family agree to work toward stated goals and plan of care. [] Patient understands intent and goals of therapy, but is neutral about his/her participation. [] Patient is unable to participate in goal setting and plan of care. Plan of Care: Please see Care Plan for updated LTGs. Family Training:  TBD Estimated LOS: 11/6/18 NINA Tinoco 
10/22/2018

## 2018-10-22 NOTE — ROUTINE PROCESS
SHIFT CHANGE NOTE FOR Tigist Wiley Bedside and Verbal shift change report given to Marine Rinaldi (oncoming nurse) by Foster Her RN (offgoing nurse). Report included the following information SBAR, Kardex, MAR and Recent Results. Situation: 
 Code Status: DNR Reason for Admission: Right ischemic stroke Hospital Day: 14 Problem List:  
Hospital Problems  Date Reviewed: 10/21/2018 Codes Class Noted POA History of transient ischemic attack (TIA) ICD-10-CM: J15.56 
ICD-9-CM: V12.54  Unknown Yes Overview Signed 10/9/2018  3:28 PM by Candace Pereira MD  
  2x Vitamin D deficiency (Chronic) ICD-10-CM: E55.9 ICD-9-CM: 268.9  10/9/2018 Yes Overview Signed 10/10/2018  1:59 PM by Candace Pereira MD  
  Vitamin D 25-Hydroxy (10/10/2018) = 22.5 Hypertensive heart disease without heart failure (Chronic) ICD-10-CM: I11.9 ICD-9-CM: 402.90  Unknown Yes Status post [facility] of [facility]st[facility] ([facility] ICD-10-CM: Z93.1 ICD-9-CM: V44.1  10/5/2018 Yes * (Principal) Acute ischemic stroke ([facility] ICD-10-CM: I63.9 ICD-9-CM: 434.91  10/2/2018 Yes Overview Signed 10/8/2018  6:32 PM by Candace Pereira MD  
  Acute Ischemic Stroke (acute infarct of the posterior superior right basal ganglia and adjacent right corona radiata) with residual left hemiparesis, dysphagia and dysarthria Impaired mobility and ADLs ICD-10-CM: Z74.09 
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 
 Past Medical History:  
Past Medical History:  
Diagnosis Date  Acute ischemic stroke ([facility] 10/2/2018 Acute Ischemic Stroke (acute infarct of the posterior superior right basal ganglia and adjacent right corona radiata) with residual left hemiparesis, dysphagia and dysarthria  Benign prostatic hyperplasia  Bilateral hearing loss  Cervical spinal stenosis 10/2/2018  Chronic obstructive pulmonary disease (COPD) ([facility]  Coronary artery disease involving native coronary artery of native heart  Diastolic dysfunction without heart failure  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) 10/2/2018  Dyslipidemia  Dysphagia as late effect of cerebrovascular accident (CVA) 10/2/2018  Gastric ulcer  Glaucoma  Hemiparesis affecting left side as late effect of cerebrovascular accident (CVA) ([facility] 10/2/2018  History of kidney stones  History of transient ischemic attack (TIA) 2x  Hypertensive heart disease without heart failure  Hypothyroidism  Meningioma ([facility] 10/3/2018 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  
 
Assessment: 
? Changes in Assessment throughout shift: see notes ? Patient has central line: no Reasons if yes: Insertion date: Last dressing date: 
? Patient has Suarez Cath: no Reasons if yes: Insertion date: 
 
? Last Vitals: 
  
Vitals:  
 10/21/18 2049 10/22/18 0157 10/22/18 0400 10/22/18 2351 BP: 156/60 151/63 152/66 156/68 Pulse: 76 71 77 76 Resp: 18 18 18 Temp: 98.2 °F (36.8 °C) 98.9 °F (37.2 °C) 98 °F (36.7 °C) SpO2: 95% 93% 90% Weight:      
Height:      
 
 
? PAIN Pain Assessment Pain Intensity 1: 0 (10/22/18 0400) Pain Intensity 1: 2 (12/29/14 1105) Pain Location 1: Leg Pain Location 1: Abdomen Pain Intervention(s) 1: Medication (see MAR) Pain Intervention(s) 1: Medication (see MAR) Patient Stated Pain Goal: 0 Patient Stated Pain Goal: 0 
o Intervention effective: yes   
o Other actions taken for pain: turn reposition ? Skin Assessment Skin color Skin Color: Appropriate for ethnicity Condition/Temperature Skin Condition/Temp: Dry, Warm Integrity Skin Integrity: Abrasion Turgor Turgor: Non-tenting Weekly Pressure Ulcer Documentation  Pressure  Injury Documentation: No Pressure Injury Noted-Pressure Ulcer Prevention Initiated Wound Prevention & Protection Methods Orientation of wound Orientation of Wound Prevention: Posterior Location of Prevention Location of Wound Prevention: Buttocks, Sacrum/Coccyx Dressing Present Dressing Present : No 
Dressing Status Dressing Status: Intact Wound Offloading Wound Offloading (Prevention Methods): Bed, pressure redistribution/air, Chair cushion ? INTAKE/OUPUT Date 10/21/18 0700 - 10/22/18 8196 10/22/18 0700 - 10/23/18 7285 Shift 8857-0347 1261-6006 24 Hour Total 9715-4265 1330-3888 24 Hour Total  
INTAKE  
NG/GT 2820 937 3757 Water Flush Volume (mL) (PEG/Gastrostomy Tube 10/06/18)  Medication Volume (PEG/Gastrostomy Tube 10/06/18) 300  300 Intake (ml) (PEG/Gastrostomy Tube 10/06/18) 5397 763 9915 Shift Total(mL/kg) 6676(13.2) [id number]) 3757(55.6) OUTPUT Urine(mL/kg/hr) Urine Occurrence(s) 3 x 3 x 6 x Stool Stool Occurrence(s) 0 x 0 x 0 x Shift Total(mL/kg) NET 97 [id number] Weight (kg) 67.6 67.6 67.6 67.6 67.6 67.6 Recommendations: 1. Patient needs and requests: Assist with ADL's 2. Diet: NPO with bolus tube feedings 3. Pending tests/procedures: none 4.  Functional Level/Equipment: w/c 
 
 5. Estimated Discharge Date: TBD Posted on Whiteboard in Patients Room: yes HEALS Safety Check A safety check occurred in the patient's room between off going nurse and oncoming nurse listed above. The safety check included the below items Area Items H High Alert Medications ? Verify all high alert medication drips (heparin, PCA, etc.) E Equipment ? Suction is set up for ALL patients (with sonia) ? Red plugs utilized for all equipment (IV pumps, etc.) ? WOWs wiped down at end of shift. ? Room stocked with oxygen, suction, and other unit-specific supplies A Alarms ? Bed alarm is set for fall risk patients ? Ensure chair alarm is in place and activated if patient is up in a chair L Lines ? Check IV for any infiltration ? Suarez bag is empty if patient has a Suarez ? Tubing and IV bags are labeled [place] Safety ? Room is clean, patient is clean, and equipment is clean. ? Hallways are clear from equipment besides carts. ? Fall bracelet on for fall risk patients ? Ensure room is clear and free of clutter ? Suction is set up for ALL patients (with sonia) ? Hallways are clear from equipment besides carts. ? Isolation precautions followed, supplies available outside room, sign posted

## 2018-10-22 NOTE — INTERDISCIPLINARY ROUNDS
[address] 
[address] [place] [address], [address] INPATIENT REHABILITATION 
PRE-TEAM CONFERENCE SUMMARY Date of Conference: 10/23/18 Name: Aria Grubbs Age / Sex: 80 y.o. / male CSN: [id number] MRN: [id number] Admit Date: 10/8/2018 Length of Stay: 14 days Primary Rehabilitation Diagnosis 1. Impaired Mobility and ADLs 2. 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 
  
Comorbidities  Urinary frequency R35.0  Nocturia R35.1  Glaucoma H40.9  History of kidney stones Z87.442  Cervical spinal stenosis M48.02  
 Meningioma  D32.9  Hypertensive heart disease without heart failure I11.9  Diastolic dysfunction without heart failure I51.89  
 Gastric ulcer K25.9  Coronary artery disease involving native coronary artery of native heart I25.10  Dyslipidemia E78.5  Chronic obstructive pulmonary disease (COPD)  J44.9  Status post insertion of percutaneous endoscopic gastrostomy (PEG) tube  Z93.1  Hypothyroidism E03.9  Benign prostatic hyperplasia N40.0  Bilateral hearing loss H91.93  
 History of transient ischemic attack (TIA) Z86.73  
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  
  
 
 
Therapy: FIM SCORES Initial Assessment Weekly Progress Assessment 10/22/2018 Eating Functional Level: 1 Comments: NPO Feeding/Eating Assistance: 1 (Dependent/total assistance)(NPO) Swallowing Grooming 4 Grooming Assistance : 5 (Supervision) Comments: Pt seated in wc at sink; pt washed face. Bathing 2 Bathing Assistance, Upper: 3 (Moderate assistance ) Position Performed: Seated in chair Comments: Pt seated in wc at sink; pt wash chest, abdomen, and left arm. Pt required total A to hold left arm to wash left axillary with right UE. Pt wash right shoulder with right UE. Pt total A to wash right UE. Pt washed right hand via placing wash cloth on thigh and rubbing. Bathing Assistance, Lower : 0 (Not tested) Comments: NT due to slow completion of UB selfcare and time constraints. Upper Body Dressing Functional Level: 2 Items Applied/Steps Completed: Pullover (4 steps) Comments: Pt doff/don pullover shirt with Max A and Max vcs. Pt required mod assist to lean forward; pt attempted to reach behind head to pull shirt overhead and required mod assist for this. Pt required mod assist to remove BUE from shirt. Pt required max assist to thread left arm through arm hole, and setup for him to thread right arm. Pt donned shirt overhead with min A. Pt requried max A to pull shirt down. Dressing Assistance : 3 (Moderate assistance) Comments: Pt seated in wc; pt doff pullover shirt x2 with min vcs for hand placement and min A to unthread left arm. To don pullover shirt, pt required min vcs to pull shirt past left elbow. Pt required max A to pull shirt down in back. Pt demonstrated knowledge of tyrell technique sequence. Lower Body Dressing Functional Level: 2 Items Applied/Steps Completed: Elastic waist pants (3 steps), Sock, left (1 step), Sock, right (1 step) Comments: Pt lying supine in bed; pt required total assist to thread legs in pants. Pt bend knees with max assist and stabilization for left leg; pt bridge with stabilization of left leg and requried min A to pull pants up to waist.  Dressing Assistance : 0 (Not tested) Comments: NT due to slow completion of UB selfcare and time constraints. Toileting Functional Level: 1 Comments: Pt require max A for clothing management and max A for maintaining midline sitting. Toileting Assistance (FIM Score): 3 (Moderate assistance ) Bladder  level of assist 1 1 Bladder  accident frequency score 2 2 Bowel  level of assist 2 1 Bowel  accident frequency score 2    
 Toilet Transfer [place] Toilet Transfer Score: 2 0 (Not tested) Tub/Shower Transfer [place] Tub or Shower Type: Shower Tub/Shower Transfer Score: 0 Comments: NT due to safety   
0 (Not tested) Comprehension Primary Mode of Comprehension: Auditory Score: 3 Comments: (Hard of Hearing) Auditory 6 Expression Primary Mode of Expression: Verbal 
Score: 3 Comments: Needing min cues for speaking up to be able to be understood by therapist Verbal 
5 Social Interaction Score: 3 Comments: minimal encouragement to participate in therapy 5 Problem Solving Score: 2 Comments: Functional problem solving for initiating transfers, bed mobility needing moderate cues, also needing significant cues for attempting to sequence wheelchair mobility using right UE/LE 5 Memory Score: 2 Comments: able to recall information with only occasional reminder provided by spouse/therapist 4 FIM SCORES Initial Assessment Weekly Progress Assessment 10/22/2018 Bed/Chair/Wheelchair Transfers Transfer Type: SPT without device Other: stand pivot and squat pivot transfer Transfer Assistance : 2 (Maximal assistance) Sit to Stand Assistance: Maximum assistance Car Transfers: Not tested Car Type: N/A Transfer Type: Other Other: stand step transfer to left side Transfer Assistance : 2 (Maximal assistance) Sit to Stand Assistance: Maximum assistance Car Transfers: Not tested Car Type: N/A Bed Mobility Rolling Right 3 (Moderate assistance ) Rolling Left 3 (Moderate assistance ) Supine to Sit 2 (Maximal assistance) Sit to Stand Maximum assistance Sit to Supine 2 (Maximal assistance) Rolling Right 
 2 (Maximal assistance) Rolling Left 
 3 (Moderate assistance ) Supine to Sit 
 2 (Maximal assistance) Sit to Stand Maximum assistance Sit to Supine 2 (Maximal assistance) Locomotion (W/C) Able to Propel (ft): 15 feet Functional Level: 1 Curbs/Ramps Assist Required (FIM Score): 0 (Not tested) Wheelchair Setup Assist Required : 2 (Maximal assistance) Wheelchair Management: Manages right brake Function 2(min A for 140 ft) Setup Assistance 1 (Dependent/total assistance) Locomotion (W/C distance) 15 Feet 140 feet(min A) Locomotion (Walk) 1 (Dependent/total assistance)  Unable to assess safely today Locomotion (Walk dist.) 2 Feet (ft)  0 ft Steps/Stairs Steps/Stairs Ambulated (#): 0 Level of Assist : 0 (Not tested)(not safe to assess at this time) Rail Use: Right   Unable to assess safely today Nursing:  
Neuro:   AAA&O x_4 Respiratory:   [] WNL   [x] O2   [x] LPM _2L prn Other: PRN breathing treatments Peripheral Vascular:   [] TEDS present   [] Edema present ____ Grade Cardiac:   [x] WNL   [] Other Genitourinary:   [x] continent   [x] incontinent   [] mary  Abdominal _10/17/18 LBM 
GI: _NPO Diet ______ Liquids Jevity 1.5 tube feeds Musculoskeletal: ____ ROM Transfers _W/C Assistive Device Used _Mod/max  Level of Assistance Skin Integumentary:   [x] Intact   [] Not Intact  Skin/Fall/Aspiration/Infection control Preventative Measures Details__High risk for aspiration. ___ Pain: [x] Controlled   [] Not Controlled   Pain Meds:   [] Scheduled   [x] PRN Registered Dietitian / Nutrition:  
No data found. Patient receiving bolus tube feeding of Jevity 1.5 and tolerating feeds. Has hyponatremia; Na 127 mmol/L today. Pt discussed with MD. Noted MD decreased water flushes in tube feeding and added sodium chloride tablet and lasix. Na levels to be checked q 6 hours per MD 
 
 
Tube Feeding:  Bolus feeds of Jevity 1.5, 237 mL (1 can) x 5 times daily via PEG Water Flushes:  75 mL before and after each bolus feed Residuals: 0 mL Supplements:          [] Yes   [x] No     
Amount of supplement consumed:  not applicable Intake/Output Summary (Last 24 hours) at 10/22/2018 1646 Last data filed at 10/22/2018 1402 Gross per 24 hour Intake 3227 ml Output  Net 3227 ml Last bowel movement: 10/17 Interdisciplinary Team Goals: 1. Discipline  Physical Therapy Goal  Patient will be able to perform transfers at moderate assist level using appropriate AD. Barrier  Decreased strength, endurance, impaired midline orientation, decreased trunk control Intervention  Neuromuscular re-education, therapeutic activity, therex, postural awareness training Goal written by:   Mccloud Breath, PT  
 
2. Discipline  Occupational Therapy Goal  Pt will perform upper body dressing with Min A.   
 Barrier  fatigue, decreased strength, decreased trunk control Intervention  ADL retraining, TE, TA, NMRE Goal written by:  NINA Carl 3. Discipline  Speech Therapy Goal  Patient will perform pharyngeal strengthening exercises with min assist-supervision Barrier  Severe dysphagia Intervention  Oral and pharyngeal strengthening exercises, patient/family education, trials of PO when appropriate Goal written by:  Og Mendoza CCC-SLP 4. Discipline  Nursing Goal Familty will be confident in ability to perform tube feeding activities. Barrier  fear Intervention  Ongoing instruction with wife on tubefeedings and medication administration via g-tube Goal written by: Hero cShmidt RN BSN  
 
5. Discipline  Clinical Psychology Goal  Maintain mood and behavior stability Barrier  Situational stress in recovery Intervention  Support  and behavioral redirection, as tolerated Goal written by:  Jeff Hernandez, PhD  
 
6. Discipline  Nutrition / Dietetics Goal  1. Patient will tolerate enteral nutrition formula and regimen without difficulty within the next 7 days. Outcome:  [x] Met/Ongoing    []  Not Met    [] New/Initial Goal  
2.  Patient will meet their estimated nutritional needs through adequate enteral nutrition within the next 7 days. Outcome:  [x] Met/Ongoing    []  Not Met    [] New/Initial Goal   
 Barrier  none known Intervention  - Continue bolus tube feeds of Jevity 1.5, 237 mL (1 can) x 5 times daily with water flush of 75 mL before and after each bolus feed. Goal EN Regimen: Jevity 1.5, 240 mL (1 can) 5 times daily + 75 mL water flush before and after each bolus to provide: 1775 kcal, 76 gm protein, 59 gm fat, 256 gm CHO, 27 gm fiber, 900 mL free water, 1650 mL total water, 100% RDIs Goal written by:   Jesús Sherman RD Disposition / Discharge Planning: Follow-up services:  [] Outpatient Therapy    [] [address] [place] [address] [] Physical Therapy 
       [] Occupational Therapy 
       [] Speech Therapy 
 [] skilled nursing facility [] Medical social worker 
 [] None 
 [x] TBD  
DME recommendations:  tbd  
Estimated discharge date:  11/6/18 Discharge Location:  [] Home   [] [facility]   [x] TBD [] Other: __________________________________ Electronic Signatures:  
 
 Signature Date Signed Physical Therapist 
  Gracia Henley, PETE 10/22/18 Occupational Therapist 
  Annita Magdaleno, OTS Radha Shi, OTR/L  10/22/18 
10/23/2018 Speech Therapist 
  Julienne Ugalde, [address] [facility]  11/22/18 Recreational Therapist 
  Latosha Klein [facility] 10/22/2018 Nursing Veronica Mcleod, RN BSN 10/22/18 Dietitian Jesús Sherman RD   10/22/18 Clinical Psychologist 
  Cary Smart, PhD  10/22/18 Physician 
  Olaf Whitaker MD   10/22/2018  FELICIANO Anna  10/22/18 The above information has been reviewed with the patient in a language that they can understand. Opportunity for comments and questions has been provided and a signed attestation has been scanned into the \"media tab\" of the EMR. Patient Signature: ______________________________________________________ Date Signed: __________________________________________________________

## 2018-10-22 NOTE — PROGRESS NOTES
Problem: Dysphagia (Adult) Goal: *Speech Goal: (INSERT TEXT) Long term goals: 1. Patient will tolerate small amounts of PO using safe swallowing techniques without overt s/s of aspiration in 4/5 trials. 2. Patient will perform oral/pharyngeal strengthening exercises with supervision. 3. Patient will participate in laryngeal strengthening exercises and swallowing maneuvers, min cues - supervison. 4. Patient will recall 3 words after 5 minutes, min assist-supervision. 5. Patient will perform functional problem solving and reasoning tasks with supervision. Short term goals (by 10/23/18): 1. Patient will tolerate small amounts of PO puree with the SLP using safe swallowing techniques without overt s/s of aspiration in 4/5 trials. 2. Patient will perform oral/pharyngeal strengthening exercises with mod assist/cues. 3. Patient will participate in laryngeal strengthening exercises and swallowing maneuvers, mod cues. 4. Patient will recall 3 words after 5 minutes, mod-min assist. 
5. Patient will perform functional problem solving and reasoning tasks with 75-85% accuracy. Speech language pathology treatment Patient: Yury Julio (63 y.o. male) Date: 10/22/2018 Diagnosis: R CVA Acute ischemic stroke ([facility] [address] Acute ischemic stroke ([facility] [address] SUBJECTIVE:  
Patient stated I was sick to my stomach yesterday. OBJECTIVE:  
Mental Status: 
Mr. Fred Rodriguez was fatigued throughout both sessions, but was willing to work with the SLP. Treatment & Interventions:  
Mr. Fred Rodriguez was seen for two half hour speech therapy sessions, morning and afternoon. The following treatment tasks were presented: Motor Speech/Dysphagia:  
Oral exercises:  Supervision Words with \"ng\":  80% accuracy with strong /g/ Final /g/ in words:  90% accuracy Vowel prolongations:  4-5 seconds Very difficult as patient short of breath Practice with flutter valve: Patient independent in its use Neuro-Linguistics: Orientation:   Supervision Problem solvin%b appropriate responses Response & Tolerance to Activities: 
Mr. Jennifer Bynum is consistently cooperative with the SLP. However, due to fatigue (and today coughing and shortness of breath) performance is often compromised. Pain: 
Pain Scale 1: Visual 
No complaints of pain After treatment:  
[x]       Patient left in no apparent distress sitting up in chair 
[]       Patient left in no apparent distress in bed 
[x]       Call bell left within reach [x]       Nursing notified 
[]       Caregiver present 
[]       Bed alarm activated ASSESSMENT:  
Progression toward goals: 
[]       Improving appropriately and progressing toward goals [x]       Improving slowly and progressing toward goals 
[]       Not making progress toward goals and plan of care will be adjusted PLAN:  
Patient continues to benefit from skilled intervention to address the above impairments. Continue treatment per established plan of care. Discharge Recommendations:  Home Health Estimated LOS: Through 18 JAIME Watson Time Calculation:  60 Minutes

## 2018-10-22 NOTE — PROGRESS NOTES
Patient in bed sleeping. No coughing , gagging or other distress observed . Respiratory rate is 18/minute

## 2018-10-22 NOTE — PROGRESS NOTES
[address] 
[address] [place] [address], [address] INPATIENT REHABILITATION 
DAILY PROGRESS NOTE Date: 10/22/2018 Name: Cindy Oneal Age / Sex: 80 y.o. 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  
 
Patient seen and examined. Blood pressure controlled. Patient's Complaint:  
No significant medical complaints Pain Control: no current joint or muscle symptoms, essentially pain-free Objective:  
 
Vital Signs: 
Patient Vitals for the past 24 hrs: 
 BP Temp Pulse Resp SpO2  
10/22/18 0807 151/61 97.1 °F (36.2 °C) 74 18 94 % 10/22/18 0643 142/86  77    
10/22/18 0511 156/68  76    
10/22/18 0400 152/66 98 °F (36.7 °C) 77 18 90 % 10/22/18 0157 151/63 98.9 °F (37.2 °C) 71 18 93 % 10/21/18 2049 156/60 98.2 °F (36.8 °C) 76 18 95 % 10/21/18 1604 145/74 98.9 °F (37.2 °C) 75 19 95 % Physical Examination: 
GENERAL SURVEY: Patient is awake, alert, oriented x 3, sitting comfortably on the chair, not in acute respiratory distress. HEENT: pink palpebral conjunctivae, anicteric sclerae, no nasoaural discharge, moist oral mucosa NECK: supple, no jugular venous distention, no palpable lymph nodes CHEST/LUNGS: symmetrical chest expansion, good air entry, clear breath sounds HEART: adynamic precordium, good S1 S2, no S3, regular rhythm, no murmurs ABDOMEN: (+) PEG tube in place, flat, bowel sounds appreciated, soft, non-tender EXTREMITIES: (+) amputated distal phalanx of the thumb of the left hand, pink nailbeds, no edema, full and equal pulses, no calf tenderness NEUROLOGICAL EXAM: The patient is awake, alert and oriented x3, able to answer questions fairly appropriately, able to follow 1 and 2 step commands. Able to tell time from the wall clock. Cranial nerves II-XII are grossly intact except for slurred speech and dysphagia. No gross sensory deficit. Motor strength is 4+/5 on the RUE and RLE, 4/5 on the LUE, 4-/5 on the LLE. Current Medications: 
Current Facility-Administered Medications Medication Dose Route Frequency  albuterol (PROVENTIL VENTOLIN) nebulizer solution 2.5 mg  2.5 mg Nebulization Q4H PRN  
 amLODIPine (NORVASC) tablet 10 mg  10 mg Per G Tube DAILY  melatonin tablet 5 mg  5 mg Per G Tube QHS  guaiFENesin (ROBITUSSIN) 100 mg/5 mL oral liquid 100 mg  100 mg Per G Tube QID  dextromethorphan (DELSYM) 30 mg/5 mL syrup 30 mg  30 mg Per G Tube Q12H  
 traZODone (DESYREL) tablet 50 mg  50 mg Per G Tube QHS  modafinil (PROVIGIL) tablet 100 mg  100 mg Per G Tube DAILY  cholecalciferol (VITAMIN D3) tablet 2,000 Units  2,000 Units Oral BID  hydrALAZINE (APRESOLINE) tablet 20 mg  20 mg Per G Tube Q12H  pneumococcal 23-valent (PNEUMOVAX 23) injection 0.5 mL  0.5 mL IntraMUSCular PRIOR TO DISCHARGE  docusate sodium (COLACE) capsule 100 mg  100 mg Per G Tube BID  
 bisacodyl (DULCOLAX) suppository 10 mg  10 mg Rectal Q48H PRN  
 heparin (porcine) injection 5,000 Units  5,000 Units SubCUTAneous Q12H  
 acetaminophen (TYLENOL) solution 650 mg  650 mg Oral Q4H PRN  
 co-enzyme Q-10 (CO Q-10) capsule 100 mg  100 mg Oral DAILY  vitamin B complex tablet  1 Tab Oral DAILY  aspirin chewable tablet 81 mg  81 mg Per G Tube DAILY  atorvastatin (LIPITOR) tablet 80 mg  80 mg Per G Tube QHS  clopidogrel (PLAVIX) tablet 75 mg  75 mg PEG Tube DAILY  lisinopril (PRINIVIL, ZESTRIL) tablet 40 mg  40 mg Per G Tube DAILY  pantoprazole (PROTONIX) granules for oral suspension 40 mg  40 mg Per G Tube ACB  finasteride (PROSCAR) tablet 5 mg  5 mg Oral QPM  
  levothyroxine (SYNTHROID) tablet 50 mcg  50 mcg Per G Tube 6am  
 metoprolol tartrate (LOPRESSOR) tablet 25 mg  25 mg Per G Tube Q12H  tamsulosin (FLOMAX) capsule 0.4 mg  0.4 mg Oral QPM  
 timolol (TIMOPTIC) 0.5 % ophthalmic solution 1 Drop  1 Drop Both Eyes DAILY Allergies: Allergies Allergen Reactions  Codeine Other (comments) Lab/Data Review: 
Recent Results (from the past 24 hour(s)) METABOLIC PANEL, BASIC Collection Time: 10/22/18  6:08 AM  
Result Value Ref Range Sodium 127 (L) 136 - 145 mmol/L Potassium 4.9 3.5 - 5.5 mmol/L Chloride 92 (L) 100 - 108 mmol/L  
 CO2 27 21 - 32 mmol/L Anion gap 8 3.0 - 18 mmol/L Glucose 88 74 - 99 mg/dL BUN 22 (H) 7.0 - 18 MG/DL Creatinine 0.76 0.6 - 1.3 MG/DL  
 BUN/Creatinine ratio 29 (H) 12 - 20 GFR est AA >60 >60 ml/min/1.73m2 GFR est non-AA >60 >60 ml/min/1.73m2 Calcium 7.7 (L) 8.5 - 10.1 MG/DL  
CBC WITH AUTOMATED DIFF Collection Time: 10/22/18  6:08 AM  
Result Value Ref Range WBC 6.1 4.6 - 13.2 K/uL  
 RBC 3.42 (L) 4.70 - 5.50 M/uL  
 HGB 10.6 (L) 13.0 - 16.0 g/dL HCT 29.8 (L) 36.0 - 48.0 % MCV 87.1 74.0 - 97.0 FL  
 MCH 31.0 24.0 - 34.0 PG  
 MCHC 35.6 31.0 - 37.0 g/dL  
 RDW 13.1 11.6 - 14.5 % PLATELET 385 959 - 423 K/uL MPV 11.2 9.2 - 11.8 FL  
 NEUTROPHILS 67 40 - 73 % LYMPHOCYTES 16 (L) 21 - 52 % MONOCYTES 15 (H) 3 - 10 % EOSINOPHILS 2 0 - 5 % BASOPHILS 0 0 - 2 %  
 ABS. NEUTROPHILS 4.1 1.8 - 8.0 K/UL  
 ABS. LYMPHOCYTES 1.0 0.9 - 3.6 K/UL  
 ABS. MONOCYTES 0.9 0.05 - 1.2 K/UL  
 ABS. EOSINOPHILS 0.1 0.0 - 0.4 K/UL  
 ABS. BASOPHILS 0.0 0.0 - 0.1 K/UL  
 DF AUTOMATED Estimated Glomerular Filtration Rate: On admission, estimated GFR based on a Creatinine of 0.77 mg/dl: 
 Using CKD-EPI = 81.6 mL/min/1.73m2 Using MDRD = 101.6 mL/min/1.73m2 Most recent estimated GFR, based on a Creatinine of 0.76 mg/dl on 10/22/2018: 
 Using CKD-EPI = 82 mL/min/1.73m2 Using MDRD = 103.1 mL/min/1.73m2 Assessment:  
 
Primary Rehabilitation Diagnosis 1. Impaired Mobility and ADLs 2. 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 
  
Comorbidities  Urinary frequency R35.0  Nocturia R35.1  Glaucoma H40.9  History of kidney stones Z87.442  Cervical spinal stenosis M48.02  
 Meningioma  D32.9  Hypertensive heart disease without heart failure I11.9  Diastolic dysfunction without heart failure I51.89  
 Gastric ulcer K25.9  Coronary artery disease involving native coronary artery of native heart I25.10  Dyslipidemia E78.5  Chronic obstructive pulmonary disease (COPD)  J44.9  Status post insertion of percutaneous endoscopic gastrostomy (PEG) tube  Z93.1  Hypothyroidism E03.9  Benign prostatic hyperplasia N40.0  Bilateral hearing loss H91.93  
 History of transient ischemic attack (TIA) Z86.73  
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  
  
 
Plan: 1. Justification for continued stay: Good progression towards established rehabilitation goals. 2. Medical Issues being followed closely: 
  [x]  Fall and safety precautions  
  []  Wound Care  
  [x]  Bowel and Bladder Function  
  [x]  Fluid Electrolyte and Nutrition Balance  
  []  Pain Control 3. Issues that 24 hour rehabilitation nursing is following: 
  [x]  Fall and safety precautions  
  []  Wound Care  
  [x]  Bowel and Bladder Function  
  [x]  Fluid Electrolyte and Nutrition Balance  
  []  Pain Control   
  [x]  Assistance with and education on in-room safety with transfers to and from the bed, wheelchair, toilet and shower. 4. Acute rehabilitation plan of care: 
  [x]  Continue current care and rehab. [x]  Physical Therapy [x]  Occupational Therapy [x]  Speech Therapy []  Hold Rehab until further notice 5. Medications: 
  [x]  MAR Reviewed [x]  Continue Present Medications 6. DVT Prophylaxis:   
  []  Lovenox [x]  Unfractionated Heparin  
  []  Coumadin  
  []  NOAC  
  []  BECKY Stockings  
  []  Sequential Compression Device []  None 7. Orders:  
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 
 > Continue: 
  > Aspirin 81 mg via PEG tube once daily in AM 
  > Atorvastatin 80 mg via PEG tube q HS 
  > Coenzyme Q10 100 mg via PEG tube once daily 
  > Clopidogrel 75 mg via PEG tube once daily in PM 
  > Vitamin B complex 1 tab via PEG tube once daily 
 
> Dysphagia as late effect of CVA; S/P Insertion of percutaneous endoscopic gastrostomy (PEG) tube (10/5/2018) 
 > NPO with tube feeding 
 > Jevity 1.5 237 ml via PEG tube 5 times daily (6AM, 10AM, 2PM, 6PM, 10PM) > On 10/15/2018, increased water flush from 100 ml to 150 ml via PEG tube vefore and after each bolus feeding 
 > Decrease water flush from 150 ml to 75 ml via PEG tube vefore and after each bolus feeding 
 
> Hypertensive heart disease without heart failure 
 > On admission to the ARU, decreased Hydralazine from 50 mg to 25 mg via PEG tube q 8 hr (6AM, 2PM, 10PM) > On 10/10/2018, decrease Hydralazine from 25 mg via PEG tube q 8 hr (6AM, 2PM, 10PM) to 20 mg via PEG tube q 12 hr (9AM, 9PM) > On 10/18/2018, Amlodipine 10 mg via PEG tube once daily (changed from 9AM to 6AM) > Continue: 
  > Amlodipine 10 mg via PEG tube once daily (6AM) 
  > Hydralazine 20 mg via PEG tube q 12 hr (9AM, 9PM) > Lisinopril 40 mg via PEG tube once daily (6AM) 
  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 
 > On 10/11/2018, started Trazodone 50 mg via PEG tube q HS 
 > Continue Trazodone 50 mg via PEG tube q HS 
 
 > Neurocognitive modulation 
 > On 10/11/2018, started: 
  > Trazodone 50 mg via PEG tube q HS 
  > Modafinil 100 mg via PEG tube q AM 
 > Continue: 
  > Trazodone 50 mg via PEG tube q HS  
  > Modafinil 100 mg via PEG tube q AM 
 
> Difficulty sleeping 
 > On admission to the ARU, started Melatonin 3 mg via PEG tube q HS 
 > On 10/11/2018, started Trazodone 50 mg via PEG tube q HS 
 > On 10/17/2018, increased Melatonin from 3 mg to 5 mg via PEG tube q HS 
 > Continue: 
  > Melatonin 5 mg via PEG tube q HS 
   > Trazodone 50 mg via PEG tube q HS 
 
> Cough 
 > WBC count (10/9/2018) = 6.8 
 > Chest x-ray (10/12/2018) showed a small left pleural effusion; prominent interstitial lung markers could represent mild infiltration or chronic interstitial lung disease. 
 > On 10/14/2018, patient was given Furosemide 20 mg via PEG tube x 1 dose 
 > No fever or desaturation noted since admission 
 > On 10/15/2018, started: 
  > Dextromethorphan 30 mg via PEG tube q 12 hr 
  > Guaifenesin 100 mg via PEG tube 4 times daily 
 > WBC count (10/22/2018) = 6.1 
 > No fever over the past 24 hours 
 > Start: 
  > Albuterol nebulization q 4 hr PRN for shortness of breath 
  > O2 inhalation 2 LPM via nasal cannula PRN for SpO2 less than 90% 
 > Continue: 
  > Dextromethorphan 30 mg via PEG tube q 12 hr 
  > Guaifenesin 100 mg via PEG tube 4 times daily 
 
> Hyponatremia 
 > Na (10/9/2018, on admission) = 143 
 > BUN/Creatinine ratio (10/9/2018) = 26 (BUN 20, Creatinine 0.77) > Na (10/15/2018) = 137 
 > BUN/Creatinine ratio (10/15/2018) = 37 (BUN 34, Creatinine 0.92)   
 > On 10/15/2018, increased water flush from 100 ml to 150 ml via PEG tube vefore and after each bolus feeding 
 > Na (10/22/2018) = 127 
 > BUN/Creatinine ratio (10/22/2018) = 29 (BUN 22, Creatinine 0.76)  > Work-up (10/22/2018): 
  > Serum Osm 
  > Urine Na 
  > Urine Osm 
  > Urine SG = 1.014 
 > Decrease water flush from 150 ml to 75 ml via PEG tube vefore and after each bolus feeding 
 > Start NaCl 1 gram via PEG tube q 8 hr  
 > Will monitor serum Na q 6 hr 
 
 
8. Patient's progress in rehabilitation and medical issues discussed with the patient. All questions answered to the best of my ability. Care plan discussed with patient and nurse. Signed:    Amy Melendrez MD 
  October 22, 2018

## 2018-10-22 NOTE — PROGRESS NOTES
Albuterol neb given for shortness of breath@ 1740. Dulcolax suppository given at 1740 for constipation.

## 2018-10-23 LAB
SODIUM SERPL-SCNC: 127 MMOL/L (ref 136–145)
SODIUM SERPL-SCNC: 130 MMOL/L (ref 136–145)
SODIUM SERPL-SCNC: 130 MMOL/L (ref 136–145)
SODIUM SERPL-SCNC: 131 MMOL/L (ref 136–145)

## 2018-10-23 PROCEDURE — 84295 ASSAY OF SERUM SODIUM: CPT | Performed by: INTERNAL MEDICINE

## 2018-10-23 PROCEDURE — 77030011256 HC DRSG MEPILEX <16IN NO BORD MOLN -A

## 2018-10-23 PROCEDURE — 36415 COLL VENOUS BLD VENIPUNCTURE: CPT | Performed by: INTERNAL MEDICINE

## 2018-10-23 PROCEDURE — 74011250637 HC RX REV CODE- 250/637: Performed by: INTERNAL MEDICINE

## 2018-10-23 PROCEDURE — 74011250636 HC RX REV CODE- 250/636: Performed by: INTERNAL MEDICINE

## 2018-10-23 PROCEDURE — 97530 THERAPEUTIC ACTIVITIES: CPT

## 2018-10-23 PROCEDURE — 97112 NEUROMUSCULAR REEDUCATION: CPT

## 2018-10-23 PROCEDURE — 97535 SELF CARE MNGMENT TRAINING: CPT

## 2018-10-23 PROCEDURE — 92507 TX SP LANG VOICE COMM INDIV: CPT

## 2018-10-23 PROCEDURE — 65310000000 HC RM PRIVATE REHAB

## 2018-10-23 PROCEDURE — 97542 WHEELCHAIR MNGMENT TRAINING: CPT

## 2018-10-23 PROCEDURE — 74011000250 HC RX REV CODE- 250: Performed by: INTERNAL MEDICINE

## 2018-10-23 RX ORDER — MIRTAZAPINE 15 MG/1
15 TABLET, FILM COATED ORAL
Status: DISCONTINUED | OUTPATIENT
Start: 2018-10-23 | End: 2018-10-30 | Stop reason: HOSPADM

## 2018-10-23 RX ADMIN — HYDRALAZINE HYDROCHLORIDE 20 MG: 10 TABLET, FILM COATED ORAL at 21:04

## 2018-10-23 RX ADMIN — FINASTERIDE 5 MG: 5 TABLET, FILM COATED ORAL at 18:53

## 2018-10-23 RX ADMIN — VITAMIN D, TAB 1000IU (100/BT) 2000 UNITS: 25 TAB at 08:52

## 2018-10-23 RX ADMIN — VITAMIN D, TAB 1000IU (100/BT) 2000 UNITS: 25 TAB at 18:53

## 2018-10-23 RX ADMIN — METOPROLOL TARTRATE 25 MG: 25 TABLET ORAL at 08:53

## 2018-10-23 RX ADMIN — CLOPIDOGREL BISULFATE 75 MG: 75 TABLET, FILM COATED ORAL at 21:04

## 2018-10-23 RX ADMIN — DEXTROMETHORPHAN 30 MG: 30 SUSPENSION, EXTENDED RELEASE ORAL at 08:53

## 2018-10-23 RX ADMIN — HEPARIN SODIUM 5000 UNITS: 5000 INJECTION, SOLUTION INTRAVENOUS; SUBCUTANEOUS at 05:09

## 2018-10-23 RX ADMIN — Medication 1 TABLET: at 08:51

## 2018-10-23 RX ADMIN — PANTOPRAZOLE SODIUM 40 MG: 40 GRANULE, DELAYED RELEASE ORAL at 07:30

## 2018-10-23 RX ADMIN — LISINOPRIL 40 MG: 40 TABLET ORAL at 05:09

## 2018-10-23 RX ADMIN — SODIUM CHLORIDE TAB 1 GM 1 G: 1 TAB at 14:10

## 2018-10-23 RX ADMIN — ALBUTEROL SULFATE 2.5 MG: 2.5 SOLUTION RESPIRATORY (INHALATION) at 22:48

## 2018-10-23 RX ADMIN — LEVOTHYROXINE SODIUM 50 MCG: 50 TABLET ORAL at 05:09

## 2018-10-23 RX ADMIN — DEXTROMETHORPHAN 30 MG: 30 SUSPENSION, EXTENDED RELEASE ORAL at 21:05

## 2018-10-23 RX ADMIN — HEPARIN SODIUM 5000 UNITS: 5000 INJECTION, SOLUTION INTRAVENOUS; SUBCUTANEOUS at 18:53

## 2018-10-23 RX ADMIN — GUAIFENESIN 100 MG: 100 SOLUTION ORAL at 09:18

## 2018-10-23 RX ADMIN — ALBUTEROL SULFATE 2.5 MG: 2.5 SOLUTION RESPIRATORY (INHALATION) at 18:51

## 2018-10-23 RX ADMIN — ALBUTEROL SULFATE 2.5 MG: 2.5 SOLUTION RESPIRATORY (INHALATION) at 10:29

## 2018-10-23 RX ADMIN — HYDRALAZINE HYDROCHLORIDE 20 MG: 10 TABLET, FILM COATED ORAL at 08:52

## 2018-10-23 RX ADMIN — TAMSULOSIN HYDROCHLORIDE 0.4 MG: 0.4 CAPSULE ORAL at 18:53

## 2018-10-23 RX ADMIN — DOCUSATE SODIUM 100 MG: 100 CAPSULE, LIQUID FILLED ORAL at 18:52

## 2018-10-23 RX ADMIN — METOPROLOL TARTRATE 25 MG: 25 TABLET ORAL at 21:04

## 2018-10-23 RX ADMIN — GUAIFENESIN 100 MG: 100 SOLUTION ORAL at 21:05

## 2018-10-23 RX ADMIN — Medication 100 MG: at 08:51

## 2018-10-23 RX ADMIN — SODIUM CHLORIDE TAB 1 GM 1 G: 1 TAB at 05:08

## 2018-10-23 RX ADMIN — ASPIRIN 81 MG CHEWABLE TABLET 81 MG: 81 TABLET CHEWABLE at 08:51

## 2018-10-23 RX ADMIN — ATORVASTATIN CALCIUM 80 MG: 40 TABLET, FILM COATED ORAL at 21:04

## 2018-10-23 RX ADMIN — DOCUSATE SODIUM 100 MG: 100 CAPSULE, LIQUID FILLED ORAL at 08:52

## 2018-10-23 RX ADMIN — SODIUM CHLORIDE TAB 1 GM 1 G: 1 TAB at 21:04

## 2018-10-23 RX ADMIN — AMLODIPINE BESYLATE 10 MG: 10 TABLET ORAL at 05:09

## 2018-10-23 RX ADMIN — TIMOLOL MALEATE 1 DROP: 5 SOLUTION OPHTHALMIC at 08:47

## 2018-10-23 RX ADMIN — MELATONIN TAB 5 MG 5 MG: 5 TAB at 21:04

## 2018-10-23 RX ADMIN — MIRTAZAPINE 15 MG: 15 TABLET, FILM COATED ORAL at 21:15

## 2018-10-23 RX ADMIN — GUAIFENESIN 100 MG: 100 SOLUTION ORAL at 14:08

## 2018-10-23 RX ADMIN — GUAIFENESIN 100 MG: 100 SOLUTION ORAL at 18:53

## 2018-10-23 RX ADMIN — MODAFINIL 100 MG: 100 TABLET ORAL at 08:52

## 2018-10-23 NOTE — PROGRESS NOTES
Problem: Self Care Deficits Care Plan (Adult) Goal: *Therapy Goal (Edit Goal, Insert Text) Occupational Therapy Goals Long Term Goals Initiated 10/9/18 and to be accomplished within 4 week(s) 1. Pt will perform self-feeding with total dependence. 2. Pt will perform grooming with supervision. 3. Pt will perform UB bathing with Min A. 
4. Pt will perform LB bathing with Min A prn AE. 5. Pt will perform tub/shower transfer with Mod A.  
6. Pt will perform UB dressing with Setup. 7. Pt will perform LB dressing with Min A prn AE. 8. Pt will perform toileting task with Min A. 
9. Pt will perform toilet transfer with Mod A. Short Term Goals Initiated 10/16/18 and to be accomplished within 7 day(s) 10/23/18 1. Pt will perform grooming with Setup. 2. Pt will perform UB bathing with Min A. 
3. Pt will perform LB bathing with Min A prn AE. 4. Pt will perform tub/shower transfer with Mod A. 
5. Pt will perform UB dressing with Min A. 
6. Pt will perform LB dressing with Mod A prn AE. 7. Pt will perform toilet transfer with Mod A. Occupational Therapy TREATMENT Patient: Jazmin Howell 80 y.o. Patient identified with name and : Yes 
 
Date: 10/23/2018 Time In: 3065 Time Out[de-identified] 4076 Diagnosis: R CVA Acute ischemic stroke ([facility] [address] Precautions: Aspiration, Fall(Head of bed 30-45 degrees at all times per speech therapist) Chart, occupational therapy assessment, plan of care, and goals were reviewed. Pain: 
Pt reports 0/10 pain or discomfort prior to treatment. Pt reports 0/10 pain or discomfort post treatment. Intervention Provided: None required SUBJECTIVE:  
Patient reported he was tired during current date ADL. OBJECTIVE DATA SUMMARY:  
 
THERAPEUTIC ACTIVITY Daily Assessment Co-Treatment-- Pt stand with support of parallel bars with visual feedback for standing in midline via plum line.  Pt required total A for placement of left UE on parallel bar and total A to extend left elbow to provide weight bearing through left UE. Pt required max verbal and tactile cues to extend left knee, contract gluts, extend spine, retract scapula, and to keep head up. Pt required max vcs to keep eyes open during activity. Pt stand x 2 trials. Pt stand tolerance is as follows: 
First trial: around 6 minutes Second trial: around 4 minutes FEEDING/EATING Daily Assessment Feeding/Eating Feeding/Eating Assistance: 1 (Dependent/total assistance) (NPO) LOWER BODY BATHING Daily Assessment Lower Body Bathing Bathing Assistance, Lower : 3 (Moderate assistance ) Position Performed: Long sitting on bed; Other (comment)(side lying) Comments: Pt long sitting in bed; pt wash anterior pericare, left and right thigh, and right lower leg. Pt attempt to wash right foot; pt required total A for thoroughness to wash right foot. Pt wash half of left lower leg with stabilization of left LE; pt total A to wash left lower leg and foot. Pt side lying with min A to maintain side lying wash right buttocks and posterior pericare. Pt total A to wash left buttocks. TOILETING Daily Assessment Toileting Toileting Assistance (FIM Score): 1 (Total assistance) Adaptive Equipment: Other (comment)(Bedside urinal ) LOWER BODY DRESSING Daily Assessment Lower Body Dressing Dressing Assistance : 2 (Maximal assistance) Position Performed: Long sitting on bed;Supine Comments: Pt long sitting. To don, required total A to thread left LE to mid thigh. Pt required total A to thread right LE to mid lower leg. Pt pull right side of elastic waist pants to right mid thigh. Pt bridge with stabilization of left foot and knee; pt pull right side of elastic waist pant to waist and pt total A to pull left side to waist.   
 
MOBILITY/TRANSFERS Daily Assessment Pt supine<>side lying on left side with supervision and min A to maintain sidelying to wash buttocks. Pt supine<>sidelying on right side with mod A. Pt side lying <>eob with mod A. Pt eob<>wc via slide board transfer with mod A.   
 
ASSESSMENT: 
Pt continues to demonstrate left UE hemiparesis, lethargy during therapy sessions, and decreased independence with self care. Pt continues to require mod A for lower body bathing and max A for lower body dressing. Pt continues to require max vcs and tactile cues to  midline and to stand up straight. On current date, nursing was notified of an open sore on left elbow and redness on left buttocks. OTS applied barrier cream to left buttocks and OTR/L applied mepilex to sore on left elbow. Pt would benefit from continued skilled OT services to increase independence and safety with self care, and functional transfers. Progression toward goals: 
[]          Improving appropriately and progressing toward goals [x]          Improving slowly and progressing toward goals 
[]          Not making progress toward goals and plan of care will be adjusted PLAN: 
Patient continues to benefit from skilled intervention to address the above impairments. Continue treatment per established plan of care. Discharge Recommendations: SNF 2/2 progress Further Equipment Recommendations for Discharge:  If d/c to Home recommend BSC, shower chair with back Activity Tolerance: 
poor Estimated LOS:11/6/18 Please refer to the flowsheet for vital signs taken during this treatment. After treatment:  
[]  Patient left in no apparent distress sitting up in chair  
[]  Patient left in no apparent distress in bed 
[]  Call bell left within reach [x]  Nursing notified 
[]  Caregiver present [x]  Transition to PT 
 
COMMUNICATION/EDUCATION:  
[] Home safety education was provided and the patient/caregiver indicated understanding. [x] Patient/family have participated as able in goal setting and plan of care. [x] Patient/family agree to work toward stated goals and plan of care. [] Patient understands intent and goals of therapy, but is neutral about his/her participation. [] Patient is unable to participate in goal setting and plan of care.  
 
 
Shanna Macias , OTS

## 2018-10-23 NOTE — PROGRESS NOTES
conducted a Follow up consultation and Spiritual Assessment for Janiya Johnson, who is a 80 y. o.,male. The  provided the following Interventions: 
Continued the relationship of care and support. Listened empathically. Offered prayer and assurance of continued prayer on patients behalf. Chart reviewed. The following outcomes were achieved: 
Patient expressed gratitude for 's visit. Assessment: 
There are no further spiritual or Jainism issues which require Spiritual Care Services interventions at this time. Plan: 
Chaplains will continue to follow and will provide pastoral care on an as needed/requested basis.  recommends bedside caregivers page  on duty if patient shows signs of acute spiritual or emotional distress. [address] [facility] Spiritual Care  
[phone]

## 2018-10-23 NOTE — ROUTINE PROCESS
SHIFT CHANGE NOTE FOR Bruna Alatorre Bedside and Verbal shift change report given to Knvg Benedict RN (oncoming nurse) by Ld Jasso RN (offgoing nurse). Report included the following information SBAR, Kardex, MAR and Recent Results. Situation: 
 Code Status: DNR Reason for Admission: Right ischemic stroke Hospital Day: 15 Problem List:  
Hospital Problems  Date Reviewed: 10/22/2018 Codes Class Noted POA Hyponatremia ICD-10-CM: E87.1 ICD-9-CM: 276.1  10/22/2018 No  
   
 History of transient ischemic attack (TIA) ICD-10-CM: E36.41 
ICD-9-CM: V12.54  Unknown Yes Overview Signed 10/9/2018  3:28 PM by Cassia Milton MD  
  2x Vitamin D deficiency (Chronic) ICD-10-CM: E55.9 ICD-9-CM: 268.9  10/9/2018 Yes Overview Signed 10/10/2018  1:59 PM by Cassia Milton MD  
  Vitamin D 25-Hydroxy (10/10/2018) = 22.5 Hypertensive heart disease without heart failure (Chronic) ICD-10-CM: I11.9 ICD-9-CM: 402.90  Unknown Yes Status post insertion of percutaneous endoscopic gastrostomy (PEG) tube ([facility] ICD-10-CM: Z93.1 ICD-9-CM: V44.1  10/5/2018 Yes * (Principal) Acute ischemic stroke ([facility] ICD-10-CM: I63.9 ICD-9-CM: 434.91  10/2/2018 Yes Overview Signed 10/8/2018  6:32 PM by Cassia Milton MD  
  Acute Ischemic Stroke (acute infarct of the posterior superior right basal ganglia and adjacent right corona radiata) with residual left hemiparesis, dysphagia and dysarthria Impaired mobility and ADLs ICD-10-CM: Z74.09 
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 
 Past Medical History:  
Past Medical History:  
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) 10/2/2018  Dyslipidemia  Dysphagia as late effect of cerebrovascular accident (CVA) 10/2/2018  Gastric ulcer  Glaucoma  Hemiparesis affecting left side as late effect of cerebrovascular accident (CVA) ([facility] 10/2/2018  History of kidney stones  History of transient ischemic attack (TIA) 2x  Hypertensive heart disease without heart failure  Hypothyroidism  Meningioma ([facility] 10/3/2018 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  
 
Assessment: 
? Changes in Assessment throughout shift: none ? Patient has central line: no Reasons if yes: Insertion date: Last dressing date: 
? Patient has Suarez Cath: no Reasons if yes: Insertion date: 
 
? Last Vitals: 
  
Vitals:  
 10/22/18 2503 10/22/18 1554 10/22/18 2100 10/23/18 4331 BP: 151/61 146/62 145/64 152/65 Pulse: 74 71 66 65 Resp: 18 18 20 Temp: 97.1 °F (36.2 °C) 97.5 °F (36.4 °C) 97.9 °F (36.6 °C) SpO2: 94% 96% 97% Weight: Height:      
 
 
? PAIN Pain Assessment Pain Intensity 1: 0 (10/23/18 0512) Pain Intensity 1: 2 (12/29/14 1105) Pain Location 1: Leg Pain Location 1: Abdomen Pain Intervention(s) 1: Medication (see MAR) Pain Intervention(s) 1: Medication (see MAR) Patient Stated Pain Goal: 0 Patient Stated Pain Goal: 0 
o Intervention effective: yes  
o Other actions taken for pain: turn reposition, rest 
 
? Skin Assessment Skin color Skin Color: Ecchymosis (comment)(scattered) Condition/Temperature Skin Condition/Temp: Warm, Dry Integrity Skin Integrity: Abrasion, Incision (comment) Turgor Turgor: Non-tenting Weekly Pressure Ulcer Documentation  Pressure  Injury Documentation: No Pressure Injury Noted-Pressure Ulcer Prevention Initiated Wound Prevention & Protection Methods Orientation of wound Orientation of Wound Prevention: Posterior Location of Prevention Location of Wound Prevention: Buttocks, Sacrum/Coccyx Dressing Present Dressing Present : No 
Dressing Status Dressing Status: Intact Wound Offloading Wound Offloading (Prevention Methods): Bed, pressure reduction mattress, Turning, Repositioning, Pillows ? INTAKE/OUPUT Date 10/22/18 0700 - 10/23/18 8678 10/23/18 0700 - 10/24/18 4311 Shift [id number] 8478-6379 24 Hour Total 7182-3184 6321-8754 24 Hour Total  
INTAKE  
P.O.  0 0     
  P. O.  0 0 NG/GT 6530 475 5336 Water Flush Volume (mL) (PEG/Gastrostomy Tube 10/06/18) 450 150 600 Medication Volume (PEG/Gastrostomy Tube 10/06/18) 100 60 160 Intake (ml) (PEG/Gastrostomy Tube 10/06/18) 1270  1270 Shift Total(mL/kg) 1203(88.7) 210(3.1) [id number]) OUTPUT Urine(mL/kg/hr) Urine Occurrence(s) 4 x 2 x 6 x Stool Stool Occurrence(s) 1 x 1 x 2 x Shift Total(mL/kg) NET 0742 660 1850 Weight (kg) 67.6 67.6 67.6 67.6 67.6 67.6 Recommendations: 1. Patient needs and requests: assist with ADL's, mobility, turning, TF bolus 2. Diet: NPO with tube feedings 3. Pending tests/procedures: LABS 4. Functional Level/Equipment: w/c 1 person assist, BSC 
 
5. Estimated Discharge Date: TBD Posted on Whiteboard in Patients Room: no  
 
[facility] Safety Check A safety check occurred in the patient's room between off going nurse and oncoming nurse listed above. The safety check included the below items Area Items H High Alert Medications ? Verify all high alert medication drips (heparin, PCA, etc.) E Equipment ? Suction is set up for ALL patients (with sonia) ? Red plugs utilized for all equipment (IV pumps, etc.) ? WOWs wiped down at end of shift. ? Room stocked with oxygen, suction, and other unit-specific supplies A Alarms ? Bed alarm is set for fall risk patients ? Ensure chair alarm is in place and activated if patient is up in a chair L Lines ? Check IV for any infiltration ? Suarez bag is empty if patient has a Suarez ? Tubing and IV bags are labeled Larue Prude Safety ? Room is clean, patient is clean, and equipment is clean. ? Hallways are clear from equipment besides carts. ? Fall bracelet on for fall risk patients ? Ensure room is clear and free of clutter ? Suction is set up for ALL patients (with sonia) ? Hallways are clear from equipment besides carts. ? Isolation precautions followed, supplies available outside room, sign posted

## 2018-10-23 NOTE — INTERDISCIPLINARY ROUNDS
[address] 
[address] [place] [address], [address] [facility] SUMMARY Date of Conference: 10/23/2018 Name: Taylor Wen Age / Sex: 80 y.o. / male CSN: [id number] MRN: [id number] Admit Date: 10/8/2018 Length of Stay: 15 days Primary Rehabilitation Diagnosis 1. Impaired Mobility and ADLs 2. 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 
  
Comorbidities  Urinary frequency R35.0  Nocturia R35.1  Glaucoma H40.9  History of kidney stones Z87.442  Cervical spinal stenosis M48.02  
 Meningioma  D32.9  Hypertensive heart disease without heart failure I11.9  Diastolic dysfunction without heart failure I51.89  
 Gastric ulcer K25.9  Coronary artery disease involving native coronary artery of native heart I25.10  Dyslipidemia E78.5  Chronic obstructive pulmonary disease (COPD)  J44.9  Status post insertion of percutaneous endoscopic gastrostomy (PEG) tube  Z93.1  Hypothyroidism E03.9  Benign prostatic hyperplasia N40.0  Bilateral hearing loss H91.93  
 History of transient ischemic attack (TIA) Z86.73  
 Traumatic amputation of left thumb T07.718D  Hyponatremia E87.1  
  
 
 
Therapy: FIM SCORES Initial Assessment Weekly Progress Assessment 10/23/2018 Eating Functional Level: 1 Comments: NPO Feeding/Eating Assistance: 1 (Dependent/total assistance) Swallowing Grooming 4 Bathing 2 Bathing Assistance, Lower : 3 (Moderate assistance ) Position Performed: Long sitting on bed; Other (comment)(side lying) Comments: Pt long sitting in bed; pt wash anterior pericare, left and right thigh, and right lower leg. Pt attempt to wash right foot; pt required total A for thoroughness to wash right foot.  Pt wash half of left lower leg with stabilization of left LE; pt total A to wash left lower leg and foot. Pt side lying with min A to maintain side lying wash right buttocks and posterior pericare. Pt total A to wash left buttocks. Upper Body Dressing Functional Level: 2 Items Applied/Steps Completed: Pullover (4 steps) Comments: Pt doff/don pullover shirt with Max A and Max vcs. Pt required mod assist to lean forward; pt attempted to reach behind head to pull shirt overhead and required mod assist for this. Pt required mod assist to remove BUE from shirt. Pt required max assist to thread left arm through arm hole, and setup for him to thread right arm. Pt donned shirt overhead with min A. Pt requried max A to pull shirt down. Lower Body Dressing Functional Level: 2 Items Applied/Steps Completed: Elastic waist pants (3 steps), Sock, left (1 step), Sock, right (1 step) Comments: Pt lying supine in bed; pt required total assist to thread legs in pants. Pt bend knees with max assist and stabilization for left leg; pt bridge with stabilization of left leg and requried min A to pull pants up to waist.  Dressing Assistance : 2 (Maximal assistance) Position Performed: Long sitting on bed;Supine Comments: Pt long sitting. To don, required total A to thread left LE to mid thigh. Pt required total A to thread right LE to mid lower leg. Pt pull right side of elastic waist pants to right mid thigh. Pt bridge with stabilization of left foot and knee; pt pull right side of elastic waist pant to waist and pt total A to pull left side to waist.   
Toileting Functional Level: 1 Comments: Pt require max A for clothing management and max A for maintaining midline sitting. Toileting Assistance (FIM Score): 1 (Total assistance) Adaptive Equipment: Other (comment)(Bedside urinal ) Bladder  level of assist 1 1 Bladder  accident frequency score 2 2 Bowel  level of assist 2 1 Bowel  accident frequency score 2 2 Toilet Transfer Saline Toilet Transfer Score: 2 Tub/Shower Transfer Saline Tub or Shower Type: Shower Tub/Shower Transfer Score: 0 Comments: NT due to safety Comprehension Primary Mode of Comprehension: Auditory Score: 3 Comments: (Hard of Hearing) Auditory 6 Expression Primary Mode of Expression: Verbal 
Score: 3 Comments: Needing min cues for speaking up to be able to be understood by therapist Verbal 
5 Social Interaction Score: 3 Comments: minimal encouragement to participate in therapy 5 Problem Solving Score: 2 Comments: Functional problem solving for initiating transfers, bed mobility needing moderate cues, also needing significant cues for attempting to sequence wheelchair mobility using right UE/LE 5 Memory Score: 2 Comments: able to recall information with only occasional reminder provided by spouse/therapist 4 FIM SCORES Initial Assessment Weekly Progress Assessment 10/23/2018 Bed/Chair/Wheelchair Transfers Transfer Type: SPT without device Other: stand pivot and squat pivot transfer Transfer Assistance : 2 (Maximal assistance) Sit to Stand Assistance: Maximum assistance Car Transfers: Not tested Car Type: N/A Transfer Type: Lateral with transfer board Other: also stand pivot transfer Transfer Assistance : 3 (Moderate assistance ) Sit to Stand Assistance: Maximum assistance Car Transfers: Not tested Bed Mobility Rolling Right 3 (Moderate assistance ) Rolling Left 3 (Moderate assistance ) Supine to Sit 2 (Maximal assistance) Sit to Stand Maximum assistance Sit to Supine 2 (Maximal assistance) Rolling Right Rolling Left Supine to Sit Sit to Stand Maximum assistance Sit to Supine Locomotion (W/C) Able to Propel (ft): 15 feet Functional Level: 1 Curbs/Ramps Assist Required (FIM Score): 0 (Not tested) Wheelchair Setup Assist Required : 2 (Maximal assistance) Wheelchair Management: Manages right brake Function 4 Setup Assistance 
2 (Maximal assistance) Locomotion (W/C distance) 15 Feet 150 feet Locomotion (Walk) 1 (Dependent/total assistance) 0 (Not tested)Unable to assess safely today Locomotion (Walk dist.) 2 Feet (ft)  0 ft Steps/Stairs Steps/Stairs Ambulated (#): 0 Level of Assist : 0 (Not tested)(not safe to assess at this time) Rail Use: Right   Unable to assess safely today Nursing:  
Neuro:   AAA&O x_4 Respiratory:   [] WNL   [x] O2   [x] LPM _2L prn Other: PRN breathing treatments Peripheral Vascular:   [] TEDS present   [] Edema present ____ Grade Cardiac:   [x] WNL   [] Other Genitourinary:   [x] continent   [x] incontinent   [] mary  Abdominal _10/17/18 LBM 
GI: _NPO Diet ______ Liquids Jevity 1.5 tube feeds Musculoskeletal: ____ ROM Transfers _W/C Assistive Device Used _Mod/max  Level of Assistance Skin Integumentary:   [x] Intact   [] Not Intact  Skin/Fall/Aspiration/Infection control Preventative Measures Details__High risk for aspiration. ___ Pain: [x] Controlled   [] Not Controlled   Pain Meds:   [] Scheduled   [x] PRN Registered Dietitian / Nutrition:  
No data found. Patient receiving bolus tube feeding of Jevity 1.5 and tolerating feeds. Has hyponatremia; Na 127 mmol/L today. Pt discussed with MD. Noted MD decreased water flushes in tube feeding and added sodium chloride tablet and lasix. Na levels to be checked q 6 hours per MD 
 
 
Tube Feeding:  Bolus feeds of Jevity 1.5, 237 mL (1 can) x 5 times daily via PEG Water Flushes:  75 mL before and after each bolus feed Residuals: 0 mL Supplements:          [] Yes   [x] No     
Amount of supplement consumed:  not applicable Intake/Output Summary (Last 24 hours) at 10/23/2018 1642 Last data filed at 10/23/2018 0339 Gross per 24 hour Intake 1215 ml Output  Net 1215 ml  
                            
 Last bowel movement: 10/17 Interdisciplinary Team Goals: 1. Discipline  Physical Therapy Goal  Patient will be able to perform transfers at moderate assist level using appropriate AD. Barrier  Decreased strength, endurance, impaired midline orientation, decreased trunk control Intervention  Neuromuscular re-education, therapeutic activity, therex, postural awareness training Goal written by:   Emmie Marshall, PT  
 
2. Discipline  Occupational Therapy Goal  Pt will perform upper body dressing with Min A.   
 Barrier  fatigue, decreased strength, decreased trunk control Intervention  ADL retraining, TE, TA, NMRE Goal written by:  NINA Trivedi 3. Discipline  Speech Therapy Goal  Patient will perform pharyngeal strengthening exercises with min assist-supervision Barrier  Severe dysphagia Intervention  Oral and pharyngeal strengthening exercises, patient/family education, trials of PO when appropriate Goal written by:  Gricelda Iglesias, CCC-SLP 4. Discipline  Nursing Goal Familty will be confident in ability to perform tube feeding activities. Barrier  fear Intervention  Ongoing instruction with wife on tubefeedings and medication administration via g-tube Goal written by: Patt Valles RN BSN  
 
5. Discipline  Clinical Psychology Goal  Maintain mood and behavior stability Barrier  Situational stress in recovery Intervention  Support  and behavioral redirection, as tolerated Goal written by:  Sonia Ledesma, PhD  
 
6. Discipline  Nutrition / Dietetics Goal  1. Patient will tolerate enteral nutrition formula and regimen without difficulty within the next 7 days. Outcome:  [x] Met/Ongoing    []  Not Met    [] New/Initial Goal  
2. Patient will meet their estimated nutritional needs through adequate enteral nutrition within the next 7 days. Outcome:  [x] Met/Ongoing    []  Not Met    [] New/Initial Goal   
 Barrier  none known Intervention  - Continue bolus tube feeds of Jevity 1.5, 237 mL (1 can) x 5 times daily with water flush of 75 mL before and after each bolus feed. Goal EN Regimen: Jevity 1.5, 240 mL (1 can) 5 times daily + 75 mL water flush before and after each bolus to provide: 1775 kcal, 76 gm protein, 59 gm fat, 256 gm CHO, 27 gm fiber, 900 mL free water, 1650 mL total water, 100% RDIs Goal written by:   Justin Finley RD Disposition / Discharge Planning: Follow-up services:  [] Outpatient Therapy    [] [address] [place] [address] [] Physical Therapy 
       [] Occupational Therapy 
       [] Speech Therapy 
 [] Skilled nursing facility [] Medical social worker 
 [] None 
 [x] TBD  
DME recommendations:  TBD Estimated discharge date:  11/6/18 Discharge Location:  [] Home   [] [facility]   [x] TBD [] Other: __________________________________ Interdisciplinary team rounds were held this PM with the following team members:   
 
 Name Physical Therapist 
  Kaylen Calderon, PT Occupational Therapist 
  Corry Valdez, OTR/L Speech Therapist 
  [place], [address] [facility] Asha Hitchcock RN BSN Dietitian Justin Finley RD Physician 
  Rizwan Dominique MD   
 Candice Sweet, MSW Signed:  Rizwan Dominique MD 
  October 23, 2018

## 2018-10-23 NOTE — ROUTINE PROCESS
Albuterol neb given per request of speech therapist. Peggy Macias reports increase in coughing and audible lung sounds. Auscultated lung sounds. are coarse through out lung fields and diminished in bases. O2 sats remain at 95-96%

## 2018-10-23 NOTE — PROGRESS NOTES
[address] 
[address] [place] [address], [address] INPATIENT REHABILITATION 
DAILY PROGRESS NOTE Date: 10/23/2018 Name: Enrique Amaya Age / Sex: 80 y.o. 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  
 
Patient seen and examined. Blood pressure controlled. Team conference was held at bedside this PM.  
 
Patient's Complaint:  
No significant medical complaints Pain Control: no current joint or muscle symptoms, essentially pain-free Objective:  
 
Vital Signs: 
Patient Vitals for the past 24 hrs: 
 BP Temp Pulse Resp SpO2  
10/23/18 1029 126/50  60    
10/23/18 0801 154/67 97.6 °F (36.4 °C) 64 18 94 % 10/23/18 0508 152/65  65    
10/22/18 2100 145/64 97.9 °F (36.6 °C) 66 20 97 % 10/22/18 1554 146/62 97.5 °F (36.4 °C) 71 18 96 % Physical Examination: 
GENERAL SURVEY: Patient is awake, alert, oriented x 3, sitting comfortably on the chair, not in acute respiratory distress. HEENT: pink palpebral conjunctivae, anicteric sclerae, no nasoaural discharge, moist oral mucosa NECK: supple, no jugular venous distention, no palpable lymph nodes CHEST/LUNGS: symmetrical chest expansion, good air entry, clear breath sounds HEART: adynamic precordium, good S1 S2, no S3, regular rhythm, no murmurs ABDOMEN: (+) PEG tube in place, flat, bowel sounds appreciated, soft, non-tender EXTREMITIES: (+) amputated distal phalanx of the thumb of the left hand, pink nailbeds, no edema, full and equal pulses, no calf tenderness NEUROLOGICAL EXAM: The patient is awake, alert and oriented x3, able to answer questions fairly appropriately, able to follow 1 and 2 step commands. Able to tell time from the wall clock. Cranial nerves II-XII are grossly intact except for slurred speech and dysphagia. No gross sensory deficit. Motor strength is 4+/5 on the RUE and RLE, 4/5 on the LUE, 4-/5 on the LLE. Current Medications: 
Current Facility-Administered Medications Medication Dose Route Frequency  sodium chloride tablet 1 g  1 g Per G Tube Q8H  
 albuterol (PROVENTIL VENTOLIN) nebulizer solution 2.5 mg  2.5 mg Nebulization Q4H PRN  
 amLODIPine (NORVASC) tablet 10 mg  10 mg Per G Tube DAILY  melatonin tablet 5 mg  5 mg Per G Tube QHS  guaiFENesin (ROBITUSSIN) 100 mg/5 mL oral liquid 100 mg  100 mg Per G Tube QID  dextromethorphan (DELSYM) 30 mg/5 mL syrup 30 mg  30 mg Per G Tube Q12H  
 traZODone (DESYREL) tablet 50 mg  50 mg Per G Tube QHS  modafinil (PROVIGIL) tablet 100 mg  100 mg Per G Tube DAILY  cholecalciferol (VITAMIN D3) tablet 2,000 Units  2,000 Units Oral BID  hydrALAZINE (APRESOLINE) tablet 20 mg  20 mg Per G Tube Q12H  pneumococcal 23-valent (PNEUMOVAX 23) injection 0.5 mL  0.5 mL IntraMUSCular PRIOR TO DISCHARGE  docusate sodium (COLACE) capsule 100 mg  100 mg Per G Tube BID  
 bisacodyl (DULCOLAX) suppository 10 mg  10 mg Rectal Q48H PRN  
 heparin (porcine) injection 5,000 Units  5,000 Units SubCUTAneous Q12H  
 acetaminophen (TYLENOL) solution 650 mg  650 mg Oral Q4H PRN  
 co-enzyme Q-10 (CO Q-10) capsule 100 mg  100 mg Oral DAILY  vitamin B complex tablet  1 Tab Oral DAILY  aspirin chewable tablet 81 mg  81 mg Per G Tube DAILY  atorvastatin (LIPITOR) tablet 80 mg  80 mg Per G Tube QHS  clopidogrel (PLAVIX) tablet 75 mg  75 mg PEG Tube DAILY  lisinopril (PRINIVIL, ZESTRIL) tablet 40 mg  40 mg Per G Tube DAILY  pantoprazole (PROTONIX) granules for oral suspension 40 mg  40 mg Per G Tube ACB  finasteride (PROSCAR) tablet 5 mg  5 mg Oral QPM  
  levothyroxine (SYNTHROID) tablet 50 mcg  50 mcg Per G Tube 6am  
 metoprolol tartrate (LOPRESSOR) tablet 25 mg  25 mg Per G Tube Q12H  tamsulosin (FLOMAX) capsule 0.4 mg  0.4 mg Oral QPM  
 timolol (TIMOPTIC) 0.5 % ophthalmic solution 1 Drop  1 Drop Both Eyes DAILY Allergies: Allergies Allergen Reactions  Codeine Other (comments) Functional Progress: OCCUPATIONAL THERAPY 
 
ON ADMISSION MOST RECENT Eating Functional Level: 1 Eating Functional Level: 1 Grooming Functional Level: 4 Grooming Functional Level: 4 Bathing Functional Level: 2 Bathing Functional Level: 2 Upper Body Dressing Functional Level: 2 Upper Body Dressing Functional Level: 2 Lower Body Dressing Functional Level: 2 Lower Body Dressing Functional Level: 2 Toileting Functional Level: 1 Toileting Functional Level: 1 Toilet Transfers Toilet Transfer Score: 2 Toilet Transfers Toilet Transfer Score: 0 Tub Devora Hides Transfers Tub/Shower Transfer Score: 0 Tub/Shower Transfers Tub/Shower Transfer Score: 0 Legend: 
 7 - Independent 6 - Modified Independent 5 - Standby Assistance / Supervision / Jose Alfredo Pineda 4 - Minimum Assistance / [address] 3 - Moderate Assistance 2 - Maximum Assistance 1 - Total Assistance / Dependent Lab/Data Review: 
Recent Results (from the past 24 hour(s)) SODIUM, UR, RANDOM Collection Time: 10/22/18  4:35 PM  
Result Value Ref Range Sodium,urine random 20 20 - 110 MMOL/L  
OSMOLALITY, UR Collection Time: 10/22/18  4:35 PM  
Result Value Ref Range Osmolality,urine 404 300 - 900 MOSM/kg H2O  
URINALYSIS W/MICROSCOPIC Collection Time: 10/22/18  4:35 PM  
Result Value Ref Range Color DARK YELLOW Appearance CLEAR Specific gravity 1.014 1.005 - 1.030    
 pH (UA) 7.0 5.0 - 8.0 Protein NEGATIVE  NEG mg/dL Glucose NEGATIVE  NEG mg/dL Ketone NEGATIVE  NEG mg/dL Bilirubin NEGATIVE  NEG Blood NEGATIVE  NEG Urobilinogen >8.0 (H) 0.2 - 1.0 EU/dL Nitrites NEGATIVE  NEG Leukocyte Esterase NEGATIVE  NEG    
 WBC 0 to 3 0 - 5 /hpf  
 RBC 0 to 3 0 - 5 /hpf Epithelial cells FEW 0 - 5 /lpf Bacteria NEGATIVE  NEG /hpf SODIUM Collection Time: 10/22/18  7:00 PM  
Result Value Ref Range Sodium 124 (L) 136 - 145 mmol/L  
SODIUM Collection Time: 10/23/18 12:55 AM  
Result Value Ref Range Sodium 127 (L) 136 - 145 mmol/L  
SODIUM Collection Time: 10/23/18  6:37 AM  
Result Value Ref Range Sodium 131 (L) 136 - 145 mmol/L  
SODIUM Collection Time: 10/23/18 12:05 PM  
Result Value Ref Range Sodium 130 (L) 136 - 145 mmol/L Estimated Glomerular Filtration Rate: On admission, estimated GFR based on a Creatinine of 0.77 mg/dl: 
 Using CKD-EPI = 81.6 mL/min/1.73m2 Using MDRD = 101.6 mL/min/1.73m2 Most recent estimated GFR, based on a Creatinine of 0.76 mg/dl on 10/22/2018: 
 Using CKD-EPI = 82 mL/min/1.73m2 Using MDRD = 103.1 mL/min/1.73m2 Assessment:  
 
Primary Rehabilitation Diagnosis 1. Impaired Mobility and ADLs 2. 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 
  
Comorbidities  Urinary frequency R35.0  Nocturia R35.1  Glaucoma H40.9  History of kidney stones Z87.442  Cervical spinal stenosis M48.02  
 Meningioma  D32.9  Hypertensive heart disease without heart failure I11.9  Diastolic dysfunction without heart failure I51.89  
 Gastric ulcer K25.9  Coronary artery disease involving native coronary artery of native heart I25.10  Dyslipidemia E78.5  Chronic obstructive pulmonary disease (COPD)  J44.9  Status post insertion of percutaneous endoscopic gastrostomy (PEG) tube  Z93.1  Hypothyroidism E03.9  Benign prostatic hyperplasia N40.0  Bilateral hearing loss H91.93  
 History of transient ischemic attack (TIA) Z86.73  
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  
  
 
Plan: 1. Justification for continued stay: Good progression towards established rehabilitation goals. 2. Medical Issues being followed closely: 
  [x]  Fall and safety precautions  
  []  Wound Care  
  [x]  Bowel and Bladder Function  
  [x]  Fluid Electrolyte and Nutrition Balance  
  []  Pain Control 3. Issues that 24 hour rehabilitation nursing is following: 
  [x]  Fall and safety precautions  
  []  Wound Care  
  [x]  Bowel and Bladder Function  
  [x]  Fluid Electrolyte and Nutrition Balance  
  []  Pain Control   
  [x]  Assistance with and education on in-room safety with transfers to and from the bed, wheelchair, toilet and shower. 4. Acute rehabilitation plan of care: 
  [x]  Continue current care and rehab. [x]  Physical Therapy [x]  Occupational Therapy [x]  Speech Therapy  
  []  Hold Rehab until further notice 5. Medications: 
  [x]  MAR Reviewed [x]  Continue Present Medications 6. DVT Prophylaxis:   
  []  Lovenox [x]  Unfractionated Heparin  
  []  Coumadin  
  []  NOAC  
  []  BECKY Stockings  
  []  Sequential Compression Device []  None 7. Orders:  
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 
 > Continue: 
  > Aspirin 81 mg via PEG tube once daily in AM 
  > Atorvastatin 80 mg via PEG tube q HS 
  > Coenzyme Q10 100 mg via PEG tube once daily 
  > Clopidogrel 75 mg via PEG tube once daily in PM 
  > Vitamin B complex 1 tab via PEG tube once daily 
 
> Dysphagia as late effect of CVA; S/P Insertion of percutaneous endoscopic gastrostomy (PEG) tube (10/5/2018) 
 > NPO with tube feeding > Jevity 1.5 237 ml via PEG tube 5 times daily (6AM, 10AM, 2PM, 6PM, 10PM) > On 10/15/2018, increased water flush from 100 ml to 150 ml via PEG tube before and after each bolus feeding 
 > On 10/22/2018, decreased water flush from 150 ml to 75 ml via PEG tube before and after each bolus feeding 
 
> Hypertensive heart disease without heart failure 
 > On admission to the ARU, decreased Hydralazine from 50 mg to 25 mg via PEG tube q 8 hr (6AM, 2PM, 10PM) > On 10/10/2018, decrease Hydralazine from 25 mg via PEG tube q 8 hr (6AM, 2PM, 10PM) to 20 mg via PEG tube q 12 hr (9AM, 9PM) > On 10/18/2018, Amlodipine 10 mg via PEG tube once daily (changed from 9AM to 6AM) > Continue: 
  > Amlodipine 10 mg via PEG tube once daily (6AM) 
  > Hydralazine 20 mg via PEG tube q 12 hr (9AM, 9PM) > Lisinopril 40 mg via PEG tube once daily (6AM) 
  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 
 > On 10/11/2018, started Trazodone 50 mg via PEG tube q HS 
 > Continue Trazodone 50 mg via PEG tube q HS 
 
> Neurocognitive modulation 
 > On 10/11/2018, started: 
  > Trazodone 50 mg via PEG tube q HS 
  > Modafinil 100 mg via PEG tube q AM 
 > Continue: 
  > Trazodone 50 mg via PEG tube q HS  
  > Modafinil 100 mg via PEG tube q AM 
 
> Difficulty sleeping 
 > On admission to the ARU, started Melatonin 3 mg via PEG tube q HS 
 > On 10/11/2018, started Trazodone 50 mg via PEG tube q HS 
 > On 10/17/2018, increased Melatonin from 3 mg to 5 mg via PEG tube q HS 
 > Continue: 
  > Melatonin 5 mg via PEG tube q HS 
   > Trazodone 50 mg via PEG tube q HS 
 
> Cough 
 > WBC count (10/9/2018) = 6.8 
 > Chest x-ray (10/12/2018) showed a small left pleural effusion; prominent interstitial lung markers could represent mild infiltration or chronic interstitial lung disease. 
 > On 10/14/2018, patient was given Furosemide 20 mg via PEG tube x 1 dose > No fever or desaturation noted since admission 
 > On 10/15/2018, started: 
  > Dextromethorphan 30 mg via PEG tube q 12 hr 
  > Guaifenesin 100 mg via PEG tube 4 times daily 
 > WBC count (10/22/2018) = 6.1 
 > No fever over the past 24 hours 
 > On 10/22/2018, started: 
  > Albuterol nebulization q 4 hr PRN for shortness of breath 
  > O2 inhalation 2 LPM via nasal cannula PRN for SpO2 less than 90% 
 > No fever over the past 24 hours 
 > Continue: 
  > Dextromethorphan 30 mg via PEG tube q 12 hr 
  > Guaifenesin 100 mg via PEG tube 4 times daily 
  > Albuterol nebulization q 4 hr PRN for shortness of breath 
  > O2 inhalation 2 LPM via nasal cannula PRN for SpO2 less than 90% 
 
> Hypotonic hyponatremia 
 > Na (10/9/2018, on admission) = 143 
 > BUN/Creatinine ratio (10/9/2018) = 26 (BUN 20, Creatinine 0.77) > Na (10/15/2018) = 137 
 > BUN/Creatinine ratio (10/15/2018) = 37 (BUN 34, Creatinine 0.92)   
 > On 10/15/2018, increased water flush from 100 ml to 150 ml via PEG tube before and after each bolus feeding 
 > Na (10/22/2018) = 127 
 > BUN/Creatinine ratio (10/22/2018) = 29 (BUN 22, Creatinine 0.76) > Work-up (10/22/2018): 
  > Serum Osm = 264 
  > Urine Na = 20 
  > Urine Osm 404 
  > Urine SG = 1.014 
 > On 10/22/2018: 
  > Decreased water flush from 150 ml to 75 ml via PEG tube vefore and after each bolus feeding 
  > Started NaCl 1 gram via PEG tube q 8 hr  
  > Monitor serum Na q 6 hr 
 > Na (10/22/2018, 7:00 PM) = 124 
 > Na (10/23/2018, 12:55 AM) = 127 
 > Na (10/23/2018, 6:37 AM) = 131 
 > Na (10/23/2018, 12:05 PM) = 130 
 > Continue: 
  > Water flush 75 ml via PEG tube before and after each bolus feeding 
  > NaCl 1 gram via PEG tube q 8 hr  
  > Monitor serum Na q 6 hr 
 
 
8. Patient's progress in rehabilitation and medical issues discussed with the patient. All questions answered to the best of my ability. Care plan discussed with patient, wife, granddaughter and nurse. Signed:    Mone Yu MD 
  October 23, 2018

## 2018-10-23 NOTE — PROGRESS NOTES
Problem: Dysphagia (Adult) Goal: *Speech Goal: (INSERT TEXT) Long term goals: 1. Patient will tolerate small amounts of PO using safe swallowing techniques without overt s/s of aspiration in 4/5 trials. 2. Patient will perform oral/pharyngeal strengthening exercises with supervision. 3. Patient will participate in laryngeal strengthening exercises and swallowing maneuvers, min cues - supervison. 4. Patient will recall 3 words after 5 minutes, min assist-supervision. 5. Patient will perform functional problem solving and reasoning tasks with supervision. Short term goals (by 10/23/18): 1. Patient will tolerate small amounts of PO puree with the SLP using safe swallowing techniques without overt s/s of aspiration in 4/5 trials. 2. Patient will perform oral/pharyngeal strengthening exercises with mod assist/cues. 3. Patient will participate in laryngeal strengthening exercises and swallowing maneuvers, mod cues. 4. Patient will recall 3 words after 5 minutes, mod-min assist. 
5. Patient will perform functional problem solving and reasoning tasks with 75-85% accuracy. Speech language pathology treatment Patient: Jerrica Alvarez (70 y.o. male) Date: 10/23/2018 Diagnosis: R CVA Acute ischemic stroke ([facility] [address] Acute ischemic stroke ([facility] [address] SUBJECTIVE:  
Patient stated I couldn't sleep last night. OBJECTIVE:  
Mental Status: 
Patient was awake and conversant when seen at his bedside this morning. However, he was tired having not had a good night's sleep last night. Treatment & Interventions:  
Mr. Sherie Burnham was seen for a half hour session at his bedside this morning. Breathing was mildly labored and patient with frequent non-productive cough. Unfortunately, coughing was exacerbated by deep breathing and vocal tasks for pharyngeal/laryngeal strengthening were not possible. The following treatment tasks were presented: Motor Speech/Dysphagia:  
 Vowel prolongations:  Not feasible this morning due to difficulty with deep breathing Final /k/ in words:  90% accuracy Oral strengthening ex:  Min assist-supervision Production of glottal stops: Min assist/cues Response & Tolerance to Activities: 
Mr. Jim Olszewski was noted to be wheezing on exhalation. At the end of the session. SLP requested that the RN give him a breathing treatment to open airways and assist with productive cough. Patient cooperative, but unable to perform many targeted tasks in today's session. Pain: 
Pain Scale 1: Numeric (0 - 10) No specific pain complaints After treatment:  
[]       Patient left in no apparent distress sitting up in chair 
[x]       Patient left in no apparent distress in bed 
[x]       Call bell left within reach [x]       Nursing notified 
[]       Caregiver present 
[]       Bed alarm activated ASSESSMENT:  
Progression toward goals: 
[]       Improving appropriately and progressing toward goals [x]       Improving slowly and progressing toward goals 
[]       Not making progress toward goals and plan of care will be adjusted PLAN:  
Patient continues to benefit from skilled intervention to address the above impairments. Continue treatment per established plan of care. Discharge Recommendations:  Home Health Estimated LOS: through 11/6/18 JAIME Cisneros Time Calculation: 30 mins

## 2018-10-23 NOTE — PROGRESS NOTES
Problem: Mobility Impaired (Adult and Pediatric) Goal: *Therapy Goal (Edit Goal, Insert Text) Physical Therapy Goals Initiated 10/9/2018, updated 10/22/2018, and to be accomplished within 7 day(s) 10/29/2018 1. Patient will move from supine to sit and sit to supine , scoot up and down and roll side to side in bed with moderate assistance consistently . 2.  Patient will transfer from bed to chair and chair to bed with moderate assistance  using the least restrictive device consistently. 3.  Patient will perform sit to stand with moderate assistance consistently without pushing to left . 4. Patient will ambulate with maximal assistance for 5 feet with the least restrictive device. 5.  Patient will perform 150 ft of wheelchair mobility with modified technique min assist for steering and negotiation of obstacles. 6.  Patient will be able to maintain dynamic sitting balance without support for at least 5 minutes with verbal cues only for maintaining midline/upright position for ease of transfers consistently throughout the day. Physical Therapy Goals Initiated 10/9/2018 and to be accomplished within 28 day(s) on 11/6/2018 1. Patient will move from supine to sit and sit to supine , scoot up and down and roll side to side in bed with supervision/set-up. 2.  Patient will transfer from bed to chair and chair to bed with supervision/set-up using the least restrictive device. 3.  Patient will perform sit to stand with supervision/set-up. 4.  Patient will ambulate with minimal assistance/contact guard assist for 50 feet with the least restrictive device. 5.  Patient will ascend/descend 4 stairs with 2 handrail(s) with minimal assistance/contact guard assist. 
6.  Patient will perform 150 ft of wheelchair mobility at modified independent level. 7.  Patient will demonstrate improved postural control for ease of functional mobility as demonstrated by PASS score >16/36 physical Therapy TREATMENT Patient: Mellissa Aragon (67 y.o. male) Date: 10/23/2018 Diagnosis: R CVA Acute ischemic stroke ([facility] [address] Acute ischemic stroke ([facility] [address] Precautions: Aspiration, Fall(Head of bed 30-45 degrees at all times per speech therapist) Chart, physical therapy assessment, plan of care and goals were reviewed. Time In: 1479 Time Out: 9121 (Co-treatment with OT [id number]) Time In: 9937 Time Out: 1405 Patient Seen For: Transfer training; Wheelchair mobility Pain: No pain reported Patient identified with name and :yes SUBJECTIVE:  
 
Patient agreeable to treatment. OBJECTIVE DATA SUMMARY:  
Objective:  
 
TRANSFERS Daily Assessment Transfer Type: Lateral with transfer board Other: also stand pivot transfer Transfer Assistance : 3 (Moderate/maximal assistance with lateral transfer with transfer board, max assist for stand pivot transfer without AD) Sit to Stand Assistance: Maximum assistance Car Transfers: Not tested Moderate/maximal assist with lateral transfer board with cues for sequencing, head/hips relationship, keeping trunk forward and lifting bottom over to scoot. More difficulty with scooting to right side needing more hands on assist. Patient able to come back to midline positioning when cued, continues to lean more toward weaker left side unless cued. Stand pivot transfer needing more assist, more difficulty with shifting hips to right side. GAIT Daily Assessment Amount of Assistance: 0 (Not tested) STEPS or STAIRS Daily Assessment Level of Assist : 0 (Not tested) BALANCE Daily Assessment Sitting - Static: Unsupported;Good (unsupported) Sitting - Dynamic: Fair (occasional) Standing - Static: Poor Standing - Dynamic : Impaired See neuro re-ed WHEELCHAIR MOBILITY Daily Assessment Able to Propel (ft): 150 feet Functional Level: 4 Curbs/Ramps Assist Required (FIM Score): 0 (Not tested) Wheelchair Setup Assist Required : 2 (Maximal assistance) Wheelchair Management: Manages right brake Minimal assist for corners, doorways, occasionally for steering more straight but otherwise, mostly just needing verbal cues for coordinating right foot and right UE together for propelling chair appropriately. Patient observed to lean laterally to his right during task leading to difficulty with attending to left side. Fatiguing quickly and significant path deviations observed, running into left side of hallway requiring therapist to assist with steering away and back to middle of hallway. Neuro Re-Education: 
Partial co-treatment with OT with PT emphasis on improved left LE weight acceptance during standing for improved ability to participate in transfers, standing tasks with eventual progression to pre-gait and gait training as appropriate. PT facilitating hips/pelvis for more neutral position, also facilitating left knee for hip/knee extension. Only able to engage quadriceps muscle for 3-5 sec bouts before fatiguing. Also performing sitting balance tasks, lateral trunk leans with return to midline position for better postural awareness. Initially having significant difficulty returning to midline position with left lateral trunk lean but with practice, able to perform with lean toward left elbow on pillow with minimal assist to return back to neutral. Patient also performing sitting reaching task forward, across midline and toward right with return back to midline/upright positioning. ASSESSMENT: 
Patient slowly improving with trunk control, continues to push to left side when in standing, improved ability to perform lateral transfer with board with practice today. Will continue to assess safest and most appropriate transfer technique with patient. Progression toward goals: 
[]      Improving appropriately and progressing toward goals [x]      Improving slowly and progressing toward goals []      Not making progress toward goals and plan of care will be adjusted PLAN: 
Patient continues to benefit from skilled intervention to address the above impairments. Continue treatment per established plan of care. Discharge Recommendations:  [address] depending on patient progress and caregiver assist required/availability of caregiver. Further Equipment Recommendations for Discharge:  wheelchair and wheelchair cushion, arm tray and brake extenders currently required but will continue to reassess most appropriate equipment. Estimated LOS:4 weeks Activity Tolerance:  
Fair, needing cues to keep eyes open. After treatment:  
Patient left seated in wheelchair, call button and suction within reach and spouse in room. Atha Sacks, PT 
10/23/2018

## 2018-10-23 NOTE — ROUTINE PROCESS
SHIFT CHANGE NOTE FOR Lisa Francois Bedside and Verbal shift change report given to Elena Bingham, RN (oncoming nurse) by Tang Fitzgerald RN (offgoing nurse). Report included the following information SBAR, Kardex, MAR and Recent Results. Situation: 
 Code Status: DNR Reason for Admission: Right ischemic stroke Hospital Day: 15 Problem List:  
Hospital Problems  Date Reviewed: 10/23/2018 Codes Class Noted POA Hyponatremia ICD-10-CM: E87.1 ICD-9-CM: 276.1  10/22/2018 No  
   
 History of transient ischemic attack (TIA) ICD-10-CM: D62.78 
ICD-9-CM: V12.54  Unknown Yes Overview Signed 10/9/2018  3:28 PM by Kulwinder Paz MD  
  2x Vitamin D deficiency (Chronic) ICD-10-CM: E55.9 ICD-9-CM: 268.9  10/9/2018 Yes Overview Signed 10/10/2018  1:59 PM by Kulwinder Paz MD  
  Vitamin D 25-Hydroxy (10/10/2018) = 22.5 Hypertensive heart disease without heart failure (Chronic) ICD-10-CM: I11.9 ICD-9-CM: 402.90  Unknown Yes Status post insertion of percutaneous endoscopic gastrostomy (PEG) tube ([facility] ICD-10-CM: Z93.1 ICD-9-CM: V44.1  10/5/2018 Yes * (Principal) Acute ischemic stroke ([facility] ICD-10-CM: I63.9 ICD-9-CM: 434.91  10/2/2018 Yes Overview Signed 10/8/2018  6:32 PM by Kulwinder Paz MD  
  Acute Ischemic Stroke (acute infarct of the posterior superior right basal ganglia and adjacent right corona radiata) with residual left hemiparesis, dysphagia and dysarthria Impaired mobility and ADLs ICD-10-CM: Z74.09 
ICD-9-CM: 799.89  10/2/2018 Yes Hemiparesis affecting left side as late effect of cerebrovascular accident (CVA) ([facility] ICD-10-CM: R09.513 ICD-9-CM: 438.20  10/2/2018 Yes Dysphagia as late effect of cerebrovascular accident (CVA) ICD-10-CM: I56.691 ICD-9-CM: 438.82  10/2/2018 Yes 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) ICD-10-CM: L50.069 ICD-9-CM: 438.13  10/2/2018 Yes Traumatic amputation of left thumb ICD-10-CM: N73.859D ICD-9-CM: 885.0  1/1/2011 Yes Background: 
 Past Medical History:  
Past Medical History:  
Diagnosis Date  Acute ischemic stroke ([facility] 10/2/2018 Acute Ischemic Stroke (acute infarct of the posterior superior right basal ganglia and adjacent right corona radiata) with residual left hemiparesis, dysphagia and dysarthria  Benign prostatic hyperplasia  Bilateral hearing loss  Cervical spinal stenosis 10/2/2018  Chronic obstructive pulmonary disease (COPD) ([facility]  Coronary artery disease involving native coronary artery of native heart  Diastolic dysfunction without heart failure  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) 10/2/2018  Dyslipidemia  Dysphagia as late effect of cerebrovascular accident (CVA) 10/2/2018  Gastric ulcer  Glaucoma  Hemiparesis affecting left side as late effect of cerebrovascular accident (CVA) ([facility] 10/2/2018  History of kidney stones  History of transient ischemic attack (TIA) 2x  Hypertensive heart disease without heart failure  Hypothyroidism  Meningioma ([facility] 10/3/2018 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  
 
Assessment: 
? Changes in Assessment throughout shift: none ? Patient has central line: no Reasons if yes: Insertion date: Last dressing date: 
? Patient has Suarez Cath: no Reasons if yes: Insertion date: 
 
? Last Vitals: 
  
Vitals:  
 10/23/18 6540 10/23/18 0801 10/23/18 1029 10/23/18 1555 BP: 152/65 154/67 126/50 149/61 Pulse: 65 64 60 65 Resp:  18  19 Temp:  97.6 °F (36.4 °C)  97.1 °F (36.2 °C) SpO2:  94%  96% Weight:      
Height: ? PAIN Pain Assessment Pain Intensity 1: 0 (10/23/18 1600) Pain Intensity 1: 2 (12/29/14 1105) Pain Location 1: Leg Pain Location 1: Abdomen Pain Intervention(s) 1: Medication (see MAR) Pain Intervention(s) 1: Medication (see MAR) Patient Stated Pain Goal: 0 Patient Stated Pain Goal: 0 
o Intervention effective: yes  
o Other actions taken for pain: turn reposition, rest 
 
? Skin Assessment Skin color Skin Color: Appropriate for ethnicity Condition/Temperature Skin Condition/Temp: Warm Integrity Skin Integrity: Incision (comment), Abrasion Turgor Turgor: Non-tenting Weekly Pressure Ulcer Documentation  Pressure  Injury Documentation: No Pressure Injury Noted-Pressure Ulcer Prevention Initiated Wound Prevention & Protection Methods Orientation of wound Orientation of Wound Prevention: Posterior Location of Prevention Location of Wound Prevention: Sacrum/Coccyx Dressing Present Dressing Present : No 
Dressing Status Dressing Status: Intact Wound Offloading Wound Offloading (Prevention Methods): Bed, pressure reduction mattress, Chair cushion, Pillows, Repositioning, Turning, Wheelchair ? INTAKE/OUPUT Date 10/22/18 1900 - 10/23/18 0591 10/23/18 0700 - 10/24/18 2637 Shift 9639-2098 24 Hour Total 8701-0929 2640-9552 24 Hour Total  
INTAKE  
P.O. 0 0     
  P. O. 0 0 NG/ 2030  Water Flush Volume (mL) (PEG/Gastrostomy Tube 10/06/18) 150 600 150 150 300 Medication Volume (PEG/Gastrostomy Tube 10/06/18) 60 160 Intake (ml) (PEG/Gastrostomy Tube 10/06/18)  1270 365 365 730 Shift Total(mL/kg) 210(3.1) [id number]) 515(7.6) 515(7.6) 5466(25.8) OUTPUT Urine(mL/kg/hr) Urine Occurrence(s) 2 x 6 x 5 x  5 x Stool Stool Occurrence(s) 1 x 2 x 0 x  0 x Shift Total(mL/kg)  2030  Weight (kg) 67.6 67.6 67.6 67.6 67.6 Recommendations: 1. Patient needs and requests: assist with ADL's, mobility, turning, TF bolus 2. Diet: NPO with tube feedings 3. Pending tests/procedures: LABS 4. Functional Level/Equipment: w/c 1 person assist, BSC 
 
5. Estimated Discharge Date: TBD Posted on Whiteboard in Patients Room: no  
 
[facility] Safety Check A safety check occurred in the patient's room between off going nurse and oncoming nurse listed above. The safety check included the below items Area Items H High Alert Medications ? Verify all high alert medication drips (heparin, PCA, etc.) E Equipment ? Suction is set up for ALL patients (with sonia) ? Red plugs utilized for all equipment (IV pumps, etc.) ? WOWs wiped down at end of shift. ? Room stocked with oxygen, suction, and other unit-specific supplies A Alarms ? Bed alarm is set for fall risk patients ? Ensure chair alarm is in place and activated if patient is up in a chair L Lines ? Check IV for any infiltration ? Suarez bag is empty if patient has a Suarez ? Tubing and IV bags are labeled Estrella Puff Safety ? Room is clean, patient is clean, and equipment is clean. ? Hallways are clear from equipment besides carts. ? Fall bracelet on for fall risk patients ? Ensure room is clear and free of clutter ? Suction is set up for ALL patients (with sonia) ? Hallways are clear from equipment besides carts. ? Isolation precautions followed, supplies available outside room, sign posted

## 2018-10-24 ENCOUNTER — APPOINTMENT (OUTPATIENT)
Dept: GENERAL RADIOLOGY | Age: 83
DRG: 057 | End: 2018-10-24
Attending: INTERNAL MEDICINE
Payer: MEDICARE

## 2018-10-24 LAB
ANION GAP SERPL CALC-SCNC: 7 MMOL/L (ref 3–18)
BNP SERPL-MCNC: 4485 PG/ML (ref 0–1800)
BUN SERPL-MCNC: 24 MG/DL (ref 7–18)
BUN/CREAT SERPL: 29 (ref 12–20)
CALCIUM SERPL-MCNC: 8 MG/DL (ref 8.5–10.1)
CHLORIDE SERPL-SCNC: 97 MMOL/L (ref 100–108)
CO2 SERPL-SCNC: 28 MMOL/L (ref 21–32)
CREAT SERPL-MCNC: 0.84 MG/DL (ref 0.6–1.3)
GLUCOSE SERPL-MCNC: 87 MG/DL (ref 74–99)
POTASSIUM SERPL-SCNC: 4.7 MMOL/L (ref 3.5–5.5)
SODIUM SERPL-SCNC: 130 MMOL/L (ref 136–145)
SODIUM SERPL-SCNC: 131 MMOL/L (ref 136–145)
SODIUM SERPL-SCNC: 132 MMOL/L (ref 136–145)
SODIUM SERPL-SCNC: 132 MMOL/L (ref 136–145)
TSH SERPL DL<=0.05 MIU/L-ACNC: 1.93 UIU/ML (ref 0.36–3.74)

## 2018-10-24 PROCEDURE — 97535 SELF CARE MNGMENT TRAINING: CPT

## 2018-10-24 PROCEDURE — BW03ZZZ PLAIN RADIOGRAPHY OF CHEST: ICD-10-PCS | Performed by: INTERNAL MEDICINE

## 2018-10-24 PROCEDURE — 65310000000 HC RM PRIVATE REHAB

## 2018-10-24 PROCEDURE — 97542 WHEELCHAIR MNGMENT TRAINING: CPT

## 2018-10-24 PROCEDURE — 97112 NEUROMUSCULAR REEDUCATION: CPT

## 2018-10-24 PROCEDURE — 84295 ASSAY OF SERUM SODIUM: CPT | Performed by: INTERNAL MEDICINE

## 2018-10-24 PROCEDURE — 97530 THERAPEUTIC ACTIVITIES: CPT

## 2018-10-24 PROCEDURE — 36415 COLL VENOUS BLD VENIPUNCTURE: CPT | Performed by: INTERNAL MEDICINE

## 2018-10-24 PROCEDURE — 71046 X-RAY EXAM CHEST 2 VIEWS: CPT

## 2018-10-24 PROCEDURE — 83880 ASSAY OF NATRIURETIC PEPTIDE: CPT | Performed by: INTERNAL MEDICINE

## 2018-10-24 PROCEDURE — 84443 ASSAY THYROID STIM HORMONE: CPT | Performed by: INTERNAL MEDICINE

## 2018-10-24 PROCEDURE — 80048 BASIC METABOLIC PNL TOTAL CA: CPT | Performed by: INTERNAL MEDICINE

## 2018-10-24 PROCEDURE — 74011250636 HC RX REV CODE- 250/636: Performed by: INTERNAL MEDICINE

## 2018-10-24 PROCEDURE — 74011250637 HC RX REV CODE- 250/637: Performed by: INTERNAL MEDICINE

## 2018-10-24 RX ORDER — SODIUM CHLORIDE TAB 1 GM 1 G
1 TAB MISCELLANEOUS 4 TIMES DAILY
Status: DISCONTINUED | OUTPATIENT
Start: 2018-10-24 | End: 2018-10-27

## 2018-10-24 RX ORDER — FUROSEMIDE 40 MG/1
40 TABLET ORAL ONCE
Status: COMPLETED | OUTPATIENT
Start: 2018-10-24 | End: 2018-10-24

## 2018-10-24 RX ORDER — SODIUM CHLORIDE 9 MG/ML
1000 INJECTION, SOLUTION INTRAVENOUS
Status: DISCONTINUED | OUTPATIENT
Start: 2018-10-24 | End: 2018-10-24

## 2018-10-24 RX ADMIN — GUAIFENESIN 100 MG: 100 SOLUTION ORAL at 18:24

## 2018-10-24 RX ADMIN — MIRTAZAPINE 15 MG: 15 TABLET, FILM COATED ORAL at 21:46

## 2018-10-24 RX ADMIN — SODIUM CHLORIDE TAB 1 GM 1 G: 1 TAB at 21:47

## 2018-10-24 RX ADMIN — SODIUM CHLORIDE TAB 1 GM 1 G: 1 TAB at 06:04

## 2018-10-24 RX ADMIN — TAMSULOSIN HYDROCHLORIDE 0.4 MG: 0.4 CAPSULE ORAL at 18:25

## 2018-10-24 RX ADMIN — DEXTROMETHORPHAN 30 MG: 30 SUSPENSION, EXTENDED RELEASE ORAL at 08:50

## 2018-10-24 RX ADMIN — FUROSEMIDE 40 MG: 40 TABLET ORAL at 18:25

## 2018-10-24 RX ADMIN — TIMOLOL MALEATE 1 DROP: 5 SOLUTION OPHTHALMIC at 08:51

## 2018-10-24 RX ADMIN — GUAIFENESIN 100 MG: 100 SOLUTION ORAL at 21:45

## 2018-10-24 RX ADMIN — LISINOPRIL 40 MG: 40 TABLET ORAL at 06:04

## 2018-10-24 RX ADMIN — PANTOPRAZOLE SODIUM 40 MG: 40 GRANULE, DELAYED RELEASE ORAL at 06:42

## 2018-10-24 RX ADMIN — HYDRALAZINE HYDROCHLORIDE 20 MG: 10 TABLET, FILM COATED ORAL at 08:49

## 2018-10-24 RX ADMIN — VITAMIN D, TAB 1000IU (100/BT) 2000 UNITS: 25 TAB at 18:25

## 2018-10-24 RX ADMIN — DOCUSATE SODIUM 100 MG: 100 CAPSULE, LIQUID FILLED ORAL at 08:50

## 2018-10-24 RX ADMIN — AMLODIPINE BESYLATE 10 MG: 10 TABLET ORAL at 06:04

## 2018-10-24 RX ADMIN — SODIUM CHLORIDE TAB 1 GM 1 G: 1 TAB at 18:25

## 2018-10-24 RX ADMIN — HEPARIN SODIUM 5000 UNITS: 5000 INJECTION, SOLUTION INTRAVENOUS; SUBCUTANEOUS at 06:00

## 2018-10-24 RX ADMIN — HEPARIN SODIUM 5000 UNITS: 5000 INJECTION, SOLUTION INTRAVENOUS; SUBCUTANEOUS at 18:25

## 2018-10-24 RX ADMIN — FINASTERIDE 5 MG: 5 TABLET, FILM COATED ORAL at 18:25

## 2018-10-24 RX ADMIN — Medication 100 MG: at 08:49

## 2018-10-24 RX ADMIN — MODAFINIL 100 MG: 100 TABLET ORAL at 09:22

## 2018-10-24 RX ADMIN — ASPIRIN 81 MG CHEWABLE TABLET 81 MG: 81 TABLET CHEWABLE at 08:49

## 2018-10-24 RX ADMIN — GUAIFENESIN 100 MG: 100 SOLUTION ORAL at 14:16

## 2018-10-24 RX ADMIN — LEVOTHYROXINE SODIUM 50 MCG: 50 TABLET ORAL at 06:04

## 2018-10-24 RX ADMIN — DEXTROMETHORPHAN 30 MG: 30 SUSPENSION, EXTENDED RELEASE ORAL at 21:44

## 2018-10-24 RX ADMIN — HYDRALAZINE HYDROCHLORIDE 20 MG: 10 TABLET, FILM COATED ORAL at 21:47

## 2018-10-24 RX ADMIN — VITAMIN D, TAB 1000IU (100/BT) 2000 UNITS: 25 TAB at 08:49

## 2018-10-24 RX ADMIN — METOPROLOL TARTRATE 25 MG: 25 TABLET ORAL at 08:50

## 2018-10-24 RX ADMIN — GUAIFENESIN 100 MG: 100 SOLUTION ORAL at 09:02

## 2018-10-24 RX ADMIN — ATORVASTATIN CALCIUM 80 MG: 40 TABLET, FILM COATED ORAL at 21:47

## 2018-10-24 RX ADMIN — MELATONIN TAB 5 MG 5 MG: 5 TAB at 21:46

## 2018-10-24 RX ADMIN — Medication 1 TABLET: at 08:50

## 2018-10-24 RX ADMIN — METOPROLOL TARTRATE 25 MG: 25 TABLET ORAL at 21:46

## 2018-10-24 RX ADMIN — DOCUSATE SODIUM 100 MG: 100 CAPSULE, LIQUID FILLED ORAL at 18:25

## 2018-10-24 RX ADMIN — CLOPIDOGREL BISULFATE 75 MG: 75 TABLET, FILM COATED ORAL at 21:45

## 2018-10-24 NOTE — PROGRESS NOTES
[address] 
[address] [place] [address] [address] INPATIENT REHABILITATION 
DAILY PROGRESS NOTE Date: 10/24/2018 Name: Taylor Wen Age / Sex: 80 y.o. / male CSN: [id number] MRN: [id number] Admit Date: 10/8/2018 Length of Stay: 16 days Primary Rehab Diagnosis: Impaired Mobility and ADLs secondary to Acute Ischemic Stroke (acute infarct of the posterior superior right basal ganglia and adjacent right corona radiata) with residual left hemiparesis, dysphagia and dysarthria Subjective:  
 
Patient seen and examined. Blood pressure controlled. No fever over the past 24 hours. Three PRN Albuterol nebulizations were given yesterday. Patient's Complaint:  
No significant medical complaints Pain Control: no current joint or muscle symptoms, essentially pain-free Objective:  
 
Vital Signs: 
Patient Vitals for the past 24 hrs: 
 BP Temp Pulse Resp SpO2  
10/24/18 0731 143/56 98.1 °F (36.7 °C) 68 20 97 % 10/24/18 0604 138/60  62    
10/23/18 2104 141/61 97.5 °F (36.4 °C) 71 18 97 % 10/23/18 1555 149/61 97.1 °F (36.2 °C) 65 19 96 % Physical Examination: 
GENERAL SURVEY: Patient is awake, alert, oriented x 3, sitting comfortably on the chair, not in acute respiratory distress. HEENT: pink palpebral conjunctivae, anicteric sclerae, no nasoaural discharge, moist oral mucosa NECK: supple, no jugular venous distention, no palpable lymph nodes CHEST/LUNGS: symmetrical chest expansion, good air entry, clear breath sounds HEART: adynamic precordium, good S1 S2, no S3, regular rhythm, no murmurs ABDOMEN: (+) PEG tube in place, flat, bowel sounds appreciated, soft, non-tender EXTREMITIES: (+) amputated distal phalanx of the thumb of the left hand, pink nailbeds, no edema, full and equal pulses, no calf tenderness NEUROLOGICAL EXAM: The patient is awake, alert and oriented x3, able to answer questions fairly appropriately, able to follow 1 and 2 step commands. Able to tell time from the wall clock. Cranial nerves II-XII are grossly intact except for slurred speech and dysphagia. No gross sensory deficit. Motor strength is 4+/5 on the RUE and RLE, 4/5 on the LUE, 4-/5 on the LLE. Current Medications: 
Current Facility-Administered Medications Medication Dose Route Frequency  mirtazapine (REMERON) tablet 15 mg  15 mg Per G Tube QHS  sodium chloride tablet 1 g  1 g Per G Tube Q8H  
 albuterol (PROVENTIL VENTOLIN) nebulizer solution 2.5 mg  2.5 mg Nebulization Q4H PRN  
 amLODIPine (NORVASC) tablet 10 mg  10 mg Per G Tube DAILY  melatonin tablet 5 mg  5 mg Per G Tube QHS  guaiFENesin (ROBITUSSIN) 100 mg/5 mL oral liquid 100 mg  100 mg Per G Tube QID  dextromethorphan (DELSYM) 30 mg/5 mL syrup 30 mg  30 mg Per G Tube Q12H  
 modafinil (PROVIGIL) tablet 100 mg  100 mg Per G Tube DAILY  cholecalciferol (VITAMIN D3) tablet 2,000 Units  2,000 Units Oral BID  hydrALAZINE (APRESOLINE) tablet 20 mg  20 mg Per G Tube Q12H  pneumococcal 23-valent (PNEUMOVAX 23) injection 0.5 mL  0.5 mL IntraMUSCular PRIOR TO DISCHARGE  docusate sodium (COLACE) capsule 100 mg  100 mg Per G Tube BID  
 bisacodyl (DULCOLAX) suppository 10 mg  10 mg Rectal Q48H PRN  
 heparin (porcine) injection 5,000 Units  5,000 Units SubCUTAneous Q12H  
 acetaminophen (TYLENOL) solution 650 mg  650 mg Oral Q4H PRN  
 co-enzyme Q-10 (CO Q-10) capsule 100 mg  100 mg Oral DAILY  vitamin B complex tablet  1 Tab Oral DAILY  aspirin chewable tablet 81 mg  81 mg Per G Tube DAILY  atorvastatin (LIPITOR) tablet 80 mg  80 mg Per G Tube QHS  clopidogrel (PLAVIX) tablet 75 mg  75 mg PEG Tube DAILY  lisinopril (PRINIVIL, ZESTRIL) tablet 40 mg  40 mg Per G Tube DAILY  pantoprazole (PROTONIX) granules for oral suspension 40 mg  40 mg Per G Tube ACB  finasteride (PROSCAR) tablet 5 mg  5 mg Oral QPM  
 levothyroxine (SYNTHROID) tablet 50 mcg  50 mcg Per G Tube 6am  
 metoprolol tartrate (LOPRESSOR) tablet 25 mg  25 mg Per G Tube Q12H  tamsulosin (FLOMAX) capsule 0.4 mg  0.4 mg Oral QPM  
 timolol (TIMOPTIC) 0.5 % ophthalmic solution 1 Drop  1 Drop Both Eyes DAILY Allergies: Allergies Allergen Reactions  Codeine Other (comments) Functional Progress: PHYSICAL THERAPY 
 
ON ADMISSION MOST RECENT Wheelchair Mobility/Management Able to Propel (ft): 15 feet Functional Level: 1 Curbs/Ramps Assist Required (FIM Score): 0 (Not tested) Wheelchair Setup Assist Required : 2 (Maximal assistance) Wheelchair Management: Manages right brake Wheelchair Mobility/Management Able to Propel (ft): 150 feet Functional Level: 4 Curbs/Ramps Assist Required (FIM Score): 0 (Not tested) Wheelchair Setup Assist Required : 2 (Maximal assistance) Wheelchair Management: Manages right brake Gait Amount of Assistance: 1 (Dependent/total assistance) Distance (ft): 2 Feet (ft) Assistive Device: Other (comment)(parallel bars) Gait Amount of Assistance: 0 (Not tested) Distance (ft): (attempted stand step transfer - 1 to 2 Feet) Assistive Device: (none) Balance-Sitting/Standing Sitting - Static: Fair (occasional) Sitting - Dynamic: Poor (constant support) Standing - Static: Poor, Constant support Standing - Dynamic : Impaired Other (comment): Postural Assessment Scale for Stroke Patients (PASS): 6/36 Indicating significant difficulty maintaining and changing postures Balance-Sitting/Standing Sitting - Static: Unsupported, Good (unsupported) Sitting - Dynamic: Fair (occasional) Standing - Static: Poor Standing - Dynamic : Impaired Other (comment): Postural Assessment Scale for Stroke Patients (PASS): 6/36 Indicating significant difficulty maintaining and changing postures Bed/Mat Mobility Rolling Right : 3 (Moderate assistance ) Rolling Left : 3 (Moderate assistance ) Supine to Sit : 2 (Maximal assistance) Sit to Supine : 2 (Maximal assistance) Bed/Mat Mobility Rolling Right : 2 (Maximal assistance) Rolling Left : 3 (Moderate assistance ) Supine to Sit : 2 (Maximal assistance) Sit to Supine : 2 (Maximal assistance) Transfers Transfer Type: SPT without device Other: stand pivot and squat pivot transfer Transfer Assistance : 2 (Maximal assistance) Sit to Stand Assistance: Maximum assistance Car Transfers: Not tested Car Type: N/A Transfers Transfer Type: Lateral with transfer board Other: also stand pivot transfer Transfer Assistance : 3 (Moderate assistance ) Sit to Stand Assistance: Maximum assistance Car Transfers: Not tested Car Type: N/A Steps or Stairs Steps/Stairs Ambulated (#): 0 Level of Assist : 0 (Not tested)(not safe to assess at this time) Rail Use: Right  Steps or Stairs Steps/Stairs Ambulated (#): (NT) 
Level of Assist : 0 (Not tested) Rail Use: Right Lab/Data Review: 
Recent Results (from the past 24 hour(s)) SODIUM Collection Time: 10/23/18  7:20 PM  
Result Value Ref Range Sodium 130 (L) 136 - 145 mmol/L  
SODIUM Collection Time: 10/24/18  1:05 AM  
Result Value Ref Range Sodium 131 (L) 136 - 145 mmol/L METABOLIC PANEL, BASIC Collection Time: 10/24/18  6:03 AM  
Result Value Ref Range Sodium 132 (L) 136 - 145 mmol/L Potassium 4.7 3.5 - 5.5 mmol/L Chloride 97 (L) 100 - 108 mmol/L  
 CO2 28 21 - 32 mmol/L Anion gap 7 3.0 - 18 mmol/L Glucose 87 74 - 99 mg/dL BUN 24 (H) 7.0 - 18 MG/DL Creatinine 0.84 0.6 - 1.3 MG/DL  
 BUN/Creatinine ratio 29 (H) 12 - 20 GFR est AA >60 >60 ml/min/1.73m2 GFR est non-AA >60 >60 ml/min/1.73m2 Calcium 8.0 (L) 8.5 - 10.1 MG/DL  
TSH 3RD GENERATION Collection Time: 10/24/18  6:03 AM  
Result Value Ref Range TSH 1.93 0.36 - 3.74 uIU/mL SODIUM  Collection Time: 10/24/18 12:07 PM  
 Result Value Ref Range Sodium 132 (L) 136 - 145 mmol/L Estimated Glomerular Filtration Rate: On admission, estimated GFR based on a Creatinine of 0.77 mg/dl: 
 Using CKD-EPI = 81.6 mL/min/1.73m2 Using MDRD = 101.6 mL/min/1.73m2 Most recent estimated GFR, based on a Creatinine of 0.84 mg/dl on 10/24/2018: 
 Using CKD-EPI = 78.7 mL/min/1.73m2 Using MDRD = 91.9 mL/min/1.73m2 Assessment:  
 
Primary Rehabilitation Diagnosis 1. Impaired Mobility and ADLs 2. 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 
  
Comorbidities  Urinary frequency R35.0  Nocturia R35.1  Glaucoma H40.9  History of kidney stones Z87.442  Cervical spinal stenosis M48.02  
 Meningioma  D32.9  Hypertensive heart disease without heart failure I11.9  Diastolic dysfunction without heart failure I51.89  
 Gastric ulcer K25.9  Coronary artery disease involving native coronary artery of native heart I25.10  Dyslipidemia E78.5  Chronic obstructive pulmonary disease (COPD)  J44.9  Status post insertion of percutaneous endoscopic gastrostomy (PEG) tube  Z93.1  Hypothyroidism E03.9  Benign prostatic hyperplasia N40.0  Bilateral hearing loss H91.93  
 History of transient ischemic attack (TIA) Z86.73  
 Traumatic amputation of left thumb P45.862Q  Hyponatremia E87.1  
  
 
Plan: 1. Justification for continued stay: Good progression towards established rehabilitation goals. 2. Medical Issues being followed closely: 
  [x]  Fall and safety precautions  
  []  Wound Care  
  [x]  Bowel and Bladder Function  
  [x]  Fluid Electrolyte and Nutrition Balance  
  []  Pain Control 3. Issues that 24 hour rehabilitation nursing is following: 
  [x]  Fall and safety precautions  
  []  Wound Care  
  [x]  Bowel and Bladder Function [x]  Fluid Electrolyte and Nutrition Balance  
  []  Pain Control   
  [x]  Assistance with and education on in-room safety with transfers to and from the bed, wheelchair, toilet and shower. 4. Acute rehabilitation plan of care: 
  [x]  Continue current care and rehab. [x]  Physical Therapy [x]  Occupational Therapy [x]  Speech Therapy  
  []  Hold Rehab until further notice 5. Medications: 
  [x]  MAR Reviewed [x]  Continue Present Medications 6. DVT Prophylaxis:   
  []  Lovenox [x]  Unfractionated Heparin  
  []  Coumadin  
  []  NOAC  
  []  BECKY Stockings  
  []  Sequential Compression Device []  None 7. Orders:  
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 
 > Continue: 
  > Aspirin 81 mg via PEG tube once daily in AM 
  > Atorvastatin 80 mg via PEG tube q HS 
  > Coenzyme Q10 100 mg via PEG tube once daily 
  > Clopidogrel 75 mg via PEG tube once daily in PM 
  > Vitamin B complex 1 tab via PEG tube once daily 
 
> Dysphagia as late effect of CVA; S/P Insertion of percutaneous endoscopic gastrostomy (PEG) tube (10/5/2018) 
 > NPO with tube feeding 
 > Jevity 1.5 237 ml via PEG tube 5 times daily (6AM, 10AM, 2PM, 6PM, 10PM) > On 10/15/2018, increased water flush from 100 ml to 150 ml via PEG tube before and after each bolus feeding 
 > On 10/22/2018, decreased water flush from 150 ml to 75 ml via PEG tube before and after each bolus feeding 
 
> Hypertensive heart disease without heart failure 
 > On admission to the ARU, decreased Hydralazine from 50 mg to 25 mg via PEG tube q 8 hr (6AM, 2PM, 10PM) > On 10/10/2018, decrease Hydralazine from 25 mg via PEG tube q 8 hr (6AM, 2PM, 10PM) to 20 mg via PEG tube q 12 hr (9AM, 9PM) > On 10/18/2018, Amlodipine 10 mg via PEG tube once daily (changed from 9AM to 6AM) > Continue: 
  > Amlodipine 10 mg via PEG tube once daily (6AM) 
  > Hydralazine 20 mg via PEG tube q 12 hr (9AM, 9PM) > Lisinopril 40 mg via PEG tube once daily (6AM) 
  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 
 > On 10/11/2018, started Trazodone 50 mg via PEG tube q HS 
 > On 10/23/2018, changed Trazodone 50 mg via PEG tube q HS to Mirtazapine 15 mg via PEG tube q HS 
 > Continue Mirtazapine 15 mg via PEG tube q HS 
 
> Neurocognitive modulation 
 > On 10/11/2018, started: 
  > Trazodone 50 mg via PEG tube q HS 
  > Modafinil 100 mg via PEG tube q AM 
 > On 10/23/2018, changed Trazodone 50 mg via PEG tube q HS to Mirtazapine 15 mg via PEG tube q HS 
 > Continue: 
  > Mirtazapine 15 mg via PEG tube q HS  
  > Modafinil 100 mg via PEG tube q AM 
 
> Difficulty sleeping 
 > On admission to the ARU, started Melatonin 3 mg via PEG tube q HS 
 > On 10/11/2018, started Trazodone 50 mg via PEG tube q HS 
 > On 10/17/2018, increased Melatonin from 3 mg to 5 mg via PEG tube q HS  
 > On 10/23/2018, changed Trazodone 50 mg via PEG tube q HS to Mirtazapine 15 mg via PEG tube q HS 
 > Continue: 
  > Melatonin 5 mg via PEG tube q HS 
   > Mirtazapine 15 mg via PEG tube q HS 
 
> Cough 
 > WBC count (10/9/2018) = 6.8 
 > Chest x-ray (10/12/2018) showed a small left pleural effusion; prominent interstitial lung markers could represent mild infiltration or chronic interstitial lung disease. 
 > On 10/14/2018, patient was given Furosemide 20 mg via PEG tube x 1 dose 
 > No fever or desaturation noted since admission 
 > On 10/15/2018, started: 
  > Dextromethorphan 30 mg via PEG tube q 12 hr 
  > Guaifenesin 100 mg via PEG tube 4 times daily 
 > WBC count (10/22/2018) = 6.1 
 > On 10/22/2018, started: 
  > Albuterol nebulization q 4 hr PRN for shortness of breath 
  > O2 inhalation 2 LPM via nasal cannula PRN for SpO2 less than 90% > Chest x-ray (PA-lateral) (10/24/2018) showed pulmonary edema with bilateral pleural effusions. > Furosemide 40 mg PO x 1 dose today 
 > No fever over the past 24 hours 
 > Continue: 
  > Dextromethorphan 30 mg via PEG tube q 12 hr 
  > Guaifenesin 100 mg via PEG tube 4 times daily 
  > Albuterol nebulization q 4 hr PRN for shortness of breath 
  > O2 inhalation 2 LPM via nasal cannula PRN for SpO2 less than 90% 
 
> Hypotonic hyponatremia 
 > Na (10/9/2018, on admission) = 143 
 > BUN/Creatinine ratio (10/9/2018) = 26 (BUN 20, Creatinine 0.77) > Na (10/15/2018) = 137 
 > BUN/Creatinine ratio (10/15/2018) = 37 (BUN 34, Creatinine 0.92)   
 > On 10/15/2018, increased water flush from 100 ml to 150 ml via PEG tube before and after each bolus feeding 
 > Na (10/22/2018) = 127 
 > BUN/Creatinine ratio (10/22/2018) = 29 (BUN 22, Creatinine 0.76) > Work-up (10/22/2018): 
  > Serum Osm = 264 
  > Urine Na = 20 
  > Urine Osm 404 
  > Urine SG = 1.014 
 > On 10/22/2018: 
  > Decreased water flush from 150 ml to 75 ml via PEG tube before and after each bolus feeding 
  > Started NaCl 1 gram via PEG tube q 8 hr  
  > Monitor serum Na q 6 hr 
 > Na (10/22/2018, 7:00 PM) = 124 
 > Na (10/23/2018, 12:55 AM) = 127 
 > Na (10/23/2018, 6:37 AM) = 131 
 > Na (10/23/2018, 12:05 PM) = 130 
 > Na (10/23/2018, 7:20 PM) = 130 
 > Na (10/24/2018, 1:05 AM) = 131 
 > Na (10/24/2018, 6:03 AM) = 132 
 > Na (10/24/2018, 12:07 PM) = 132 
 > BUN/Creatinine ratio (10/24/2018) = 29 (BUN 24, Creatinine 0.84) > TSH (10/24/2018) = 1.93 
 > Furosemide 40 mg PO x 1 dose today 
 > Continue: 
  > Water flush 75 ml via PEG tube before and after each bolus feeding 
  > Increase NaCl from 1 gram via PEG tube q 8 hr to 1 gram via PEG tube 4 times daily 
  > Decrease monitor frequency of serum Na from q 6 hr to q 12 hr 
 
 
8.  Patient's progress in rehabilitation and medical issues discussed with the patient. All questions answered to the best of my ability. Care plan discussed with patient and nurse. Signed:    Renea Roach MD 
  October 24, 2018

## 2018-10-24 NOTE — PROGRESS NOTES
Problem: Mobility Impaired (Adult and Pediatric) Goal: *Therapy Goal (Edit Goal, Insert Text) Physical Therapy Goals Initiated 10/9/2018, updated 10/22/2018, and to be accomplished within 7 day(s) 10/29/2018 1. Patient will move from supine to sit and sit to supine , scoot up and down and roll side to side in bed with moderate assistance consistently . 2.  Patient will transfer from bed to chair and chair to bed with moderate assistance  using the least restrictive device consistently. 3.  Patient will perform sit to stand with moderate assistance consistently without pushing to left . 4. Patient will ambulate with maximal assistance for 5 feet with the least restrictive device. 5.  Patient will perform 150 ft of wheelchair mobility with modified technique min assist for steering and negotiation of obstacles. 6.  Patient will be able to maintain dynamic sitting balance without support for at least 5 minutes with verbal cues only for maintaining midline/upright position for ease of transfers consistently throughout the day. Physical Therapy Goals Initiated 10/9/2018 and to be accomplished within 28 day(s) on 11/6/2018 1. Patient will move from supine to sit and sit to supine , scoot up and down and roll side to side in bed with supervision/set-up. 2.  Patient will transfer from bed to chair and chair to bed with supervision/set-up using the least restrictive device. 3.  Patient will perform sit to stand with supervision/set-up. 4.  Patient will ambulate with minimal assistance/contact guard assist for 50 feet with the least restrictive device. 5.  Patient will ascend/descend 4 stairs with 2 handrail(s) with minimal assistance/contact guard assist. 
6.  Patient will perform 150 ft of wheelchair mobility at modified independent level. 7.  Patient will demonstrate improved postural control for ease of functional mobility as demonstrated by PASS score >16/36 physical Therapy TREATMENT Patient: Cindy Oneal (7 y.o. male) Date: 10/24/2018 Diagnosis: R CVA Acute ischemic stroke ([facility] Acute ischemic stroke ([facility] Precautions: Aspiration, Fall(Head of bed 30-45 degrees at all times per speech therapist) Chart, physical therapy assessment, plan of care and goals were reviewed. Time In: 3112 Time Out: 1450 Patient Seen For: Transfer training; Wheelchair mobility; Patient education;Balance activities Pain: No pain reported today. Patient identified with name and :yes SUBJECTIVE:  
 
\"I'm dead tired. \" Patient agreeable to participation in therapy. OBJECTIVE DATA SUMMARY:  
Objective:  
 
GROSS ASSESSMENT Daily Assessment Patient observed to have increased left UE tone, drawing UE into shoulder adduction, IR, elbow flexion. BED/MAT MOBILITY Daily Assessment Rolling Right : 3 (Moderate assistance ) Rolling Left : 3 (Moderate assistance ) Supine to Sit : 0 (Not tested) Sit to Supine : 3 (Moderate assistance)(assist primarily at left LE to get into sidelying but then needing assist at bilateral LE to get into supine) TRANSFERS Daily Assessment Transfer Type: Lateral with transfer board Other: also performing squat pivot transfer max assist 
Transfer Assistance : 3 (Moderate assistance )(mod A for lateral transfer with transfer board) Sit to Stand Assistance: Maximum assistance(lifting/lowering assist) Car Transfers: Not tested Car Type: N/A Min/moderate assist for transfer board training with transfers to left and right, cues for appropriate trunk positioning, scooting forward appropriately, appropriate head/hips relationship for scooting along board. Improved weightbearing through left LE during task. Attempted carrying over task to squat pivot, however, patient requiring additional assist to perform transfer, even to left side. GAIT Daily Assessment Amount of Assistance: (pre-gait only) Distance (ft): 0 Feet (ft) Assistive Device: Other (comment)(parallel bars) Pre-gait following standing bouts, stepping forward/back only 4 reps right LE before fatiguing. Needing max assist for maintaining balance, using right UE support of parallel bar for support. BALANCE Daily Assessment Sitting - Static: Good (unsupported) Sitting - Dynamic: Fair (occasional) Standing - Static: Poor(right UE support of railing; mod/max A) Standing - Dynamic : Impaired See neuro re-ed WHEELCHAIR MOBILITY Daily Assessment Able to Propel (ft): 160 feet Functional Level: 4 Curbs/Ramps Assist Required (FIM Score): 0 (Not tested) Wheelchair Setup Assist Required : 2 (Maximal assistance) Wheelchair Management: Manages right brake Min A for steering at times, maintaining straight path. Neuro Re-Education: 
Patient performing static standing bouts x 4 reps, up to 2-3 min bouts with therapist facilitating from the side, using railing at right, mirror in front of patient. Improved engagement of left quad for knee ext, PT facilitating hip ext, occasionally at trunk for reduced trunk rotation for neutral, midline, upright positioning. Patient fatiguing quickly, needing mod/max assist and blocking of left knee for safety at times. ASSESSMENT: 
Patient better able to participate in transfers with transfer board today, also better able to participate in static standing today. Will continue to progress as appropriate. Progression toward goals: 
[x]      Improving appropriately and progressing toward goals 
[]      Improving slowly and progressing toward goals 
[]      Not making progress toward goals and plan of care will be adjusted PLAN: 
Patient continues to benefit from skilled intervention to address the above impairments. Continue treatment per established plan of care. Discharge Recommendations:  [address] [facility] Further Equipment Recommendations for Discharge:  wheelchair Estimated LOS: 4 weeks Activity Tolerance:  
Fair, fatiguing quickly. After treatment:  
Patient left supine in bed, call button and suction within reach. Tiffanie Chicas, PT 
10/24/2018

## 2018-10-24 NOTE — ROUTINE PROCESS
SHIFT CHANGE NOTE FOR [place] [facility] Bedside and Verbal shift change report given to Ford Pgae RN (oncoming nurse) by Josue Perez RN (offgoing nurse). Report included the following information SBAR, Kardex, MAR and Recent Results. Situation: 
 Code Status: DNR Reason for Admission: Right ischemic stroke Hospital Day: 16 Problem List:  
Hospital Problems  Date Reviewed: 10/24/2018 Codes Class Noted POA Hyponatremia ICD-10-CM: E87.1 ICD-9-CM: 276.1  10/22/2018 No  
   
 History of transient ischemic attack (TIA) ICD-10-CM: Q23.53 
ICD-9-CM: V12.54  Unknown Yes Overview Signed 10/9/2018  3:28 PM by Anat Griffin MD  
  2x Vitamin D deficiency (Chronic) ICD-10-CM: E55.9 ICD-9-CM: 268.9  10/9/2018 Yes Overview Signed 10/10/2018  1:59 PM by Anat Griffin MD  
  Vitamin D 25-Hydroxy (10/10/2018) = 22.5 Hypertensive heart disease without heart failure (Chronic) ICD-10-CM: I11.9 ICD-9-CM: 402.90  Unknown Yes Status post insertion of [facility] ([facility] ICD-10-CM: Z93.1 ICD-9-CM: V44.1  10/5/2018 Yes * (Principal) Acute ischemic stroke ([facility] ICD-10-CM: I63.9 ICD-9-CM: 434.91  10/2/2018 Yes Overview Signed 10/8/2018  6:32 PM by Anat Griffin MD  
  Acute Ischemic Stroke (acute infarct of the posterior superior right basal ganglia and adjacent right corona radiata) with residual left hemiparesis, dysphagia and dysarthria Impaired mobility and ADLs ICD-10-CM: Z74.09 
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 
 Past Medical History:  
Past Medical History:  
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) 10/2/2018  Dyslipidemia  Dysphagia as late effect of cerebrovascular accident (CVA) 10/2/2018  Gastric ulcer  Glaucoma  Hemiparesis affecting left side as late effect of cerebrovascular accident (CVA) ([facility] 10/2/2018  History of kidney stones  History of transient ischemic attack (TIA) 2x  Hypertensive heart disease without heart failure  Hypothyroidism  Meningioma ([facility] 10/3/2018 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  
 
Assessment: 
? Changes in Assessment throughout shift: none ? Patient has central line: no Reasons if yes: Insertion date: Last dressing date: 
? Patient has Suarez Cath: no Reasons if yes: Insertion date: 
 
? Last Vitals: 
  
Vitals:  
 10/23/18 2104 10/24/18 8653 10/24/18 1946 10/24/18 1633 BP: 141/61 138/60 143/56 153/58 Pulse: 71 62 68 68 Resp: 18  20 20 Temp: 97.5 °F (36.4 °C)  98.1 °F (36.7 °C) 97.3 °F (36.3 °C) SpO2: 97%  97% 95% Weight:      
Height:      
 
 
? PAIN Pain Assessment Pain Intensity 1: 0 (10/24/18 1604) Pain Intensity 1: 2 (12/29/14 1105) Pain Location 1: Leg Pain Location 1: Abdomen Pain Intervention(s) 1: Medication (see MAR) Pain Intervention(s) 1: Medication (see MAR) Patient Stated Pain Goal: 0 Patient Stated Pain Goal: 0 
o Intervention effective: yes   
o Other actions taken for pain: turn reposition rest 
 
? Skin Assessment Skin color Skin Color: Appropriate for ethnicity Condition/Temperature Skin Condition/Temp: Warm Integrity Skin Integrity: Abrasion Turgor Turgor: Non-tenting Weekly Pressure Ulcer Documentation  Pressure  Injury Documentation: No Pressure Injury Noted-Pressure Ulcer Prevention Initiated Wound Prevention & Protection Methods Orientation of wound Orientation of Wound Prevention: Posterior Location of Prevention Location of Wound Prevention: Sacrum/Coccyx Dressing Present Dressing Present : No 
Dressing Status Dressing Status: Intact Wound Offloading Wound Offloading (Prevention Methods): Bed, pressure redistribution/air ? INTAKE/OUPUT Date 10/23/18 0700 - 10/24/18 9934 10/24/18 0700 - 10/25/18 6943 Shift 8248-7715 1304-0887 24 Hour Total 3252-6019 6630-0039 24 Hour Total  
INTAKE  
P.O.  0 0     
  P. O.  0 0 NG/ 1327 1842 960  960 Water Flush Volume (mL) (PEG/Gastrostomy Tube 10/06/18) 150 450 600 300  300 Medication Volume (PEG/Gastrostomy Tube 10/06/18)  125 125 120  120 Intake (ml) (PEG/Gastrostomy Tube 10/06/18)  540  540 Shift Total(mL/kg) 515(7.6) 8459(80.8) [id number]) [id number])  [id number]) OUTPUT Urine(mL/kg/hr)    300  300 Urine Voided    300  300 Urine Occurrence(s) 5 x 6 x 11 x 2 x  2 x Stool Stool Occurrence(s) 0 x 0 x 0 x 0 x  0 x Shift Total(mL/kg)    300(4.4)  300(4.4)  2392 1808 296  883 Weight (kg) 67.6 67.6 67.6 67.6 67.6 67.6 Recommendations: 1. Patient needs and requests: Assist with ADL's, TF,  
 
2. Diet: NPO with bolus TF 3. Pending tests/procedures: LABS 4. Functional Level/Equipment: 1 person assist, BSC, W/C 
 
5. Estimated Discharge Date: TBD Posted on Whiteboard in Patients Room: yes HEALS Safety Check A safety check occurred in the patient's room between off going nurse and oncoming nurse listed above. The safety check included the below items Area Items H High Alert Medications ? Verify all high alert medication drips (heparin, PCA, etc.) E Equipment ? Suction is set up for ALL patients (with sonia) ? Red plugs utilized for all equipment (IV pumps, etc.) ? WOWs wiped down at end of shift. ? Room stocked with oxygen, suction, and other unit-specific supplies A Alarms ? Bed alarm is set for fall risk patients ? Ensure chair alarm is in place and activated if patient is up in a chair L Lines ? Check IV for any infiltration ? Suarez bag is empty if patient has a Suarez ? Tubing and IV bags are labeled [facility] Safety ? Room is clean, patient is clean, and equipment is clean. ? Hallways are clear from equipment besides carts. ? Fall bracelet on for fall risk patients ? Ensure room is clear and free of clutter ? Suction is set up for ALL patients (with sonia) ? Hallways are clear from equipment besides carts. ? Isolation precautions followed, supplies available outside room, sign posted

## 2018-10-24 NOTE — ROUTINE PROCESS
SHIFT CHANGE NOTE FOR [place] [facility] Bedside and Verbal shift change report given to Gayla Noonan RN (oncoming nurse) by Morgan Acosta RN (offgoing nurse). Report included the following information SBAR, Kardex, MAR and Recent Results. Situation: 
 Code Status: DNR Reason for Admission: Right ischemic stroke Hospital Day: 16 Problem List:  
Hospital Problems  Date Reviewed: 10/23/2018 Codes Class Noted POA Hyponatremia ICD-10-CM: E87.1 ICD-9-CM: 276.1  10/22/2018 No  
   
 History of transient ischemic attack (TIA) ICD-10-CM: K66.19 
ICD-9-CM: V12.54  Unknown Yes Overview Signed 10/9/2018  3:28 PM by Anat Griffin MD  
  2x Vitamin D deficiency (Chronic) ICD-10-CM: E55.9 ICD-9-CM: 268.9  10/9/2018 Yes Overview Signed 10/10/2018  1:59 PM by Anat Griffin MD  
  Vitamin D 25-Hydroxy (10/10/2018) = 22.5 Hypertensive heart disease without heart failure (Chronic) ICD-10-CM: I11.9 ICD-9-CM: 402.90  Unknown Yes Status post insertion of [facility] ([facility] ICD-10-CM: Z93.1 ICD-9-CM: V44.1  10/5/2018 Yes * (Principal) Acute ischemic stroke ([facility] ICD-10-CM: I63.9 ICD-9-CM: 434.91  10/2/2018 Yes Overview Signed 10/8/2018  6:32 PM by Anat Griffin MD  
  Acute Ischemic Stroke (acute infarct of the posterior superior right basal ganglia and adjacent right corona radiata) with residual left hemiparesis, dysphagia and dysarthria Impaired mobility and ADLs ICD-10-CM: Z74.09 
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 
 Past Medical History:  
Past Medical History:  
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) 10/2/2018  Dyslipidemia  Dysphagia as late effect of cerebrovascular accident (CVA) 10/2/2018  Gastric ulcer  Glaucoma  Hemiparesis affecting left side as late effect of cerebrovascular accident ([facility]) [facility] 10/2/2018  History of kidney stones  History of transient ischemic attack (TIA) 2x  Hypertensive heart disease without heart failure  Hypothyroidism  [facility] ([facility] 10/3/2018 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  
 
Assessment: 
? Changes in Assessment throughout shift: none ? Patient has central line: no Reasons if yes: Insertion date: Last dressing date: 
? Patient has Suarez Cath: no Reasons if yes: Insertion date: 
 
? Last Vitals: 
  
Vitals:  
 10/23/18 0801 10/23/18 1029 10/23/18 1555 10/23/18 2104 BP: 154/67 126/50 149/61 141/61 Pulse: 64 60 65 71 Resp: 18  19 Temp: 97.6 °F (36.4 °C)  97.1 °F (36.2 °C) SpO2: 94%  96% Weight:      
Height: ? PAIN Pain Assessment Pain Intensity 1: 0 (10/23/18 2306) Pain Intensity 1: 2 (12/29/14 1105) Pain Location 1: Leg Pain Location 1: Abdomen Pain Intervention(s) 1: Medication (see MAR) Pain Intervention(s) 1: Medication (see MAR) Patient Stated Pain Goal: 0 Patient Stated Pain Goal: 0 
o Intervention effective: yes   
o Other actions taken for pain: turn reposition rest 
 
? Skin Assessment Skin color Skin Color: Appropriate for ethnicity Condition/Temperature Skin Condition/Temp: Warm Integrity Skin Integrity: Incision (comment), Abrasion Turgor Turgor: Non-tenting Weekly Pressure Ulcer Documentation  Pressure  Injury Documentation: No Pressure Injury Noted-Pressure Ulcer Prevention Initiated Wound Prevention & Protection Methods Orientation of wound Orientation of Wound Prevention: Posterior Location of Prevention Location of Wound Prevention: Buttocks, Sacrum/Coccyx Dressing Present Dressing Present : No 
Dressing Status Dressing Status: Intact Wound Offloading Wound Offloading (Prevention Methods): Bed, pressure reduction mattress, Turning, Repositioning, Pillows ? INTAKE/OUPUT Date 10/23/18 0700 - 10/24/18 5247 10/24/18 0700 - 10/25/18 1230 Shift 1399-4277 5811-7485 24 Hour Total 7169-6609 0425-6107 24 Hour Total  
INTAKE  
P.O.  0 0     
  P. O.  0 0 NG/ 733 74 81 Water Flush Volume (mL) (PEG/Gastrostomy Tube 10/06/18) 150 300 450 Medication Volume (PEG/Gastrostomy Tube 10/06/18)  65 65 Intake (ml) (PEG/Gastrostomy Tube 10/06/18)  Shift Total(mL/kg) 515(7.6) [id number]) [id number]) OUTPUT Urine(mL/kg/hr) Urine Occurrence(s) 5 x 3 x 8 x Stool Stool Occurrence(s) 0 x 0 x 0 x Shift Total(mL/kg)  733 74 81 Weight (kg) 67.6 67.6 67.6 67.6 67.6 67.6 Recommendations: 1. Patient needs and requests: Assist with ADL's, TF,  
 
2.  Diet: NPO with bolus TF 
 
 3. Pending tests/procedures: LABS 4. Functional Level/Equipment: 1 person assist, BSC, W/C 
 
5. Estimated Discharge Date: TBD Posted on Whiteboard in Patients Room: yes HEALS Safety Check A safety check occurred in the patient's room between off going nurse and oncoming nurse listed above. The safety check included the below items Area Items H High Alert Medications ? Verify all high alert medication drips (heparin, PCA, etc.) E Equipment ? Suction is set up for ALL patients (with sonia) ? Red plugs utilized for all equipment (IV pumps, etc.) ? WOWs wiped down at end of shift. ? Room stocked with oxygen, suction, and other unit-specific supplies A Alarms ? Bed alarm is set for fall risk patients ? Ensure chair alarm is in place and activated if patient is up in a chair L Lines ? Check IV for any infiltration ? Suarez bag is empty if patient has a Suarez ? Tubing and IV bags are labeled [facility] Safety ? Room is clean, patient is clean, and equipment is clean. ? Hallways are clear from equipment besides carts. ? Fall bracelet on for fall risk patients ? Ensure room is clear and free of clutter ? Suction is set up for ALL patients (with sonia) ? Hallways are clear from equipment besides carts. ? Isolation precautions followed, supplies available outside room, sign posted

## 2018-10-24 NOTE — PROGRESS NOTES
Problem: Dysphagia (Adult) Goal: *Speech Goal: (INSERT TEXT) Long term goals: 1. Patient will tolerate small amounts of PO using safe swallowing techniques without overt s/s of aspiration in 4/5 trials. 2. Patient will perform oral/pharyngeal strengthening exercises with supervision. 3. Patient will participate in laryngeal strengthening exercises and swallowing maneuvers, min cues - supervison. 4. Patient will recall 3 words after 5 minutes, min assist-supervision. 5. Patient will perform functional problem solving and reasoning tasks with supervision. Short term goals (by 10/30/18): 1. Patient will tolerate small amounts of PO puree with the SLP using safe swallowing techniques without overt s/s of aspiration in 4/5 trials. 2. Patient will perform oral/pharyngeal strengthening exercises with min assist/cues. 3. Patient will participate in laryngeal strengthening exercises and swallowing maneuvers, min cues. 4. Patient will recall 3 words after 5 minutes, mod-min assist. 
5. Patient will perform functional problem solving and reasoning tasks with 80-90% accuracy. Speech language pathology treatment Patient: Jazmin Howell (8 y.o. male) Date: 10/24/2018 Diagnosis: R CVA Acute ischemic stroke ([facility] Acute ischemic stroke ([facility] SUBJECTIVE:  
Patient stated I have some kind of residue (in his throat). OBJECTIVE:  
Mental Status: 
Mr. Sherie Burnham was seen at the beginning and the end of the day. At both of these times, patient quite fatigued and needed cues to attend to tasks. This afternoon, patient with wet cough after attempts at tasks, session deferred. Treatment & Interventions:  
Mr. Sherie Burnham was seen for a thirty minute speech therapy session this morning. A second session was attempted this afternoon, but patient unable to participate. Motor Speech:  
Vowel prolongations:  Limited to 7 seconds Pitch glides:   Very limited pitch range Oral exercises: Mod-max cues Thorough oral care provided Response & Tolerance to Activities: 
Mr. Luis Miguel Crespo was very tired in both sessions today. Breath support limited for pharyngeal strengthening. Unit MD reported this afternoon that x-ray showed bilateral pleural effusions. Treatment for these will begin this evening. Pain: 
Pain Scale 1: Numeric (0 - 10) No report of pain After treatment:  
[]       Patient left in no apparent distress sitting up in chair 
[x]       Patient left in no apparent distress in bed 
[x]       Call bell left within reach [x]       Nursing notified 
[]       Caregiver present 
[]       Bed alarm activated ASSESSMENT:  
Progression toward goals: 
[]       Improving appropriately and progressing toward goals [x]       Improving slowly and progressing toward goals 
[]       Not making progress toward goals and plan of care will be adjusted PLAN:  
Patient continues to benefit from skilled intervention to address the above impairments. Continue treatment per established plan of care. Discharge Recommendations:  Home Health Estimated LOS: Through 10/13/18 JAIME Greco Time Calculation: 30 mins

## 2018-10-24 NOTE — PROGRESS NOTES
Problem: Self Care Deficits Care Plan (Adult) Goal: *Therapy Goal (Edit Goal, Insert Text) Occupational Therapy Goals Long Term Goals Initiated 10/9/18 and to be accomplished within 4 week(s) 1. Pt will perform self-feeding with total dependence. 2. Pt will perform grooming with supervision. 3. Pt will perform UB bathing with Min A. 
4. Pt will perform LB bathing with Min A prn AE. 5. Pt will perform tub/shower transfer with Mod A.  
6. Pt will perform UB dressing with Setup. 7. Pt will perform LB dressing with Min A prn AE. 8. Pt will perform toileting task with Min A. 
9. Pt will perform toilet transfer with Mod A. Short Term Goals Initiated 10/16/18 and to be accomplished within 7 day(s) 10/23/18 1. Pt will perform grooming with Setup. 2. Pt will perform UB bathing with Min A. 
3. Pt will perform LB bathing with Min A prn AE. 4. Pt will perform tub/shower transfer with Mod A. 
5. Pt will perform UB dressing with Min A. 
6. Pt will perform LB dressing with Mod A prn AE. 7. Pt will perform toilet transfer with Mod A. Occupational Therapy TREATMENT Patient: Humberto Culver 80 y.o. Patient identified with name and : yes Date: 10/24/2018 Time In: 1031 Time Out[de-identified] 8469 Diagnosis: R CVA Acute ischemic stroke ([facility] [address] Precautions: Aspiration, Fall(Head of bed 30-45 degrees at all times per speech therapist) Chart, occupational therapy assessment, plan of care, and goals were reviewed. Pain: 
Pt reports 0/10 pain or discomfort prior to treatment. Pt reports 0/10 pain or discomfort post treatment. Intervention Provided: None required SUBJECTIVE:  
Patient stated That felt good following stretching of left UE into elbow extension with inhibition of bicep. OBJECTIVE DATA SUMMARY:  
 
THERAPEUTIC ACTIVITY Daily Assessment  Pt seated in wc at tabletop to participate in connect 4 game to encourage weight bearing through left UE via flexing elbow and weight bearing through elbow and forearm. Pt required max vcs and max tactile cues to lean forward into left UE to facilitate weight bearing. LOWER BODY DRESSING Daily Assessment Lower Body Dressing Bathing Assistance, Lower : 2 (Maximal assistance) Position Performed: Long sitting on bed;Supine Comments: Pt long sitting in bed; pt required total assist to thread left LE and right foot to mid right lower leg pt pull elastic waist pants to right mid thigh with right UE. Pt attempt to bridge. Pt total to pull pants to waist.   
 
MOBILITY/TRANSFERS Daily Assessment Pt eob<>wc via slide board transfer with min A and max vcs for sequence and hand placement. Pt supine <>side lying with total A to bend left knee. Pt side lying <>sitting eob with min A to get left leg to edge of bed and max A to push to sitting. ASSESSMENT: 
Pt continues to demonstrate left UE hemiparesis. On current date, pt required max A for lower body dressing for elastic waist pants. On current date, pt required max vcs and max tactile cues to facilitate weight bearing through left elbow and forearm. Pt performed slide board transfer eob <>wc with min A and max vcs for sequence and hand placement. On current date, upon initiation of session, pt found reclined in bed (30 degrees) with call bell and suction out of reach and open brief. OTS placed call bell and suction within reach and secured brief. Nursing was notified about incident as this is a safety concern. Pt would benefit from continued skilled OT services to increase independence and safety with self care and functional transfers. Progression toward goals: 
[]          Improving appropriately and progressing toward goals [x]          Improving slowly and progressing toward goals 
[]          Not making progress toward goals and plan of care will be adjusted PLAN: 
 Patient continues to benefit from skilled intervention to address the above impairments. Continue treatment per established plan of care. Discharge Recommendations:  SNF 2/2 progress Further Equipment Recommendations for Discharge: If d/c to Home recommend BSC, shower chair with back Activity Tolerance: 
Poor Estimated LOS: 11/6/18 Please refer to the flowsheet for vital signs taken during this treatment. After treatment:  
[x]  Patient left in no apparent distress sitting up in wheelchair  
[]  Patient left in no apparent distress in bed 
[x]  Call bell left within reach 
[]  Nursing notified 
[]  Caregiver present 
[]  Bed alarm activated COMMUNICATION/EDUCATION:  
[] Home safety education was provided and the patient/caregiver indicated understanding. [x] Patient/family have participated as able in goal setting and plan of care. [x] Patient/family agree to work toward stated goals and plan of care. [] Patient understands intent and goals of therapy, but is neutral about his/her participation. [] Patient is unable to participate in goal setting and plan of care.  
 
 
Madonna Palencia, OTS

## 2018-10-25 LAB
HCT VFR BLD AUTO: 30.8 % (ref 36–48)
HGB BLD-MCNC: 10.7 G/DL (ref 13–16)
PLATELET # BLD AUTO: 258 K/UL (ref 135–420)
SODIUM SERPL-SCNC: 133 MMOL/L (ref 136–145)
SODIUM SERPL-SCNC: 136 MMOL/L (ref 136–145)

## 2018-10-25 PROCEDURE — 97112 NEUROMUSCULAR REEDUCATION: CPT

## 2018-10-25 PROCEDURE — 97535 SELF CARE MNGMENT TRAINING: CPT

## 2018-10-25 PROCEDURE — 84295 ASSAY OF SERUM SODIUM: CPT | Performed by: INTERNAL MEDICINE

## 2018-10-25 PROCEDURE — 36415 COLL VENOUS BLD VENIPUNCTURE: CPT | Performed by: INTERNAL MEDICINE

## 2018-10-25 PROCEDURE — 85018 HEMOGLOBIN: CPT | Performed by: INTERNAL MEDICINE

## 2018-10-25 PROCEDURE — 74011250636 HC RX REV CODE- 250/636: Performed by: INTERNAL MEDICINE

## 2018-10-25 PROCEDURE — 85049 AUTOMATED PLATELET COUNT: CPT | Performed by: INTERNAL MEDICINE

## 2018-10-25 PROCEDURE — 92526 ORAL FUNCTION THERAPY: CPT

## 2018-10-25 PROCEDURE — 74011250637 HC RX REV CODE- 250/637: Performed by: INTERNAL MEDICINE

## 2018-10-25 PROCEDURE — 97542 WHEELCHAIR MNGMENT TRAINING: CPT

## 2018-10-25 PROCEDURE — 92507 TX SP LANG VOICE COMM INDIV: CPT

## 2018-10-25 PROCEDURE — 97530 THERAPEUTIC ACTIVITIES: CPT

## 2018-10-25 PROCEDURE — 65310000000 HC RM PRIVATE REHAB

## 2018-10-25 RX ORDER — FUROSEMIDE 40 MG/1
40 TABLET ORAL ONCE
Status: COMPLETED | OUTPATIENT
Start: 2018-10-25 | End: 2018-10-25

## 2018-10-25 RX ADMIN — LISINOPRIL 40 MG: 40 TABLET ORAL at 05:52

## 2018-10-25 RX ADMIN — AMLODIPINE BESYLATE 10 MG: 10 TABLET ORAL at 05:52

## 2018-10-25 RX ADMIN — Medication 100 MG: at 09:54

## 2018-10-25 RX ADMIN — HEPARIN SODIUM 5000 UNITS: 5000 INJECTION, SOLUTION INTRAVENOUS; SUBCUTANEOUS at 18:19

## 2018-10-25 RX ADMIN — CLOPIDOGREL BISULFATE 75 MG: 75 TABLET, FILM COATED ORAL at 21:02

## 2018-10-25 RX ADMIN — LEVOTHYROXINE SODIUM 50 MCG: 50 TABLET ORAL at 05:52

## 2018-10-25 RX ADMIN — GUAIFENESIN 100 MG: 100 SOLUTION ORAL at 21:01

## 2018-10-25 RX ADMIN — METOPROLOL TARTRATE 25 MG: 25 TABLET ORAL at 21:02

## 2018-10-25 RX ADMIN — ATORVASTATIN CALCIUM 80 MG: 40 TABLET, FILM COATED ORAL at 21:01

## 2018-10-25 RX ADMIN — MIRTAZAPINE 15 MG: 15 TABLET, FILM COATED ORAL at 21:02

## 2018-10-25 RX ADMIN — GUAIFENESIN 100 MG: 100 SOLUTION ORAL at 14:05

## 2018-10-25 RX ADMIN — VITAMIN D, TAB 1000IU (100/BT) 2000 UNITS: 25 TAB at 09:53

## 2018-10-25 RX ADMIN — SODIUM CHLORIDE TAB 1 GM 1 G: 1 TAB at 10:42

## 2018-10-25 RX ADMIN — DOCUSATE SODIUM 100 MG: 100 CAPSULE, LIQUID FILLED ORAL at 18:18

## 2018-10-25 RX ADMIN — MODAFINIL 100 MG: 100 TABLET ORAL at 09:54

## 2018-10-25 RX ADMIN — GUAIFENESIN 100 MG: 100 SOLUTION ORAL at 18:18

## 2018-10-25 RX ADMIN — HYDRALAZINE HYDROCHLORIDE 20 MG: 10 TABLET, FILM COATED ORAL at 21:02

## 2018-10-25 RX ADMIN — DOCUSATE SODIUM 100 MG: 100 CAPSULE, LIQUID FILLED ORAL at 09:53

## 2018-10-25 RX ADMIN — PANTOPRAZOLE SODIUM 40 MG: 40 GRANULE, DELAYED RELEASE ORAL at 05:51

## 2018-10-25 RX ADMIN — ASPIRIN 81 MG CHEWABLE TABLET 81 MG: 81 TABLET CHEWABLE at 09:54

## 2018-10-25 RX ADMIN — DEXTROMETHORPHAN 30 MG: 30 SUSPENSION, EXTENDED RELEASE ORAL at 09:53

## 2018-10-25 RX ADMIN — TAMSULOSIN HYDROCHLORIDE 0.4 MG: 0.4 CAPSULE ORAL at 18:18

## 2018-10-25 RX ADMIN — METOPROLOL TARTRATE 25 MG: 25 TABLET ORAL at 09:54

## 2018-10-25 RX ADMIN — MELATONIN TAB 5 MG 5 MG: 5 TAB at 21:01

## 2018-10-25 RX ADMIN — HYDRALAZINE HYDROCHLORIDE 20 MG: 10 TABLET, FILM COATED ORAL at 09:55

## 2018-10-25 RX ADMIN — SODIUM CHLORIDE TAB 1 GM 1 G: 1 TAB at 17:17

## 2018-10-25 RX ADMIN — SODIUM CHLORIDE TAB 1 GM 1 G: 1 TAB at 05:52

## 2018-10-25 RX ADMIN — FUROSEMIDE 40 MG: 40 TABLET ORAL at 14:05

## 2018-10-25 RX ADMIN — GUAIFENESIN 100 MG: 100 SOLUTION ORAL at 09:54

## 2018-10-25 RX ADMIN — HEPARIN SODIUM 5000 UNITS: 5000 INJECTION, SOLUTION INTRAVENOUS; SUBCUTANEOUS at 05:51

## 2018-10-25 RX ADMIN — FINASTERIDE 5 MG: 5 TABLET, FILM COATED ORAL at 18:18

## 2018-10-25 RX ADMIN — TIMOLOL MALEATE 1 DROP: 5 SOLUTION OPHTHALMIC at 09:00

## 2018-10-25 RX ADMIN — VITAMIN D, TAB 1000IU (100/BT) 2000 UNITS: 25 TAB at 18:18

## 2018-10-25 RX ADMIN — SODIUM CHLORIDE TAB 1 GM 1 G: 1 TAB at 21:01

## 2018-10-25 RX ADMIN — Medication 1 TABLET: at 09:54

## 2018-10-25 RX ADMIN — DEXTROMETHORPHAN 30 MG: 30 SUSPENSION, EXTENDED RELEASE ORAL at 21:02

## 2018-10-25 NOTE — PROGRESS NOTES
provided pastoral care and spiritual support during dedication for Narendra De Souza, who is a 80 y. o.,male. The  provided the following Interventions: 
Continued the relationship of care and support. Listened empathically. Offered prayer and assurance of continued prayer on patients behalf. Chart reviewed. The following outcomes were achieved: 
Patient expressed gratitude for 's visit. Assessment: 
There are no further spiritual or Orthodoxy issues which require Spiritual Care Services interventions at this time. Plan: 
Chaplains will continue to follow and will provide pastoral care on an as needed/requested basis.  recommends bedside caregivers page  on duty if patient shows signs of acute spiritual or emotional distress. [address] [facility]  
[phone]

## 2018-10-25 NOTE — ROUTINE PROCESS
0800 PT. Awake sitting up in bed alert and oriented x 3 no change in assessment no signs of distress. 0930 Pt. Tolerated TF jevity 1.5 240 ml well no residual . 
1200 pt. With therapy. 1330 able to transfer by wheelchair. 1500 no change in assessment. 1800 Pt. Repositioned in bed tolerated TF jevity 1.5

## 2018-10-25 NOTE — PROGRESS NOTES
[address] 
[address] [place] [address], [address] INPATIENT REHABILITATION 
DAILY PROGRESS NOTE Date: 10/25/2018 Name: Xiao Paez Age / Sex: 80 y.o. / male CSN: [id number] MRN: [id number] Admit Date: 10/8/2018 Length of Stay: 17 days Primary Rehab Diagnosis: Impaired Mobility and ADLs secondary to Acute Ischemic Stroke (acute infarct of the posterior superior right basal ganglia and adjacent right corona radiata) with residual left hemiparesis, dysphagia and dysarthria Subjective:  
 
Patient seen and examined. Blood pressure controlled. No fever over the past 24 hours. Patient's Complaint:  
No significant medical complaints Pain Control: no current joint or muscle symptoms, essentially pain-free Objective:  
 
Vital Signs: 
Patient Vitals for the past 24 hrs: 
 BP Temp Pulse Resp SpO2  
10/25/18 0750 157/58  68 18 95 % 10/25/18 0732 151/56 96.7 °F (35.9 °C) 67 18 96 % 10/25/18 0532 150/59  65    
10/24/18 2142 161/63 98.2 °F (36.8 °C) 74 20 96 % 10/24/18 1633 153/58 97.3 °F (36.3 °C) 68 20 95 % Physical Examination: 
GENERAL SURVEY: Patient is awake, alert, oriented x 3, sitting comfortably on the chair, not in acute respiratory distress. HEENT: pink palpebral conjunctivae, anicteric sclerae, no nasoaural discharge, moist oral mucosa NECK: supple, no jugular venous distention, no palpable lymph nodes CHEST/LUNGS: symmetrical chest expansion, good air entry, clear breath sounds HEART: adynamic precordium, good S1 S2, no S3, regular rhythm, no murmurs ABDOMEN: (+) PEG tube in place, flat, bowel sounds appreciated, soft, non-tender EXTREMITIES: (+) amputated distal phalanx of the thumb of the left hand, pink nailbeds, no edema, full and equal pulses, no calf tenderness NEUROLOGICAL EXAM: The patient is awake, alert and oriented x3, able to answer questions fairly appropriately, able to follow 1 and 2 step commands. Able to tell time from the wall clock. Cranial nerves II-XII are grossly intact except for slurred speech and dysphagia. No gross sensory deficit. Motor strength is 4+/5 on the RUE and RLE, 4/5 on the LUE, 4-/5 on the LLE. Current Medications: 
Current Facility-Administered Medications Medication Dose Route Frequency  sodium chloride tablet 1 g  1 g Per G Tube QID  mirtazapine (REMERON) tablet 15 mg  15 mg Per G Tube QHS  albuterol (PROVENTIL VENTOLIN) nebulizer solution 2.5 mg  2.5 mg Nebulization Q4H PRN  
 amLODIPine (NORVASC) tablet 10 mg  10 mg Per G Tube DAILY  melatonin tablet 5 mg  5 mg Per G Tube QHS  guaiFENesin (ROBITUSSIN) 100 mg/5 mL oral liquid 100 mg  100 mg Per G Tube QID  dextromethorphan (DELSYM) 30 mg/5 mL syrup 30 mg  30 mg Per G Tube Q12H  
 modafinil (PROVIGIL) tablet 100 mg  100 mg Per G Tube DAILY  cholecalciferol (VITAMIN D3) tablet 2,000 Units  2,000 Units Oral BID  hydrALAZINE (APRESOLINE) tablet 20 mg  20 mg Per G Tube Q12H  pneumococcal 23-valent (PNEUMOVAX 23) injection 0.5 mL  0.5 mL IntraMUSCular PRIOR TO DISCHARGE  docusate sodium (COLACE) capsule 100 mg  100 mg Per G Tube BID  
 bisacodyl (DULCOLAX) suppository 10 mg  10 mg Rectal Q48H PRN  
 heparin (porcine) injection 5,000 Units  5,000 Units SubCUTAneous Q12H  
 acetaminophen (TYLENOL) solution 650 mg  650 mg Oral Q4H PRN  
 co-enzyme Q-10 (CO Q-10) capsule 100 mg  100 mg Oral DAILY  vitamin B complex tablet  1 Tab Oral DAILY  aspirin chewable tablet 81 mg  81 mg Per G Tube DAILY  atorvastatin (LIPITOR) tablet 80 mg  80 mg Per G Tube QHS  clopidogrel (PLAVIX) tablet 75 mg  75 mg PEG Tube DAILY  lisinopril (PRINIVIL, ZESTRIL) tablet 40 mg  40 mg Per G Tube DAILY  pantoprazole (PROTONIX) granules for oral suspension 40 mg  40 mg Per G Tube ACB  finasteride (PROSCAR) tablet 5 mg  5 mg Oral QPM  
  levothyroxine (SYNTHROID) tablet 50 mcg  50 mcg Per G Tube 6am  
 metoprolol tartrate (LOPRESSOR) tablet 25 mg  25 mg Per G Tube Q12H  tamsulosin (FLOMAX) capsule 0.4 mg  0.4 mg Oral QPM  
 timolol (TIMOPTIC) 0.5 % ophthalmic solution 1 Drop  1 Drop Both Eyes DAILY Allergies: Allergies Allergen Reactions  Codeine Other (comments) Functional Progress: SPEECH AND LANGUAGE PATHOLOGY 
 
ON ADMISSION MOST RECENT Comprehension (Native Language) Primary Mode of Comprehension: Auditory Score: 3 Comments: (Hard of Hearing) Comprehension (Native Language) Primary Mode of Comprehension: Auditory Score: 6 Comments: Bilateral hearing aids. Expression (Native Language) Primary Mode of Expression: Verbal 
Score: 3 Comments: Needing min cues for speaking up to be able to be understood by therapist 
 Expression (Native Language) Primary Mode of Expression: Verbal 
Score: 4 Comments: Due to low volume with fatigue Social Interaction/Pragmatics Score: 3 Comments: minimal encouragement to participate in therapy Social Interaction/Pragmatics Score: 5 Comments: minimal encouragement to participate in therapy Problem Solving Score: 2 Comments: Functional problem solving for initiating transfers, bed mobility needing moderate cues, also needing significant cues for attempting to sequence wheelchair mobility using right UE/LE Problem Solving Score: 5 Comments: Functional problem solving for initiating transfers, bed mobility needing moderate cues, also needing significant cues for attempting to sequence wheelchair mobility using right UE/LE Memory Score: 2 Comments: able to recall information with only occasional reminder provided by spouse/therapist Memory Score: 4 Comments: able to recall information with only occasional reminder provided by spouse/therapist 
  
 
Legend: 
 7 - Independent 6 - Modified Independent 5 - Standby Assistance / Supervision / Syl Odonnell 4 - Minimum Assistance / [address] 3 - Moderate Assistance 2 - Maximum Assistance 1 - Total Assistance / Dependent Lab/Data Review: 
Recent Results (from the past 24 hour(s)) SODIUM Collection Time: 10/24/18  8:48 PM  
Result Value Ref Range Sodium 130 (L) 136 - 145 mmol/L  
NT-PRO BNP Collection Time: 10/24/18  9:00 PM  
Result Value Ref Range NT pro-BNP 4,485 (H) 0 - 1,800 PG/ML  
HGB & HCT Collection Time: 10/25/18  6:16 AM  
Result Value Ref Range HGB 10.7 (L) 13.0 - 16.0 g/dL HCT 30.8 (L) 36.0 - 48.0 % PLATELET COUNT Collection Time: 10/25/18  6:16 AM  
Result Value Ref Range PLATELET 026 152 - 634 K/uL SODIUM Collection Time: 10/25/18  6:16 AM  
Result Value Ref Range Sodium 133 (L) 136 - 145 mmol/L Estimated Glomerular Filtration Rate: On admission, estimated GFR based on a Creatinine of 0.77 mg/dl: 
 Using CKD-EPI = 81.6 mL/min/1.73m2 Using MDRD = 101.6 mL/min/1.73m2 Most recent estimated GFR, based on a Creatinine of 0.84 mg/dl on 10/24/2018: 
 Using CKD-EPI = 78.7 mL/min/1.73m2 Using MDRD = 91.9 mL/min/1.73m2 Assessment:  
 
Primary Rehabilitation Diagnosis 1. Impaired Mobility and ADLs 2. 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 
  
Comorbidities  Urinary frequency R35.0  Nocturia R35.1  Glaucoma H40.9  History of kidney stones Z87.442  Cervical spinal stenosis M48.02  
 Meningioma  D32.9  Hypertensive heart disease without heart failure I11.9  Diastolic dysfunction without heart failure I51.89  
 Gastric ulcer K25.9  Coronary artery disease involving native coronary artery of native heart I25.10  Dyslipidemia E78.5  Chronic obstructive pulmonary disease (COPD)  J44.9  Status post insertion of percutaneous endoscopic gastrostomy (PEG) tube  Z93.1  Hypothyroidism E03.9  Benign prostatic hyperplasia N40.0  Bilateral hearing loss H91.93  
 History of transient ischemic attack (TIA) Z86.73  
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  
  
 
Plan: 1. Justification for continued stay: Good progression towards established rehabilitation goals. 2. Medical Issues being followed closely: 
  [x]  Fall and safety precautions  
  []  Wound Care  
  [x]  Bowel and Bladder Function  
  [x]  Fluid Electrolyte and Nutrition Balance  
  []  Pain Control 3. Issues that 24 hour rehabilitation nursing is following: 
  [x]  Fall and safety precautions  
  []  Wound Care  
  [x]  Bowel and Bladder Function  
  [x]  Fluid Electrolyte and Nutrition Balance  
  []  Pain Control   
  [x]  Assistance with and education on in-room safety with transfers to and from the bed, wheelchair, toilet and shower. 4. Acute rehabilitation plan of care: 
  [x]  Continue current care and rehab. [x]  Physical Therapy [x]  Occupational Therapy [x]  Speech Therapy  
  []  Hold Rehab until further notice 5. Medications: 
  [x]  MAR Reviewed [x]  Continue Present Medications 6. DVT Prophylaxis:   
  []  Lovenox [x]  Unfractionated Heparin  
  []  Coumadin  
  []  NOAC  
  []  BECKY Stockings  
  []  Sequential Compression Device []  None 7. Orders:  
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 
 > Continue: 
  > Aspirin 81 mg via PEG tube once daily in AM 
  > Atorvastatin 80 mg via PEG tube q HS 
  > Coenzyme Q10 100 mg via PEG tube once daily 
  > Clopidogrel 75 mg via PEG tube once daily in PM 
  > Vitamin B complex 1 tab via PEG tube once daily 
 
> Dysphagia as late effect of CVA; S/P Insertion of percutaneous endoscopic gastrostomy (PEG) tube (10/5/2018) > NPO with tube feeding 
 > Jevity 1.5 237 ml via PEG tube 5 times daily (6AM, 10AM, 2PM, 6PM, 10PM) > On 10/15/2018, increased water flush from 100 ml to 150 ml via PEG tube before and after each bolus feeding 
 > On 10/22/2018, decreased water flush from 150 ml to 75 ml via PEG tube before and after each bolus feeding 
 > Decrease water flush from 75 ml to 50 ml via PEG tube before and after each bolus feeding 
 
> Hypertensive heart disease without heart failure 
 > On admission to the ARU, decreased Hydralazine from 50 mg to 25 mg via PEG tube q 8 hr (6AM, 2PM, 10PM) > On 10/10/2018, decrease Hydralazine from 25 mg via PEG tube q 8 hr (6AM, 2PM, 10PM) to 20 mg via PEG tube q 12 hr (9AM, 9PM) > On 10/18/2018, Amlodipine 10 mg via PEG tube once daily (changed from 9AM to 6AM) > Chest x-ray (PA-lateral) (10/24/2018) showed pulmonary edema with bilateral pleural effusions. > On 10/24/2018, patient was given Furosemide 40 mg PO x 1 dose  
 > BNP (10/25/2018) = 4,485 
 > Furosemide 40 mg PO x 1 dose today 
 > Continue: 
  > Amlodipine 10 mg via PEG tube once daily (6AM) 
  > Hydralazine 20 mg via PEG tube q 12 hr (9AM, 9PM) > Lisinopril 40 mg via PEG tube once daily (6AM) 
  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 
 > On 10/11/2018, started Trazodone 50 mg via PEG tube q HS 
 > On 10/23/2018, changed Trazodone 50 mg via PEG tube q HS to Mirtazapine 15 mg via PEG tube q HS 
 > Continue Mirtazapine 15 mg via PEG tube q HS 
 
> Neurocognitive modulation 
 > On 10/11/2018, started: 
  > Trazodone 50 mg via PEG tube q HS 
  > Modafinil 100 mg via PEG tube q AM 
 > On 10/23/2018, changed Trazodone 50 mg via PEG tube q HS to Mirtazapine 15 mg via PEG tube q HS 
 > Continue: 
  > Mirtazapine 15 mg via PEG tube q HS  
  > Modafinil 100 mg via PEG tube q AM 
 
> Difficulty sleeping 
 > On admission to the ARU, started Melatonin 3 mg via PEG tube q HS 
 > On 10/11/2018, started Trazodone 50 mg via PEG tube q HS 
 > On 10/17/2018, increased Melatonin from 3 mg to 5 mg via PEG tube q HS  
 > On 10/23/2018, changed Trazodone 50 mg via PEG tube q HS to Mirtazapine 15 mg via PEG tube q HS 
 > Continue: 
  > Melatonin 5 mg via PEG tube q HS 
   > Mirtazapine 15 mg via PEG tube q HS 
 
> Cough 
 > WBC count (10/9/2018) = 6.8 
 > Chest x-ray (10/12/2018) showed a small left pleural effusion; prominent interstitial lung markers could represent mild infiltration or chronic interstitial lung disease. 
 > On 10/14/2018, patient was given Furosemide 20 mg via PEG tube x 1 dose 
 > No fever or desaturation noted since admission 
 > On 10/15/2018, started: 
  > Dextromethorphan 30 mg via PEG tube q 12 hr 
  > Guaifenesin 100 mg via PEG tube 4 times daily 
 > WBC count (10/22/2018) = 6.1 
 > On 10/22/2018, started: 
  > Albuterol nebulization q 4 hr PRN for shortness of breath 
  > O2 inhalation 2 LPM via nasal cannula PRN for SpO2 less than 90% 
 > Chest x-ray (PA-lateral) (10/24/2018) showed pulmonary edema with bilateral pleural effusions. > On 10/24/2018, patient was given Furosemide 40 mg PO x 1 dose  
 > BNP (10/25/2018) = 4,485 
 > Furosemide 40 mg PO x 1 dose today 
 > No fever over the past 24 hours 
 > Continue: 
  > Dextromethorphan 30 mg via PEG tube q 12 hr 
  > Guaifenesin 100 mg via PEG tube 4 times daily 
  > Albuterol nebulization q 4 hr PRN for shortness of breath 
  > O2 inhalation 2 LPM via nasal cannula PRN for SpO2 less than 90% 
 
> Hypotonic hyponatremia 
 > Na (10/9/2018, on admission) = 143 
 > BUN/Creatinine ratio (10/9/2018) = 26 (BUN 20, Creatinine 0.77)  > Na (10/15/2018) = 137 
 > BUN/Creatinine ratio (10/15/2018) = 37 (BUN 34, Creatinine 0.92)   
 > On 10/15/2018, increased water flush from 100 ml to 150 ml via PEG tube before and after each bolus feeding 
 > Na (10/22/2018) = 127 
 > BUN/Creatinine ratio (10/22/2018) = 29 (BUN 22, Creatinine 0.76) > Work-up (10/22/2018): 
  > Serum Osm = 264 
  > Urine Na = 20 
  > Urine Osm 404 
  > Urine SG = 1.014 
 > On 10/22/2018: 
  > Decreased water flush from 150 ml to 75 ml via PEG tube before and after each bolus feeding 
  > Started NaCl 1 gram via PEG tube q 8 hr  
  > Monitor serum Na q 6 hr 
 > Na (10/22/2018, 7:00 PM) = 124 
 > Na (10/23/2018, 12:55 AM) = 127 
 > Na (10/23/2018, 6:37 AM) = 131 
 > Na (10/23/2018, 12:05 PM) = 130 
 > Na (10/23/2018, 7:20 PM) = 130 
 > Na (10/24/2018, 1:05 AM) = 131 
 > Na (10/24/2018, 6:03 AM) = 132 
 > Na (10/24/2018, 12:07 PM) = 132 
 > BUN/Creatinine ratio (10/24/2018) = 29 (BUN 24, Creatinine 0.84) > TSH (10/24/2018) = 1.93 
 > On 10/24/2018: 
  > Patient was given Furosemide 40 mg PO x 1 dose  
  > Increased NaCl from 1 gram via PEG tube q 8 hr to 1 gram via PEG tube 4 times daily 
  > Decreased monitor frequency of serum Na from q 6 hr to q 12 hr 
 > Na (10/24/2018, 8:48 PM) = 130 
 > Na (10/25/2018, 6:16 AM) = 133 
 > Furosemide 40 mg PO x 1 dose today 
 > Continue: 
  > Decrease water flush from 75 ml to 50 ml via PEG tube before and after each bolus feeding 
  > NaCl 1 gram via PEG tube 4 times daily 
  > Monitor serum Na q 12 hr 
 
 
8. Patient's progress in rehabilitation and medical issues discussed with the patient. All questions answered to the best of my ability. Care plan discussed with patient and nurse. Signed:    Miles Titus MD 
  October 25, 2018

## 2018-10-25 NOTE — ROUTINE PROCESS
SHIFT CHANGE NOTE FOR [facility] Bedside and Verbal shift change report given to Sabrina Johnson RN (oncoming nurse) by Frankey Sicard, RN (offgoing nurse). Report included the following information SBAR, Kardex, MAR and Recent Results. Situation: 
 Code Status: DNR Reason for Admission: Right ischemic stroke Hospital Day: 16 Problem List:  
Hospital Problems  Date Reviewed: 10/24/2018 Codes Class Noted POA Hyponatremia ICD-10-CM: E87.1 ICD-9-CM: 276.1  10/22/2018 No  
   
 History of transient ischemic attack (TIA) ICD-10-CM: V93.51 
ICD-9-CM: V12.54  Unknown Yes Overview Signed 10/9/2018  3:28 PM by Gurwinder Hernadez MD  
  2x Vitamin D deficiency (Chronic) ICD-10-CM: E55.9 ICD-9-CM: 268.9  10/9/2018 Yes Overview Signed 10/10/2018  1:59 PM by Gurwinder Hernadez MD  
  Vitamin D 25-Hydroxy (10/10/2018) = 22.5 Hypertensive heart disease without heart failure (Chronic) ICD-10-CM: I11.9 ICD-9-CM: 402.90  Unknown Yes Status post insertion of percutaneous [facility] gastrost[facility] ([facility] ([facility] ICD-10-CM: Z93.1 ICD-9-CM: V44.1  10/5/2018 Yes * (Principal) Acute ischemic stroke ([facility] ICD-10-CM: I63.9 ICD-9-CM: 434.91  10/2/2018 Yes Overview Signed 10/8/2018  6:32 PM by Gurwinder Hernadez MD  
  Acute Ischemic Stroke (acute infarct of the posterior superior right basal ganglia and adjacent right corona radiata) with residual left hemiparesis, dysphagia and dysarthria Impaired mobility and ADLs ICD-10-CM: Z74.09 
ICD-9-CM: 799.89  10/2/2018 Yes Hemiparesis affecting left side as late effect of cerebrovascular accident (CVA) ([facility] ICD-10-CM: T71.393 ICD-9-CM: 438.20  10/2/2018 Yes Dysphagia as late effect of cerebrovascular accident (CVA) ICD-10-CM: L62.129 ICD-9-CM: 438.82  10/2/2018 Yes 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) ICD-10-CM: X43.511 ICD-9-CM: 438.13  10/2/2018 Yes Traumatic amputation of left thumb ICD-10-CM: S01.959T ICD-9-CM: 885.0  1/1/2011 Yes Background: 
 Past Medical History:  
Past Medical History:  
Diagnosis Date  Acute ischemic stroke ([facility] 10/2/2018 Acute Ischemic Stroke (acute infarct of the posterior superior right basal ganglia and adjacent right corona radiata) with residual left hemiparesis, dysphagia and dysarthria  Benign prostatic hyperplasia  Bilateral hearing loss  Cervical spinal stenosis 10/2/2018  Chronic obstructive pulmonary disease (COPD) ([facility]  Coronary artery disease involving native coronary artery of native heart  Diastolic dysfunction without heart failure  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) 10/2/2018  Dyslipidemia  Dysphagia as late effect of cerebrovascular accident (CVA) 10/2/2018  Gastric ulcer  Glaucoma  Hemiparesis affecting left side as late effect of cerebrovascular accident ([facility]) [facility] 10/2/2018  History of kidney stones  History of transient ischemic attack (TIA) 2x  Hypertensive heart disease without heart failure  Hypothyroidism  Meningi[facility] ([facility] 10/3/2018 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  
 
Assessment: 
? Changes in Assessment throughout shift: none ? Patient has central line: no Reasons if yes: Insertion date: Last dressing date: 
? Patient has Suarez Cath: no Reasons if yes: Insertion date: 
 
? Last Vitals: 
  
Vitals:  
 10/23/18 2104 10/24/18 0037 10/24/18 1946 10/24/18 1633 BP: 141/61 138/60 143/56 153/58 Pulse: 71 62 68 68 Resp: 18  20 20 Temp: 97.5 °F (36.4 °C)  98.1 °F (36.7 °C) 97.3 °F (36.3 °C) SpO2: 97%  97% 95% Weight:      
Height:      
 
 
? PAIN Pain Assessment Pain Intensity 1: 0 (10/24/18 2005) Pain Intensity 1: 2 (12/29/14 1105) Pain Location 1: Leg Pain Location 1: Abdomen Pain Intervention(s) 1: Medication (see MAR) Pain Intervention(s) 1: Medication (see MAR) Patient Stated Pain Goal: 0 Patient Stated Pain Goal: 0 
o Intervention effective: yes   
o Other actions taken for pain: turn reposition rest 
 
? Skin Assessment Skin color Skin Color: Appropriate for ethnicity Condition/Temperature Skin Condition/Temp: Dry, Warm Integrity Skin Integrity: Abrasion, Incision (comment) Turgor Turgor: Non-tenting Weekly Pressure Ulcer Documentation  Pressure  Injury Documentation: No Pressure Injury Noted-Pressure Ulcer Prevention Initiated Wound Prevention & Protection Methods Orientation of wound Orientation of Wound Prevention: Posterior Location of Prevention Location of Wound Prevention: Sacrum/Coccyx Dressing Present Dressing Present : No 
Dressing Status Dressing Status: Intact Wound Offloading Wound Offloading (Prevention Methods): Bed, pressure redistribution/air, Repositioning ? INTAKE/OUPUT Date 10/23/18 1900 - 10/24/18 1557 10/24/18 0700 - 10/25/18 7143 Shift 8462-5102 24 Hour Total 8186-5315 1892-9081 24 Hour Total  
INTAKE  
P.O. 0 0     
  P. O. 0 0 NG/GT 8094 3097 533  826 Water Flush Volume (mL) (PEG/Gastrostomy Tube 10/06/18) 450 600 300  300 Medication Volume (PEG/Gastrostomy Tube 10/06/18) 125 125 120  120 Intake (ml) (PEG/Gastrostomy Tube 10/06/18) 752 1117 540  540 Shift Total(mL/kg) 0054(83.5) [id number]) [id number])  [id number]) OUTPUT Urine(mL/kg/hr)   300(0.4)  300 Urine Voided   300  300 Urine Occurrence(s) 6 x 11 x 3 x 0 x 3 x Stool Stool Occurrence(s) 0 x 0 x 0 x 0 x 0 x Shift Total(mL/kg)   300(4.4)  300(4.4) NET 7176 6542 552 828 Weight (kg) 67.6 67.6 67.6 67.6 67.6 Recommendations: 1. Patient needs and requests: Assist with ADL's, TF,  
 
2. Diet: NPO with bolus TF 3. Pending tests/procedures: LABS 4. Functional Level/Equipment: 1 person assist, BSC, W/C 
 
5. Estimated Discharge Date: TBD Posted on Whiteboard in Patients Room: yes HEALS Safety Check A safety check occurred in the patient's room between off going nurse and oncoming nurse listed above. The safety check included the below items Area Items H High Alert Medications ? Verify all high alert medication drips (heparin, PCA, etc.) E Equipment ? Suction is set up for ALL patients (with sonia) ? Red plugs utilized for all equipment (IV pumps, etc.) ? WOWs wiped down at end of shift. ? Room stocked with oxygen, suction, and other unit-specific supplies A Alarms ? Bed alarm is set for fall risk patients ? Ensure chair alarm is in place and activated if patient is up in a chair L Lines ? Check IV for any infiltration ? Suarez bag is empty if patient has a Suarez ? Tubing and IV bags are labeled So Protect Me Safety ? Room is clean, patient is clean, and equipment is clean. ? Hallways are clear from equipment besides carts. ? Fall bracelet on for fall risk patients ? Ensure room is clear and free of clutter ? Suction is set up for ALL patients (with sonia) ? Hallways are clear from equipment besides carts. ? Isolation precautions followed, supplies available outside room, sign posted

## 2018-10-25 NOTE — PROGRESS NOTES
Problem: Dysphagia (Adult) Goal: *Speech Goal: (INSERT TEXT) Long term goals: 1. Patient will tolerate small amounts of PO using safe swallowing techniques without overt s/s of aspiration in 4/5 trials. 2. Patient will perform oral/pharyngeal strengthening exercises with supervision. 3. Patient will participate in laryngeal strengthening exercises and swallowing maneuvers, min cues - supervison. 4. Patient will recall 3 words after 5 minutes, min assist-supervision. 5. Patient will perform functional problem solving and reasoning tasks with supervision. Short term goals (by 10/30/18): 1. Patient will tolerate small amounts of PO puree with the SLP using safe swallowing techniques without overt s/s of aspiration in 4/5 trials. 2. Patient will perform oral/pharyngeal strengthening exercises with min assist/cues. 3. Patient will participate in laryngeal strengthening exercises and swallowing maneuvers, min cues. 4. Patient will recall 3 words after 5 minutes, mod-min assist. 
5. Patient will perform functional problem solving and reasoning tasks with 80-90% accuracy. Speech language pathology treatment Patient: Xiao Paez (71 y.o. male) Date: 10/25/2018 Diagnosis: R CVA Acute ischemic stroke ([facility] Acute ischemic stroke ([facility] SUBJECTIVE:  
Patient stated I'm tired. OBJECTIVE:  
Mental Status: 
Mr. Tameka Woods was very tired in both sessions today, morning and afternoon. Treatment & Interventions:  
Mr. Tameka Woods was seen this morning following both OT and PT sessions and again this afternoon after having a time to rest in bed. During both sessions, it was difficult to keep him alert and interactive. The following treatment tasks were presented: Motor Speech/Dyaphagia:  
Oral exercises: Mod assist due to fatigue Vowel prolongations:  10-16 seconds, low volume/breathy voice \"ng\" in words:   80% accuracy Strong final /k/:  90% accuracy Strong final /g/:  90% accuracy Pitch glides:   Range severely limited (no more than a musical third) Neuro-Linguistics:  
Recall 3 words: Mod assist 
Reasoning tasks:  85% accuracy Voice: Low volume, breathy voice Response & Tolerance to Activities: 
Mr. Karen Randall tried to participate with the SLP. However, fatigue was a barrier in both sessions. Pain: 
Pain Scale 1: Numeric (0 - 10) No report of pain After treatment:  
[x]       Patient left in no apparent distress sitting up in chair 
[x]       Patient left in no apparent distress in bed 
[x]       Call bell left within reach 
[]       Nursing notified 
[x]       Caregiver present 
[]       Bed alarm activated ASSESSMENT:  
Progression toward goals: 
[]       Improving appropriately and progressing toward goals [x]       Improving slowly and progressing toward goals 
[]       Not making progress toward goals and plan of care will be adjusted PLAN:  
Patient continues to benefit from skilled intervention to address the above impairments. Continue treatment per established plan of care. Discharge Recommendations:  R[address] Terrell Estimated LOS: Through 10/30/18 JAIME Harrison Time Calculation:  50 Mintues

## 2018-10-25 NOTE — PROGRESS NOTES
Problem: Mobility Impaired (Adult and Pediatric) Goal: *Therapy Goal (Edit Goal, Insert Text) Physical Therapy Goals Initiated 10/9/2018, updated 10/22/2018, and to be accomplished within 7 day(s) 10/29/2018 1. Patient will move from supine to sit and sit to supine , scoot up and down and roll side to side in bed with moderate assistance consistently . 2.  Patient will transfer from bed to chair and chair to bed with moderate assistance  using the least restrictive device consistently. 3.  Patient will perform sit to stand with moderate assistance consistently without pushing to left . 4. Patient will ambulate with maximal assistance for 5 feet with the least restrictive device. 5.  Patient will perform 150 ft of wheelchair mobility with modified technique min assist for steering and negotiation of obstacles. 6.  Patient will be able to maintain dynamic sitting balance without support for at least 5 minutes with verbal cues only for maintaining midline/upright position for ease of transfers consistently throughout the day. Physical Therapy Goals Initiated 10/9/2018 and to be accomplished within 28 day(s) on 11/6/2018 1. Patient will move from supine to sit and sit to supine , scoot up and down and roll side to side in bed with supervision/set-up. 2.  Patient will transfer from bed to chair and chair to bed with supervision/set-up using the least restrictive device. 3.  Patient will perform sit to stand with supervision/set-up. 4.  Patient will ambulate with minimal assistance/contact guard assist for 50 feet with the least restrictive device. 5.  Patient will ascend/descend 4 stairs with 2 handrail(s) with minimal assistance/contact guard assist. 
6.  Patient will perform 150 ft of wheelchair mobility at modified independent level. 7.  Patient will demonstrate improved postural control for ease of functional mobility as demonstrated by PASS score >16/36 physical Therapy TREATMENT Patient: Taylor Wen (15 y.o. male) Date: 10/25/2018 Diagnosis: R CVA Acute ischemic stroke ([facility] Acute ischemic stroke ([facility] Precautions: Aspiration, Fall(Head of bed 30-45 degrees at all times per speech therapist) Chart, physical therapy assessment, plan of care and goals were reviewed. Time In: 08 Time Out: 72 Patient Seen For: Balance activities;Transfer training; Wheelchair mobility; Patient education Pain: No pain reported or indicated during treatment. Patient identified with name and :yes SUBJECTIVE:  
 
Patient reporting feeling \"all right\" but observed to be quite tired this morning. Upon arrival, sleeping soundly needing to be woken up for therapy. Patient agreeable to treatment, needing extra time to respond to all communication. OBJECTIVE DATA SUMMARY:  
Objective:  
 
GROSS ASSESSMENT Daily Assessment Decreased left UE tone observed this morning, improved ability to be positioned when in chair. BED/MAT MOBILITY Daily Assessment Rolling Right : 3 (Moderate assistance ) Rolling Left : 3 (Moderate assistance ) Supine to Sit : (Mod/max assist needing assist at trunk and Left LE) Sit to Supine : 0 (Not tested) Moderate assistance required for rolling, moderate/maximal assist needed at trunk and left LE for coming into sitting position with elevated head of bed 30 degrees. Patient having difficulty coming into full upright position needing extra time, cues for positioning of right UE to assist with pushing into sitting. Patient getting up toward right side of bed for this treatment session. TRANSFERS Daily Assessment Transfer Type: Lateral with transfer board Transfer Assistance : 3 (Moderate assistance ) Sit to Stand Assistance: Maximum assistance Car Transfers: Not tested Car Type: N/A Moderate assist for slide board transfer, needing cues for appropriate scooting technique, reminders for head/hips relationship when moving, moderate assist for scooting hips to right and moderate assist at trunk to not lean too far to left side when coming to his left. Continues to improve with weightbearing through left LE during transfer. Sit<->stand max assist needing cues and assist for more midline positioning, assist at left trunk, left hip/pelvis for appropriate positioning, upright posture, more blocking of left knee when fatiguing. BALANCE Daily Assessment Sitting - Static: Fair (occasional) Sitting - Dynamic: Poor (constant support) Standing - Static: Poor(Needing right UE support of parallel bar and mod/max assist) WHEELCHAIR MOBILITY Daily Assessment Able to Propel (ft): 180 feet Functional Level: 3(min/mod assist for propulsion and steering) Curbs/Ramps Assist Required (FIM Score): 0 (Not tested) Needing more assist with steering and propulsion today. Difficulty with maintaining straight path and needing significant cues for technique. Neuro Re-Education: 
Sitting balance edge of mat needing cues for keeping eyes open, midline/upright position. Left lateral lean more pronounced today. Fatiguing more quickly as well. Also performing static standing only 1.5 min before fatiguing significantly and reporting feeling \"lightheaded. \" Mod/max assist for this standing bout with therapist facilitating from the side. ASSESSMENT: 
Noted to have increased fatigue today, reporting feeling \"drunk\" and unbalanced, \"lightheaded\" following standing bout. BP was 157/58 in sitting, HR 68 bpm, 95% SpO2. Nurse notified, lightheadedness alleviated with seated rest. Needing more assist with wheelchair propulsion today. Discussed with nurse that his AM meds not yet given that help patient be more alert during the day, recommending adjustment of therapy schedule to accommodate. Progression toward goals: []      Improving appropriately and progressing toward goals [x]      Improving slowly and progressing toward goals 
[]      Not making progress toward goals and plan of care will be adjusted PLAN: 
Patient continues to benefit from skilled intervention to address the above impairments. Continue treatment per established plan of care. Discharge Recommendations:  Rd Terrell Further Equipment Recommendations for Discharge:  wheelchair currently required Estimated LOS: 4 weeks Activity Tolerance:  
Fair to poor depending on fatigue. Please refer to the flowsheet for vital signs taken during this treatment. After treatment:  
Patient left seated in wheelchair, call button and suction within reach, visitors in room. Donal Bumpers, PT 
10/25/2018

## 2018-10-25 NOTE — PROGRESS NOTES
NUTRITION Nursing Referral: SHIELA FERGUSON PTA Nutrition Consult: General Nutrition Management & Supplements RECOMMENDATIONS / PLAN:  
 
- Continue bolus tube feeds of Jevity 1.5, 237 mL (1 can) x 5 times daily with water flush of 50 mL before and after each bolus feed. - Continue RD inpatient monitoring and evaluation. Goal EN Regimen: Jevity 1.5, 240 mL (1 can) 5 times daily + 50 mL water flush before and after each bolus to provide: 1775 kcal, 76 gm protein, 59 gm fat, 256 gm CHO, 27 gm fiber, 900 mL free water, 1400 mL total water, 100% RDIs NUTRITION INTERVENTIONS & DIAGNOSIS:  
 
[x] Enteral nutrition: continue bolus tube feeding Nutrition Diagnosis:  Inadequate oral intake related to inability to tolerate po as evidenced by NPO 
 
ASSESSMENT:  
 
10/25: Pt tolerating bolus tube feeds at goal. Continues to have hyponatremia, but improving; Na 133 mmol/L today; water flushes decreased today to 50 mL before and after each bolus feed. Pt discussed with RN 
 
10/22: Patient receiving bolus tube feeding of Jevity 1.5 and tolerating feeds. Has hyponatremia; Na 127 mmol/L today. Pt discussed with MD. Noted MD decreased water flushes in tube feeding and added sodium chloride tablet and lasix. Na levels to be checked q 6 hours per MD 
10/15: Pt receiving bolus tube feeds; tolerating at goal rate. Water flushes increased to 150 mL before and after each bolus feed per RN report. 10/12: Pt tolerating bolus tube feeds at goal rate 10/9: Pt unavailable at time of visit. Currently NPO; receiving bolus tube feeding via PEG. Tolerating feeds. EN infusion adequate to meet patients estimated nutritional needs:   [x] Yes     [] No   [] Unable to determine at this time Tube Feeding:  Bolus feeds of Jevity 1.5, 237 mL (1 can) x 5 times daily via PEG Water Flushes:  50 mL before and after each bolus feed Residuals: 0 mL Diet: DIET NPO With Tube Feedings DIET TUBE FEEDING Food Allergies:  None known Current Appetite:   [] Good     [] Fair     [] Poor     [x] Other: NPO Appetite/meal intake prior to admission:   [] Good     [] Fair     [] Poor     [x] Other: unknown, NPO Feeding Limitations:  [x] Swallowing difficulty    [] Chewing difficulty    [] Other: 
Current Meal Intake: No data found. BM: 10/23 Skin Integrity:  Left arm skin tear Edema:   [x] No     [] Yes Pertinent Medications: Reviewed: bowel regimen, vitamin B complex, sodium chloride Recent Labs 10/25/18 
6479 10/24/18 
2048 10/24/18 
1207 10/24/18 
3175 * 130* 132* 132* K  --   --   --  4.7 CL  --   --   --  97* CO2  --   --   --  28  
GLU  --   --   --  87 BUN  --   --   --  24* CREA  --   --   --  0.84 CA  --   --   --  8.0* Intake/Output Summary (Last 24 hours) at 10/25/2018 1536 Last data filed at 10/25/2018 0600 Gross per 24 hour Intake 350 ml Output 250 ml Net 100 ml Anthropometrics: 
Ht Readings from Last 1 Encounters:  
10/08/18 5' 6\" (1.676 m) Last 3 Recorded Weights in this Encounter 10/08/18 2046 Weight: 67.6 kg (149 lb) Body mass index is 24.05 kg/m². Weight History:    Noted 4 lb wt loss x 10 month PTA per chart hx 
 
Weight Metrics 10/8/2018 12/11/2017 6/27/2017 9/7/2016 5/24/2016 Weight 149 lb 153 lb 153 lb 155 lb 154 lb BMI 24.05 kg/m2 25.07 kg/m2 25.07 kg/m2 25.4 kg/m2 25.23 kg/m2 Admitting Diagnosis: R CVA Acute ischemic stroke ([facility] Pertinent PMHx:  Kidney stones, gastric ulcer, CAD, dyslipidemia, COPD, hypothyroidism Education Needs:        [x] None identified  [] Identified - Not appropriate at this time  []  Identified and addressed - refer to education log Learning Limitations:   [] None identified  [x] Identified: bilateral hearing loss Cultural, Christianity & ethnic food preferences:  [x] None identified    [] Identified and addressed ESTIMATED NUTRITION NEEDS:  
 
 Calories: 7029-0593 kcal (MSJx1.2-1.4) based on  [x] Actual BW: 68 kg      [] IBW Protein: 54-68 gm (0.8-1 gm/kg) based on  [x] Actual BW      [] IBW Fluid: 1 mL/kcal 
  
MONITORING & EVALUATION:  
 
Nutrition Goal(s): 1. Patient will tolerate enteral nutrition formula and regimen without difficulty within the next 7 days. Outcome:  [x] Met/Ongoing    []  Not Met    [] New/Initial Goal  
2. Patient will meet their estimated nutritional needs through adequate enteral nutrition within the next 7 days. Outcome:  [x] Met/Ongoing    []  Not Met    [] New/Initial Goal  
 
Monitoring:   [] Food and beverage intake   [] Diet order   [x] Nutrition-focused physical findings   [x] Treatment/therapy   [] Weight   [x] Enteral nutrition intake Previous Recommendations (for follow-up assessments only):     [x]   Implemented       []   Not Implemented (RD to address)      [] No Longer Appropriate     [] No Recommendation Made Discharge Planning:  Goal tube feeds via PEG [x] Participated in care planning, discharge planning, & interdisciplinary rounds as appropriate Asael Plascencia, RD Pager: [phone]

## 2018-10-25 NOTE — PROGRESS NOTES
Problem: Self Care Deficits Care Plan (Adult) Goal: *Therapy Goal (Edit Goal, Insert Text) Occupational Therapy Goals Long Term Goals Initiated 10/9/18 and to be accomplished within 4 week(s) 1. Pt will perform self-feeding with total dependence. 2. Pt will perform grooming with supervision. 3. Pt will perform UB bathing with Min A. 
4. Pt will perform LB bathing with Min A prn AE. 5. Pt will perform tub/shower transfer with Mod A.  
6. Pt will perform UB dressing with Setup. 7. Pt will perform LB dressing with Min A prn AE. 8. Pt will perform toileting task with Min A. 
9. Pt will perform toilet transfer with Mod A. Short Term Goals Initiated 10/16/18 and to be accomplished within 7 day(s) 10/23/18 1. Pt will perform grooming with Setup. 2. Pt will perform UB bathing with Min A. 
3. Pt will perform LB bathing with Min A prn AE. 4. Pt will perform tub/shower transfer with Mod A. 
5. Pt will perform UB dressing with Min A. 
6. Pt will perform LB dressing with Mod A prn AE. 7. Pt will perform toilet transfer with Mod A. Occupational Therapy TREATMENT Patient: Jeancarlos Santacruz 80 y.o. Patient identified with name and : yes Date: 10/25/2018 Time In: 938 Time Out[de-identified] 3187 Diagnosis: R CVA Acute ischemic stroke ([facility] [address] Precautions: Aspiration, Fall(Head of bed 30-45 degrees at all times per speech therapist) Chart, occupational therapy assessment, plan of care, and goals were reviewed. Pain: 
Pt reports 0/10 pain or discomfort prior to treatment. Pt reports pain or discomfort post treatment; during transfers he stated \"everything hurts\". Intervention Provided: Rest breaks SUBJECTIVE:  
Patient stated I'd like to say I enjoyed it, but I didn't, at conclusion of session. OBJECTIVE DATA SUMMARY:  
 
TOILETING Daily Assessment Pt required total dependence x 2 persons for clothing management. Toileting hygiene was not tested due to current date toileting was practice; pt did not void. MOBILITY/TRANSFERS Daily Assessment Pt completed toilet transfer via slide board x 2 trials for practice prn rest breaks. Pt required max A to transfer to the right, and min A to transfer to the left. Pt required max vcs for safe hand placement during transfer. ASSESSMENT: 
Pt continues to demonstrate left UE hemiparesis and lateral lean when sitting. Pt continues to demonstrate pushing to the left when completing functional slide board transfers; pt requires max A to transfer toward the right due to the pushing. However, pt required min A to slide board transfer toward the left. On current date for toileting practice, pt required TD x 2 persons for clothing management. On current date, unproductive time due to pt and family conversation. Pt would benefit from continued skilled OT services to increase independence and safety with functional transfers and self care. Progression toward goals: 
[]          Improving appropriately and progressing toward goals [x]          Improving slowly and progressing toward goals 
[]          Not making progress toward goals and plan of care will be adjusted PLAN: 
Patient continues to benefit from skilled intervention to address the above impairments. Continue treatment per established plan of care. Discharge Recommendations: SNF 2/2 progress Further Equipment Recommendations for Discharge: If d/c to Home recommend BSC, shower chair with back Activity Tolerance: 
Poor Estimated LOS: 11/6/18 Please refer to the flowsheet for vital signs taken during this treatment. After treatment:  
[x]  Patient left in no apparent distress sitting up in wheelchair  
[]  Patient left in no apparent distress in bed 
[x]  Call bell left within reach 
[]  Nursing notified 
[]  Caregiver present 
[]  Bed alarm activated COMMUNICATION/EDUCATION:  
 [] Home safety education was provided and the patient/caregiver indicated understanding. [x] Patient/family have participated as able in goal setting and plan of care. [x] Patient/family agree to work toward stated goals and plan of care. [] Patient understands intent and goals of therapy, but is neutral about his/her participation. [] Patient is unable to participate in goal setting and plan of care.  
 
 
Gio Srivastava , OTS

## 2018-10-25 NOTE — ROUTINE PROCESS
SHIFT CHANGE NOTE FOR Tobias Cuevas Bedside and Verbal shift change report given to Harper Krabbe, RN (oncoming nurse) by Luis King RN (offgoing nurse). Report included the following information SBAR, Kardex, MAR and Recent Results. Situation: 
 Code Status: DNR Reason for Admission: Right ischemic stroke Hospital Day: 16 Problem List:  
Hospital Problems  Date Reviewed: 10/25/2018 Codes Class Noted POA Hyponatremia ICD-10-CM: E87.1 ICD-9-CM: 276.1  10/22/2018 No  
   
 History of transient ischemic attack (TIA) ICD-10-CM: C33.25 
ICD-9-CM: V12.54  Unknown Yes Overview Signed 10/9/2018  3:28 PM by Ana Clarke MD  
  2x Vitamin D deficiency (Chronic) ICD-10-CM: E55.9 ICD-9-CM: 268.9  10/9/2018 Yes Overview Signed 10/10/2018  1:59 PM by Ana Clarke MD  
  Vitamin D 25-Hydroxy (10/10/2018) = 22.5 Hypertensive heart disease without heart failure (Chronic) ICD-10-CM: I11.9 ICD-9-CM: 402.90  Unknown Yes Status post insertion of [facility] ([facility] tube [facility] ICD-10-CM: Z93.1 ICD-9-CM: V44.1  10/5/2018 Yes * (Principal) Acute ischemic stroke ([facility] [address] ICD-10-CM: I63.9 ICD-9-CM: 434.91  10/2/2018 Yes Overview Signed 10/8/2018  6:32 PM by Ana Clarke MD  
  Acute Ischemic Stroke (acute infarct of the posterior superior right basal ganglia and adjacent right corona radiata) with residual left hemiparesis, dysphagia and dysarthria Impaired mobility and ADLs ICD-10-CM: Z74.09 
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 
 Past Medical History:  
Past Medical History:  
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) 10/2/2018  Dyslipidemia  Dysphagia as late effect of cerebrovascular accident (CVA) 10/2/2018  Gastric ulcer  Glaucoma  Hemiparesis affecting left side as late effect of cerebrovascular accident ([facility] 10/2/2018  History of kidney stones  History of transient ischemic attack (TIA) 2x  Hypertensive heart disease without heart failure  Hypothyroidism  [facility] ([facility] 10/3/2018 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  
 
Assessment: 
? Changes in Assessment throughout shift: none ? Patient has central line: no Reasons if yes: Insertion date: Last dressing date: 
? Patient has Suarez Cath: no Reasons if yes: Insertion date: 
 
? Last Vitals: 
  
Vitals:  
 10/25/18 3579 10/25/18 0732 10/25/18 1169 10/25/18 1555 BP: 150/59 151/56 157/58 147/60 Pulse: 65 67 68 67 Resp:  18 18 20 Temp:  96.7 °F (35.9 °C)  97.7 °F (36.5 °C) SpO2:  96% 95% 97% Weight: Height:      
 
 
? PAIN Pain Assessment Pain Intensity 1: 0 (10/25/18 1555) Pain Intensity 1: 2 (12/29/14 1105) Pain Location 1: Leg Pain Location 1: Abdomen Pain Intervention(s) 1: Medication (see MAR) Pain Intervention(s) 1: Medication (see MAR) Patient Stated Pain Goal: 0 Patient Stated Pain Goal: 0 
o Intervention effective: yes   
o Other actions taken for pain: turn reposition rest 
 
? Skin Assessment Skin color Skin Color: Appropriate for ethnicity Condition/Temperature Skin Condition/Temp: Dry, Warm Integrity Skin Integrity: Abrasion Turgor Turgor: Non-tenting Weekly Pressure Ulcer Documentation  Pressure  Injury Documentation: No Pressure Injury Noted-Pressure Ulcer Prevention Initiated Wound Prevention & Protection Methods Orientation of wound Orientation of Wound Prevention: Posterior Location of Prevention Location of Wound Prevention: Sacrum/Coccyx Dressing Present Dressing Present : No 
Dressing Status Dressing Status: Intact Wound Offloading Wound Offloading (Prevention Methods): Bed, pressure redistribution/air, Repositioning ? INTAKE/OUPUT Date 10/24/18 1900 - 10/25/18 5597 10/25/18 0700 - 10/26/18 1668 Shift 9294-4674 24 Hour Total 6746-3922 4333-7745 24 Hour Total  
INTAKE  
P.O.   0  0  
  P. O.   0  0 NG/ 1310 1450  1450 Water Flush Volume (mL) (PEG/Gastrostomy Tube 10/06/18) 150 450 150  150 Medication Volume (PEG/Gastrostomy Tube 10/06/18) 200 320 200  200 Intake (ml) (PEG/Gastrostomy Tube 10/06/18)  540 1100  1100 Shift Total(mL/kg) 350(5.2) 1310(19.4) 1450(21.5)  1450(21.5) OUTPUT Urine(mL/kg/hr) 250 550 Urine Voided 250 550 Urine Occurrence(s) 5 x 8 x 4 x  4 x Emesis/NG output Emesis Occurrence(s)   0 x  0 x Stool Stool Occurrence(s) 0 x 0 x 0 x  0 x Shift Total(mL/kg) 250(3.7) 550(8.1)  582 2264  1450 Weight (kg) 67.6 67.6 67.6 67.6 67.6 Recommendations: 1. Patient needs and requests: Assist with ADL's, TF,  
 
2. Diet: NPO with bolus TF 3. Pending tests/procedures: LABS 4. Functional Level/Equipment: 1 person assist, BSC, W/C 
 
5. Estimated Discharge Date: TBD Posted on Whiteboard in Patients Room: yes HEALS Safety Check A safety check occurred in the patient's room between off going nurse and oncoming nurse listed above. The safety check included the below items Area Items H High Alert Medications ? Verify all high alert medication drips (heparin, PCA, etc.) E Equipment ? Suction is set up for ALL patients (with sonia) ? Red plugs utilized for all equipment (IV pumps, etc.) ? WOWs wiped down at end of shift. ? Room stocked with oxygen, suction, and other unit-specific supplies A Alarms ? Bed alarm is set for fall risk patients ? Ensure chair alarm is in place and activated if patient is up in a chair L Lines ? Check IV for any infiltration ? Suarez bag is empty if patient has a Suarez ? Tubing and IV bags are labeled Jessica Nai Safety ? Room is clean, patient is clean, and equipment is clean. ? Hallways are clear from equipment besides carts. ? Fall bracelet on for fall risk patients ? Ensure room is clear and free of clutter ? Suction is set up for ALL patients (with sonia) ? Hallways are clear from equipment besides carts. ? Isolation precautions followed, supplies available outside room, sign posted

## 2018-10-26 LAB — SODIUM SERPL-SCNC: 135 MMOL/L (ref 136–145)

## 2018-10-26 PROCEDURE — 97110 THERAPEUTIC EXERCISES: CPT

## 2018-10-26 PROCEDURE — 97112 NEUROMUSCULAR REEDUCATION: CPT

## 2018-10-26 PROCEDURE — 97530 THERAPEUTIC ACTIVITIES: CPT

## 2018-10-26 PROCEDURE — 36415 COLL VENOUS BLD VENIPUNCTURE: CPT | Performed by: INTERNAL MEDICINE

## 2018-10-26 PROCEDURE — 97535 SELF CARE MNGMENT TRAINING: CPT

## 2018-10-26 PROCEDURE — 92507 TX SP LANG VOICE COMM INDIV: CPT

## 2018-10-26 PROCEDURE — 74011250637 HC RX REV CODE- 250/637: Performed by: INTERNAL MEDICINE

## 2018-10-26 PROCEDURE — 65310000000 HC RM PRIVATE REHAB

## 2018-10-26 PROCEDURE — 92526 ORAL FUNCTION THERAPY: CPT

## 2018-10-26 PROCEDURE — 74011250636 HC RX REV CODE- 250/636: Performed by: INTERNAL MEDICINE

## 2018-10-26 PROCEDURE — 84295 ASSAY OF SERUM SODIUM: CPT | Performed by: INTERNAL MEDICINE

## 2018-10-26 RX ORDER — FUROSEMIDE 40 MG/1
40 TABLET ORAL ONCE
Status: COMPLETED | OUTPATIENT
Start: 2018-10-26 | End: 2018-10-26

## 2018-10-26 RX ADMIN — DOCUSATE SODIUM 100 MG: 100 CAPSULE, LIQUID FILLED ORAL at 18:38

## 2018-10-26 RX ADMIN — HYDRALAZINE HYDROCHLORIDE 20 MG: 10 TABLET, FILM COATED ORAL at 09:48

## 2018-10-26 RX ADMIN — SODIUM CHLORIDE TAB 1 GM 1 G: 1 TAB at 05:13

## 2018-10-26 RX ADMIN — TAMSULOSIN HYDROCHLORIDE 0.4 MG: 0.4 CAPSULE ORAL at 18:38

## 2018-10-26 RX ADMIN — SODIUM CHLORIDE TAB 1 GM 1 G: 1 TAB at 21:49

## 2018-10-26 RX ADMIN — ATORVASTATIN CALCIUM 80 MG: 40 TABLET, FILM COATED ORAL at 21:49

## 2018-10-26 RX ADMIN — AMLODIPINE BESYLATE 10 MG: 10 TABLET ORAL at 05:13

## 2018-10-26 RX ADMIN — METOPROLOL TARTRATE 25 MG: 25 TABLET ORAL at 09:47

## 2018-10-26 RX ADMIN — Medication 1 TABLET: at 09:48

## 2018-10-26 RX ADMIN — GUAIFENESIN 100 MG: 100 SOLUTION ORAL at 21:48

## 2018-10-26 RX ADMIN — MIRTAZAPINE 15 MG: 15 TABLET, FILM COATED ORAL at 21:48

## 2018-10-26 RX ADMIN — GUAIFENESIN 100 MG: 100 SOLUTION ORAL at 18:38

## 2018-10-26 RX ADMIN — VITAMIN D, TAB 1000IU (100/BT) 2000 UNITS: 25 TAB at 09:47

## 2018-10-26 RX ADMIN — GUAIFENESIN 100 MG: 100 SOLUTION ORAL at 15:01

## 2018-10-26 RX ADMIN — DOCUSATE SODIUM 100 MG: 100 CAPSULE, LIQUID FILLED ORAL at 09:47

## 2018-10-26 RX ADMIN — CLOPIDOGREL BISULFATE 75 MG: 75 TABLET, FILM COATED ORAL at 21:49

## 2018-10-26 RX ADMIN — HEPARIN SODIUM 5000 UNITS: 5000 INJECTION, SOLUTION INTRAVENOUS; SUBCUTANEOUS at 05:14

## 2018-10-26 RX ADMIN — LEVOTHYROXINE SODIUM 50 MCG: 50 TABLET ORAL at 05:13

## 2018-10-26 RX ADMIN — PANTOPRAZOLE SODIUM 40 MG: 40 GRANULE, DELAYED RELEASE ORAL at 06:36

## 2018-10-26 RX ADMIN — GUAIFENESIN 100 MG: 100 SOLUTION ORAL at 09:47

## 2018-10-26 RX ADMIN — FUROSEMIDE 40 MG: 40 TABLET ORAL at 15:01

## 2018-10-26 RX ADMIN — SODIUM CHLORIDE TAB 1 GM 1 G: 1 TAB at 12:32

## 2018-10-26 RX ADMIN — DEXTROMETHORPHAN 30 MG: 30 SUSPENSION, EXTENDED RELEASE ORAL at 09:48

## 2018-10-26 RX ADMIN — FINASTERIDE 5 MG: 5 TABLET, FILM COATED ORAL at 18:38

## 2018-10-26 RX ADMIN — MELATONIN TAB 5 MG 5 MG: 5 TAB at 21:49

## 2018-10-26 RX ADMIN — HEPARIN SODIUM 5000 UNITS: 5000 INJECTION, SOLUTION INTRAVENOUS; SUBCUTANEOUS at 18:38

## 2018-10-26 RX ADMIN — METOPROLOL TARTRATE 25 MG: 25 TABLET ORAL at 21:49

## 2018-10-26 RX ADMIN — SODIUM CHLORIDE TAB 1 GM 1 G: 1 TAB at 15:26

## 2018-10-26 RX ADMIN — MODAFINIL 100 MG: 100 TABLET ORAL at 09:47

## 2018-10-26 RX ADMIN — VITAMIN D, TAB 1000IU (100/BT) 2000 UNITS: 25 TAB at 18:37

## 2018-10-26 RX ADMIN — TIMOLOL MALEATE 1 DROP: 5 SOLUTION OPHTHALMIC at 10:09

## 2018-10-26 RX ADMIN — Medication 100 MG: at 09:48

## 2018-10-26 RX ADMIN — LISINOPRIL 40 MG: 40 TABLET ORAL at 05:13

## 2018-10-26 RX ADMIN — HYDRALAZINE HYDROCHLORIDE 20 MG: 10 TABLET, FILM COATED ORAL at 21:48

## 2018-10-26 RX ADMIN — ASPIRIN 81 MG CHEWABLE TABLET 81 MG: 81 TABLET CHEWABLE at 09:47

## 2018-10-26 RX ADMIN — DEXTROMETHORPHAN 30 MG: 30 SUSPENSION, EXTENDED RELEASE ORAL at 19:42

## 2018-10-26 NOTE — PROGRESS NOTES
Problem: Mobility Impaired (Adult and Pediatric) Goal: *Therapy Goal (Edit Goal, Insert Text) Physical Therapy Goals Initiated 10/9/2018, updated 10/22/2018, and to be accomplished within 7 day(s) 10/29/2018 1. Patient will move from supine to sit and sit to supine , scoot up and down and roll side to side in bed with moderate assistance consistently . 2.  Patient will transfer from bed to chair and chair to bed with moderate assistance  using the least restrictive device consistently. 3.  Patient will perform sit to stand with moderate assistance consistently without pushing to left . 4. Patient will ambulate with maximal assistance for 5 feet with the least restrictive device. 5.  Patient will perform 150 ft of wheelchair mobility with modified technique min assist for steering and negotiation of obstacles. 6.  Patient will be able to maintain dynamic sitting balance without support for at least 5 minutes with verbal cues only for maintaining midline/upright position for ease of transfers consistently throughout the day. Physical Therapy Goals Initiated 10/9/2018 and to be accomplished within 28 day(s) on 11/6/2018 1. Patient will move from supine to sit and sit to supine , scoot up and down and roll side to side in bed with supervision/set-up. 2.  Patient will transfer from bed to chair and chair to bed with supervision/set-up using the least restrictive device. 3.  Patient will perform sit to stand with supervision/set-up. 4.  Patient will ambulate with minimal assistance/contact guard assist for 50 feet with the least restrictive device. 5.  Patient will ascend/descend 4 stairs with 2 handrail(s) with minimal assistance/contact guard assist. 
6.  Patient will perform 150 ft of wheelchair mobility at modified independent level. 7.  Patient will demonstrate improved postural control for ease of functional mobility as demonstrated by PASS score >16/36 physical Therapy TREATMENT Patient: Arcenio Pickens (27 y.o. male) Date: 10/26/2018 Diagnosis: R CVA Acute ischemic stroke ([facility] [address] Acute ischemic stroke ([facility] [address] [facility] Precautions: Aspiration, Fall(Head of bed 30-45 degrees at all times per speech therapist) Chart, physical therapy assessment, plan of care and goals were reviewed. Time In: 1 Time Out: 1030 Pain: 
Pt pain was reported as   pre-treatment. Pt pain was reported as post-treatment. Intervention:  
 
Patient identified with name and : 
 
SUBJECTIVE:  
 
 
 
OBJECTIVE DATA SUMMARY:  
Objective:  
 
GROSS ASSESSMENT Daily Assessment BED/MAT MOBILITY Daily Assessment TRANSFERS Daily Assessment Sit to Stand Assistance: Maximum assistance GAIT Daily Assessment STEPS or STAIRS Daily Assessment BALANCE Daily Assessment Sitting - Static: Fair (occasional); Supported sitting Sitting - Dynamic: Poor (constant support) Standing - Static: Poor Standing - Dynamic : Impaired WHEELCHAIR MOBILITY Daily Assessment THERAPEUTIC EXERCISES Daily Assessment Neuro Re-Education: 
 
 
ASSESSMENT: 
 
Progression toward goals: 
[]      Improving appropriately and progressing toward goals 
[]      Improving slowly and progressing toward goals 
[]      Not making progress toward goals and plan of care will be adjusted PLAN: 
Patient continues to benefit from skilled intervention to address the above impairments. Continue treatment per established plan of care. Discharge Recommendations: Further Equipment Recommendations for Discharge:    
 
Estimated LOS: 
 
Activity Tolerance:  
 
Please refer to the flowsheet for vital signs taken during this treatment. After treatment:  
Patient left Lois Larger 10/26/2018

## 2018-10-26 NOTE — PROGRESS NOTES
Problem: Self Care Deficits Care Plan (Adult) Goal: *Therapy Goal (Edit Goal, Insert Text) Occupational Therapy Goals Long Term Goals Initiated 10/9/18 and to be accomplished within 4 week(s) 1. Pt will perform self-feeding with total dependence. 2. Pt will perform grooming with supervision. 3. Pt will perform UB bathing with Min A. 
4. Pt will perform LB bathing with Min A prn AE. 5. Pt will perform tub/shower transfer with Mod A.  
6. Pt will perform UB dressing with Setup. 7. Pt will perform LB dressing with Min A prn AE. 8. Pt will perform toileting task with Min A. 
9. Pt will perform toilet transfer with Mod A. Short Term Goals Initiated 10/16/18 and to be accomplished within 7 day(s) 10/23/18 1. Pt will perform grooming with Setup. 2. Pt will perform UB bathing with Min A. 
3. Pt will perform LB bathing with Min A prn AE. 4. Pt will perform tub/shower transfer with Mod A. 
5. Pt will perform UB dressing with Min A. 
6. Pt will perform LB dressing with Mod A prn AE. 7. Pt will perform toilet transfer with Mod A. Occupational Therapy TREATMENT Patient: Jennifer Cummings 80 y.o. Patient identified with name and : yes Date: 10/26/2018 Time In: 930 Time Out[de-identified] 5477 Diagnosis: R CVA Acute ischemic stroke ([facility] [address] Precautions: Aspiration, Fall(Head of bed 30-45 degrees at all times per speech therapist) Chart, occupational therapy assessment, plan of care, and goals were reviewed. Pain: 
Pt reports 0/10 pain or discomfort prior to treatment. Pt reports 0/10 pain or discomfort post treatment. Intervention Provided: None required SUBJECTIVE:  
Patient stated you did good work stretching this arm, it felt good\" at conclusion of session. OBJECTIVE DATA SUMMARY:  
 
THERAPEUTIC ACTIVITY Daily Assessment Co treatment with PT-- Pt stand with support of parallel bars with visual mirror feedback.  Pt required total A to place left UE onto parallel bars and required total A to stabilize elbow and hand to facilitate weight bearing for later functional use. Pt required mod tactile cues to engage quadriceps to extend left knee. Pt required max vcs to keep eyes open and to extend spine and retract scapulae. During standing, pt observed to laterally lean to left side and anteriorly lean. TOILETING Daily Assessment Toileting Toileting Assistance (FIM Score): 1 (Total assistance) Adaptive Equipment: Other (comment)(Bedside urinal) LOWER BODY DRESSING Daily Assessment Lower Body Dressing Dressing Assistance : 2 (Maximal assistance) Leg Crossed Method Used: No 
Position Performed: Long sitting on bed Comments: Pt long sitting; pt required total A to thread left LE and mod A to thread right LE. Pt attempt to bridge with stabilization of left LE foot and knee. Pt fatigue. Pt total to pull elastic waist pants up to waist via rolling side to side. MOBILITY/TRANSFERS Daily Assessment Pt eob<>wc via slide board transfer to the left with min A. Pt supine to side lying on left side with supervision. Pt side lying to eob with max A.   
 
ASSESSMENT: 
Pt continues to require max A for lower body dressing and is dependent for toileting. On current date, pt received 10:00 tube feeding during session. OTS stretch left UE during feeding. Pt left UE presented with moderate tone, however left UE reached full PROM with inhibition of bicep and slow passive stretch. Pt left forearm passively stretched to full pronation and supination. Pt reported no pain during stretch. On current date, pt required min A to transfer eob<>wc toward the left via slideboard. Pt would benefit from continued skilled OT services to increase independence and safety with functional transfers and self care. Progression toward goals: 
[]          Improving appropriately and progressing toward goals [x]          Improving slowly and progressing toward goals 
[]          Not making progress toward goals and plan of care will be adjusted PLAN: 
Patient continues to benefit from skilled intervention to address the above impairments. Continue treatment per established plan of care. Discharge Recommendations:  SNF 2/2 progress Further Equipment Recommendations for Discharge: If d/c to Home recommend BSC, shower chair with back Activity Tolerance: 
Poor Estimated LOS:11/6/18 Please refer to the flowsheet for vital signs taken during this treatment. After treatment:  
[x]  Patient left in no apparent distress sitting up in wheelchair  
[]  Patient left in no apparent distress in bed 
[x]  Call bell left within reach 
[]  Nursing notified 
[]  Caregiver present 
[]  Bed alarm activated COMMUNICATION/EDUCATION:  
[] Home safety education was provided and the patient/caregiver indicated understanding. [x] Patient/family have participated as able in goal setting and plan of care. [x] Patient/family agree to work toward stated goals and plan of care. [] Patient understands intent and goals of therapy, but is neutral about his/her participation. [] Patient is unable to participate in goal setting and plan of care.  
 
 
Deejay Taylor , OTS

## 2018-10-26 NOTE — PROGRESS NOTES
[address] 
[address] [place] [address], [address] INPATIENT REHABILITATION 
DAILY PROGRESS NOTE Date: 10/26/2018 Name: Mellissa Aragon Age / Sex: 80 y.o. 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  
 
Patient seen and examined. Blood pressure controlled. No fever over the past 24 hours. Patient's Complaint:  
No significant medical complaints Pain Control: no current joint or muscle symptoms, essentially pain-free Objective:  
 
Vital Signs: 
Patient Vitals for the past 24 hrs: 
 BP Temp Pulse Resp SpO2  
10/26/18 0754 159/64 98.1 °F (36.7 °C) 75 20 97 % 10/26/18 0513 153/60  63    
10/25/18 2100 145/56 98 °F (36.7 °C) 71 22 95 % 10/25/18 1555 147/60 97.7 °F (36.5 °C) 67 20 97 % Physical Examination: 
GENERAL SURVEY: Patient is awake, alert, oriented x 3, sitting comfortably on the chair, not in acute respiratory distress. HEENT: pink palpebral conjunctivae, anicteric sclerae, no nasoaural discharge, moist oral mucosa NECK: supple, no jugular venous distention, no palpable lymph nodes CHEST/LUNGS: symmetrical chest expansion, good air entry, clear breath sounds HEART: adynamic precordium, good S1 S2, no S3, regular rhythm, no murmurs ABDOMEN: (+) PEG tube in place, flat, bowel sounds appreciated, soft, non-tender EXTREMITIES: (+) amputated distal phalanx of the thumb of the left hand, pink nailbeds, no edema, full and equal pulses, no calf tenderness NEUROLOGICAL EXAM: The patient is awake, alert and oriented x3, able to answer questions fairly appropriately, able to follow 1 and 2 step commands. Able to tell time from the wall clock. Cranial nerves II-XII are grossly intact except for slurred speech and dysphagia. No gross sensory deficit. Motor strength is 4+/5 on the RUE and RLE, 4/5 on the LUE, 4-/5 on the LLE. Current Medications: 
Current Facility-Administered Medications Medication Dose Route Frequency  sodium chloride tablet 1 g  1 g Per G Tube QID  mirtazapine (REMERON) tablet 15 mg  15 mg Per G Tube QHS  albuterol (PROVENTIL VENTOLIN) nebulizer solution 2.5 mg  2.5 mg Nebulization Q4H PRN  
 amLODIPine (NORVASC) tablet 10 mg  10 mg Per G Tube DAILY  melatonin tablet 5 mg  5 mg Per G Tube QHS  guaiFENesin (ROBITUSSIN) 100 mg/5 mL oral liquid 100 mg  100 mg Per G Tube QID  dextromethorphan (DELSYM) 30 mg/5 mL syrup 30 mg  30 mg Per G Tube Q12H  
 modafinil (PROVIGIL) tablet 100 mg  100 mg Per G Tube DAILY  cholecalciferol (VITAMIN D3) tablet 2,000 Units  2,000 Units Oral BID  hydrALAZINE (APRESOLINE) tablet 20 mg  20 mg Per G Tube Q12H  pneumococcal 23-valent (PNEUMOVAX 23) injection 0.5 mL  0.5 mL IntraMUSCular PRIOR TO DISCHARGE  docusate sodium (COLACE) capsule 100 mg  100 mg Per G Tube BID  
 bisacodyl (DULCOLAX) suppository 10 mg  10 mg Rectal Q48H PRN  
 heparin (porcine) injection 5,000 Units  5,000 Units SubCUTAneous Q12H  
 acetaminophen (TYLENOL) solution 650 mg  650 mg Oral Q4H PRN  
 co-enzyme Q-10 (CO Q-10) capsule 100 mg  100 mg Oral DAILY  vitamin B complex tablet  1 Tab Oral DAILY  aspirin chewable tablet 81 mg  81 mg Per G Tube DAILY  atorvastatin (LIPITOR) tablet 80 mg  80 mg Per G Tube QHS  clopidogrel (PLAVIX) tablet 75 mg  75 mg PEG Tube DAILY  lisinopril (PRINIVIL, ZESTRIL) tablet 40 mg  40 mg Per G Tube DAILY  pantoprazole (PROTONIX) granules for oral suspension 40 mg  40 mg Per G Tube ACB  finasteride (PROSCAR) tablet 5 mg  5 mg Oral QPM  
  levothyroxine (SYNTHROID) tablet 50 mcg  50 mcg Per G Tube 6am  
 metoprolol tartrate (LOPRESSOR) tablet 25 mg  25 mg Per G Tube Q12H  tamsulosin (FLOMAX) capsule 0.4 mg  0.4 mg Oral QPM  
 timolol (TIMOPTIC) 0.5 % ophthalmic solution 1 Drop  1 Drop Both Eyes DAILY Allergies: Allergies Allergen Reactions  Codeine Other (comments) Lab/Data Review: 
Recent Results (from the past 24 hour(s)) SODIUM Collection Time: 10/25/18  6:50 PM  
Result Value Ref Range Sodium 136 136 - 145 mmol/L  
SODIUM Collection Time: 10/26/18  6:05 AM  
Result Value Ref Range Sodium 135 (L) 136 - 145 mmol/L Estimated Glomerular Filtration Rate: On admission, estimated GFR based on a Creatinine of 0.77 mg/dl: 
 Using CKD-EPI = 81.6 mL/min/1.73m2 Using MDRD = 101.6 mL/min/1.73m2 Most recent estimated GFR, based on a Creatinine of 0.84 mg/dl on 10/24/2018: 
 Using CKD-EPI = 78.7 mL/min/1.73m2 Using MDRD = 91.9 mL/min/1.73m2 Assessment:  
 
Primary Rehabilitation Diagnosis 1. Impaired Mobility and ADLs 2. 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 
  
Comorbidities  Urinary frequency R35.0  Nocturia R35.1  Glaucoma H40.9  History of kidney stones Z87.442  Cervical spinal stenosis M48.02  
 Meningioma  D32.9  Hypertensive heart disease without heart failure I11.9  Diastolic dysfunction without heart failure I51.89  
 Gastric ulcer K25.9  Coronary artery disease involving native coronary artery of native heart I25.10  Dyslipidemia E78.5  Chronic obstructive pulmonary disease (COPD)  J44.9  Status post insertion of percutaneous endoscopic gastrostomy (PEG) tube  Z93.1  Hypothyroidism E03.9  Benign prostatic hyperplasia N40.0  Bilateral hearing loss H91.93  
 History of transient ischemic attack (TIA) Z86.73  
 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  
  
 
Plan: 1. Justification for continued stay: Good progression towards established rehabilitation goals. 2. Medical Issues being followed closely: 
  [x]  Fall and safety precautions  
  []  Wound Care  
  [x]  Bowel and Bladder Function  
  [x]  Fluid Electrolyte and Nutrition Balance  
  []  Pain Control 3. Issues that 24 hour rehabilitation nursing is following: 
  [x]  Fall and safety precautions  
  []  Wound Care  
  [x]  Bowel and Bladder Function  
  [x]  Fluid Electrolyte and Nutrition Balance  
  []  Pain Control   
  [x]  Assistance with and education on in-room safety with transfers to and from the bed, wheelchair, toilet and shower. 4. Acute rehabilitation plan of care: 
  [x]  Continue current care and rehab. [x]  Physical Therapy [x]  Occupational Therapy [x]  Speech Therapy  
  []  Hold Rehab until further notice 5. Medications: 
  [x]  MAR Reviewed [x]  Continue Present Medications 6. DVT Prophylaxis:   
  []  Lovenox [x]  Unfractionated Heparin  
  []  Coumadin  
  []  NOAC  
  []  BECKY Stockings  
  []  Sequential Compression Device []  None 7. Orders:  
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 
 > Continue: 
  > Aspirin 81 mg via PEG tube once daily in AM 
  > Atorvastatin 80 mg via PEG tube q HS 
  > Coenzyme Q10 100 mg via PEG tube once daily 
  > Clopidogrel 75 mg via PEG tube once daily in PM 
  > Vitamin B complex 1 tab via PEG tube once daily 
 
> Dysphagia as late effect of CVA; S/P Insertion of percutaneous endoscopic gastrostomy (PEG) tube (10/5/2018) 
 > NPO with tube feeding 
 > Jevity 1.5 237 ml via PEG tube 5 times daily (6AM, 10AM, 2PM, 6PM, 10PM) > On 10/15/2018, increased water flush from 100 ml to 150 ml via PEG tube before and after each bolus feeding 
 > On 10/22/2018, decreased water flush from 150 ml to 75 ml via PEG tube before and after each bolus feeding 
 > On 10/25/2018, decreased water flush from 75 ml to 50 ml via PEG tube before and after each bolus feeding 
 
> Hypertensive heart disease without heart failure 
 > On admission to the ARU, decreased Hydralazine from 50 mg to 25 mg via PEG tube q 8 hr (6AM, 2PM, 10PM) > On 10/10/2018, decrease Hydralazine from 25 mg via PEG tube q 8 hr (6AM, 2PM, 10PM) to 20 mg via PEG tube q 12 hr (9AM, 9PM) > On 10/18/2018, Amlodipine 10 mg via PEG tube once daily (changed from 9AM to 6AM) > Chest x-ray (PA-lateral) (10/24/2018) showed pulmonary edema with bilateral pleural effusions. > On 10/24/2018, patient was given Furosemide 40 mg PO x 1 dose  
 > BNP (10/25/2018) = 4,485 
 > On 10/25/2018, patient was given Furosemide 40 mg PO x 1 dose  
 > Furosemide 40 mg PO x 1 dose today 
 > Continue: 
  > Amlodipine 10 mg via PEG tube once daily (6AM) 
  > Hydralazine 20 mg via PEG tube q 12 hr (9AM, 9PM) > Lisinopril 40 mg via PEG tube once daily (6AM) 
  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 
 > On 10/11/2018, started Trazodone 50 mg via PEG tube q HS 
 > On 10/23/2018, changed Trazodone 50 mg via PEG tube q HS to Mirtazapine 15 mg via PEG tube q HS 
 > Continue Mirtazapine 15 mg via PEG tube q HS 
 
> Neurocognitive modulation 
 > On 10/11/2018, started: 
  > Trazodone 50 mg via PEG tube q HS 
  > Modafinil 100 mg via PEG tube q AM 
 > On 10/23/2018, changed Trazodone 50 mg via PEG tube q HS to Mirtazapine 15 mg via PEG tube q HS 
 > Continue: 
  > Mirtazapine 15 mg via PEG tube q HS  
  > Modafinil 100 mg via PEG tube q AM 
 
> Difficulty sleeping 
 > On admission to the ARU, started Melatonin 3 mg via PEG tube q HS 
 > On 10/11/2018, started Trazodone 50 mg via PEG tube q HS 
 > On 10/17/2018, increased Melatonin from 3 mg to 5 mg via PEG tube q HS  
 > On 10/23/2018, changed Trazodone 50 mg via PEG tube q HS to Mirtazapine 15 mg via PEG tube q HS 
 > Continue: 
  > Melatonin 5 mg via PEG tube q HS 
   > Mirtazapine 15 mg via PEG tube q HS 
 
> Cough 
 > WBC count (10/9/2018) = 6.8 
 > Chest x-ray (10/12/2018) showed a small left pleural effusion; prominent interstitial lung markers could represent mild infiltration or chronic interstitial lung disease. 
 > On 10/14/2018, patient was given Furosemide 20 mg via PEG tube x 1 dose 
 > No fever or desaturation noted since admission 
 > On 10/15/2018, started: 
  > Dextromethorphan 30 mg via PEG tube q 12 hr 
  > Guaifenesin 100 mg via PEG tube 4 times daily 
 > WBC count (10/22/2018) = 6.1 
 > On 10/22/2018, started: 
  > Albuterol nebulization q 4 hr PRN for shortness of breath 
  > O2 inhalation 2 LPM via nasal cannula PRN for SpO2 less than 90% 
 > Chest x-ray (PA-lateral) (10/24/2018) showed pulmonary edema with bilateral pleural effusions. > On 10/24/2018, patient was given Furosemide 40 mg PO x 1 dose  
 > BNP (10/25/2018) = 4,485 
 > Furosemide 40 mg PO x 1 dose today 
 > No fever over the past 24 hours 
 > Continue: 
  > Dextromethorphan 30 mg via PEG tube q 12 hr 
  > Guaifenesin 100 mg via PEG tube 4 times daily 
  > Albuterol nebulization q 4 hr PRN for shortness of breath 
  > O2 inhalation 2 LPM via nasal cannula PRN for SpO2 less than 90% 
 
> Hypotonic hyponatremia 
 > Na (10/9/2018, on admission) = 143 
 > BUN/Creatinine ratio (10/9/2018) = 26 (BUN 20, Creatinine 0.77)  > Na (10/15/2018) = 137 
 > BUN/Creatinine ratio (10/15/2018) = 37 (BUN 34, Creatinine 0.92)   
 > On 10/15/2018, increased water flush from 100 ml to 150 ml via PEG tube before and after each bolus feeding 
 > Na (10/22/2018) = 127 
 > BUN/Creatinine ratio (10/22/2018) = 29 (BUN 22, Creatinine 0.76) > Work-up (10/22/2018): 
  > Serum Osm = 264 
  > Urine Na = 20 
  > Urine Osm 404 
  > Urine SG = 1.014 
 > On 10/22/2018: 
  > Decreased water flush from 150 ml to 75 ml via PEG tube before and after each bolus feeding 
  > Started NaCl 1 gram via PEG tube q 8 hr  
  > Monitor serum Na q 6 hr 
 > Na (10/22/2018, 7:00 PM) = 124 
 > Na (10/23/2018, 12:55 AM) = 127 
 > Na (10/23/2018, 6:37 AM) = 131 
 > Na (10/23/2018, 12:05 PM) = 130 
 > Na (10/23/2018, 7:20 PM) = 130 
 > Na (10/24/2018, 1:05 AM) = 131 
 > Na (10/24/2018, 6:03 AM) = 132 
 > Na (10/24/2018, 12:07 PM) = 132 
 > BUN/Creatinine ratio (10/24/2018) = 29 (BUN 24, Creatinine 0.84) > TSH (10/24/2018) = 1.93 
 > On 10/24/2018: 
  > Patient was given Furosemide 40 mg PO x 1 dose  
  > Increased NaCl from 1 gram via PEG tube q 8 hr to 1 gram via PEG tube 4 times daily 
  > Decreased monitor frequency of serum Na from q 6 hr to q 12 hr 
 > Na (10/24/2018, 8:48 PM) = 130 
 > Na (10/25/2018, 6:16 AM) = 133 
 > On 10/25/2018: 
  > Patient was given Furosemide 40 mg PO x 1 dose  
  > Decreased water flush from 75 ml to 50 ml via PEG tube before and after each bolus feeding 
 > Na (10/25/2018, 6:50 PM) = 136 
 > Na (10/26/2018, 6:05 AM) = 135  
 > Furosemide 40 mg PO x 1 dose today 
 > Continue: 
  > Water flush 50 ml via PEG tube before and after each bolus feeding 
  > NaCl 1 gram via PEG tube 4 times daily 
  > Decrease monitoring of serum Na from q 12 hr to once daily 8. Patient's progress in rehabilitation and medical issues discussed with the patient. All questions answered to the best of my ability. Care plan discussed with patient and nurse. Signed:    Gloria Bush MD 
  October 26, 2018

## 2018-10-26 NOTE — PROGRESS NOTES
Problem: Dysphagia (Adult) Goal: *Speech Goal: (INSERT TEXT) Long term goals: 1. Patient will tolerate small amounts of PO using safe swallowing techniques without overt s/s of aspiration in 4/5 trials. 2. Patient will perform oral/pharyngeal strengthening exercises with supervision. 3. Patient will participate in laryngeal strengthening exercises and swallowing maneuvers, min cues - supervison. 4. Patient will recall 3 words after 5 minutes, min assist-supervision. 5. Patient will perform functional problem solving and reasoning tasks with supervision. Short term goals (by 10/30/18): 1. Patient will tolerate small amounts of PO puree with the SLP using safe swallowing techniques without overt s/s of aspiration in 4/5 trials. 2. Patient will perform oral/pharyngeal strengthening exercises with min assist/cues. 3. Patient will participate in laryngeal strengthening exercises and swallowing maneuvers, min cues. 4. Patient will recall 3 words after 5 minutes, mod-min assist. 
5. Patient will perform functional problem solving and reasoning tasks with 80-90% accuracy. Outcome: Progressing Towards Goal 
SPEECH LANGUAGE PATHOLOGY TREATMENT 
  
Patient: Ada Chowdary (21 y.o. male) Date: 10/25/2018 Diagnosis: R CVA Acute ischemic stroke ([facility] [address] Acute ischemic stroke ([facility] [address] SUBJECTIVE:  
Patient stated I can't put it together. [During reasoning task] 
  
OBJECTIVE:  
Mental Status: 
Mr. Karen Randall was more awake and alert during first session, increased lethargy noted during second session. 
  
  
Treatment & Interventions:  
Mr. Karen Randall was seen in his room for 2 sessions, wife present during second session. Pt with increased lethargy during second session, negatively impacting accuracy.   The following treatment tasks were presented: 
  
Neuro-Linguistics:  
Recall 3 words:                       first set of words: 2/3 independently, 3/3 with min semantic cues; second set: 3/3 following mod semantic cues only Deductive reasonin% accuracy independently, 100% accuracy with mod verbal cues 
 
  
  
Response & Tolerance to Activities: 
Mr. Rosa garcia improved attention/ concentration and hence, responses to delayed recall task with eyes opened.   
  
Pain: 
Pain Scale 1: Numeric (0 - 10) No report of pain After treatment:  
[x]       Patient left in no apparent distress sitting up in chair 
[]       Patient left in no apparent distress in bed 
[x]       Call bell left within reach 
[]       Nursing notified 
[x]       Caregiver present 
[]       Bed alarm activated 
  
ASSESSMENT:  
Progression toward goals: 
[]       Improving appropriately and progressing toward goals [x]       Improving slowly and progressing toward goals 
[]       Not making progress toward goals and plan of care will be adjusted 
  PLAN:  
Patient continues to benefit from skilled intervention to address the above impairments. Continue treatment per established plan of care. Discharge Recommendations:  [address] 
  
Estimated LOS: Through 10/30/18 
  
Rere Brenner MS, CCC/SLP Time Calculation:  60 Mintues

## 2018-10-26 NOTE — ROUTINE PROCESS
SHIFT CHANGE NOTE FOR [facility] 
  
Bedside and Verbal shift change report given to Wes Cameron RN (oncoming nurse) by Alejandro Green RN (offgoing nurse). Report included the following information SBAR, Kardex, MAR and Recent Results. 
  
Situation: 
            Code Status: DNR Reason for Admission: Right ischemic stroke Hospital Day: 16 Problem List:  
          
Hospital Problems  Date Reviewed: 10/25/2018  
        Codes Class Noted POA  
  Hyponatremia ICD-10-CM: E87.1 ICD-9-CM: 276.1   10/22/2018 No  
     
  History of transient ischemic attack (TIA) ICD-10-CM: X05.48 
ICD-9-CM: V12.54   Unknown Yes  
  Overview Signed 10/9/2018  3:28 PM by Daksha Ruby MD  
    2x 
   
  
     
  Vitamin D deficiency (Chronic) ICD-10-CM: E55.9 ICD-9-CM: 056. 9   10/9/2018 Yes  
  Overview Signed 10/10/2018  1:59 PM by Daksha Ruby MD  
    Vitamin D 25-Hydroxy (10/10/2018) = 22.5  
   
  
     
  Hypertensive heart disease without heart failure (Chronic) ICD-10-CM: I11.9 ICD-9-CM: 402.90   Unknown Yes  
     
  Status post insertion of percutaneous endoscopic gastrostomy (PEG) tube ([facility] ICD-10-CM: Z93.1 ICD-9-CM: V44.1   10/5/2018 Yes  
     
  * (Principal) Acute ischemic stroke ([facility] ICD-10-CM: I63.9 ICD-9-CM: 434.91   10/2/2018 Yes  
  Overview Signed 10/8/2018  6:32 PM by Daksha Ruby MD  
    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 
   
  
     
  Impaired mobility and ADLs ICD-10-CM: Z74.09 
ICD-9-CM: 799.89   10/2/2018 Yes  
     
  Hemiparesis affecting left side as late effect of cerebrovascular accident (CVA) ([facility] [address] ICD-10-CM: A22.894 ICD-9-CM: 438.20   10/2/2018 Yes  
     
  Dysphagia as late effect of cerebrovascular accident (CVA) ICD-10-CM: F25.427 ICD-9-CM: 438.82   10/2/2018 Yes  
     
   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) ICD-10-CM: P06.185 ICD-9-CM: 438.13   10/2/2018 Yes  
     
  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 0   1/1/2011 Yes  
     
   
  
  
Background: 
            Past Medical History:  
    
Past Medical History:  
Diagnosis Date  Acute ischemic stroke ([facility] [facility] 10/2/2018  
  Acute Ischemic Stroke (acute infarct of the posterior superior right basal ganglia and adjacent right corona radiata) with residual left hemiparesis, dysphagia and dysarthria  Benign prostatic hyperplasia    
 Bilateral hearing loss    
 Cervical spinal stenosis 10/2/2018  Chronic obstructive pulmonary disease (COPD) ([facility] [address]    
 Coronary artery disease involving native coronary artery of native heart    
 Diastolic dysfunction without heart failure    
 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) 10/2/2018  Dyslipidemia    
 Dysphagia as late effect of cerebrovascular accident (CVA) 10/2/2018  Gastric ulcer    
 Glaucoma    
 Hemiparesis affecting left side as late effect of cerebrovascular accident (CVA) ([facility] [address] [facility] 10/2/2018  History of kidney stones    
 History of transient ischemic attack (TIA)    
  2x  Hypertensive heart disease without heart failure    
 Hypothyroidism    
 Meningioma ([facility] [address] 10/3/2018  
  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    
 Traumatic amputation of left thumb 2011  Urinary frequency    
 Vitamin D deficiency 10/9/2018  
  Vitamin D 25-Hydroxy (10/10/2018) = 22.5   
  
            Patient taking anticoagulants yes Heparin Patient has a defibrillator: no  
  
Assessment: 
? Changes in Assessment throughout shift: none 
  
? Patient has central line: no Reasons if yes: Insertion date: Last dressing date: 
? Patient has Suarez Cath: no Reasons if yes: Insertion date: 
  
? Last Vitals: 
             
      
Vitals:   10/25/18 0532 10/25/18 0732 10/25/18 0750 10/25/18 1555 BP: 150/59 151/56 157/58 147/60 Pulse: 65 67 68 67 Resp:   18 18 20 Temp:   96.7 °F (35.9 °C)   97.7 °F (36.5 °C) SpO2:   96% 95% 97% Weight:          
Height:          
  
  
· PAIN Pain Assessment Pain Intensity 1: 0 (10/25/18 1555) Pain Intensity 1: 2 (12/29/14 1105) Pain Location 1: Leg Pain Location 1: Abdomen Pain Intervention(s) 1: Medication (see MAR) Pain Intervention(s) 1: Medication (see MAR) Patient Stated Pain Goal: 0 Patient Stated Pain Goal: 0 
? Intervention effective: yes   
? Other actions taken for pain: turn reposition rest 
  
· Skin Assessment Skin color Skin Color: Appropriate for ethnicity Condition/Temperature Skin Condition/Temp: Dry, Warm Integrity Skin Integrity: Abrasion Turgor Turgor: Non-tenting Weekly Pressure Ulcer Documentation  Pressure  Injury Documentation: No Pressure Injury Noted-Pressure Ulcer Prevention Initiated Wound Prevention & Protection Methods Orientation of wound Orientation of Wound Prevention: Posterior Location of Prevention Location of Wound Prevention: Sacrum/Coccyx Dressing Present Dressing Present : No 
Dressing Status Dressing Status: Intact Wound Offloading Wound Offloading (Prevention Methods): Bed, pressure redistribution/air, Repositioning 
  
· INTAKE/OUPUT Date 10/24/18 1900 - 10/25/18 1956 10/25/18 0700 - 10/26/18 4652 Shift 9616-7206 24 Hour Total 0912-9457 4870-1305 24 Hour Total  
INTAKE  
P. O.     0   0  
  P. O.     0   0 NG/ 1310 1450   1450 Water Flush Volume (mL) (PEG/Gastrostomy Tube 10/06/18) 150 450 150   150 Medication Volume (PEG/Gastrostomy Tube 10/06/18) 200 320 200   200 Intake (ml) (PEG/Gastrostomy Tube 10/06/18)   540 1100   1100 Shift Total(mL/kg) 350(5.2) 1310(19.4) 1450(21.5)   1450(21.5) OUTPUT  
 Urine(mL/kg/hr) 250 550        
  Urine Voided 250 550        
  Urine Occurrence(s) 5 x 8 x 4 x   4 x Emesis/NG output            
  Emesis Occurrence(s)     0 x   0 x Stool            
  Stool Occurrence(s) 0 x 0 x 0 x   0 x Shift Total(mL/kg) 250(3.7) 550(8.1)        
 600 5834   1450 Weight (kg) 67.6 67.6 67.6 67.6 67.6  
  
  
Recommendations: 1. Patient needs and requests: Assist with ADL's, TF,  
  
2. Diet: NPO with bolus TF 
  
3. Pending tests/procedures: LABS  
  
4. Functional Level/Equipment: 1 person assist, BSC, W/C 
  
5. Estimated Discharge Date: TBD Posted on Whiteboard in Patients Room: yes  
  
HEALS Safety Check 
  
A safety check occurred in the patient's room between off going nurse and oncoming nurse listed above. 
  
The safety check included the below items Area Items H High Alert Medications § Verify all high alert medication drips (heparin, PCA, etc.) E Equipment § Suction is set up for ALL patients (with sonia) § Red plugs utilized for all equipment (IV pumps, etc.) § WOWs wiped down at end of shift. § Room stocked with oxygen, suction, and other unit-specific supplies A Alarms § Bed alarm is set for fall risk patients § Ensure chair alarm is in place and activated if patient is up in a chair L Lines § Check IV for any infiltration § Suarez bag is empty if patient has a Suarez § Tubing and IV bags are labeled [facility] Safety 
  § Room is clean, patient is clean, and equipment is clean. § Hallways are clear from equipment besides carts. § Fall bracelet on for fall risk patients § Ensure room is clear and free of clutter § Suction is set up for ALL patients (with sonia) § Hallways are clear from equipment besides carts.   
§ Isolation precautions followed, supplies available outside room, sign posted

## 2018-10-26 NOTE — ROUTINE PROCESS
SHIFT CHANGE NOTE FOR [facility] 
  
Bedside and Verbal shift change report given to Lali Caldwell RN (oncoming nurse) by Marybel Toth RN (offgoing nurse). Report included the following information SBAR, Kardex, MAR and Recent Results. 
  
Situation: 
            Code Status: DNR Reason for Admission: Right ischemic stroke Hospital Day: 16 Problem List:  
          
Hospital Problems  Date Reviewed: 10/25/2018  
        Codes Class Noted POA  
  Hyponatremia ICD-10-CM: E87.1 ICD-9-CM: 276.1   10/22/2018 No  
     
  History of transient ischemic attack (TIA) ICD-10-CM: D96.85 
ICD-9-CM: V12.54   Unknown Yes  
  Overview Signed 10/9/2018  3:28 PM by Sergio Jett MD  
    2x 
   
  
     
  Vitamin D deficiency (Chronic) ICD-10-CM: E55.9 ICD-9-CM: 559. 9   10/9/2018 Yes  
  Overview Signed 10/10/2018  1:59 PM by Sergio Jett MD  
    Vitamin D 25-Hydroxy (10/10/2018) = 22.5  
   
  
     
  Hypertensive heart disease without heart failure (Chronic) ICD-10-CM: I11.9 ICD-9-CM: 402.90   Unknown Yes  
     
  Status post insertion of percutaneous endoscopic gastrostomy (PEG) tube ([facility] ICD-10-CM: Z93.1 ICD-9-CM: V44.1   10/5/2018 Yes  
     
  * (Principal) Acute ischemic stroke ([facility] ICD-10-CM: I63.9 ICD-9-CM: 434.91   10/2/2018 Yes  
  Overview Signed 10/8/2018  6:32 PM by Sergio Jett MD  
    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 
   
  
     
  Impaired mobility and ADLs ICD-10-CM: Z74.09 
ICD-9-CM: 799.89   10/2/2018 Yes  
     
  Hemiparesis affecting left side as late effect of cerebrovascular accident (CVA) ([facility] ICD-10-CM: E57.381 ICD-9-CM: 438.20   10/2/2018 Yes  
     
  Dysphagia as late effect of cerebrovascular accident (CVA) ICD-10-CM: X21.174 ICD-9-CM: 438.82   10/2/2018 Yes  
     
   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) ICD-10-CM: C47.656 ICD-9-CM: 438.13   10/2/2018 Yes  
     
  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 0   1/1/2011 Yes  
     
   
  
  
Background: 
            Past Medical History:  
    
Past Medical History:  
Diagnosis Date  Acute ischemic stroke ([facility] 10/2/2018  
  Acute Ischemic Stroke (acute infarct of the posterior superior right basal ganglia and adjacent right corona radiata) with residual left hemiparesis, dysphagia and dysarthria  Benign prostatic hyperplasia    
 Bilateral hearing loss    
 Cervical spinal stenosis 10/2/2018  Chronic obstructive pulmonary disease (COPD) ([facility]    
 Coronary artery disease involving native coronary artery of native heart    
 Diastolic dysfunction without heart failure    
 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) 10/2/2018  Dyslipidemia    
 Dysphagia as late effect of cerebrovascular accident (CVA) 10/2/2018  Gastric ulcer    
 Glaucoma    
 Hemiparesis affecting left side as late effect of cerebrovascular accident (CVA) ([facility] 10/2/2018  History of kidney stones    
 History of transient ischemic attack (TIA)    
  2x  Hypertensive heart disease without heart failure    
 Hypothyroidism    
 Meningioma ([facility] 10/3/2018  
  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    
 Traumatic amputation of left thumb 2011  Urinary frequency    
 Vitamin D deficiency 10/9/2018  
  Vitamin D 25-Hydroxy (10/10/2018) = 22.5   
  
            Patient taking anticoagulants yes Heparin Patient has a defibrillator: no  
  
Assessment: 
? Changes in Assessment throughout shift: none 
  
? Patient has central line: no Reasons if yes: Insertion date: Last dressing date: 
? Patient has Suarez Cath: no Reasons if yes: Insertion date: 
  
? Last Vitals: 
             
      
Vitals:   10/25/18 0532 10/25/18 0732 10/25/18 0750 10/25/18 1555 BP: 150/59 151/56 157/58 147/60 Pulse: 65 67 68 67 Resp:   18 18 20 Temp:   96.7 °F (35.9 °C)   97.7 °F (36.5 °C) SpO2:   96% 95% 97% Weight:          
Height:          
  
  
· PAIN Pain Assessment Pain Intensity 1: 0 (10/25/18 1555) Pain Intensity 1: 2 (12/29/14 1105) Pain Location 1: Leg Pain Location 1: Abdomen Pain Intervention(s) 1: Medication (see MAR) Pain Intervention(s) 1: Medication (see MAR) Patient Stated Pain Goal: 0 Patient Stated Pain Goal: 0 
? Intervention effective: yes   
? Other actions taken for pain: turn reposition rest 
  
· Skin Assessment Skin color Skin Color: Appropriate for ethnicity Condition/Temperature Skin Condition/Temp: Dry, Warm Integrity Skin Integrity: Abrasion Turgor Turgor: Non-tenting Weekly Pressure Ulcer Documentation  Pressure  Injury Documentation: No Pressure Injury Noted-Pressure Ulcer Prevention Initiated Wound Prevention & Protection Methods Orientation of wound Orientation of Wound Prevention: Posterior Location of Prevention Location of Wound Prevention: Sacrum/Coccyx Dressing Present Dressing Present : No 
Dressing Status Dressing Status: Intact Wound Offloading Wound Offloading (Prevention Methods): Bed, pressure redistribution/air, Repositioning 
  
· INTAKE/OUPUT Date 10/24/18 1900 - 10/25/18 2701 10/25/18 0700 - 10/26/18 0516 Shift 6378-9002 24 Hour Total 8178-3809 3520-3150 24 Hour Total  
INTAKE  
P. O.     0   0  
  P. O.     0   0 NG/ 1310 1450   1450 Water Flush Volume (mL) (PEG/Gastrostomy Tube 10/06/18) 150 450 150   150 Medication Volume (PEG/Gastrostomy Tube 10/06/18) 200 320 200   200 Intake (ml) (PEG/Gastrostomy Tube 10/06/18)   540 1100   1100 Shift Total(mL/kg) 350(5.2) 1310(19.4) 1450(21.5)   1450(21.5) OUTPUT  
 Urine(mL/kg/hr) 250 550        
  Urine Voided 250 550        
  Urine Occurrence(s) 5 x 8 x 4 x   4 x Emesis/NG output            
  Emesis Occurrence(s)     0 x   0 x Stool            
  Stool Occurrence(s) 0 x 0 x 0 x   0 x Shift Total(mL/kg) 250(3.7) 550(8.1)        
 072 4366   1450 Weight (kg) 67.6 67.6 67.6 67.6 67.6  
  
  
Recommendations: 1. Patient needs and requests: Assist with ADL's, TF,  
  
2. Diet: NPO with bolus TF 
  
3. Pending tests/procedures: LABS  
  
4. Functional Level/Equipment: 1 person assist, BSC, W/C 
  
5. Estimated Discharge Date: TBD Posted on Whiteboard in Patients Room: yes  
  
HEALS Safety Check 
  
A safety check occurred in the patient's room between off going nurse and oncoming nurse listed above. 
  
The safety check included the below items Area Items H High Alert Medications § Verify all high alert medication drips (heparin, PCA, etc.) E Equipment § Suction is set up for ALL patients (with sonia) § Red plugs utilized for all equipment (IV pumps, etc.) § WOWs wiped down at end of shift. § Room stocked with oxygen, suction, and other unit-specific supplies A Alarms § Bed alarm is set for fall risk patients § Ensure chair alarm is in place and activated if patient is up in a chair L Lines § Check IV for any infiltration § Suarez bag is empty if patient has a Suarez § Tubing and IV bags are labeled Kari Irons Safety 
  § Room is clean, patient is clean, and equipment is clean. § Hallways are clear from equipment besides carts. § Fall bracelet on for fall risk patients § Ensure room is clear and free of clutter § Suction is set up for ALL patients (with sonia) § Hallways are clear from equipment besides carts.   
§ Isolation precautions followed, supplies available outside room, sign posted

## 2018-10-26 NOTE — PROGRESS NOTES
Problem: Dysphagia (Adult) Goal: *Speech Goal: (INSERT TEXT) Long term goals: 1. Patient will tolerate small amounts of PO using safe swallowing techniques without overt s/s of aspiration in 4/5 trials. 2. Patient will perform oral/pharyngeal strengthening exercises with supervision. 3. Patient will participate in laryngeal strengthening exercises and swallowing maneuvers, min cues - supervison. 4. Patient will recall 3 words after 5 minutes, min assist-supervision. 5. Patient will perform functional problem solving and reasoning tasks with supervision. Short term goals (by 10/30/18): 1. Patient will tolerate small amounts of PO puree with the SLP using safe swallowing techniques without overt s/s of aspiration in 4/5 trials. 2. Patient will perform oral/pharyngeal strengthening exercises with min assist/cues. 3. Patient will participate in laryngeal strengthening exercises and swallowing maneuvers, min cues. 4. Patient will recall 3 words after 5 minutes, mod-min assist. 
5. Patient will perform functional problem solving and reasoning tasks with 80-90% accuracy. Outcome: Progressing Towards Goal 
SPEECH LANGUAGE PATHOLOGY TREATMENT 
  
Patient: Cynthia Evans (25 y.o. male) Date: 10/25/2018 Diagnosis: R CVA Acute ischemic stroke ([facility] Acute ischemic stroke ([facility] [address] SUBJECTIVE:  
Patient stated I'm tired. 
  
OBJECTIVE:  
Mental Status: awake, alert, more lethargic as session wore on. 
  
  
Treatment & Interventions:  
Mr. Giovani Cantor was seen this morning in his room for dysphag tx. The following treatment tasks were presented: Motor Speech/Dyaphagia:  
Falsetto /i/:                          10-18 seconds x 5 reps (average 16.2 seconds) Strong /k/ in words:              95% accuracy Pitch glides:                            x 5 reps, min encouragement 
  
  
Response & Tolerance to Activities: 
Mr. Giovani Cantor participated in session to the best of his ability.   Pt lethargic after first 30 minute session.   
  
Pain: 
Pain Scale 1: Numeric (0 - 10) No report of pain After treatment:  
[x]       Patient left in no apparent distress sitting up in chair 
[]       Patient left in no apparent distress in bed 
[x]       Call bell left within reach 
[]       Nursing notified 
[x]       Caregiver present 
[]       Bed alarm activated 
  
ASSESSMENT:  
Progression toward goals: 
[]       Improving appropriately and progressing toward goals [x]       Improving slowly and progressing toward goals 
[]       Not making progress toward goals and plan of care will be adjusted 
  PLAN:  
Patient continues to benefit from skilled intervention to address the above impairments. Continue treatment per established plan of care. Discharge Recommendations:  dR [place] 
  
Estimated LOS: Through 10/30/18 
  
Tenzin Blake MS, CCC/SLP Speech- Language Pathologist 
 
Time Calculation:  30 Mintues

## 2018-10-26 NOTE — PROGRESS NOTES
Problem: Mobility Impaired (Adult and Pediatric) Goal: *Therapy Goal (Edit Goal, Insert Text) Physical Therapy Goals Initiated 10/9/2018, updated 10/22/2018, and to be accomplished within 7 day(s) 10/29/2018 1. Patient will move from supine to sit and sit to supine , scoot up and down and roll side to side in bed with moderate assistance consistently . 2.  Patient will transfer from bed to chair and chair to bed with moderate assistance  using the least restrictive device consistently. 3.  Patient will perform sit to stand with moderate assistance consistently without pushing to left . 4. Patient will ambulate with maximal assistance for 5 feet with the least restrictive device. 5.  Patient will perform 150 ft of wheelchair mobility with modified technique min assist for steering and negotiation of obstacles. 6.  Patient will be able to maintain dynamic sitting balance without support for at least 5 minutes with verbal cues only for maintaining midline/upright position for ease of transfers consistently throughout the day. Physical Therapy Goals Initiated 10/9/2018 and to be accomplished within 28 day(s) on 11/6/2018 1. Patient will move from supine to sit and sit to supine , scoot up and down and roll side to side in bed with supervision/set-up. 2.  Patient will transfer from bed to chair and chair to bed with supervision/set-up using the least restrictive device. 3.  Patient will perform sit to stand with supervision/set-up. 4.  Patient will ambulate with minimal assistance/contact guard assist for 50 feet with the least restrictive device. 5.  Patient will ascend/descend 4 stairs with 2 handrail(s) with minimal assistance/contact guard assist. 
6.  Patient will perform 150 ft of wheelchair mobility at modified independent level. 7.  Patient will demonstrate improved postural control for ease of functional mobility as demonstrated by PASS score >16/36 physical Therapy TREATMENT Patient: Heather Rodriguez (6 y.o. male) Date: 10/26/2018 Diagnosis: R CVA Acute ischemic stroke ([facility] Acute ischemic stroke ([facility] [address] [address] Precautions: Aspiration, Fall(Head of bed 30-45 degrees at all times per speech therapist) Chart, physical therapy assessment, plan of care and goals were reviewed. Time in:13:30 Time out: 14:30 Time In: 1 Time Out: 1030 Pain: 
Pt pain was reported as  0 pre-treatment. Pt pain was reported as 0 post-treatment. Intervention: none Patient identified with name and :yes SUBJECTIVE:  
 
I'm so tired today. OBJECTIVE DATA SUMMARY:  
Objective:  
 
 
 
TRANSFERS Daily Assessment Transfer Type: Lateral with transfer board Transfer Assistance : 3 (Moderate assistance ) Sit to Stand Assistance: Maximum assistance BALANCE Daily Assessment Sitting - Static: Fair (occasional); Supported sitting Sitting - Dynamic: Poor (constant support) Standing - Static: Poor Standing - Dynamic : Impaired WHEELCHAIR MOBILITY Daily Assessment Able to Propel (ft): (71 ft) Functional Level: (2) Curbs/Ramps Assist Required (FIM Score): 0 (Not tested) Wheelchair Management: Manages right brake THERAPEUTIC EXERCISES Daily Assessment Extremity: Left Exercise Type #1: (pt was long sitting) Sets Performed: 1 Reps Performed: 10 Level of Assist: Minimal assistance Neuro Re-Education: 
Static standing in parallel bars x 2 trials with co-treatment of OTR to maximize the patients session for improved functional independence. ASSESSMENT: 
Pt is seen for deficits in strength, mobility, transfers and safety. Pt is agreeable to session although he reports increased fatigue and feeling dizzy upon transfers. Challenged pt this am session with static standing to improve weight shifting to L and to increase WBing of LLE to improve patients balance and ease with transfers.  Pt is required moderate assistance to stand at the parallel bars x 2 and maximum assistance with weight shifting through the LLE while using tactile cuing and blocking of L knee during standing. Fatigue is a factor as pt requires constant cues to keep his eyes opened and to use the mirror in front of him to focus on the need to straighten out his L knee. Pt improved with 2nd trial of standing and weight shifting to L as he was able to activate his L quad in order to stand for several seconds with adequate weight shift through extremity. Pt requires constant VCs for hand and foot placement with transfers and with standing activities. PM session concentration was on seated balance and weight shifting. Pt c/o dizziness upone sliding board transfers from [facility] to [facility] and his vitals are taken and /62, HR 82 and Sp02 is 96%. Pts dizziness subsided after several minutes of sitting edge of mat. Pt will progress with additional sessions to address current weakness and limitations. Progression toward goals: 
[]      Improving appropriately and progressing toward goals [x]      Improving slowly and progressing toward goals 
[]      Not making progress toward goals and plan of care will be adjusted PLAN: 
Patient continues to benefit from skilled intervention to address the above impairments. Continue treatment per established plan of care. Discharge Recommendations:  SNF Further Equipment Recommendations for Discharge:  TBD Estimated LOS: 10/31/18 Activity Tolerance: Pt with fair tolerance to current session schedule. Please refer to the flowsheet for vital signs taken during this treatment. After treatment:  
Patient left in recliner with the call bell within reach and with his spouse present. Josias Agustin 10/26/2018

## 2018-10-26 NOTE — ROUTINE PROCESS
0800 Pt. Awake sitting up in bed no change in assessment no signs of distress. 0930 pt. Tolerated Jevity 1.5 TF no residual. 
1200 pt. With therapy. 1330 able to transfer by wheelchair. 1500 no change in assessment. 1800 Pt.  Sitting up in chair tolerated Jevity 1.5 TF

## 2018-10-27 ENCOUNTER — APPOINTMENT (OUTPATIENT)
Dept: GENERAL RADIOLOGY | Age: 83
DRG: 057 | End: 2018-10-27
Attending: FAMILY MEDICINE
Payer: MEDICARE

## 2018-10-27 LAB
ANION GAP SERPL CALC-SCNC: 9 MMOL/L (ref 3–18)
BNP SERPL-MCNC: 2664 PG/ML (ref 0–1800)
BUN SERPL-MCNC: 22 MG/DL (ref 7–18)
BUN/CREAT SERPL: 26 (ref 12–20)
CALCIUM SERPL-MCNC: 7.7 MG/DL (ref 8.5–10.1)
CHLORIDE SERPL-SCNC: 100 MMOL/L (ref 100–108)
CO2 SERPL-SCNC: 27 MMOL/L (ref 21–32)
CREAT SERPL-MCNC: 0.84 MG/DL (ref 0.6–1.3)
GLUCOSE SERPL-MCNC: 85 MG/DL (ref 74–99)
POTASSIUM SERPL-SCNC: 4.6 MMOL/L (ref 3.5–5.5)
SODIUM SERPL-SCNC: 136 MMOL/L (ref 136–145)

## 2018-10-27 PROCEDURE — 77030037878 HC DRSG MEPILEX >48IN BORD MOLN -B

## 2018-10-27 PROCEDURE — 97530 THERAPEUTIC ACTIVITIES: CPT

## 2018-10-27 PROCEDURE — 97140 MANUAL THERAPY 1/> REGIONS: CPT

## 2018-10-27 PROCEDURE — 36415 COLL VENOUS BLD VENIPUNCTURE: CPT | Performed by: INTERNAL MEDICINE

## 2018-10-27 PROCEDURE — 65310000000 HC RM PRIVATE REHAB

## 2018-10-27 PROCEDURE — 74011250636 HC RX REV CODE- 250/636: Performed by: INTERNAL MEDICINE

## 2018-10-27 PROCEDURE — 74011250637 HC RX REV CODE- 250/637: Performed by: INTERNAL MEDICINE

## 2018-10-27 PROCEDURE — BW03ZZZ PLAIN RADIOGRAPHY OF CHEST: ICD-10-PCS | Performed by: INTERNAL MEDICINE

## 2018-10-27 PROCEDURE — 83880 ASSAY OF NATRIURETIC PEPTIDE: CPT | Performed by: INTERNAL MEDICINE

## 2018-10-27 PROCEDURE — 80048 BASIC METABOLIC PNL TOTAL CA: CPT | Performed by: INTERNAL MEDICINE

## 2018-10-27 PROCEDURE — 71046 X-RAY EXAM CHEST 2 VIEWS: CPT

## 2018-10-27 RX ADMIN — ASPIRIN 81 MG CHEWABLE TABLET 81 MG: 81 TABLET CHEWABLE at 08:38

## 2018-10-27 RX ADMIN — DOCUSATE SODIUM 100 MG: 100 CAPSULE, LIQUID FILLED ORAL at 17:41

## 2018-10-27 RX ADMIN — DOCUSATE SODIUM 100 MG: 100 CAPSULE, LIQUID FILLED ORAL at 08:38

## 2018-10-27 RX ADMIN — TIMOLOL MALEATE 1 DROP: 5 SOLUTION OPHTHALMIC at 10:15

## 2018-10-27 RX ADMIN — Medication 100 MG: at 08:38

## 2018-10-27 RX ADMIN — MIRTAZAPINE 15 MG: 15 TABLET, FILM COATED ORAL at 21:18

## 2018-10-27 RX ADMIN — CLOPIDOGREL BISULFATE 75 MG: 75 TABLET, FILM COATED ORAL at 21:18

## 2018-10-27 RX ADMIN — HYDRALAZINE HYDROCHLORIDE 20 MG: 10 TABLET, FILM COATED ORAL at 08:38

## 2018-10-27 RX ADMIN — GUAIFENESIN 100 MG: 100 SOLUTION ORAL at 10:09

## 2018-10-27 RX ADMIN — VITAMIN D, TAB 1000IU (100/BT) 2000 UNITS: 25 TAB at 17:41

## 2018-10-27 RX ADMIN — DEXTROMETHORPHAN 30 MG: 30 SUSPENSION, EXTENDED RELEASE ORAL at 08:38

## 2018-10-27 RX ADMIN — SODIUM CHLORIDE TAB 1 GM 1 G: 1 TAB at 10:09

## 2018-10-27 RX ADMIN — MELATONIN TAB 5 MG 5 MG: 5 TAB at 21:18

## 2018-10-27 RX ADMIN — HEPARIN SODIUM 5000 UNITS: 5000 INJECTION, SOLUTION INTRAVENOUS; SUBCUTANEOUS at 17:40

## 2018-10-27 RX ADMIN — TAMSULOSIN HYDROCHLORIDE 0.4 MG: 0.4 CAPSULE ORAL at 17:41

## 2018-10-27 RX ADMIN — SODIUM CHLORIDE TAB 1 GM 1 G: 1 TAB at 06:38

## 2018-10-27 RX ADMIN — GUAIFENESIN 100 MG: 100 SOLUTION ORAL at 21:18

## 2018-10-27 RX ADMIN — Medication 1 TABLET: at 08:38

## 2018-10-27 RX ADMIN — HEPARIN SODIUM 5000 UNITS: 5000 INJECTION, SOLUTION INTRAVENOUS; SUBCUTANEOUS at 06:38

## 2018-10-27 RX ADMIN — AMLODIPINE BESYLATE 10 MG: 10 TABLET ORAL at 06:38

## 2018-10-27 RX ADMIN — VITAMIN D, TAB 1000IU (100/BT) 2000 UNITS: 25 TAB at 08:38

## 2018-10-27 RX ADMIN — GUAIFENESIN 100 MG: 100 SOLUTION ORAL at 17:41

## 2018-10-27 RX ADMIN — METOPROLOL TARTRATE 25 MG: 25 TABLET ORAL at 21:18

## 2018-10-27 RX ADMIN — DEXTROMETHORPHAN 30 MG: 30 SUSPENSION, EXTENDED RELEASE ORAL at 21:18

## 2018-10-27 RX ADMIN — MODAFINIL 100 MG: 100 TABLET ORAL at 08:38

## 2018-10-27 RX ADMIN — FINASTERIDE 5 MG: 5 TABLET, FILM COATED ORAL at 17:41

## 2018-10-27 RX ADMIN — PANTOPRAZOLE SODIUM 40 MG: 40 GRANULE, DELAYED RELEASE ORAL at 06:37

## 2018-10-27 RX ADMIN — LISINOPRIL 40 MG: 40 TABLET ORAL at 06:38

## 2018-10-27 RX ADMIN — GUAIFENESIN 100 MG: 100 SOLUTION ORAL at 14:31

## 2018-10-27 RX ADMIN — LEVOTHYROXINE SODIUM 50 MCG: 50 TABLET ORAL at 06:38

## 2018-10-27 RX ADMIN — METOPROLOL TARTRATE 25 MG: 25 TABLET ORAL at 08:38

## 2018-10-27 RX ADMIN — HYDRALAZINE HYDROCHLORIDE 20 MG: 10 TABLET, FILM COATED ORAL at 21:18

## 2018-10-27 RX ADMIN — ATORVASTATIN CALCIUM 80 MG: 40 TABLET, FILM COATED ORAL at 21:18

## 2018-10-27 NOTE — PROGRESS NOTES
Progress Note Patient: Avi Hall MRN: [id number]  CSN: [id number] YOB: 1931  Age: 80 y.o. Sex: male DOA: 10/8/2018 LOS:  LOS: 19 days Subjective:  
 
 
Primary Rehab Diagnosis: Impaired Mobility and ADLs secondary to Acute Ischemic Stroke (acute infarct of the posterior superior right basal ganglia and adjacent right corona radiata) with residual left hemiparesis, dysphagia and dysarthria Review of systems General: No fevers or chills. Cardiovascular: No chest pain or pressure. No palpitations. Pulmonary: positive shortness of breath,  Positive cough  no wheeze. Gastrointestinal: No abdominal pain, nausea, vomiting or diarrhea. Genitourinary:  Positive urinary frequency Musculoskeletal: No joint or muscle pain, no back pain, no recent trauma. Neurologic: No headache, no seizure generalized weakness Objective:  
 
Physical Exam: 
Visit Vitals /58 (BP 1 Location: Right arm, BP Patient Position: At rest) Pulse 68 Temp 96.6 °F (35.9 °C) Resp 19 Ht 5' 6\" (1.676 m) Wt 67.6 kg (149 lb) SpO2 95% BMI 24.05 kg/m² General:         Alert, cooperative, no acute distress   
HEENT: NC, Atraumatic. PERRLA, anicteric sclerae. Lungs:  fine basal crepitation . Heart:  Regular  rhythm,  No murmur, No Rubs, No Gallops Abdomen: Soft, Non distended, Non tender.  +Bowel sounds, no HSM Extremities: No lower limb edema Psych:   Not anxious or agitated. Neurologic:  CN 2-12 grossly intact, Alert and confused. Lt sided weakness Intake and Output: 
Current Shift:  10/27 0701 - 10/27 1900 In: 397 Out: 370 [Urine:370] Last three shifts:  10/25 1901 - 10/27 0700 In: 1937 Out: -  
 
Labs: Results:  
   
Chemistry Recent Labs 10/27/18 
8458 10/26/18 
4293 10/25/18 
1850 GLU 85  --   --   
 135* 136  
K 4.6  --   --   
  --   --   
CO2 27  --   --   
BUN 22*  --   --   
CREA 0.84  --   --   
CA 7.7*  --   --   
 AGAP 9  --   --   
BUCR 26*  --   --   
  
CBC w/Diff Recent Labs 10/25/18 
1483 HGB 10.7* HCT 30.8*  Cardiac Enzymes No results for input(s): CPK, CKND1, SIERRA in the last 72 hours. No lab exists for component: Madonna Tucker Coagulation No results for input(s): PTP, INR, APTT in the last 72 hours. No lab exists for component: INREXT Lipid Panel No results found for: CHOL, CHOLPOCT, CHOLX, CHLST, CHOLV, 380416, HDL, LDL, LDLC, DLDLP, 896433, VLDLC, VLDL, TGLX, TRIGL, TRIGP, TGLPOCT, CHHD, CHHDX  
BNP No results for input(s): BNPP in the last 72 hours. Liver Enzymes No results for input(s): TP, ALB, TBIL, AP, SGOT, GPT in the last 72 hours. No lab exists for component: DBIL Thyroid Studies Lab Results Component Value Date/Time TSH 1.93 10/24/2018 06:03 AM  
    
 
Procedures/imaging: see electronic medical records for all procedures/Xrays and details which were not copied into this note but were reviewed prior to creation of Plan Medications:  
Current Facility-Administered Medications Medication Dose Route Frequency  sodium chloride tablet 1 g  1 g Per G Tube QID  mirtazapine (REMERON) tablet 15 mg  15 mg Per G Tube QHS  albuterol (PROVENTIL VENTOLIN) nebulizer solution 2.5 mg  2.5 mg Nebulization Q4H PRN  
 amLODIPine (NORVASC) tablet 10 mg  10 mg Per G Tube DAILY  melatonin tablet 5 mg  5 mg Per G Tube QHS  guaiFENesin (ROBITUSSIN) 100 mg/5 mL oral liquid 100 mg  100 mg Per G Tube QID  dextromethorphan (DELSYM) 30 mg/5 mL syrup 30 mg  30 mg Per G Tube Q12H  
 modafinil (PROVIGIL) tablet 100 mg  100 mg Per G Tube DAILY  cholecalciferol (VITAMIN D3) tablet 2,000 Units  2,000 Units Oral BID  hydrALAZINE (APRESOLINE) tablet 20 mg  20 mg Per G Tube Q12H  pneumococcal 23-valent (PNEUMOVAX 23) injection 0.5 mL  0.5 mL IntraMUSCular PRIOR TO DISCHARGE  docusate sodium (COLACE) capsule 100 mg  100 mg Per G Tube BID  
  bisacodyl (DULCOLAX) suppository 10 mg  10 mg Rectal Q48H PRN  
 heparin (porcine) injection 5,000 Units  5,000 Units SubCUTAneous Q12H  
 acetaminophen (TYLENOL) solution 650 mg  650 mg Oral Q4H PRN  
 co-enzyme Q-10 (CO Q-10) capsule 100 mg  100 mg Oral DAILY  vitamin B complex tablet  1 Tab Oral DAILY  aspirin chewable tablet 81 mg  81 mg Per G Tube DAILY  atorvastatin (LIPITOR) tablet 80 mg  80 mg Per G Tube QHS  clopidogrel (PLAVIX) tablet 75 mg  75 mg PEG Tube DAILY  lisinopril (PRINIVIL, ZESTRIL) tablet 40 mg  40 mg Per G Tube DAILY  pantoprazole (PROTONIX) granules for oral suspension 40 mg  40 mg Per G Tube ACB  finasteride (PROSCAR) tablet 5 mg  5 mg Oral QPM  
 levothyroxine (SYNTHROID) tablet 50 mcg  50 mcg Per G Tube 6am  
 metoprolol tartrate (LOPRESSOR) tablet 25 mg  25 mg Per G Tube Q12H  tamsulosin (FLOMAX) capsule 0.4 mg  0.4 mg Oral QPM  
 timolol (TIMOPTIC) 0.5 % ophthalmic solution 1 Drop  1 Drop Both Eyes DAILY Assessment/Plan Principal Problem: 
  Acute ischemic stroke ([facility] (10/2/2018) Overview: Acute Ischemic Stroke (acute infarct of the posterior superior right basal  
    ganglia and adjacent right corona radiata) with residual left hemiparesis,  
    dysphagia and dysarthria Active Problems: 
  Impaired mobility and ADLs (10/2/2018) Hemiparesis affecting left side as late effect of cerebrovascular accident (CVA) ([facility] (10/2/2018) Dysphagia as late effect of cerebrovascular accident (CVA) (10/2/2018) 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) (10/2/2018) Hypertensive heart disease without heart failure () Status post insertion of percutaneous endoscopic gastrostomy (PEG) tube ([facility] (10/5/2018) History of transient ischemic attack (TIA) () Overview: 2x Traumatic amputation of left thumb (1/1/2011) Vitamin D deficiency (10/9/2018) Overview: Vitamin D 25-Hydroxy (10/10/2018) = 22.5 Hyponatremia (10/22/2018) Plan Pt on ASA, plavix and lipitor q10 Tube feeding monitor for aspiration Pt on higher dose of LAsix due to worsening pulmonary edema Decrease free water for flush due to hyponatremia Will hold on sodium tablet Cbc, CMP, mag and TSH Recheck CXR Edith Hopson MD 
10/27/2018 4:07 PM

## 2018-10-27 NOTE — ROUTINE PROCESS
SHIFT CHANGE NOTE FOR [facility] 
  
Bedside and Verbal shift change report given to Ac Azul RN (oncoming nurse) by Barb Shepard RN (offgoing nurse). Report included the following information SBAR, Kardex, MAR and Recent Results. 
  
Situation: 
            Code Status: DNR Reason for Admission: Right ischemic stroke Hospital Day: 16 Problem List:  
          
Hospital Problems  Date Reviewed: 10/25/2018  
        Codes Class Noted POA  
  Hyponatremia ICD-10-CM: E87.1 ICD-9-CM: 276.1   10/22/2018 No  
     
  History of transient ischemic attack (TIA) ICD-10-CM: X64.03 
ICD-9-CM: V12.54   Unknown Yes  
  Overview Signed 10/9/2018  3:28 PM by Daksha Ruby MD  
    2x 
   
  
     
  Vitamin D deficiency (Chronic) ICD-10-CM: E55.9 ICD-9-CM: 394. 9   10/9/2018 Yes  
  Overview Signed 10/10/2018  1:59 PM by Daksha Ruby MD  
    Vitamin D 25-Hydroxy (10/10/2018) = 22.5  
   
  
     
  Hypertensive heart disease without heart failure (Chronic) ICD-10-CM: I11.9 ICD-9-CM: 402.90   Unknown Yes  
     
  [facility] ICD-10-CM: Z93.1 ICD-9-CM: V44.1   10/5/2018 Yes  
     
  * (Principal) Acute ischemic stroke ([facility] ICD-10-CM: I63.9 ICD-9-CM: 434.91   10/2/2018 Yes  
  Overview Signed 10/8/2018  6:32 PM by Daksha Ruby MD  
    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 
   
  
     
  Impaired mobility and ADLs ICD-10-CM: Z74.09 
ICD-9-CM: 799.89   10/2/2018 Yes  
     
  Hemiparesis affecting left side as late effect of cerebrovascular accident (CVA) ([facility] ICD-10-CM: K15.729 ICD-9-CM: 438.20   10/2/2018 Yes  
     
  Dysphagia as late effect of cerebrovascular accident (CVA) ICD-10-CM: B96.093 ICD-9-CM: 438.82   10/2/2018 Yes  
     
   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) ICD-10-CM: J33.840 ICD-9-CM: 438.13   10/2/2018 Yes  
     
  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 0   1/1/2011 Yes  
     
   
  
  
Background: 
            Past Medical History:  
    
Past Medical History:  
Diagnosis Date  Acute ischemic stroke ([facility] 10/2/2018  
  Acute Ischemic Stroke (acute infarct of the posterior superior right basal ganglia and adjacent right corona radiata) with residual left hemiparesis, dysphagia and dysarthria  Benign prostatic hyperplasia    
 Bilateral hearing loss    
 Cervical spinal stenosis 10/2/2018  Chronic obstructive pulmonary disease (COPD) ([facility]    
 Coronary artery disease involving native coronary artery of native heart    
 Diastolic dysfunction without heart failure    
 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) 10/2/2018  Dyslipidemia    
 Dysphagia as late effect of cerebrovascular accident (CVA) 10/2/2018  Gastric ulcer    
 Glaucoma    
 Hemiparesis affecting left side as late effect of cerebrovascular accident (CVA) ([facility] 10/2/2018  History of kidney stones    
 History of transient ischemic attack (TIA)    
  2x  Hypertensive heart disease without heart failure    
 Hypothyroidism    
 Meningioma ([facility] 10/3/2018  
  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    
 Traumatic amputation of left thumb 2011  Urinary frequency    
 Vitamin D deficiency 10/9/2018  
  Vitamin D 25-Hydroxy (10/10/2018) = 22.5   
  
            Patient taking anticoagulants yes Heparin Patient has a defibrillator: no  
  
Assessment: 
? Changes in Assessment throughout shift: none 
  
? Patient has central line: no Reasons if yes: Insertion date: Last dressing date: 
? Patient has Suarez Cath: no Reasons if yes: Insertion date: 
  
? Last Vitals: 
             
      
Vitals:   10/25/18 0532 10/25/18 0732 10/25/18 0750 10/25/18 1555 BP: 150/59 151/56 157/58 147/60 Pulse: 65 67 68 67 Resp:   18 18 20 Temp:   96.7 °F (35.9 °C)   97.7 °F (36.5 °C) SpO2:   96% 95% 97% Weight:          
Height:          
  
  
· PAIN Pain Assessment Pain Intensity 1: 0 (10/25/18 1555) Pain Intensity 1: 2 (12/29/14 1105) Pain Location 1: Leg Pain Location 1: Abdomen Pain Intervention(s) 1: Medication (see MAR) Pain Intervention(s) 1: Medication (see MAR) Patient Stated Pain Goal: 0 Patient Stated Pain Goal: 0 
? Intervention effective: yes   
? Other actions taken for pain: turn reposition rest 
  
· Skin Assessment Skin color Skin Color: Appropriate for ethnicity Condition/Temperature Skin Condition/Temp: Dry, Warm Integrity Skin Integrity: Abrasion Turgor Turgor: Non-tenting Weekly Pressure Ulcer Documentation  Pressure  Injury Documentation: No Pressure Injury Noted-Pressure Ulcer Prevention Initiated Wound Prevention & Protection Methods Orientation of wound Orientation of Wound Prevention: Posterior Location of Prevention Location of Wound Prevention: Sacrum/Coccyx Dressing Present Dressing Present : No 
Dressing Status Dressing Status: Intact Wound Offloading Wound Offloading (Prevention Methods): Bed, pressure redistribution/air, Repositioning 
  
· INTAKE/OUPUT Date 10/24/18 1900 - 10/25/18 8019 10/25/18 0700 - 10/26/18 6036 Shift 6571-9878 24 Hour Total 0867-4757 5577-7743 24 Hour Total  
INTAKE  
P. O.     0   0  
  P. O.     0   0 NG/ 1310 1450   1450 Water Flush Volume (mL) (PEG/Gastrostomy Tube 10/06/18) 150 450 150   150 Medication Volume (PEG/Gastrostomy Tube 10/06/18) 200 320 200   200 Intake (ml) (PEG/Gastrostomy Tube 10/06/18)   540 1100   1100 Shift Total(mL/kg) 350(5.2) 1310(19.4) 1450(21.5)   1450(21.5) OUTPUT  
 Urine(mL/kg/hr) 250 550        
  Urine Voided 250 550        
  Urine Occurrence(s) 5 x 8 x 4 x   4 x Emesis/NG output            
  Emesis Occurrence(s)     0 x   0 x Stool            
  Stool Occurrence(s) 0 x 0 x 0 x   0 x Shift Total(mL/kg) 250(3.7) 550(8.1)        
 577 4078   1450 Weight (kg) 67.6 67.6 67.6 67.6 67.6  
  
  
Recommendations: 1. Patient needs and requests: Assist with ADL's, TF,  
  
2. Diet: NPO with bolus TF 
  
3. Pending tests/procedures: LABS  
  
4. Functional Level/Equipment: 1 person assist, BSC, W/C 
  
5. Estimated Discharge Date: TBD Posted on Whiteboard in Patients Room: yes  
  
HEALS Safety Check 
  
A safety check occurred in the patient's room between off going nurse and oncoming nurse listed above. 
  
The safety check included the below items Area Items H High Alert Medications § Verify all high alert medication drips (heparin, PCA, etc.) E Equipment § Suction is set up for ALL patients (with sonia) § Red plugs utilized for all equipment (IV pumps, etc.) § WOWs wiped down at end of shift. § Room stocked with oxygen, suction, and other unit-specific supplies A Alarms § Bed alarm is set for fall risk patients § Ensure chair alarm is in place and activated if patient is up in a chair L Lines § Check IV for any infiltration § Suarez bag is empty if patient has a Suarez § Tubing and IV bags are labeled Rom[place] Safety 
  § Room is clean, patient is clean, and equipment is clean. § Hallways are clear from equipment besides carts. § Fall bracelet on for fall risk patients § Ensure room is clear and free of clutter § Suction is set up for ALL patients (with sonia) § Hallways are clear from equipment besides carts.   
§ Isolation precautions followed, supplies available outside room, sign posted

## 2018-10-27 NOTE — PROGRESS NOTES
Problem: Self Care Deficits Care Plan (Adult) Goal: *Therapy Goal (Edit Goal, Insert Text) Occupational Therapy Goals Long Term Goals Initiated 10/9/18 and to be accomplished within 4 week(s) 1. Pt will perform self-feeding with total dependence. 2. Pt will perform grooming with supervision. 3. Pt will perform UB bathing with Min A. 
4. Pt will perform LB bathing with Min A prn AE. 5. Pt will perform tub/shower transfer with Mod A.  
6. Pt will perform UB dressing with Setup. 7. Pt will perform LB dressing with Min A prn AE. 8. Pt will perform toileting task with Min A. 
9. Pt will perform toilet transfer with Mod A. Short Term Goals Initiated 10/16/18 and to be accomplished within 7 day(s) 10/23/18 1. Pt will perform grooming with Setup. 2. Pt will perform UB bathing with Min A. 
3. Pt will perform LB bathing with Min A prn AE. 4. Pt will perform tub/shower transfer with Mod A. 
5. Pt will perform UB dressing with Min A. 
6. Pt will perform LB dressing with Mod A prn AE. 7. Pt will perform toilet transfer with Mod A. Occupational Therapy TREATMENT Patient: Lalo Side 80 y.o. Patient identified with name and : yes Date: 10/27/2018 First Tx Session Time In: 5257 Time Out[de-identified] 3080 Diagnosis: R CVA Acute ischemic stroke ([facility] [address] [facility] Precautions: Aspiration, Fall(Head of bed 30-45 degrees at all times per speech therapist) Chart, occupational therapy assessment, plan of care, and goals were reviewed. Pain: 
Pt reports 0/10 pain or discomfort prior to treatment. Pt reports 0/10 pain or discomfort post treatment. Intervention Provided: Na SUBJECTIVE:  
Patient stated I need to suction myself.  OBJECTIVE DATA SUMMARY:  
 
THERAPEUTIC ACTIVITY Daily Assessment Sitting balance activity on edge of mat forward and side reach to pinch clothespins on tree with WB on LUE-min A Slide board transfer min assist bed-chair, chair-mat, mat-chair 4  
 
 Manual therapy Daily Assessment PROM LUE into sh flex/ex rotation, wrist ext 5  
 
ASSESSMENT: 
Pt needed multiple rest breaks today due to coughing and fatigue in general. Pt demo good sitting balance today during dynamic sitting activities on mat. Progression toward goals: 
[x]          Improving appropriately and progressing toward goals 
[]          Improving slowly and progressing toward goals 
[]          Not making progress toward goals and plan of care will be adjusted PLAN: 
Patient continues to benefit from skilled intervention to address the above impairments. Continue treatment per established plan of care. Discharge Recommendations: To Be Determined Further Equipment Recommendations for Discharge:  N/A Activity Tolerance: 
fair Estimated LOS:3 weeks Please refer to the flowsheet for vital signs taken during this treatment. After treatment:  
[x]  Patient left in no apparent distress sitting up in chair  
[]  Patient left in no apparent distress in bed 
[]  Call bell left within reach 
[]  Nursing notified 
[]  Caregiver present 
[]  Bed alarm activated COMMUNICATION/EDUCATION:  
[] Home safety education was provided and the patient/caregiver indicated understanding. [x] Patient/family have participated as able in goal setting and plan of care. [] Patient/family agree to work toward stated goals and plan of care. [] Patient understands intent and goals of therapy, but is neutral about his/her participation. [] Patient is unable to participate in goal setting and plan of care.  
 
 
Stevie Do, OT

## 2018-10-27 NOTE — ROUTINE PROCESS
[facility] 
  
Bedside and Verbal shift change report given to NINA Mcclure RN (oncoming nurse) by Shashank Krueger RN (offgoing nurse). Report included the following information SBAR, Kardex, MAR and Recent Results. 
  
Situation: 
            Code Status: DNR Reason for Admission: Right ischemic stroke Hospital Day: 16 Problem List:  
          
Hospital Problems  Date Reviewed: 10/25/2018  
        Codes Class Noted POA  
  Hyponatremia ICD-10-CM: E87.1 ICD-9-CM: 276.1   10/22/2018 No  
     
  History of transient ischemic attack (TIA) ICD-10-CM: X97.73 
ICD-9-CM: V12.54   Unknown Yes  
  Overview Signed 10/9/2018  3:28 PM by Stevie Pascual MD  
    2x 
   
  
     
  Vitamin D deficiency (Chronic) ICD-10-CM: E55.9 ICD-9-CM: 341. 9   10/9/2018 Yes  
  Overview Signed 10/10/2018  1:59 PM by Stevie Pascual MD  
    Vitamin D 25-Hydroxy (10/10/2018) = 22.5  
   
  
     
  Hypertensive heart disease without heart failure (Chronic) ICD-10-CM: I11.9 ICD-9-CM: 402.90   Unknown Yes  
     
  Status post [facility] ICD-10-CM: Z93.1 ICD-9-CM: V44.1   10/5/2018 Yes  
     
  * (Principal) Acute ischemic stroke ([facility] ICD-10-CM: I63.9 ICD-9-CM: 434.91   10/2/2018 Yes  
  Overview Signed 10/8/2018  6:32 PM by Stevie Pasucal MD  
    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 
   
  
     
  Impaired mobility and ADLs ICD-10-CM: Z74.09 
ICD-9-CM: 799.89   10/2/2018 Yes  
     
  Hemiparesis affecting left side as late effect of cerebrovascular accident (CVA) ([facility] ICD-10-CM: E47.387 ICD-9-CM: 438.20   10/2/2018 Yes  
     
  Dysphagia as late effect of cerebrovascular accident (CVA) ICD-10-CM: S07.115 ICD-9-CM: 438.82   10/2/2018 Yes  
     
   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) ICD-10-CM: R07.661 ICD-9-CM: 438.13   10/2/2018 Yes  
     
  Traumatic amputation of left thumb ICD-10-CM: D74.122Q ICD-9-CM: 780. 0   1/1/2011 Yes  
     
   
  
  
Background: 
            Past Medical History:  
    
Past Medical History:  
Diagnosis Date  Acute ischemic stroke ([facility] 10/2/2018  
  Acute Ischemic Stroke (acute infarct of the posterior superior right basal ganglia and adjacent right corona radiata) with residual left hemiparesis, dysphagia and dysarthria  Benign prostatic hyperplasia    
 Bilateral hearing loss    
 Cervical spinal stenosis 10/2/2018  Chronic obstructive pulmonary disease (COPD) ([facility]    
 Coronary artery disease involving native coronary artery of native heart    
 Diastolic dysfunction without heart failure    
 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) 10/2/2018  Dyslipidemia    
 Dysphagia as late effect of cerebrovascular accident (CVA) 10/2/2018  Gastric ulcer    
 Glaucoma    
 Hemiparesis affecting left side as late effect of cerebrovascular accident (CVA) ([facility] 10/2/2018  History of kidney stones    
 History of transient ischemic attack (TIA)    
  2x  Hypertensive heart disease without heart failure    
 Hypothyroidism    
 Meningioma ([facility] 10/3/2018  
  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    
 Traumatic amputation of left thumb 2011  Urinary frequency    
 Vitamin D deficiency 10/9/2018  
  Vitamin D 25-Hydroxy (10/10/2018) = 22.5   
  
            Patient taking anticoagulants yes Heparin Patient has a defibrillator: no  
  
Assessment: 
? Changes in Assessment throughout shift: none 
  
? Patient has central line: no Reasons if yes: Insertion date: Last dressing date: 
? Patient has Suarez Cath: no Reasons if yes: Insertion date: 
  
? Last Vitals: 
             
      
Vitals:   10/25/18 0532 10/25/18 0732 10/25/18 0750 10/25/18 1555 BP: 150/59 151/56 157/58 147/60 Pulse: 65 67 68 67 Resp:   18 18 20 Temp:   96.7 °F (35.9 °C)   97.7 °F (36.5 °C) SpO2:   96% 95% 97% Weight:          
Height:          
  
  
· PAIN Pain Assessment Pain Intensity 1: 0 (10/25/18 1555) Pain Intensity 1: 2 (12/29/14 1105) Pain Location 1: Leg Pain Location 1: Abdomen Pain Intervention(s) 1: Medication (see MAR) Pain Intervention(s) 1: Medication (see MAR) Patient Stated Pain Goal: 0 Patient Stated Pain Goal: 0 
? Intervention effective: yes   
? Other actions taken for pain: turn reposition rest 
  
· Skin Assessment Skin color Skin Color: Appropriate for ethnicity Condition/Temperature Skin Condition/Temp: Dry, Warm Integrity Skin Integrity: Abrasion Turgor Turgor: Non-tenting Weekly Pressure Ulcer Documentation  Pressure  Injury Documentation: No Pressure Injury Noted-Pressure Ulcer Prevention Initiated Wound Prevention & Protection Methods Orientation of wound Orientation of Wound Prevention: Posterior Location of Prevention Location of Wound Prevention: Sacrum/Coccyx Dressing Present Dressing Present : No 
Dressing Status Dressing Status: Intact Wound Offloading Wound Offloading (Prevention Methods): Bed, pressure redistribution/air, Repositioning 
  
· INTAKE/OUPUT Date 10/24/18 1900 - 10/25/18 0474 10/25/18 0700 - 10/26/18 8325 Shift 5087-4571 24 Hour Total 3679-1390 0303-5684 24 Hour Total  
INTAKE  
P. O.     0   0  
  P. O.     0   0 NG/ 1310 1450   1450 Water Flush Volume (mL) (PEG/Gastrostomy Tube 10/06/18) 150 450 150   150 Medication Volume (PEG/Gastrostomy Tube 10/06/18) 200 320 200   200 Intake (ml) (PEG/Gastrostomy Tube 10/06/18)   540 1100   1100 Shift Total(mL/kg) 350(5.2) 1310(19.4) 1450(21.5)   1450(21.5) OUTPUT  
 Urine(mL/kg/hr) 250 550        
  Urine Voided 250 550        
  Urine Occurrence(s) 5 x 8 x 4 x   4 x Emesis/NG output            
  Emesis Occurrence(s)     0 x   0 x Stool            
  Stool Occurrence(s) 0 x 0 x 0 x   0 x Shift Total(mL/kg) 250(3.7) 550(8.1)        
 640 1375   1450 Weight (kg) 67.6 67.6 67.6 67.6 67.6  
  
  
Recommendations: 1. Patient needs and requests: Assist with ADL's, TF,  
  
2. Diet: NPO with bolus TF 
  
3. Pending tests/procedures: LABS  
  
4. Functional Level/Equipment: 1 person assist, BSC, W/C 
  
5. Estimated Discharge Date: TBD Posted on Whiteboard in Patients Room: yes  
  
HEALS Safety Check 
  
A safety check occurred in the patient's room between off going nurse and oncoming nurse listed above. 
  
The safety check included the below items Area Items H High Alert Medications § Verify all high alert medication drips (heparin, PCA, etc.) E Equipment § Suction is set up for ALL patients (with sonia) § Red plugs utilized for all equipment (IV pumps, etc.) § WOWs wiped down at end of shift. § Room stocked with oxygen, suction, and other unit-specific supplies A Alarms § Bed alarm is set for fall risk patients § Ensure chair alarm is in place and activated if patient is up in a chair L Lines § Check IV for any infiltration § Suarez bag is empty if patient has a Suarez § Tubing and IV bags are labeled [place] Safety 
  § Room is clean, patient is clean, and equipment is clean. § Hallways are clear from equipment besides carts. § Fall bracelet on for fall risk patients § Ensure room is clear and free of clutter § Suction is set up for ALL patients (with sonia) § Hallways are clear from equipment besides carts.   
§ Isolation precautions followed, supplies available outside room, sign posted

## 2018-10-27 NOTE — PROGRESS NOTES
Problem: Falls - Risk of 
Goal: *Absence of Falls Document Iggy Hunger Fall Risk and appropriate interventions in the flowsheet. Outcome: Progressing Towards Goal 
Fall Risk Interventions: 
Mobility Interventions: Bed/chair exit alarm, Patient to call before getting OOB Mentation Interventions: Bed/chair exit alarm Medication Interventions: Patient to call before getting OOB, Bed/chair exit alarm Elimination Interventions: Patient to call for help with toileting needs, Bed/chair exit alarm, Call light in reach History of Falls Interventions: Bed/chair exit alarm Problem: Pressure Injury - Risk of 
Goal: *Prevention of pressure injury Document Rupert Scale and appropriate interventions in the flowsheet. Outcome: Progressing Towards Goal 
Pressure Injury Interventions: 
Sensory Interventions: Assess changes in LOC Moisture Interventions: Maintain skin hydration (lotion/cream) Activity Interventions: Chair cushion Mobility Interventions: Chair cushion Nutrition Interventions: Document food/fluid/supplement intake Friction and Shear Interventions: Minimize layers

## 2018-10-28 LAB
ALBUMIN SERPL-MCNC: 2.9 G/DL (ref 3.4–5)
ALBUMIN/GLOB SERPL: 0.9 {RATIO} (ref 0.8–1.7)
ALP SERPL-CCNC: 117 U/L (ref 45–117)
ALT SERPL-CCNC: 47 U/L (ref 16–61)
ANION GAP SERPL CALC-SCNC: 7 MMOL/L (ref 3–18)
AST SERPL-CCNC: 24 U/L (ref 15–37)
BASOPHILS # BLD: 0 K/UL (ref 0–0.1)
BASOPHILS NFR BLD: 1 % (ref 0–2)
BILIRUB SERPL-MCNC: 0.9 MG/DL (ref 0.2–1)
BUN SERPL-MCNC: 23 MG/DL (ref 7–18)
BUN/CREAT SERPL: 28 (ref 12–20)
CALCIUM SERPL-MCNC: 8.1 MG/DL (ref 8.5–10.1)
CHLORIDE SERPL-SCNC: 102 MMOL/L (ref 100–108)
CO2 SERPL-SCNC: 26 MMOL/L (ref 21–32)
CREAT SERPL-MCNC: 0.83 MG/DL (ref 0.6–1.3)
DIFFERENTIAL METHOD BLD: ABNORMAL
EOSINOPHIL # BLD: 0.5 K/UL (ref 0–0.4)
EOSINOPHIL NFR BLD: 8 % (ref 0–5)
ERYTHROCYTE [DISTWIDTH] IN BLOOD BY AUTOMATED COUNT: 13.4 % (ref 11.6–14.5)
GLOBULIN SER CALC-MCNC: 3.3 G/DL (ref 2–4)
GLUCOSE SERPL-MCNC: 124 MG/DL (ref 74–99)
HCT VFR BLD AUTO: 33.4 % (ref 36–48)
HGB BLD-MCNC: 11.6 G/DL (ref 13–16)
LYMPHOCYTES # BLD: 1 K/UL (ref 0.9–3.6)
LYMPHOCYTES NFR BLD: 18 % (ref 21–52)
MAGNESIUM SERPL-MCNC: 2.4 MG/DL (ref 1.6–2.6)
MCH RBC QN AUTO: 31 PG (ref 24–34)
MCHC RBC AUTO-ENTMCNC: 34.7 G/DL (ref 31–37)
MCV RBC AUTO: 89.3 FL (ref 74–97)
MONOCYTES # BLD: 0.6 K/UL (ref 0.05–1.2)
MONOCYTES NFR BLD: 11 % (ref 3–10)
NEUTS SEG # BLD: 3.4 K/UL (ref 1.8–8)
NEUTS SEG NFR BLD: 62 % (ref 40–73)
PLATELET # BLD AUTO: 253 K/UL (ref 135–420)
PMV BLD AUTO: 10.4 FL (ref 9.2–11.8)
POTASSIUM SERPL-SCNC: 4.1 MMOL/L (ref 3.5–5.5)
PROT SERPL-MCNC: 6.2 G/DL (ref 6.4–8.2)
RBC # BLD AUTO: 3.74 M/UL (ref 4.7–5.5)
SODIUM SERPL-SCNC: 135 MMOL/L (ref 136–145)
TSH SERPL DL<=0.05 MIU/L-ACNC: 1.46 UIU/ML (ref 0.36–3.74)
WBC # BLD AUTO: 5.5 K/UL (ref 4.6–13.2)

## 2018-10-28 PROCEDURE — 80053 COMPREHEN METABOLIC PANEL: CPT | Performed by: FAMILY MEDICINE

## 2018-10-28 PROCEDURE — 74011250636 HC RX REV CODE- 250/636: Performed by: INTERNAL MEDICINE

## 2018-10-28 PROCEDURE — 83735 ASSAY OF MAGNESIUM: CPT | Performed by: FAMILY MEDICINE

## 2018-10-28 PROCEDURE — 97112 NEUROMUSCULAR REEDUCATION: CPT

## 2018-10-28 PROCEDURE — 36415 COLL VENOUS BLD VENIPUNCTURE: CPT | Performed by: FAMILY MEDICINE

## 2018-10-28 PROCEDURE — 74011250637 HC RX REV CODE- 250/637: Performed by: INTERNAL MEDICINE

## 2018-10-28 PROCEDURE — 85025 COMPLETE CBC W/AUTO DIFF WBC: CPT | Performed by: INTERNAL MEDICINE

## 2018-10-28 PROCEDURE — 84443 ASSAY THYROID STIM HORMONE: CPT | Performed by: FAMILY MEDICINE

## 2018-10-28 PROCEDURE — 97535 SELF CARE MNGMENT TRAINING: CPT

## 2018-10-28 PROCEDURE — 65310000000 HC RM PRIVATE REHAB

## 2018-10-28 RX ADMIN — MIRTAZAPINE 15 MG: 15 TABLET, FILM COATED ORAL at 21:15

## 2018-10-28 RX ADMIN — DEXTROMETHORPHAN 30 MG: 30 SUSPENSION, EXTENDED RELEASE ORAL at 21:15

## 2018-10-28 RX ADMIN — AMLODIPINE BESYLATE 10 MG: 10 TABLET ORAL at 05:33

## 2018-10-28 RX ADMIN — GUAIFENESIN 100 MG: 100 SOLUTION ORAL at 14:10

## 2018-10-28 RX ADMIN — HYDRALAZINE HYDROCHLORIDE 20 MG: 10 TABLET, FILM COATED ORAL at 21:15

## 2018-10-28 RX ADMIN — Medication 1 TABLET: at 08:51

## 2018-10-28 RX ADMIN — LISINOPRIL 40 MG: 40 TABLET ORAL at 05:33

## 2018-10-28 RX ADMIN — ATORVASTATIN CALCIUM 80 MG: 40 TABLET, FILM COATED ORAL at 21:15

## 2018-10-28 RX ADMIN — MODAFINIL 100 MG: 100 TABLET ORAL at 08:51

## 2018-10-28 RX ADMIN — CLOPIDOGREL BISULFATE 75 MG: 75 TABLET, FILM COATED ORAL at 21:15

## 2018-10-28 RX ADMIN — TIMOLOL MALEATE 1 DROP: 5 SOLUTION OPHTHALMIC at 08:56

## 2018-10-28 RX ADMIN — TAMSULOSIN HYDROCHLORIDE 0.4 MG: 0.4 CAPSULE ORAL at 17:55

## 2018-10-28 RX ADMIN — BISACODYL 10 MG: 10 SUPPOSITORY RECTAL at 21:27

## 2018-10-28 RX ADMIN — PANTOPRAZOLE SODIUM 40 MG: 40 GRANULE, DELAYED RELEASE ORAL at 06:35

## 2018-10-28 RX ADMIN — FINASTERIDE 5 MG: 5 TABLET, FILM COATED ORAL at 17:55

## 2018-10-28 RX ADMIN — GUAIFENESIN 100 MG: 100 SOLUTION ORAL at 21:15

## 2018-10-28 RX ADMIN — GUAIFENESIN 100 MG: 100 SOLUTION ORAL at 09:59

## 2018-10-28 RX ADMIN — MELATONIN TAB 5 MG 5 MG: 5 TAB at 21:15

## 2018-10-28 RX ADMIN — LEVOTHYROXINE SODIUM 50 MCG: 50 TABLET ORAL at 05:33

## 2018-10-28 RX ADMIN — METOPROLOL TARTRATE 25 MG: 25 TABLET ORAL at 08:52

## 2018-10-28 RX ADMIN — GUAIFENESIN 100 MG: 100 SOLUTION ORAL at 17:55

## 2018-10-28 RX ADMIN — HYDRALAZINE HYDROCHLORIDE 20 MG: 10 TABLET, FILM COATED ORAL at 08:52

## 2018-10-28 RX ADMIN — DOCUSATE SODIUM 100 MG: 100 CAPSULE, LIQUID FILLED ORAL at 17:55

## 2018-10-28 RX ADMIN — HEPARIN SODIUM 5000 UNITS: 5000 INJECTION, SOLUTION INTRAVENOUS; SUBCUTANEOUS at 17:55

## 2018-10-28 RX ADMIN — HEPARIN SODIUM 5000 UNITS: 5000 INJECTION, SOLUTION INTRAVENOUS; SUBCUTANEOUS at 05:33

## 2018-10-28 RX ADMIN — ASPIRIN 81 MG CHEWABLE TABLET 81 MG: 81 TABLET CHEWABLE at 08:51

## 2018-10-28 RX ADMIN — Medication 100 MG: at 08:51

## 2018-10-28 RX ADMIN — METOPROLOL TARTRATE 25 MG: 25 TABLET ORAL at 21:15

## 2018-10-28 RX ADMIN — VITAMIN D, TAB 1000IU (100/BT) 2000 UNITS: 25 TAB at 08:52

## 2018-10-28 RX ADMIN — DEXTROMETHORPHAN 30 MG: 30 SUSPENSION, EXTENDED RELEASE ORAL at 08:51

## 2018-10-28 RX ADMIN — DOCUSATE SODIUM 100 MG: 100 CAPSULE, LIQUID FILLED ORAL at 08:51

## 2018-10-28 RX ADMIN — VITAMIN D, TAB 1000IU (100/BT) 2000 UNITS: 25 TAB at 17:55

## 2018-10-28 NOTE — PROGRESS NOTES
Problem: Self Care Deficits Care Plan (Adult) Goal: *Therapy Goal (Edit Goal, Insert Text) Occupational Therapy Goals Long Term Goals Initiated 10/9/18 and to be accomplished within 4 week(s) 1. Pt will perform self-feeding with total dependence. 2. Pt will perform grooming with supervision. 3. Pt will perform UB bathing with Min A. 
4. Pt will perform LB bathing with Min A prn AE. 5. Pt will perform tub/shower transfer with Mod A.  
6. Pt will perform UB dressing with Setup. 7. Pt will perform LB dressing with Min A prn AE. 8. Pt will perform toileting task with Min A. 
9. Pt will perform toilet transfer with Mod A. Short Term Goals Initiated 10/16/18 and to be accomplished within 7 day(s) 10/23/18 1. Pt will perform grooming with Setup. 2. Pt will perform UB bathing with Min A. 
3. Pt will perform LB bathing with Min A prn AE. 4. Pt will perform tub/shower transfer with Mod A. 
5. Pt will perform UB dressing with Min A. 
6. Pt will perform LB dressing with Mod A prn AE. 7. Pt will perform toilet transfer with Mod A. Occupational Therapy TREATMENT Patient: Patt Gibbons 80 y.o. Patient identified with name and : Yes 
 
Date: 10/28/2018 First Tx Session Time In: 0029 Time Out[de-identified] 5890 Diagnosis: R CVA Acute ischemic stroke ([facility] [address] Precautions: Aspiration, Fall(Head of bed 30-45 degrees at all times per speech therapist) Chart, occupational therapy assessment, plan of care, and goals were reviewed. Pain: 
Pt reports 0/10 pain or discomfort prior to treatment. Pt reports 0/10 pain or discomfort post treatment. Intervention Provided: N/A 
 
 
SUBJECTIVE:  
Patient stated I want to stay sitting up.  Patient very drowsy and falling asleep during therapy session. OBJECTIVE DATA SUMMARY:  
 
NMRE Daily Assessment PROM to (L) UE during patient's peg tube feeding.  Once in therapy gym, patient sitting in w/c with bedside table on (L) side for NMR activities for WBing through (L) elbow: patient picks up coins with (R) hand reaching over to (L) side onto table and places into slotted container also on table, also completes small pegs with pegboard pattern with (R) reaching towards (L). Cone activity with patient reaching forward to address trunk control/strengthening, and placing cones onto table on (L) side. GROOMING Daily Assessment Handed patient comb, and he is able to comb his hair however requires min A for (L) side. LOWER BODY DRESSING Daily Assessment Lower Body Dressing Dressing Assistance : 2 (Maximal assistance) Leg Crossed Method Used: No 
Position Performed: Long sitting on bed Comments: Patient able to roll side to side and requires max A for pulling pants over hips. MOBILITY/TRANSFERS Daily Assessment Sliding board transfer towards (R) side with mod A from bed to w/c.    
 
ASSESSMENT: 
Patient very drowsy during today's session and requires greater assistance with transfer at beginning of session. He is motivated to complete therapeutic activities. Progression toward goals: 
[x]          Improving appropriately and progressing toward goals 
[]          Improving slowly and progressing toward goals 
[]          Not making progress toward goals and plan of care will be adjusted PLAN: 
Patient continues to benefit from skilled intervention to address the above impairments. Continue treatment per established plan of care. Discharge Recommendations: To Be Determined Further Equipment Recommendations for Discharge:  TBD Activity Tolerance: 
Fair Estimated LOS: 
 
Please refer to the flowsheet for vital signs taken during this treatment. After treatment:  
[x]  Patient left in no apparent distress sitting up in chair  
[]  Patient left in no apparent distress in bed 
[x]  Call bell left within reach 
[]  Nursing notified 
[]  Caregiver present 
[]  Bed alarm activated COMMUNICATION/EDUCATION:  
 [] Home safety education was provided and the patient/caregiver indicated understanding. [] Patient/family have participated as able in goal setting and plan of care. [] Patient/family agree to work toward stated goals and plan of care. [] Patient understands intent and goals of therapy, but is neutral about his/her participation. [] Patient is unable to participate in goal setting and plan of care. Thao Bolden, OTR/L

## 2018-10-28 NOTE — ROUTINE PROCESS
SHIFT CHANGE NOTE FOR [facility] 
  
Bedside and Verbal shift change report given to NINA Mcclure RN (oncoming nurse) by Cordelia Colon RN (offgoing nurse). Report included the following information SBAR, Kardex, MAR and Recent Results. 
  
Situation: 
            Code Status: DNR Reason for Admission: Right ischemic stroke Hospital Day: 16 Problem List:  
          
Hospital Problems  Date Reviewed: 10/25/2018  
        Codes Class Noted POA  
  Hyponatremia ICD-10-CM: E87.1 ICD-9-CM: 276.1   10/22/2018 No  
     
  History of transient ischemic attack (TIA) ICD-10-CM: U72.30 
ICD-9-CM: V12.54   Unknown Yes  
  Overview Signed 10/9/2018  3:28 PM by Ricky See MD  
    2x 
   
  
     
  Vitamin D deficiency (Chronic) ICD-10-CM: E55.9 ICD-9-CM: 272. 9   10/9/2018 Yes  
  Overview Signed 10/10/2018  1:59 PM by Ricky See MD  
    Vitamin D 25-Hydroxy (10/10/2018) = 22.5  
   
  
     
  Hypertensive heart disease without heart failure (Chronic) ICD-10-CM: I11.9 ICD-9-CM: 402.90   Unknown Yes  
     
  [facility] ICD-10-CM: Z93.1 ICD-9-CM: V44.1   10/5/2018 Yes  
     
  * (Principal) Acute ischemic stroke ([facility] [address] ICD-10-CM: I63.9 ICD-9-CM: 434.91   10/2/2018 Yes  
  Overview Signed 10/8/2018  6:32 PM by Ricky See MD  
    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 
   
  
     
  Impaired mobility and ADLs ICD-10-CM: Z74.09 
ICD-9-CM: 799.89   10/2/2018 Yes  
     
  Hemiparesis affecting left side as late effect of cerebrovascular accident (CVA) ([facility] [address] ICD-10-CM: O30.965 ICD-9-CM: 438.20   10/2/2018 Yes  
     
  Dysphagia as late effect of cerebrovascular accident (CVA) ICD-10-CM: P08.571 ICD-9-CM: 438.82   10/2/2018 Yes  
     
   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) ICD-10-CM: F38.663 ICD-9-CM: 438.13   10/2/2018 Yes  
     
  Traumatic amputation of left thumb ICD-10-CM: V19.266M ICD-9-CM: 521. 0   1/1/2011 Yes  
     
   
  
  
Background: 
            Past Medical History:  
    
Past Medical History:  
Diagnosis Date  Acute ischemic stroke ([facility] [address] 10/2/2018  
  Acute Ischemic Stroke (acute infarct of the posterior superior right basal ganglia and adjacent right corona radiata) with residual left hemiparesis, dysphagia and dysarthria  Benign prostatic hyperplasia    
 Bilateral hearing loss    
 Cervical spinal stenosis 10/2/2018  Chronic obstructive pulmonary disease (COPD) ([facility] [address]    
 Coronary artery disease involving native coronary artery of native heart    
 Diastolic dysfunction without heart failure    
 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) 10/2/2018  Dyslipidemia    
 Dysphagia as late effect of cerebrovascular accident (CVA) 10/2/2018  Gastric ulcer    
 Glaucoma    
 Hemiparesis affecting left side as late effect of cerebrovascular accident (CVA) ([facility] [address] 10/2/2018  History of kidney stones    
 History of transient ischemic attack (TIA)    
  2x  Hypertensive heart disease without heart failure    
 Hypothyroidism    
 Meningioma ([facility] [address] 10/3/2018  
  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    
 Traumatic amputation of left thumb 2011  Urinary frequency    
 Vitamin D deficiency 10/9/2018  
  Vitamin D 25-Hydroxy (10/10/2018) = 22.5   
  
            Patient taking anticoagulants yes Heparin Patient has a defibrillator: no  
  
Assessment: 
? Changes in Assessment throughout shift: none 
  
? Patient has central line: no Reasons if yes: Insertion date: Last dressing date: 
? Patient has Suarez Cath: no Reasons if yes: Insertion date: 
  
? Last Vitals: 
             
      
Vitals:   10/25/18 0532 10/25/18 0732 10/25/18 0750 10/25/18 1555 BP: 150/59 151/56 157/58 147/60 Pulse: 65 67 68 67 Resp:   18 18 20 Temp:   96.7 °F (35.9 °C)   97.7 °F (36.5 °C) SpO2:   96% 95% 97% Weight:          
Height:          
  
  
· PAIN Pain Assessment Pain Intensity 1: 0 (10/25/18 1555) Pain Intensity 1: 2 (12/29/14 1105) Pain Location 1: Leg Pain Location 1: Abdomen Pain Intervention(s) 1: Medication (see MAR) Pain Intervention(s) 1: Medication (see MAR) Patient Stated Pain Goal: 0 Patient Stated Pain Goal: 0 
? Intervention effective: yes   
? Other actions taken for pain: turn reposition rest 
  
· Skin Assessment Skin color Skin Color: Appropriate for ethnicity Condition/Temperature Skin Condition/Temp: Dry, Warm Integrity Skin Integrity: Abrasion Turgor Turgor: Non-tenting Weekly Pressure Ulcer Documentation  Pressure  Injury Documentation: No Pressure Injury Noted-Pressure Ulcer Prevention Initiated Wound Prevention & Protection Methods Orientation of wound Orientation of Wound Prevention: Posterior Location of Prevention Location of Wound Prevention: Sacrum/Coccyx Dressing Present Dressing Present : No 
Dressing Status Dressing Status: Intact Wound Offloading Wound Offloading (Prevention Methods): Bed, pressure redistribution/air, Repositioning 
  
· INTAKE/OUPUT Date 10/24/18 1900 - 10/25/18 6823 10/25/18 0700 - 10/26/18 4037 Shift 9416-3243 24 Hour Total 6045-0847 8876-2680 24 Hour Total  
INTAKE  
P. O.     0   0  
  P. O.     0   0 NG/ 1310 1450   1450 Water Flush Volume (mL) (PEG/Gastrostomy Tube 10/06/18) 150 450 150   150 Medication Volume (PEG/Gastrostomy Tube 10/06/18) 200 320 200   200 Intake (ml) (PEG/Gastrostomy Tube 10/06/18)   540 1100   1100 Shift Total(mL/kg) 350(5.2) 1310(19.4) 1450(21.5)   1450(21.5) OUTPUT  
 Urine(mL/kg/hr) 250 550        
  Urine Voided 250 550        
  Urine Occurrence(s) 5 x 8 x 4 x   4 x Emesis/NG output            
  Emesis Occurrence(s)     0 x   0 x Stool            
  Stool Occurrence(s) 0 x 0 x 0 x   0 x Shift Total(mL/kg) 250(3.7) 550(8.1)        
 116 1895   1450 Weight (kg) 67.6 67.6 67.6 67.6 67.6  
  
  
Recommendations: 1. Patient needs and requests: Assist with ADL's, TF,  
  
2. Diet: NPO with bolus TF 
  
3. Pending tests/procedures: LABS  
  
4. Functional Level/Equipment: 1 person assist, BSC, W/C 
  
5. Estimated Discharge Date: TBD Posted on Whiteboard in Patients Room: yes  
  
HEALS Safety Check 
  
A safety check occurred in the patient's room between off going nurse and oncoming nurse listed above. 
  
The safety check included the below items Area Items H High Alert Medications § Verify all high alert medication drips (heparin, PCA, etc.) E Equipment § Suction is set up for ALL patients (with sonia) § Red plugs utilized for all equipment (IV pumps, etc.) § WOWs wiped down at end of shift. § Room stocked with oxygen, suction, and other unit-specific supplies A Alarms § Bed alarm is set for fall risk patients § Ensure chair alarm is in place and activated if patient is up in a chair L Lines § Check IV for any infiltration § Suarez bag is empty if patient has a Suarez § Tubing and IV bags are labeled [facility] Safety 
  § Room is clean, patient is clean, and equipment is clean. § Hallways are clear from equipment besides carts. § Fall bracelet on for fall risk patients § Ensure room is clear and free of clutter § Suction is set up for ALL patients (with sonia) § Hallways are clear from equipment besides carts.   
§ Isolation precautions followed, supplies available outside room, sign posted

## 2018-10-28 NOTE — ROUTINE PROCESS
SHIFT CHANGE NOTE FOR [facility] 
  
Bedside and Verbal shift change report given to Lynch RN (oncoming nurse) by  Akash Castro RN (offgoing nurse). Report included the following information SBAR, Kardex, MAR and Recent Results. 
  
Situation: 
            YMFS Status: DNR 
            Reason for Admission: Right ischemic stroke Hospital Day: 17 
            Problem List:  
           
                   
Hospital Problems  Date Reviewed: 10/25/2018  
        Codes Class Noted POA  
  Hyponatremia ICD-10-CM: E87.1 ICD-9-CM: 276.1   10/22/2018 No  
     
  History of transient ischemic attack (TIA) ICD-10-CM: C83.37 
ICD-9-CM: V12.54   Unknown Yes  
  Overview Signed 10/9/2018  3:28 PM by Mitchell Brice MD  
    2x 
   
  
     
  Vitamin D deficiency (Chronic) ICD-10-CM: E55.9 ICD-9-CM: 306. 9   10/9/2018 Yes  
  Overview Signed 10/10/2018  1:59 PM by Mitchell Brice MD  
    Vitamin D 25-Hydroxy (10/10/2018) = 22.5  
   
  
     
  Hypertensive heart disease without heart failure (Chronic) ICD-10-CM: I11.9 ICD-9-CM: 402.90   Unknown Yes  
     
  Status post [facility] ICD-10-CM: Z93.1 ICD-9-CM: V44.1   10/5/2018 Yes  
     
  * (Principal) Acute ischemic stroke ([facility] [address] ICD-10-CM: I63.9 ICD-9-CM: 434.91   10/2/2018 Yes  
  Overview Signed 10/8/2018  6:32 PM by Mitchell Brice MD  
    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 
   
  
     
  Impaired mobility and ADLs ICD-10-CM: Z74.09 
ICD-9-CM: 799.89   10/2/2018 Yes  
     
  Hemiparesis affecting left side as late effect of cerebrovascular accident (CVA) ([facility] [address] ICD-10-CM: P47.627 ICD-9-CM: 438.20   10/2/2018 Yes  
     
  Dysphagia as late effect of cerebrovascular accident (CVA) ICD-10-CM: K38.038 ICD-9-CM: 438.82   10/2/2018 Yes  
     
   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) ICD-10-CM: V01.323 ICD-9-CM: 438.13   10/2/2018 Yes  
     
  Traumatic amputation of left thumb ICD-10-CM: O54.496X ICD-9-CM: 062. 0   1/1/2011 Yes  
     
   
  
  
Background: 
            Past Medical History:  
       
Past Medical History:  
Diagnosis Date  [facility] 10/2/2018  
  Acute Ischemic Stroke (acute infarct of the posterior superior right basal ganglia and adjacent right corona radiata) with residual left hemiparesis, dysphagia and dysarthria  Benign prostatic hyperplasia    
 Bilateral hearing loss    
 Cervical spinal stenosis 10/2/2018  Chronic obstructive pulmonary disease (COPD) ([facility] [address]    
 Coronary artery disease involving native coronary artery of native heart    
 Diastolic dysfunction without heart failure    
 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) 10/2/2018  Dyslipidemia    
 Dysphagia as late effect of cerebrovascular accident (CVA) 10/2/2018  Gastric ulcer    
 Glaucoma    
 Hemiparesis affecting left side as late effect of cerebrovascular accident (CVA) ([facility] [address] 10/2/2018  History of kidney stones    
 History of transient ischemic attack (TIA)    
  2x  Hypertensive heart disease without heart failure    
 Hypothyroidism    
 Meningioma ([facility] [address] 10/3/2018  
  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    
 Traumatic amputation of left thumb 2011  Urinary frequency    
 Vitamin D deficiency 10/9/2018  
  Vitamin D 25-Hydroxy (10/10/2018) = 22.5   
  
            Patient taking anticoagulants yes Heparin 
            Patient has a defibrillator: no  
  
Assessment: 
? Changes in Assessment throughout shift: none 
  
? Patient has central line: no Reasons if yes: Insertion date: Last dressing date: 
? Patient has Suarez Cath: no Reasons if yes:   
Insertion date: 
  
? Last Vitals: 
  
           
Vitals:   10/25/18 0532 10/25/18 0732 10/25/18 0750 10/25/18 1555 BP: 150/59 151/56 157/58 147/60 Pulse: 65 67 68 67 Resp:   18 18 20 Temp:   96.7 °F (35.9 °C)   97.7 °F (36.5 °C) SpO2:   96% 95% 97% Weight:          
Height:          
  
  
· PAIN 
                        Pain Assessment 
                        Pain Intensity 1: 0 (10/25/18 1555) Pain Intensity 1: 2 (12/29/14 1105)                         NXIF Location 1: Leg Pain Location 1: Abdomen 
                        Pain Intervention(s) 1: Medication (see MAR) Pain Intervention(s) 1: Medication (see MAR) Patient Stated Pain Goal: 0 Patient Stated Pain Goal: 0 
? Intervention effective: yes   
? Other actions taken for pain: turn reposition rest 
  
· Skin Assessment Skin color Skin Color: Appropriate for ethnicity Condition/Temperature Skin Condition/Temp: Dry, Warm Integrity Skin Integrity: Abrasion Turgor Turgor: Non-tenting Weekly Pressure Ulcer Documentation  Pressure  Injury Documentation: No Pressure Injury Noted-Pressure Ulcer Prevention Initiated Wound Prevention & Protection Methods Orientation of wound Orientation of Wound Prevention: Posterior Location of Prevention Location of Wound Prevention: Sacrum/Coccyx Dressing Present Dressing Present : No 
Dressing Status Dressing Status: Intact Wound Offloading Wound Offloading (Prevention Methods): Bed, pressure redistribution/air, Repositioning 
  
· INTAKE/OUPUT 
             
Date 10/24/18 1900 - 10/25/18 6040 10/25/18 0700 - 10/26/18 1478 Shift 8253-6206 24 Hour Total 8515-4359 3581-6203 24 Hour Total  
INTAKE  
P. O.     0   0  
  P.O.     0   0 NG/ 1310 1450   1450  
  Water Flush Volume (mL) (PEG/Gastrostomy Tube 10/06/18) 150 450 150   150  
  Medication Volume (PEG/Gastrostomy Tube 10/06/18) 200 320 200   200  
  Intake (ml) (PEG/Gastrostomy Tube 10/06/18)   540 1100   1100 Shift Total(mL/kg) 350(5.2) 1310(19.4) 1450(21.5)   1450(21.5) OUTPUT  
 Urine(mL/kg/hr) 250 550        
  Urine Voided 250 550        
  Urine Occurrence(s) 5 x 8 x 4 x   4 x Emesis/NG output            
  Emesis Occurrence(s)     0 x   0 x Stool            
  Stool Occurrence(s) 0 x 0 x 0 x   0 x Shift Total(mL/kg) 250(3.7) 550(8.1)        
 369 0592   1450 Weight (kg) 67.6 67.6 67.6 67.6 67.6  
  
  
Recommendations: 1. Patient needs and requests: Assist with ADL's, TF,  
  
2. Diet: NPO with bolus TF 
  
3. Pending tests/procedures: LABS  
  
4. Functional Level/Equipment: 1 person assist, BSC, W/C 
  
5. Estimated Discharge Date: TBD Posted on Whiteboard in Patients Room: yes  
  
HEALS Safety Check 
  
A safety check occurred in the patient's room between off going nurse and oncoming nurse listed above. 
  
The safety check included the below items Area Items H High Alert Medications § Verify all high alert medication drips (heparin, PCA, etc.) E Equipment § Suction is set up for ALL patients (with sonia) § Red plugs utilized for all equipment (IV pumps, etc.) § WOWs wiped down at end of shift. § Room stocked with oxygen, suction, and other unit-specific supplies A Alarms § Bed alarm is set for fall risk patients § Ensure chair alarm is in place and activated if patient is up in a chair L Lines § Check IV for any infiltration § Suarez bag is empty if patient has a Suarez § Tubing and IV bags are labeled [employer] Safety 
  § Room is clean, patient is clean, and equipment is clean. § Hallways are clear from equipment besides carts. § Fall bracelet on for fall risk patients § Ensure room is clear and free of clutter § Suction is set up for ALL patients (with sonia) § Hallways are clear from equipment besides carts.   
§ Isolation precautions followed, supplies available outside room, sign posted

## 2018-10-28 NOTE — PROGRESS NOTES
Progress Note Patient: Humberto Culver MRN: [id number]  CSN: [id number] YOB: 1931  Age: 80 y.o. Sex: male DOA: 10/8/2018 LOS:  LOS: 20 days Subjective:  
 
 
Primary Rehab Diagnosis: Impaired Mobility and ADLs secondary to Acute Ischemic Stroke (acute infarct of the posterior superior right basal ganglia and adjacent right corona radiata) with residual left hemiparesis, dysphagia and dysarthria CXR 10/27 Similar to improved bilateral pleural effusions with resolved interstitial edema 
and improved lung base haziness which may have reflected mild pulmonary edema and/or atelectasis. Review of systems General: No fevers or chills. Cardiovascular: No chest pain or pressure. No palpitations. Pulmonary:  shortness of breath,  And cough improved   no wheeze. Gastrointestinal: No abdominal pain, nausea, vomiting or diarrhea. Genitourinary:  Positive urinary frequency Musculoskeletal: No joint or muscle pain, no back pain, no recent trauma. Neurologic: No headache, no seizure generalized weakness Objective:  
 
Physical Exam: 
Visit Vitals /63 (BP 1 Location: Left arm, BP Patient Position: At rest) Pulse 70 Temp 97.5 °F (36.4 °C) Resp 18 Ht 5' 6\" (1.676 m) Wt 67.6 kg (149 lb) SpO2 95% BMI 24.05 kg/m² General:         Alert, cooperative, no acute distress   
HEENT: NC, Atraumatic. PERRLA, anicteric sclerae. Lungs:  CTA. Heart:  Regular  rhythm,  No murmur, No Rubs, No Gallops Abdomen: Soft, Non distended, Non tender.  +Bowel sounds, no HSM Extremities: No lower limb edema Psych:   Not anxious or agitated. Neurologic:  CN 2-12 grossly intact, Alert and confused. Lt sided weakness Intake and Output: 
Current Shift:  10/28 0701 - 10/28 1900 In: 80 Out: 600 [Urine:600] Last three shifts:  10/26 1901 - 10/28 0700 In: 8046 Out: 490 [Urine:490] Labs: Results:  
   
Chemistry Recent Labs 10/28/18 
0618 10/27/18 4635 10/26/18 
7438 * 85  --   
* 136 135* K 4.1 4.6  --   
 100  --   
CO2 26 27  --   
BUN 23* 22*  --   
CREA 0.83 0.84  --   
CA 8.1* 7.7*  --   
AGAP 7 9  --   
BUCR 28* 26*  --   
  --   --   
TP 6.2*  --   --   
ALB 2.9*  --   --   
GLOB 3.3  --   --   
AGRAT 0.9  --   --   
  
CBC w/Diff Recent Labs 10/28/18 
9940 WBC 5.5  
RBC 3.74* HGB 11.6* HCT 33.4*  
 GRANS 62 LYMPH 18* EOS 8* Cardiac Enzymes No results for input(s): CPK, CKND1, SIERRA in the last 72 hours. No lab exists for component: Gary Reardon Coagulation No results for input(s): PTP, INR, APTT in the last 72 hours. No lab exists for component: INREXT, INREXT Lipid Panel No results found for: CHOL, CHOLPOCT, CHOLX, CHLST, CHOLV, 047304, HDL, LDL, LDLC, DLDLP, 753602, VLDLC, VLDL, TGLX, TRIGL, TRIGP, TGLPOCT, CHHD, CHHDX  
BNP No results for input(s): BNPP in the last 72 hours. Liver Enzymes Recent Labs 10/28/18 
3518 TP 6.2* ALB 2.9*  
 SGOT 24 Thyroid Studies Lab Results Component Value Date/Time TSH 1.46 10/28/2018 06:18 AM  
    
 
Procedures/imaging: see electronic medical records for all procedures/Xrays and details which were not copied into this note but were reviewed prior to creation of Plan Medications:  
Current Facility-Administered Medications Medication Dose Route Frequency  mirtazapine (REMERON) tablet 15 mg  15 mg Per G Tube QHS  albuterol (PROVENTIL VENTOLIN) nebulizer solution 2.5 mg  2.5 mg Nebulization Q4H PRN  
 amLODIPine (NORVASC) tablet 10 mg  10 mg Per G Tube DAILY  melatonin tablet 5 mg  5 mg Per G Tube QHS  guaiFENesin (ROBITUSSIN) 100 mg/5 mL oral liquid 100 mg  100 mg Per G Tube QID  dextromethorphan (DELSYM) 30 mg/5 mL syrup 30 mg  30 mg Per G Tube Q12H  
 modafinil (PROVIGIL) tablet 100 mg  100 mg Per G Tube DAILY  cholecalciferol (VITAMIN D3) tablet 2,000 Units  2,000 Units Oral BID  
  hydrALAZINE (APRESOLINE) tablet 20 mg  20 mg Per G Tube Q12H  pneumococcal 23-valent (PNEUMOVAX 23) injection 0.5 mL  0.5 mL IntraMUSCular PRIOR TO DISCHARGE  docusate sodium (COLACE) capsule 100 mg  100 mg Per G Tube BID  
 bisacodyl (DULCOLAX) suppository 10 mg  10 mg Rectal Q48H PRN  
 heparin (porcine) injection 5,000 Units  5,000 Units SubCUTAneous Q12H  
 acetaminophen (TYLENOL) solution 650 mg  650 mg Oral Q4H PRN  
 co-enzyme Q-10 (CO Q-10) capsule 100 mg  100 mg Oral DAILY  vitamin B complex tablet  1 Tab Oral DAILY  aspirin chewable tablet 81 mg  81 mg Per G Tube DAILY  atorvastatin (LIPITOR) tablet 80 mg  80 mg Per G Tube QHS  clopidogrel (PLAVIX) tablet 75 mg  75 mg PEG Tube DAILY  lisinopril (PRINIVIL, ZESTRIL) tablet 40 mg  40 mg Per G Tube DAILY  pantoprazole (PROTONIX) granules for oral suspension 40 mg  40 mg Per G Tube ACB  finasteride (PROSCAR) tablet 5 mg  5 mg Oral QPM  
 levothyroxine (SYNTHROID) tablet 50 mcg  50 mcg Per G Tube 6am  
 metoprolol tartrate (LOPRESSOR) tablet 25 mg  25 mg Per G Tube Q12H  tamsulosin (FLOMAX) capsule 0.4 mg  0.4 mg Oral QPM  
 timolol (TIMOPTIC) 0.5 % ophthalmic solution 1 Drop  1 Drop Both Eyes DAILY Assessment/Plan Principal Problem: 
  [facility] (10/2/2018) Overview: Acute Ischemic Stroke (acute infarct of the posterior superior right basal  
    ganglia and adjacent right corona radiata) with residual left hemiparesis,  
    dysphagia and dysarthria Active Problems: 
  Impaired mobility and ADLs (10/2/2018) Hemiparesis affecting left side as late effect of cerebrovascular accident (CVA) ([facility] (10/2/2018) Dysphagia as late effect of cerebrovascular accident (CVA) (10/2/2018) 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) (10/2/2018) Hypertensive heart disease without heart failure () Status post insertion of percutaneous endoscopic gastrostomy (PEG) tube ([facility] (10/5/2018) History of transient ischemic attack (TIA) () Overview: 2x Traumatic amputation of left thumb (1/1/2011) Vitamin D deficiency (10/9/2018) Overview: Vitamin D 25-Hydroxy (10/10/2018) = 22.5 Hyponatremia (10/22/2018) Plan Pt on ASA, plavix and lipitor q10 Tube feeding monitor for aspiration Pt on higher dose of LAsix due to worsening pulmonary edema Decrease free water for flush due to hyponatremia D/C  sodium tablet recheck BMP in AM  
TSH  within normal  
Recheck CXR improved pulmonary edema Maria Luz Cahrles MD 
10/28/2018 3:04  PM

## 2018-10-29 PROBLEM — G47.9 DIFFICULTY SLEEPING: Chronic | Status: ACTIVE | Noted: 2018-10-07

## 2018-10-29 PROBLEM — R05.9 COUGH: Chronic | Status: ACTIVE | Noted: 2018-10-08

## 2018-10-29 PROBLEM — Z91.89 AT RISK FOR ASPIRATION PNEUMONIA: Chronic | Status: ACTIVE | Noted: 2018-10-08

## 2018-10-29 LAB
ANION GAP SERPL CALC-SCNC: 9 MMOL/L (ref 3–18)
BUN SERPL-MCNC: 25 MG/DL (ref 7–18)
BUN/CREAT SERPL: 31 (ref 12–20)
CALCIUM SERPL-MCNC: 8.1 MG/DL (ref 8.5–10.1)
CHLORIDE SERPL-SCNC: 101 MMOL/L (ref 100–108)
CO2 SERPL-SCNC: 26 MMOL/L (ref 21–32)
CREAT SERPL-MCNC: 0.81 MG/DL (ref 0.6–1.3)
GLUCOSE SERPL-MCNC: 93 MG/DL (ref 74–99)
HCT VFR BLD AUTO: 35.9 % (ref 36–48)
HGB BLD-MCNC: 12.2 G/DL (ref 13–16)
PLATELET # BLD AUTO: 255 K/UL (ref 135–420)
POTASSIUM SERPL-SCNC: 5 MMOL/L (ref 3.5–5.5)
SODIUM SERPL-SCNC: 136 MMOL/L (ref 136–145)

## 2018-10-29 PROCEDURE — 74011250636 HC RX REV CODE- 250/636: Performed by: INTERNAL MEDICINE

## 2018-10-29 PROCEDURE — 97535 SELF CARE MNGMENT TRAINING: CPT

## 2018-10-29 PROCEDURE — 80048 BASIC METABOLIC PNL TOTAL CA: CPT | Performed by: INTERNAL MEDICINE

## 2018-10-29 PROCEDURE — 85018 HEMOGLOBIN: CPT | Performed by: INTERNAL MEDICINE

## 2018-10-29 PROCEDURE — 85049 AUTOMATED PLATELET COUNT: CPT | Performed by: INTERNAL MEDICINE

## 2018-10-29 PROCEDURE — 97112 NEUROMUSCULAR REEDUCATION: CPT

## 2018-10-29 PROCEDURE — 74011250637 HC RX REV CODE- 250/637: Performed by: INTERNAL MEDICINE

## 2018-10-29 PROCEDURE — 36415 COLL VENOUS BLD VENIPUNCTURE: CPT | Performed by: INTERNAL MEDICINE

## 2018-10-29 PROCEDURE — 65310000000 HC RM PRIVATE REHAB

## 2018-10-29 PROCEDURE — 97530 THERAPEUTIC ACTIVITIES: CPT

## 2018-10-29 RX ORDER — GUAIFENESIN 100 MG/5ML
100 SOLUTION ORAL 4 TIMES DAILY
Qty: 1 BOTTLE | Refills: 0 | Status: SHIPPED
Start: 2018-10-30

## 2018-10-29 RX ORDER — ALBUTEROL SULFATE 0.83 MG/ML
2.5 SOLUTION RESPIRATORY (INHALATION)
Qty: 10 EACH | Refills: 0 | Status: SHIPPED
Start: 2018-10-29

## 2018-10-29 RX ORDER — CHOLECALCIFEROL (VITAMIN D3) 125 MCG
5 CAPSULE ORAL
Qty: 15 TAB | Refills: 0 | Status: SHIPPED
Start: 2018-10-30

## 2018-10-29 RX ORDER — DOCUSATE SODIUM 100 MG/1
100 CAPSULE, LIQUID FILLED ORAL 2 TIMES DAILY
Qty: 30 CAP | Refills: 0 | Status: SHIPPED
Start: 2018-10-30

## 2018-10-29 RX ORDER — CHOLECALCIFEROL (VITAMIN D3) 125 MCG
100 CAPSULE ORAL DAILY
Qty: 15 CAP | Refills: 0 | Status: SHIPPED
Start: 2018-10-30

## 2018-10-29 RX ORDER — MODAFINIL 100 MG/1
100 TABLET ORAL DAILY
Qty: 15 TAB | Refills: 0 | Status: SHIPPED
Start: 2018-10-30

## 2018-10-29 RX ORDER — MELATONIN
2000 DAILY
Qty: 30 TAB | Refills: 0 | Status: SHIPPED
Start: 2018-10-29

## 2018-10-29 RX ORDER — PANTOPRAZOLE SODIUM 40 MG/1
40 GRANULE, DELAYED RELEASE ORAL
Qty: 15 EACH | Refills: 0 | Status: SHIPPED
Start: 2018-10-30

## 2018-10-29 RX ORDER — TAMSULOSIN HYDROCHLORIDE 0.4 MG/1
0.4 CAPSULE ORAL
Qty: 15 CAP | Refills: 0 | Status: SHIPPED
Start: 2018-10-29

## 2018-10-29 RX ORDER — GUAIFENESIN 100 MG/5ML
81 LIQUID (ML) ORAL DAILY
Qty: 15 TAB | Refills: 0 | Status: SHIPPED
Start: 2018-10-30

## 2018-10-29 RX ORDER — MULTIVIT WITH MINERALS/HERBS
1 TABLET ORAL DAILY
Qty: 15 TAB | Refills: 0 | Status: SHIPPED
Start: 2018-10-30

## 2018-10-29 RX ORDER — LISINOPRIL 40 MG/1
40 TABLET ORAL DAILY
Qty: 15 TAB | Refills: 0 | Status: SHIPPED
Start: 2018-10-30

## 2018-10-29 RX ORDER — AMLODIPINE BESYLATE 10 MG/1
10 TABLET ORAL DAILY
Qty: 15 TAB | Refills: 0 | Status: SHIPPED
Start: 2018-10-30

## 2018-10-29 RX ORDER — MIRTAZAPINE 15 MG/1
15 TABLET, FILM COATED ORAL
Qty: 15 TAB | Refills: 0 | Status: SHIPPED
Start: 2018-10-30

## 2018-10-29 RX ORDER — HYDRALAZINE HYDROCHLORIDE 10 MG/1
20 TABLET, FILM COATED ORAL EVERY 12 HOURS
Qty: 60 TAB | Refills: 0 | Status: SHIPPED
Start: 2018-10-30

## 2018-10-29 RX ORDER — HEPARIN SODIUM 5000 [USP'U]/ML
5000 INJECTION, SOLUTION INTRAVENOUS; SUBCUTANEOUS EVERY 12 HOURS
Qty: 30 SYRINGE | Refills: 0 | Status: SHIPPED
Start: 2018-10-30

## 2018-10-29 RX ORDER — ATORVASTATIN CALCIUM 80 MG/1
80 TABLET, FILM COATED ORAL
Qty: 15 TAB | Refills: 0 | Status: SHIPPED
Start: 2018-10-30

## 2018-10-29 RX ORDER — METOPROLOL TARTRATE 25 MG/1
25 TABLET, FILM COATED ORAL EVERY 12 HOURS
Qty: 30 TAB | Refills: 0 | Status: SHIPPED
Start: 2018-10-29

## 2018-10-29 RX ORDER — FINASTERIDE 5 MG/1
5 TABLET, FILM COATED ORAL EVERY EVENING
Qty: 15 TAB | Refills: 0 | Status: SHIPPED
Start: 2018-10-29

## 2018-10-29 RX ORDER — FACIAL-BODY WIPES
10 EACH TOPICAL
Qty: 10 SUPPOSITORY | Refills: 0 | Status: SHIPPED
Start: 2018-10-29

## 2018-10-29 RX ORDER — CLOPIDOGREL BISULFATE 75 MG/1
75 TABLET ORAL EVERY EVENING
Qty: 15 TAB | Refills: 0 | Status: SHIPPED
Start: 2018-10-29

## 2018-10-29 RX ORDER — DEXTROMETHORPHAN POLISTIREX 30 MG/5ML
30 SUSPENSION ORAL EVERY 12 HOURS
Qty: 100 ML | Refills: 0 | Status: SHIPPED
Start: 2018-10-30 | End: 2018-11-09

## 2018-10-29 RX ORDER — LEVOTHYROXINE SODIUM 50 UG/1
50 TABLET ORAL
Qty: 15 TAB | Refills: 0 | Status: SHIPPED
Start: 2018-10-30

## 2018-10-29 RX ADMIN — DEXTROMETHORPHAN 30 MG: 30 SUSPENSION, EXTENDED RELEASE ORAL at 21:09

## 2018-10-29 RX ADMIN — GUAIFENESIN 100 MG: 100 SOLUTION ORAL at 17:57

## 2018-10-29 RX ADMIN — METOPROLOL TARTRATE 25 MG: 25 TABLET ORAL at 08:58

## 2018-10-29 RX ADMIN — ASPIRIN 81 MG CHEWABLE TABLET 81 MG: 81 TABLET CHEWABLE at 08:58

## 2018-10-29 RX ADMIN — TIMOLOL MALEATE 1 DROP: 5 SOLUTION OPHTHALMIC at 09:02

## 2018-10-29 RX ADMIN — ATORVASTATIN CALCIUM 80 MG: 40 TABLET, FILM COATED ORAL at 21:10

## 2018-10-29 RX ADMIN — MELATONIN TAB 5 MG 5 MG: 5 TAB at 21:10

## 2018-10-29 RX ADMIN — TAMSULOSIN HYDROCHLORIDE 0.4 MG: 0.4 CAPSULE ORAL at 17:58

## 2018-10-29 RX ADMIN — HEPARIN SODIUM 5000 UNITS: 5000 INJECTION, SOLUTION INTRAVENOUS; SUBCUTANEOUS at 18:00

## 2018-10-29 RX ADMIN — LISINOPRIL 40 MG: 40 TABLET ORAL at 05:20

## 2018-10-29 RX ADMIN — VITAMIN D, TAB 1000IU (100/BT) 2000 UNITS: 25 TAB at 17:58

## 2018-10-29 RX ADMIN — GUAIFENESIN 100 MG: 100 SOLUTION ORAL at 09:01

## 2018-10-29 RX ADMIN — METOPROLOL TARTRATE 25 MG: 25 TABLET ORAL at 21:09

## 2018-10-29 RX ADMIN — CLOPIDOGREL BISULFATE 75 MG: 75 TABLET, FILM COATED ORAL at 21:09

## 2018-10-29 RX ADMIN — Medication 1 TABLET: at 08:57

## 2018-10-29 RX ADMIN — AMLODIPINE BESYLATE 10 MG: 10 TABLET ORAL at 05:20

## 2018-10-29 RX ADMIN — GUAIFENESIN 100 MG: 100 SOLUTION ORAL at 01:00

## 2018-10-29 RX ADMIN — Medication 100 MG: at 08:58

## 2018-10-29 RX ADMIN — DOCUSATE SODIUM 100 MG: 100 CAPSULE, LIQUID FILLED ORAL at 17:58

## 2018-10-29 RX ADMIN — HYDRALAZINE HYDROCHLORIDE 20 MG: 10 TABLET, FILM COATED ORAL at 08:59

## 2018-10-29 RX ADMIN — FINASTERIDE 5 MG: 5 TABLET, FILM COATED ORAL at 17:58

## 2018-10-29 RX ADMIN — MIRTAZAPINE 15 MG: 15 TABLET, FILM COATED ORAL at 21:09

## 2018-10-29 RX ADMIN — LEVOTHYROXINE SODIUM 50 MCG: 50 TABLET ORAL at 05:20

## 2018-10-29 RX ADMIN — VITAMIN D, TAB 1000IU (100/BT) 2000 UNITS: 25 TAB at 08:58

## 2018-10-29 RX ADMIN — MODAFINIL 100 MG: 100 TABLET ORAL at 08:57

## 2018-10-29 RX ADMIN — PANTOPRAZOLE SODIUM 40 MG: 40 GRANULE, DELAYED RELEASE ORAL at 07:30

## 2018-10-29 RX ADMIN — GUAIFENESIN 100 MG: 100 SOLUTION ORAL at 21:10

## 2018-10-29 RX ADMIN — HYDRALAZINE HYDROCHLORIDE 20 MG: 10 TABLET, FILM COATED ORAL at 21:09

## 2018-10-29 RX ADMIN — HEPARIN SODIUM 5000 UNITS: 5000 INJECTION, SOLUTION INTRAVENOUS; SUBCUTANEOUS at 05:21

## 2018-10-29 RX ADMIN — DEXTROMETHORPHAN 30 MG: 30 SUSPENSION, EXTENDED RELEASE ORAL at 08:57

## 2018-10-29 RX ADMIN — DOCUSATE SODIUM 100 MG: 100 CAPSULE, LIQUID FILLED ORAL at 08:58

## 2018-10-29 NOTE — PROGRESS NOTES
[id number] 
[address] [place] [address], [address] INPATIENT REHABILITATION 
DAILY PROGRESS NOTE Date: 10/29/2018 Name: Taina Courtney Age / Sex: 80 y.o. / male CSN: [id number] MRN: [id number] Admit Date: 10/8/2018 Length of Stay: 21 days Primary Rehab Diagnosis: Impaired Mobility and ADLs secondary to Acute Ischemic Stroke (acute infarct of the posterior superior right basal ganglia and adjacent right corona radiata) with residual left hemiparesis, dysphagia and dysarthria Subjective:  
 
Patient seen and examined. Blood pressure controlled. No fever over the past 24 hours. Patient's Complaint:  
No significant medical complaints Pain Control: no current joint or muscle symptoms, essentially pain-free Objective:  
 
Vital Signs: 
Patient Vitals for the past 24 hrs: 
 BP Temp Pulse Resp SpO2  
10/29/18 0858 133/60      
10/29/18 0738 133/60 98.7 °F (37.1 °C) 75 17 92 % 10/29/18 0520 158/60  68    
10/28/18 2115 144/58 98.7 °F (37.1 °C) 69 19 95 % 10/28/18 1621 147/55 97.5 °F (36.4 °C) 66 18 95 % Physical Examination: 
GENERAL SURVEY: Patient is awake, alert, oriented x 3, laying supine on the bed, not in acute respiratory distress. HEENT: pink palpebral conjunctivae, anicteric sclerae, no nasoaural discharge, moist oral mucosa NECK: supple, no jugular venous distention, no palpable lymph nodes CHEST/LUNGS: symmetrical chest expansion, good air entry, clear breath sounds HEART: adynamic precordium, good S1 S2, no S3, regular rhythm, no murmurs ABDOMEN: (+) PEG tube in place, flat, bowel sounds appreciated, soft, non-tender EXTREMITIES: (+) amputated distal phalanx of the thumb of the left hand, pink nailbeds, no edema, full and equal pulses, no calf tenderness NEUROLOGICAL EXAM: The patient is awake, alert and oriented x3, able to answer questions fairly appropriately, able to follow 1 and 2 step commands. Able to tell time from the wall clock. Cranial nerves II-XII are grossly intact except for slurred speech and dysphagia. No gross sensory deficit. Motor strength is 4+/5 on the RUE and RLE, 4/5 on the LUE, 3-/5 on the LLE. Current Medications: 
Current Facility-Administered Medications Medication Dose Route Frequency  mirtazapine (REMERON) tablet 15 mg  15 mg Per G Tube QHS  albuterol (PROVENTIL VENTOLIN) nebulizer solution 2.5 mg  2.5 mg Nebulization Q4H PRN  
 amLODIPine (NORVASC) tablet 10 mg  10 mg Per G Tube DAILY  melatonin tablet 5 mg  5 mg Per G Tube QHS  guaiFENesin (ROBITUSSIN) 100 mg/5 mL oral liquid 100 mg  100 mg Per G Tube QID  dextromethorphan (DELSYM) 30 mg/5 mL syrup 30 mg  30 mg Per G Tube Q12H  
 modafinil (PROVIGIL) tablet 100 mg  100 mg Per G Tube DAILY  cholecalciferol (VITAMIN D3) tablet 2,000 Units  2,000 Units Oral BID  hydrALAZINE (APRESOLINE) tablet 20 mg  20 mg Per G Tube Q12H  pneumococcal 23-valent (PNEUMOVAX 23) injection 0.5 mL  0.5 mL IntraMUSCular PRIOR TO DISCHARGE  docusate sodium (COLACE) capsule 100 mg  100 mg Per G Tube BID  
 bisacodyl (DULCOLAX) suppository 10 mg  10 mg Rectal Q48H PRN  
 heparin (porcine) injection 5,000 Units  5,000 Units SubCUTAneous Q12H  
 acetaminophen (TYLENOL) solution 650 mg  650 mg Oral Q4H PRN  
 co-enzyme Q-10 (CO Q-10) capsule 100 mg  100 mg Oral DAILY  vitamin B complex tablet  1 Tab Oral DAILY  aspirin chewable tablet 81 mg  81 mg Per G Tube DAILY  atorvastatin (LIPITOR) tablet 80 mg  80 mg Per G Tube QHS  clopidogrel (PLAVIX) tablet 75 mg  75 mg PEG Tube DAILY  lisinopril (PRINIVIL, ZESTRIL) tablet 40 mg  40 mg Per G Tube DAILY  pantoprazole (PROTONIX) granules for oral suspension 40 mg  40 mg Per G Tube ACB  finasteride (PROSCAR) tablet 5 mg  5 mg Oral QPM  
 levothyroxine (SYNTHROID) tablet 50 mcg  50 mcg Per G Tube 6am  
  metoprolol tartrate (LOPRESSOR) tablet 25 mg  25 mg Per G Tube Q12H  tamsulosin (FLOMAX) capsule 0.4 mg  0.4 mg Oral QPM  
 timolol (TIMOPTIC) 0.5 % ophthalmic solution 1 Drop  1 Drop Both Eyes DAILY Allergies: Allergies Allergen Reactions  Codeine Other (comments) Lab/Data Review: 
Recent Results (from the past 24 hour(s)) METABOLIC PANEL, BASIC Collection Time: 10/29/18  6:18 AM  
Result Value Ref Range Sodium 136 136 - 145 mmol/L Potassium 5.0 3.5 - 5.5 mmol/L Chloride 101 100 - 108 mmol/L  
 CO2 26 21 - 32 mmol/L Anion gap 9 3.0 - 18 mmol/L Glucose 93 74 - 99 mg/dL BUN 25 (H) 7.0 - 18 MG/DL Creatinine 0.81 0.6 - 1.3 MG/DL  
 BUN/Creatinine ratio 31 (H) 12 - 20 GFR est AA >60 >60 ml/min/1.73m2 GFR est non-AA >60 >60 ml/min/1.73m2 Calcium 8.1 (L) 8.5 - 10.1 MG/DL  
HGB & HCT Collection Time: 10/29/18  6:18 AM  
Result Value Ref Range HGB 12.2 (L) 13.0 - 16.0 g/dL HCT 35.9 (L) 36.0 - 48.0 % PLATELET COUNT Collection Time: 10/29/18  6:18 AM  
Result Value Ref Range PLATELET 495 981 - 198 K/uL Estimated Glomerular Filtration Rate: On admission, estimated GFR based on a Creatinine of 0.77 mg/dl: 
 Using CKD-EPI = 81.6 mL/min/1.73m2 Using MDRD = 101.6 mL/min/1.73m2 Most recent estimated GFR, based on a Creatinine of 0.81 mg/dl on 10/29/2018: 
 Using CKD-EPI = 79.9 mL/min/1.73m2 Using MDRD = 95.8 mL/min/1.73m2 Assessment:  
 
Primary Rehabilitation Diagnosis 1. Impaired Mobility and ADLs 2. 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 
  
Comorbidities  Urinary frequency R35.0  Nocturia R35.1  Glaucoma H40.9  History of kidney stones Z87.442  Cervical spinal stenosis M48.02  
 Meningioma  D32.9  Hypertensive heart disease without heart failure I11.9  Diastolic dysfunction without heart failure I51.89  
 Gastric ulcer K25.9  Coronary artery disease involving native coronary artery of native heart I25.10  Dyslipidemia E78.5  Chronic obstructive pulmonary disease (COPD)  J44.9  Status post insertion of percutaneous endoscopic gastrostomy (PEG) tube  Z93.1  Hypothyroidism E03.9  Benign prostatic hyperplasia N40.0  Bilateral hearing loss H91.93  
 History of transient ischemic attack (TIA) Z86.73  
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  
  
 
Plan: 1. Justification for continued stay: Good progression towards established rehabilitation goals. 2. Medical Issues being followed closely: 
  [x]  Fall and safety precautions  
  []  Wound Care  
  [x]  Bowel and Bladder Function  
  [x]  Fluid Electrolyte and Nutrition Balance  
  []  Pain Control 3. Issues that 24 hour rehabilitation nursing is following: 
  [x]  Fall and safety precautions  
  []  Wound Care  
  [x]  Bowel and Bladder Function  
  [x]  Fluid Electrolyte and Nutrition Balance  
  []  Pain Control   
  [x]  Assistance with and education on in-room safety with transfers to and from the bed, wheelchair, toilet and shower. 4. Acute rehabilitation plan of care: 
  [x]  Continue current care and rehab. [x]  Physical Therapy [x]  Occupational Therapy [x]  Speech Therapy  
  []  Hold Rehab until further notice 5. Medications: 
  [x]  MAR Reviewed [x]  Continue Present Medications 6. DVT Prophylaxis:   
  []  Lovenox [x]  Unfractionated Heparin  
  []  Coumadin  
  []  NOAC  
  []  BECKY Stockings  
  []  Sequential Compression Device []  None 7. Orders:  
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 
 > Continue: > Aspirin 81 mg via PEG tube once daily in AM 
  > Atorvastatin 80 mg via PEG tube q HS 
  > Coenzyme Q10 100 mg via PEG tube once daily 
  > Clopidogrel 75 mg via PEG tube once daily in PM 
  > Vitamin B complex 1 tab via PEG tube once daily 
 
> Dysphagia as late effect of CVA; S/P Insertion of percutaneous endoscopic gastrostomy (PEG) tube (10/5/2018) 
 > NPO with tube feeding 
 > Jevity 1.5 237 ml via PEG tube 5 times daily (6AM, 10AM, 2PM, 6PM, 10PM) > On 10/15/2018, increased water flush from 100 ml to 150 ml via PEG tube before and after each bolus feeding 
 > On 10/22/2018, decreased water flush from 150 ml to 75 ml via PEG tube before and after each bolus feeding 
 > On 10/25/2018, decreased water flush from 75 ml to 50 ml via PEG tube before and after each bolus feeding 
 
> Hypertensive heart disease without heart failure 
 > On admission to the ARU, decreased Hydralazine from 50 mg to 25 mg via PEG tube q 8 hr (6AM, 2PM, 10PM) > On 10/10/2018, decrease Hydralazine from 25 mg via PEG tube q 8 hr (6AM, 2PM, 10PM) to 20 mg via PEG tube q 12 hr (9AM, 9PM) > On 10/18/2018, Amlodipine 10 mg via PEG tube once daily (changed from 9AM to 6AM) > Chest x-ray (PA-lateral) (10/24/2018) showed pulmonary edema with bilateral pleural effusions. > On 10/24/2018, patient was given Furosemide 40 mg PO x 1 dose  
 > BNP (10/25/2018) = 4,485 
 > On 10/25/2018, patient was given Furosemide 40 mg PO x 1 dose  
 > Furosemide 40 mg PO x 1 dose today 
 > Continue: 
  > Amlodipine 10 mg via PEG tube once daily (6AM) 
  > Hydralazine 20 mg via PEG tube q 12 hr (9AM, 9PM) > Lisinopril 40 mg via PEG tube once daily (6AM) 
  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 
 > On 10/11/2018, started Trazodone 50 mg via PEG tube q HS 
 > On 10/23/2018, changed Trazodone 50 mg via PEG tube q HS to Mirtazapine 15 mg via PEG tube q HS 
 > Continue Mirtazapine 15 mg via PEG tube q HS 
 
> Neurocognitive modulation 
 > On 10/11/2018, started: 
  > Trazodone 50 mg via PEG tube q HS 
  > Modafinil 100 mg via PEG tube q AM 
 > On 10/23/2018, changed Trazodone 50 mg via PEG tube q HS to Mirtazapine 15 mg via PEG tube q HS 
 > Continue: 
  > Mirtazapine 15 mg via PEG tube q HS  
  > Modafinil 100 mg via PEG tube q AM 
 
> Difficulty sleeping 
 > On admission to the ARU, started Melatonin 3 mg via PEG tube q HS 
 > On 10/11/2018, started Trazodone 50 mg via PEG tube q HS 
 > On 10/17/2018, increased Melatonin from 3 mg to 5 mg via PEG tube q HS  
 > On 10/23/2018, changed Trazodone 50 mg via PEG tube q HS to Mirtazapine 15 mg via PEG tube q HS 
 > Continue: 
  > Melatonin 5 mg via PEG tube q HS 
   > Mirtazapine 15 mg via PEG tube q HS 
 
> Cough 
 > WBC count (10/9/2018) = 6.8 
 > Chest x-ray (10/12/2018) showed a small left pleural effusion; prominent interstitial lung markers could represent mild infiltration or chronic interstitial lung disease. 
 > On 10/14/2018, patient was given Furosemide 20 mg via PEG tube x 1 dose 
 > No fever or desaturation noted since admission 
 > On 10/15/2018, started: 
  > Dextromethorphan 30 mg via PEG tube q 12 hr 
  > Guaifenesin 100 mg via PEG tube 4 times daily 
 > WBC count (10/22/2018) = 6.1 
 > On 10/22/2018, started: 
  > Albuterol nebulization q 4 hr PRN for shortness of breath 
  > O2 inhalation 2 LPM via nasal cannula PRN for SpO2 less than 90% 
 > Chest x-ray (PA-lateral) (10/24/2018) showed pulmonary edema with bilateral pleural effusions.  
 > On 10/24/2018, patient was given Furosemide 40 mg PO x 1 dose  
 > BNP (10/25/2018) = 4,485 
 > On 10/26/2018, patient was given Furosemide 40 mg PO x 1 dose  
 > BNP (10/27/2018) = 2,664 
 > Chest x-ray (10/27/2018) showed similar to improved bilateral pleural effusions with resolved interstitial edema and improved lung base haziness which may have reflected mild pulmonary edema and/or atelectasis. 
 > No fever over the past 24 hours 
 > Continue: 
  > Dextromethorphan 30 mg via PEG tube q 12 hr 
  > Guaifenesin 100 mg via PEG tube 4 times daily 
  > Albuterol nebulization q 4 hr PRN for shortness of breath 
  > O2 inhalation 2 LPM via nasal cannula PRN for SpO2 less than 90% 
 
> Hypotonic hyponatremia 
 > Na (10/9/2018, on admission) = 143 
 > BUN/Creatinine ratio (10/9/2018) = 26 (BUN 20, Creatinine 0.77) > Na (10/15/2018) = 137 
 > BUN/Creatinine ratio (10/15/2018) = 37 (BUN 34, Creatinine 0.92)   
 > On 10/15/2018, increased water flush from 100 ml to 150 ml via PEG tube before and after each bolus feeding 
 > Na (10/22/2018) = 127 
 > BUN/Creatinine ratio (10/22/2018) = 29 (BUN 22, Creatinine 0.76) > Work-up (10/22/2018): 
  > Serum Osm = 264 
  > Urine Na = 20 
  > Urine Osm 404 
  > Urine SG = 1.014 
 > On 10/22/2018: 
  > Decreased water flush from 150 ml to 75 ml via PEG tube before and after each bolus feeding 
  > Started NaCl 1 gram via PEG tube q 8 hr  
  > Monitor serum Na q 6 hr 
 > Na (10/22/2018, 7:00 PM) = 124 
 > Na (10/23/2018, 12:55 AM) = 127 
 > Na (10/23/2018, 6:37 AM) = 131 
 > Na (10/23/2018, 12:05 PM) = 130 
 > Na (10/23/2018, 7:20 PM) = 130 
 > Na (10/24/2018, 1:05 AM) = 131 
 > Na (10/24/2018, 6:03 AM) = 132 
 > Na (10/24/2018, 12:07 PM) = 132 
 > BUN/Creatinine ratio (10/24/2018) = 29 (BUN 24, Creatinine 0.84) > TSH (10/24/2018) = 1.93 
 > On 10/24/2018: 
  > Patient was given Furosemide 40 mg PO x 1 dose  
  > Increased NaCl from 1 gram via PEG tube q 8 hr to 1 gram via PEG tube 4 times daily 
  > Decreased monitor frequency of serum Na from q 6 hr to q 12 hr 
 > Na (10/24/2018, 8:48 PM) = 130 
 > Na (10/25/2018, 6:16 AM) = 133 
 > On 10/25/2018: 
  > Patient was given Furosemide 40 mg PO x 1 dose  
  > Decreased water flush from 75 ml to 50 ml via PEG tube before and after each bolus feeding > Na (10/25/2018, 6:50 PM) = 136 
 > Na (10/26/2018, 6:05 AM) = 135  
 > On 10/26/2018: 
  > Patient was given Furosemide 40 mg PO x 1 dose  
  > Decreased monitoring of serum Na from q 12 hr to once daily 
 > Na (10/27/2018) = 136 
 > BUN/Creatinine ratio (10/27/2018) = 26 (BUN 22, Creatinine 0.84) 
 > On 10/27/2018, discontinued NaCl 1 gram via PEG tube 4 times daily 
 > Na (10/28/2018) = 135  
 > BUN/Creatinine ratio (10/28/2018) = 28 (BUN 23, Creatinine 0.83)  
 > TSH (10/28/2018) = 1.46 
 > Na (10/29/2018) = 136 
 > BUN/Creatinine ratio (10/29/2018) = 31 (BUN 25, Creatinine 0.81)  
 > Continue Water flush 50 ml via PEG tube before and after each bolus feeding 8. Patient's progress in rehabilitation and medical issues discussed with the patient. All questions answered to the best of my ability. Care plan discussed with patient and nurse. 9. Discharge Planning: 
 > For discharge / transfer to a subacute rehabilitation facility ([address] [facility] [address]) tomorrow to continue his rehabilitative therapies 
 > Once discharged from the subacute rehabilitation facility, the patient is to follow-up with: 1. PCP (Dr. Laura Mallory) 2. Neurology (Dr. Alexi Pham) Signed:    Chris Torre MD 
  October 29, 2018

## 2018-10-29 NOTE — PROGRESS NOTES
NUTRITION Nursing Referral: SHIELA FERGUSON PTA Nutrition Consult: General Nutrition Management & Supplements RECOMMENDATIONS / PLAN:  
 
- Continue bolus tube feeds of Jevity 1.5, 237 mL (1 can) x 5 times daily with water flush of 50 mL before and after each bolus feed. - Continue RD inpatient monitoring and evaluation. Goal EN Regimen: Jevity 1.5, 240 mL (1 can) 5 times daily + 50 mL water flush before and after each bolus to provide: 1775 kcal, 76 gm protein, 59 gm fat, 256 gm CHO, 27 gm fiber, 900 mL free water, 1400 mL total water, 100% RDIs NUTRITION INTERVENTIONS & DIAGNOSIS:  
 
[x] Enteral nutrition: continue bolus tube feeding Nutrition Diagnosis:  Inadequate oral intake related to inability to tolerate po as evidenced by NPO 
 
ASSESSMENT:  
 
10/29: Pt tolerating bolus tube feeds at goal. Na improving; 135 mmol/L today. NaCl tablet discontinued by MD. 
 
10/25: Pt tolerating bolus tube feeds at goal. Continues to have hyponatremia, but improving; Na 133 mmol/L today; water flushes decreased today to 50 mL before and after each bolus feed. Pt discussed with RN 
10/22: Patient receiving bolus tube feeding of Jevity 1.5 and tolerating feeds. Has hyponatremia; Na 127 mmol/L today. Pt discussed with MD. Noted MD decreased water flushes in tube feeding and added sodium chloride tablet and lasix. Na levels to be checked q 6 hours per MD 
10/15: Pt receiving bolus tube feeds; tolerating at goal rate. Water flushes increased to 150 mL before and after each bolus feed per RN report. 10/12: Pt tolerating bolus tube feeds at goal rate 10/9: Pt unavailable at time of visit. Currently NPO; receiving bolus tube feeding via PEG. Tolerating feeds. EN infusion adequate to meet patients estimated nutritional needs:   [x] Yes     [] No   [] Unable to determine at this time Tube Feeding:  Bolus feeds of Jevity 1.5, 237 mL (1 can) x 5 times daily via PEG 
 Water Flushes:  50 mL before and after each bolus feed Residuals: 0 mL Diet: DIET NPO With Tube Feedings DIET TUBE FEEDING Food Allergies:  None known Current Appetite:   [] Good     [] Fair     [] Poor     [x] Other: NPO Appetite/meal intake prior to admission:   [] Good     [] Fair     [] Poor     [x] Other: unknown, NPO Feeding Limitations:  [x] Swallowing difficulty    [] Chewing difficulty    [] Other: 
Current Meal Intake:  
Patient Vitals for the past 100 hrs: 
 % Diet Eaten 10/27/18 1819 0 % 10/27/18 1405 0 % 10/27/18 0927 0 % BM: 10/29 Skin Integrity:  Left arm skin tear Edema:   [x] No     [] Yes Pertinent Medications: Reviewed: bowel regimen, vitamin B complex Recent Labs 10/29/18 
1911 10/28/18 
4082 10/27/18 
2999  135* 136  
K 5.0 4.1 4.6  102 100 CO2 26 26 27 GLU 93 124* 85 BUN 25* 23* 22* CREA 0.81 0.83 0.84 CA 8.1* 8.1* 7.7* MG  --  2.4  --   
ALB  --  2.9*  --   
SGOT  --  24  --   
ALT  --  47  -- Intake/Output Summary (Last 24 hours) at 10/29/2018 1620 Last data filed at 10/29/2018 1342 Gross per 24 hour Intake 864 ml Output 575 ml Net 289 ml Anthropometrics: 
Ht Readings from Last 1 Encounters:  
10/08/18 5' 6\" (1.676 m) Last 3 Recorded Weights in this Encounter 10/08/18 2046 Weight: 67.6 kg (149 lb) Body mass index is 24.05 kg/m². Weight History:    Noted 4 lb wt loss x 10 month PTA per chart hx 
 
Weight Metrics 10/8/2018 12/11/2017 6/27/2017 9/7/2016 5/24/2016 Weight 149 lb 153 lb 153 lb 155 lb 154 lb BMI 24.05 kg/m2 25.07 kg/m2 25.07 kg/m2 25.4 kg/m2 25.23 kg/m2 Admitting Diagnosis: R CVA Acute ischemic stroke ([address] Pertinent PMHx:  Kidney stones, gastric ulcer, CAD, dyslipidemia, COPD, hypothyroidism Education Needs:        [x] None identified  [] Identified - Not appropriate at this time  []  Identified and addressed - refer to education log Learning Limitations:   [] None identified  [x] Identified: bilateral hearing loss Cultural, Denominational & ethnic food preferences:  [x] None identified    [] Identified and addressed ESTIMATED NUTRITION NEEDS:  
 
Calories: 7922-2955 kcal (MSJx1.2-1.4) based on  [x] Actual BW: 68 kg      [] IBW Protein: 54-68 gm (0.8-1 gm/kg) based on  [x] Actual BW      [] IBW Fluid: 1 mL/kcal 
  
MONITORING & EVALUATION:  
 
Nutrition Goal(s): 1. Patient will tolerate enteral nutrition formula and regimen without difficulty within the next 7 days. Outcome:  [x] Met/Ongoing    []  Not Met    [] New/Initial Goal  
2. Patient will meet their estimated nutritional needs through adequate enteral nutrition within the next 7 days. Outcome:  [x] Met/Ongoing    []  Not Met    [] New/Initial Goal  
 
Monitoring:   [] Food and beverage intake   [] Diet order   [x] Nutrition-focused physical findings   [x] Treatment/therapy   [] Weight   [x] Enteral nutrition intake Previous Recommendations (for follow-up assessments only):     [x]   Implemented       []   Not Implemented (RD to address)      [] No Longer Appropriate     [] No Recommendation Made Discharge Planning:  Goal tube feeds via PEG [x] Participated in care planning, discharge planning, & interdisciplinary rounds as appropriate Jasper Mcclendon RD Pager: [phone]

## 2018-10-29 NOTE — PROGRESS NOTES
Problem: Self Care Deficits Care Plan (Adult) Goal: *Therapy Goal (Edit Goal, Insert Text) Occupational Therapy Goals Long Term Goals Initiated 10/9/18 and to be accomplished within 4 week(s) 1. Pt will perform self-feeding with total dependence. 2. Pt will perform grooming with supervision. 3. Pt will perform UB bathing with Min A. 
4. Pt will perform LB bathing with Min A prn AE. 5. Pt will perform tub/shower transfer with Mod A.  
6. Pt will perform UB dressing with Setup. 7. Pt will perform LB dressing with Min A prn AE. 8. Pt will perform toileting task with Min A. 
9. Pt will perform toilet transfer with Mod A. Occupational Therapy discharge Patient: Mellissa Aragon (2 y.o. male) Date: 10/29/2018 Time In: 1130 Time Out[de-identified] 9992 Primary Diagnosis: R CVA Acute ischemic stroke ([facility] [address] Acute ischemic stroke ([facility] [address] Precautions:   Aspiration, Fall(Head of bed 30-45 degrees at all times per speech therapist) Barriers to Learning/Limitations: yes;  sensory deficits-vision/hearing/speech and physical 
Compensate with: visual, verbal, tactile, kinesthetic cues/model Patient identified with name and :yes SUBJECTIVE:  
Patient reported he was tired during current date bathing and dressing. OBJECTIVE DATA SUMMARY:  
 
Past Medical History:  
Diagnosis Date  Acute ischemic stroke ([facility] [address] 10/2/2018 Acute Ischemic Stroke (acute infarct of the posterior superior right basal ganglia and adjacent right corona radiata) with residual left hemiparesis, dysphagia and dysarthria  Benign prostatic hyperplasia  Bilateral hearing loss  Cervical spinal stenosis 10/2/2018  Chronic obstructive pulmonary disease (COPD) ([facility] [address]  Coronary artery disease involving native coronary artery of native heart  Diastolic dysfunction without heart failure  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) 10/2/2018  Dyslipidemia  Dysphagia as late effect of cerebrovascular accident (CVA) 10/2/2018  Gastric ulcer  Glaucoma  Hemiparesis affecting left side as late effect of cerebrovascular accident (CVA) ([facility] [address] 10/2/2018  History of kidney stones  History of transient ischemic attack (TIA) 2x  Hypertensive heart disease without heart failure  Hypothyroidism  Meningioma ([facility] [address] 10/3/2018 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  
Procedure Laterality Date  HX CORONARY ARTERY BYPASS GRAFT  2011 S/P CABG x 4  
 HX GASTROSTOMY  10/05/2018  HX HEMORRHOIDECTOMY Prior Level of Function/Home: Independent Situation:  
Home Situation Home Environment: Private residence # Steps to Enter: 4 Rails to Enter: Yes Hand Rails : Bilateral 
Wheelchair Ramp: No 
One/Two Story Residence: One story Living Alone: No 
Support Systems: Spouse/Significant Other/Partner Patient Expects to be Discharged to[de-identified] Private residence Current DME Used/Available at Home: Alisa Saleh, tigre, Nathaniel Schmidt, rollator Tub or Shower Type: Shower [x]     Right hand dominant   []     Left hand dominant Pain: 
Pt reports 0/10 pain or discomfort prior to treatment. Pt reports 0/10 pain or discomfort post treatment. Problem List:   
Decreased strength B UE  [x]  Left UE hemiparesis Decreased strength trunk/core  [x] Decreased AROM   [x]  Left UE hemiparesis Decreased PROM  [x]  Left UE shoulder Decreased balance sitting  [x] Decreased balance standing  [x] Decreased endurance  [x] Pain  [] Functional Limitations:  
Decreased independence with ADL  [x] Decreased independence with functional transfers  [x] Decreased independence with ambulation  [x] Decreased independence with IADL  [x] Outcome Measures: MMT Initial Assessment Right Upper Extremity  Left Upper Extermity UE AROM WFL Hemiparesis Shoulder flexion - - Shoulder extension - - Shoulder ABDuction - - Shoulder ADDUction - -  
Elbow Flexion - -  
Elbow Extension - - Wrist Extension/Flexion - -  
 - -  
 
 
  
 
MMT Discharge Assessment Right Upper Extremity  Left Upper Extermity UE AROM WFL Hemiparesis Shoulder flexion 3+/5 MMT - Shoulder extension - - Shoulder ABDuction - - Shoulder ADDUction - -  
Elbow Flexion 4/5 MMT -  
Elbow Extension 4/5 MMT -  
Wrist Extension/Flexion - -  
 - -  
 
 
0/5 No palpable muscle contraction 1/5 Palpable muscle contraction, no joint movement 2-/5 Less than full range of motion in gravity eliminated position 2/5 Able to complete full range of motion in gravity eliminated position 2+/5 Able to initiate movement against gravity 3-/5 More than half but not full range of motion against gravity 3/5 Able to complete full range of motion against gravity 3+/5 Completes full range of motion against gravity with minimal resistance 4-/5 Completes full range of motion against gravity with minimal resistance 4/5 Completes full range of motion against gravity with moderate resistance 5/5 Completes full range of motion against gravity with maximum resistance Coordination: Left UE hemiparesis Sensation: Left UE impaired FIM SCORES Initial Assessment Discharge Assessment Eating 0 NPO Feeding/Eating Feeding/Eating Assistance: 1 (Dependent/total assistance) Grooming 4 Pt long sitting in bed; washed face with mod vcs for sequencing (needed cues to remove glasses before washing face). Pt lying supine in bed performed hand hygeine with min A for thoroughness. Grooming Grooming Assistance : 5 (Supervision) Comments: Pt seated at sink wc level to wash face. Oral Hygiene FIM: 5  Bathing 2  
 
 Pt completed bathing in bed; long sitting he washed left arm, chest, anterior pericare, and right and left thigh. Side lying he washed buttocks and posterior pericare. Upper Body Bathing Bathing Assistance, Upper: 4 (Minimal assistance) Position Performed: Seated in chair Comments: Pt seated at sink wc level; pt wash left and right axillary, left and right arm, chest and abdomen. pt required total A to hold left UE while right UE wash left axillary, and pt required min A to wash right UE for thoroughness. Pt washed majority of right UE via laying washcloth on lap and rubbing right UE over wash cloth. Lower Body Bathing Bathing Assistance, Lower : 3 (Moderate assistance ) Position Performed: Long sitting on bed;Supine Comments: Pt long sitting bed level; pt wash anterior pericare and right and left thigh, and right lower leg. pt total A for right and left foot, left lower leg, and uncovered portion of left buttocks. Pt side lying washed posterior pericare and part of right buttocks. Majority of buttocks not washed due to large bandange for skin breakdown. Upper Body Dressing 2 Pt doff/don pullover shirt with Max A and Max vcs. Pt required mod assist to lean forward; pt attempted to reach behind head to pull shirt overhead and required mod assist for this. Pt required mod assist to remove BUE from shirt. Pt required max assist to thread left arm through arm hole, and setup for him to thread right arm. Pt donned shirt overhead with min A. Pt requried max A to pull shirt down Upper Body Dressing Dressing Assistance : 4 (Minimal assistance) Comments: pt seated in wc. pt doff pullover shirt via hemitechnique with extended time. To don pullover shirt, pt required min A to thread left arm and mod vcs to pull shirt down back; pt thread right arm, put over head, and pull down with extended time. Lower Body Dressing 2 Pt lying supine in bed; pt required total assist to thread legs in pants. Pt bend knees with max assist and stabilization for left leg; pt bridge with stabilization of left leg and requried min A to pull pants up to waist.  Lower Body Dressing Dressing Assistance : 2 (Maximal assistance) Leg Crossed Method Used: No 
Position Performed: Long sitting on bed;Supine Comments: Pt total A to thread left LE and mod A to thread right LE. Pt bridge with total A to pull pants up on left side. Sock and/or Shoe Management FIM: 1 Toileting 1 Pt require max A for clothing management and max A for maintaining midline sitting. Toileting Toileting Assistance (FIM Score): 1 (Total assistance) Bladder - level of assist 1 Bladder - accident frequency score 2 Bowel - level of assist 2 Bowel - accident frequency score 2 Tub/Shower Transfer 0 NT due to safety 0 NT due to safety Toilet Transfer 2 Functional Transfers Toilet Transfer : (slide board) Amount of Assistance Required: 2 (Maximal assistance) Comprehension 3 5 Expression 3 5 Social Interaction 3 7 Problem Solving 2 3 Memory 2 5 Please see [facility] Interdisciplinary Eval: Coordination/Balance Section for details regarding FIM score description. Activity Tolerance:  
poor ASSESSMENT: 
Patient has met 3/9 goals (1-3). Pt did not meet goals 4-9. Pt requires mod A for LB bathing and max A for lower body dressing due to left side weakness, decreased endurance, and trunk weakness. Shower/tub transfer was not tested through out stay due to patient safety. Pt requires min A for upper body dressing due to requiring min A to thread the left UE, requiring mod vcs to pull shirt down once overhead, and requiring extended time for undressing and dressing. Pt requires max A to complete slide board toilet transfer when transferring to the right due to pt's tendency to push toward the left.  Pt would benefit from continued skilled OT services in skilled nursing facility to increase his independence and safety in self care and functional transfers. Progression toward goals: 
[]      Improving appropriately and progressing toward goals [x]      Improving slowly and progressing toward goals [x]      Not making progress toward goals and plan of care will be adjusted PLAN: 
Pt would benefit from continued skilled occupational therapy in order to improve independent functional mobility within the home with use of least restrictive device. Interventions may include range of motion (AROM, PROM B LE/trunk), motor function (B UE/trunk strengthening/coordination), activity tolerance (vitals, oxygen saturation levels), bed mobility training, balance activities, gait training (progressive ambulation program), and functional transfer training. Discharge Recommendations: [address] Further Equipment Recommendations for Discharge: None at current time, when transition home recommend bedside commode with drop arm. Please refer to the flowsheet for vital signs taken during this treatment. After treatment:  
[]  Patient left in no apparent distress sitting up in chair 
[x]  Patient left in no apparent distress in bed 
[x]  Call bell left within reach [x]  Nursing notified 
[]  Caregiver present 
[]  Bed alarm activated COMMUNICATION/EDUCATION:  
Communication/Collaboration: 
[]      Home safety education was provided and the patient/caregiver indicated understanding. [x]      Patient/family have participated as able and agree with findings and recommendations. []      Patient is unable to participate in plan of care at this time. NINA Frye 
10/29/2018

## 2018-10-29 NOTE — PROGRESS NOTES
Problem: Mobility Impaired (Adult and Pediatric) Goal: *Therapy Goal (Edit Goal, Insert Text) Physical Therapy Goals Initiated 10/9/2018, updated 10/22/2018, and to be accomplished within 7 day(s) 10/29/2018 1. Patient will move from supine to sit and sit to supine , scoot up and down and roll side to side in bed with moderate assistance consistently . Not met consistently 10/29/18 2. Patient will transfer from bed to chair and chair to bed with moderate assistance  using the least restrictive device consistently. Not met consistently 10/29/18 3. Patient will perform sit to stand with moderate assistance consistently without pushing to left . Not met consistently 10/29/18 4. Patient will ambulate with maximal assistance for 5 feet with the least restrictive device. Not met consistently 10/29/18 5. Patient will perform 150 ft of wheelchair mobility with modified technique min assist for steering and negotiation of obstacles. Not met consistently 10/29/18 6. Patient will be able to maintain dynamic sitting balance without support for at least 5 minutes with verbal cues only. for maintaining midline/upright position for ease of transfers consistently throughout the day. Goal met 10/29/18 Physical Therapy Goals Initiated 10/9/2018 and to be accomplished within 28 day(s) on 11/6/2018 1. Patient will move from supine to sit and sit to supine , scoot up and down and roll side to side in bed with supervision/set-up. Not met consistently 10/29/18 2. Patient will transfer from bed to chair and chair to bed with supervision/set-up using the least restrictive device. Not met consistently 10/29/18 3. Patient will perform sit to stand with supervision/set-up. Not met consistently 10/29/18 4. Patient will ambulate with minimal assistance/contact guard assist for 50 feet with the least restrictive device. Not met consistently 10/29/18 5.  Patient will ascend/descend 4 stairs with 2 handrail(s) with minimal assistance/contact guard assist.Not met consistently 10/29/18 6. Patient will perform 150 ft of wheelchair mobility at modified independent level. Not met consistently 10/29/18 7. Patient will demonstrate improved postural control for ease of functional mobility as demonstrated by PASS score >16/36Not met consistently 10/29/18 physical Therapy Discharge note Patient: Chase Lennon (89 y.o. male) Date: 10/29/2018 Diagnosis: R CVA Acute ischemic stroke ([facility] [address] Acute ischemic stroke ([facility] [address] Precautions: Aspiration, Fall(Head of bed 30-45 degrees at all times per speech therapist) Chart, physical therapy assessment, plan of care and goals were reviewed. Pain: 
Pt pain was reported as 0  pre-treatment. Pt pain was reported as 0  post-treatment. Intervention: none Patient identified with name and :yes SUBJECTIVE:  
 
Patient stated: I'm dizzy. OBJECTIVE DATA SUMMARY:  
AROM:  
 
FIM SCORES Initial Assessment Discharge Assessment Bed/Chair/Wheelchair Transfers 2 2 Wheelchair Mobility 1 Walking [place] 1 Steps/Stairs 0 PRIMARY MODE OF LOCOMOTION:  
Please see [facility] Interdisciplinary Eval: Coordination/Balance Section for details regarding FIM score description. BED/CHAIR/WHEELCHAIR TRANSFERS Initial Assessment Discharge Assessment Rolling Right 3 (Moderate assistance ) 3 (Moderate assistance ) Rolling Left 3 (Moderate assistance ) 4 (Minimal assistance) Supine to Sit 2 (Maximal assistance) 3 (Moderate assistance) Sit to Stand Maximum assistance Maximum assistance Sit to Supine 2 (Maximal assistance) 3 (Moderate assistance) Transfer Assist Score 2 2 Transfer Type SPT without device Lateral with transfer board Comments needing assist with lifting,lowering,pivoting to transfer surface Car Transfer Not tested Car Type N/A    
 
 
 [facility] MOBILITY/MANAGEMENT Initial Assessment Discharge Assessment Able to Propel 72 ft (72 ft) Functional Level 2 Curbs/ramps assistance required 0 (Not tested) 0 (Not tested) Wheelchair set up assistance required 2 (Maximal assistance) 1 (Dependent/total assistance) Wheelchair management Manages right brake/left brake Manages left brake;Manages right brake(with VCs and extender) WALKING INDEPENDENCE Initial Assessment Discharge Assessment Assistive device Other (comment)(parallel bars) (N/A) Ambulation assistance - level surface Distance 0 (ft) (0 ft) Functional Level 1 Comments Unsafe at this time Ambulation assistance - unlevel surface STEPS/STAIRS Initial Assessment Discharge Assessment Steps/Stairs ambulated 0 ( unsafe at this time) Rail Use Right  None Functional Level 0 0 Comments Unsafe at this time Unsafe at this time Curbs/Ramps NT NT  
 
LTGs: Pt with progress noted with seated static balance, other LTGs were not met Neuro Re-Education: Pt with impaired dynamic seated balance and requires eKi for corrections with LOB. Pt was challenged with reaching using RUE to L side with minimal assistance to return to center. Therapeutic Exercises:  
 
 
ASSESSMENT:Pt was seen for deficits in strength, mobility, transfers, ambulation, balance and safety. He made limited progress during stay at acute rehabilitation setting, however he is eager to participate and may be more suitable for skilled rehab setting. Progress was noted with seated balance that initially was poor and fair statically. Pt was introduced to lateral sliding board transfers and he was able to make slight gains with transfers and use of LLE weight bearing, however this was inconsistent. Pt fatigues easily and c/o dizziness upon transferring and today especially upon sitting in WC.  His vitals are taken and his BP is 120/52, HR 62bpm, and Sp02 is 99%. Nurse, [facility] is made aware and will monitor regularly. PLAN: 
Pt would benefit from continued skilled physical therapy in order to improve independent functional mobility at  level. Interventions may include range of motion (AROM, PROM B LE/trunk), motor function (B LE/trunk strengthening/coordination), activity tolerance (vitals, oxygen saturation levels), bed mobility training, balance activities, gait training (progressive ambulation program), and functional transfer training. Discharge Recommendations:  DC to SNF 10/30/18 Further Equipment Recommendations for Discharge:  TBD upon next facility progress Activity Tolerance:  Pt with fair tolerance to session. Pt c/o dizziness with transfers and with static sitting. ease refer to the flowsheet for vital signs taken during this treatment. After treatment:  
[x] Patient left in no apparent distress sitting up in chair 
[] Patient left in no apparent distress in bed 
[x] Call bell left within reach [x] Nursing notified 
[] Caregiver present 
[] Bed alarm activated Ross Offer 10/29/2018

## 2018-10-29 NOTE — PROGRESS NOTES
conducted a Follow up consultation and Spiritual Assessment for Aidan Fuentes, who is a 80 y. o.,male. The  provided the following Interventions: 
Continued the relationship of care and support. Listened empathically. Offered prayer and assurance of continued prayer on patients behalf. Chart reviewed. The following outcomes were achieved: 
Patient expressed gratitude for 's visit. Assessment: 
There are no further spiritual or [employer] issues which require Spiritual Care Services interventions at this time. Plan: 
Chaplains will continue to follow and will provide pastoral care on an as needed/requested basis.  recommends bedside caregivers page  on duty if patient shows signs of acute spiritual or emotional distress.  [facility] Spiritual Care  
[phone]

## 2018-10-29 NOTE — PROGRESS NOTES
Problem: Dysphagia (Adult) Goal: *Speech Goal: (INSERT TEXT) Long term goals: 1. Patient will tolerate small amounts of PO using safe swallowing techniques without overt s/s of aspiration in 4/5 trials. 2. Patient will perform oral/pharyngeal strengthening exercises with supervision. 3. Patient will participate in laryngeal strengthening exercises and swallowing maneuvers, min cues - supervison. 4. Patient will recall 3 words after 5 minutes, min assist-supervision. 5. Patient will perform functional problem solving and reasoning tasks with supervision. Short term goals (by 10/30/18): 1. Patient will tolerate small amounts of PO puree with the SLP using safe swallowing techniques without overt s/s of aspiration in 4/5 trials. 2. Patient will perform oral/pharyngeal strengthening exercises with min assist/cues. 3. Patient will participate in laryngeal strengthening exercises and swallowing maneuvers, min cues. 4. Patient will recall 3 words after 5 minutes, mod-min assist. 
5. Patient will perform functional problem solving and reasoning tasks with 80-90% accuracy. Speech language pathology treatment Patient: Stefani No (44 y.o. male) Date: 10/29/2018 Diagnosis: R CVA Acute ischemic stroke ([facility] Acute ischemic stroke ([facility] SUBJECTIVE:  
Patient stated I'm sleepy. OBJECTIVE:  
Mental Status: 
Mr. Luis Miguel Crespo was very tired in both sessions today. He had participate with the OT or PT prior to each session with the SLP. SLP noted that his left facial weakness appeared mor pronounced this morning. This was reported to the physician. Treatment & Interventions:  
Mr. Luis Miguel Crespo was seen for two half hour speech therapy sessions. SLP frequently needed to provide verbal/tactile cues to remain alert. The following treatment tasks were presented: Motor Speech:  
Vowel prolongations:  7-13 seconds in very soft, breathy voice Laryngeal valving:  Fair; incomplete air stoppage with glottal stops and increased nalality Pitch glides:   Very limited range Production of final /k/:  Productions less forceful that when last seen Production of \"ng\" in words: Mod assist for effective /g/ 
Oral/pharygeal ex:  Lip movements changed (weaker on the left than last week) Difficulty with posterior tongue to palate contact Neuro-Linguistics:  
Orientation:   Min assist 
Recent memory:  Supervision-min assistResponse & Tolerance to Activities: 
Overall, Mr. Lalit Verdugo appeared significantly changed (weaker oral movements, breathier voice) than when last seen by this SLP on Thursday. Concerns were reported to the unit physician. Pain: 
Pain Scale 1: Numeric (0 - 10) No complaint of pain After treatment:  
[x]       Patient left in no apparent distress sitting up in chair 
[]       Patient left in no apparent distress in bed 
[x]       Call bell left within reach [x]       Nursing notified 
[]       Caregiver present 
[]       Bed alarm activated ASSESSMENT:  
Progression toward goals: 
[]       Improving appropriately and progressing toward goals [x]       Improving slowly and progressing toward goals 
[]       Not making progress toward goals and plan of care will be adjusted PLAN:  
Patient continues to benefit from skilled intervention to address the above impairments. Continue treatment at SNF post discharge. Discharge Recommendations:  [address] Estimated LOS: Patient for discharge to SNF on 10/30/18 JAIME Lewis Time Calculation:  60 Minutes

## 2018-10-29 NOTE — ROUTINE PROCESS
SHIFT CHANGE NOTE FOR [facility] 
  
Bedside and Verbal shift change report given to HARESH Aguilera (oncoming nurse) by Amber Wynelle Lanes, RN (offgoing nurse). Report included the following information SBAR, Kardex, MAR and Recent Results. 
  
Situation: 
            [facility] Status: DNR 
            Reason for Admission: Right ischemic stroke Hospital Day: 17 
            Problem List:  
           
                   
Hospital Problems  Date Reviewed: 10/25/2018  
        Codes Class Noted POA  
  Hyponatremia ICD-10-CM: E87.1 ICD-9-CM: 276.1   10/22/2018 No  
     
  History of transient ischemic attack (TIA) ICD-10-CM: W86.26 
ICD-9-CM: V12.54   Unknown Yes  
  Overview Signed 10/9/2018  3:28 PM by Laura Kruger MD  
    2x 
   
  
     
  Vitamin D deficiency (Chronic) ICD-10-CM: E55.9 ICD-9-CM: 188. 9   10/9/2018 Yes  
  Overview Signed 10/10/2018  1:59 PM by Laura Kruger MD  
    Vitamin D 25-Hydroxy (10/10/2018) = 22.5  
   
  
     
  Hypertensive heart disease without heart failure (Chronic) ICD-10-CM: I11.9 ICD-9-CM: 402.90   Unknown Yes  
     
  Status [facility] ICD-10-CM: Z93.1 ICD-9-CM: V44.1   10/5/2018 Yes  
     
  * (Principal) Acute ischemic stroke ([facility] ICD-10-CM: I63.9 ICD-9-CM: 434.91   10/2/2018 Yes  
  Overview Signed 10/8/2018  6:32 PM by Laura Kruger MD  
    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 
   
  
     
  Impaired mobility and ADLs ICD-10-CM: Z74.09 
ICD-9-CM: 799.89   10/2/2018 Yes  
     
  Hemiparesis affecting left side as late effect of cerebrovascular accident (CVA) ([facility] ICD-10-CM: P03.198 ICD-9-CM: 438.20   10/2/2018 Yes  
     
  Dysphagia as late effect of cerebrovascular accident (CVA) ICD-10-CM: F57.002 ICD-9-CM: 438.82   10/2/2018 Yes  
     
   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) ICD-10-CM: L16.648 ICD-9-CM: 438.13   10/2/2018 Yes  
     
  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 0   1/1/2011 Yes  
     
   
  
  
Background: 
            Past Medical History:  
       
Past Medical History:  
Diagnosis Date  Acute ischemic stroke ([facility] 10/2/2018  
  Acute Ischemic Stroke (acute infarct of the posterior superior right basal ganglia and adjacent right corona radiata) with residual left hemiparesis, dysphagia and dysarthria  Benign prostatic hyperplasia    
 Bilateral hearing loss    
 Cervical spinal stenosis 10/2/2018  Chronic obstructive pulmonary disease (COPD) ([facility]    
 Coronary artery disease involving native coronary artery of native heart    
 Diastolic dysfunction without heart failure    
 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) 10/2/2018  Dyslipidemia    
 Dysphagia as late effect of cerebrovascular accident (CVA) 10/2/2018  Gastric ulcer    
 Glaucoma    
 Hemiparesis affecting left side as late effect of cerebrovascular accident (CVA) ([facility] 10/2/2018  History of kidney stones    
 History of transient ischemic attack (TIA)    
  2x  Hypertensive heart disease without heart failure    
 Hypothyroidism    
 Meningioma ([facility] 10/3/2018  
  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    
 Traumatic amputation of left thumb 2011  Urinary frequency    
 Vitamin D deficiency 10/9/2018  
  Vitamin D 25-Hydroxy (10/10/2018) = 22.5   
  
            Patient taking anticoagulants yes Heparin 
            Patient has a defibrillator: no  
  
Assessment: 
? Changes in Assessment throughout shift: none 
  
? Patient has central line: no Reasons if yes: Insertion date: Last dressing date: 
? Patient has Suarez Cath: no Reasons if yes:   
Insertion date: 
  
? Last Vitals: 
  
           
Vitals:   10/25/18 0532 10/25/18 0732 10/25/18 0750 10/25/18 1555 BP: 150/59 151/56 157/58 147/60 Pulse: 65 67 68 67 Resp:   18 18 20 Temp:   96.7 °F (35.9 °C)   97.7 °F (36.5 °C) SpO2:   96% 95% 97% Weight:          
Height:          
  
  
· PAIN 
                        Pain Assessment 
                        Pain Intensity 1: 0 (10/25/18 1555) Pain Intensity 1: 2 (12/29/14 1105)                         SPIC Location 1: Leg Pain Location 1: Abdomen 
                        Pain Intervention(s) 1: Medication (see MAR) Pain Intervention(s) 1: Medication (see MAR) Patient Stated Pain Goal: 0 Patient Stated Pain Goal: 0 
? Intervention effective: yes   
? Other actions taken for pain: turn reposition rest 
  
· Skin Assessment Skin color Skin Color: Appropriate for ethnicity Condition/Temperature Skin Condition/Temp: Dry, Warm Integrity Skin Integrity: Abrasion Turgor Turgor: Non-tenting Weekly Pressure Ulcer Documentation  Pressure  Injury Documentation: No Pressure Injury Noted-Pressure Ulcer Prevention Initiated Wound Prevention & Protection Methods Orientation of wound Orientation of Wound Prevention: Posterior Location of Prevention Location of Wound Prevention: Sacrum/Coccyx Dressing Present Dressing Present : No 
Dressing Status Dressing Status: Intact Wound Offloading Wound Offloading (Prevention Methods): Bed, pressure redistribution/air, Repositioning 
  
· INTAKE/OUPUT 
             
Date 10/24/18 1900 - 10/25/18 0732 10/25/18 0700 - 10/26/18 4320 Shift 7457-6672 24 Hour Total 1089-4691 6427-1733 24 Hour Total  
INTAKE  
P. O.     0   0  
  P.O.     0   0 NG/ 1310 1450   1450  
  Water Flush Volume (mL) (PEG/Gastrostomy Tube 10/06/18) 150 450 150   150  
  Medication Volume (PEG/Gastrostomy Tube 10/06/18) 200 320 200   200  
  Intake (ml) (PEG/Gastrostomy Tube 10/06/18)   540 1100   1100 Shift Total(mL/kg) 350(5.2) 1310(19.4) 1450(21.5)   1450(21.5) OUTPUT  
 Urine(mL/kg/hr) 250 550        
  Urine Voided 250 550        
  Urine Occurrence(s) 5 x 8 x 4 x   4 x Emesis/NG output            
  Emesis Occurrence(s)     0 x   0 x Stool            
  Stool Occurrence(s) 0 x 0 x 0 x   0 x Shift Total(mL/kg) 250(3.7) 550(8.1)        
 874 6647   1450 Weight (kg) 67.6 67.6 67.6 67.6 67.6  
  
  
Recommendations: 1. Patient needs and requests: Assist with ADL's, TF,  
  
2. Diet: NPO with bolus TF 
  
3. Pending tests/procedures: LABS  
  
4. Functional Level/Equipment: 1 person assist, BSC, W/C 
  
5. Estimated Discharge Date: TBD Posted on Whiteboard in Patients Room: yes  
  
HEALS Safety Check 
  
A safety check occurred in the patient's room between off going nurse and oncoming nurse listed above. 
  
The safety check included the below items Area Items H High Alert Medications § Verify all high alert medication drips (heparin, PCA, etc.) E Equipment § Suction is set up for ALL patients (with sonia) § Red plugs utilized for all equipment (IV pumps, etc.) § WOWs wiped down at end of shift. § Room stocked with oxygen, suction, and other unit-specific supplies A Alarms § Bed alarm is set for fall risk patients § Ensure chair alarm is in place and activated if patient is up in a chair L Lines § Check IV for any infiltration § Suarez bag is empty if patient has a Suarez § Tubing and IV bags are labeled Alexander Gunter Safety 
  § Room is clean, patient is clean, and equipment is clean. § Hallways are clear from equipment besides carts. § Fall bracelet on for fall risk patients § Ensure room is clear and free of clutter § Suction is set up for ALL patients (with sonia) § Hallways are clear from equipment besides carts.   
§ Isolation precautions followed, supplies available outside room, sign posted

## 2018-10-29 NOTE — ROUTINE PROCESS
SHIFT CHANGE NOTE FOR [facility] 
  
Bedside and Verbal shift change report given to Gretta Johns, RN (oncoming nurse) by Ale Zaragoza RN (offgoing nurse). Report included the following information SBAR, Kardex, MAR and Recent Results. 
  
Situation: 
            [facility] Status: DNR 
            Reason for Admission: Right ischemic stroke Hospital Day: 17 
            Problem List:  
           
                   
Hospital Problems  Date Reviewed: 10/25/2018  
        Codes Class Noted POA  
  Hyponatremia ICD-10-CM: E87.1 ICD-9-CM: 276.1   10/22/2018 No  
     
  History of transient ischemic attack (TIA) ICD-10-CM: Q91.68 
ICD-9-CM: V12.54   Unknown Yes  
  Overview Signed 10/9/2018  3:28 PM by Sandy Gilbert MD  
    2x 
   
  
     
  Vitamin D deficiency (Chronic) ICD-10-CM: E55.9 ICD-9-CM: 338. 9   10/9/2018 Yes  
  Overview Signed 10/10/2018  1:59 PM by Sandy Gilbert MD  
    Vitamin D 25-Hydroxy (10/10/2018) = 22.5  
   
  
     
  Hypertensive heart disease without heart failure (Chronic) ICD-10-CM: I11.9 ICD-9-CM: 402.90   Unknown Yes  
     
  Status post [facility] ICD-10-CM: Z93.1 ICD-9-CM: V44.1   10/5/2018 Yes  
     
  * (Principal) Acute ischemic stroke ([facility] ICD-10-CM: I63.9 ICD-9-CM: 434.91   10/2/2018 Yes  
  Overview Signed 10/8/2018  6:32 PM by Sandy Gilbert MD  
    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 
   
  
     
  Impaired mobility and ADLs ICD-10-CM: Z74.09 
ICD-9-CM: 799.89   10/2/2018 Yes  
     
  Hemiparesis affecting left side as late effect of cerebrovascular accident (CVA) ([facility] ICD-10-CM: D68.802 ICD-9-CM: 438.20   10/2/2018 Yes  
     
  Dysphagia as late effect of cerebrovascular accident (CVA) ICD-10-CM: O05.645 ICD-9-CM: 438.82   10/2/2018 Yes  
     
   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) ICD-10-CM: H97.294 ICD-9-CM: 438.13   10/2/2018 Yes  
     
  Traumatic amputation of left thumb ICD-10-CM: I05.366R ICD-9-CM: 397. 0   1/1/2011 Yes  
     
   
  
  
Background: 
            Past Medical History:  
       
Past Medical History:  
Diagnosis Date  Acute ischemic stroke ([facility] 10/2/2018  
  Acute Ischemic Stroke (acute infarct of the posterior superior right basal ganglia and adjacent right corona radiata) with residual left hemiparesis, dysphagia and dysarthria  Benign prostatic hyperplasia    
 Bilateral hearing loss    
 Cervical spinal stenosis 10/2/2018  Chronic obstructive pulmonary disease (COPD) ([facility]    
 Coronary artery disease involving native coronary artery of native heart    
 Diastolic dysfunction without heart failure    
 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) 10/2/2018  Dyslipidemia    
 Dysphagia as late effect of cerebrovascular accident (CVA) 10/2/2018  Gastric ulcer    
 Glaucoma    
 Hemiparesis affecting left side as late effect of cerebrovascular accident (CVA) ([facility] 10/2/2018  History of kidney stones    
 History of transient ischemic attack (TIA)    
  2x  Hypertensive heart disease without heart failure    
 Hypothyroidism    
 Meningioma ([facility] 10/3/2018  
  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    
 Traumatic amputation of left thumb 2011  Urinary frequency    
 Vitamin D deficiency 10/9/2018  
  Vitamin D 25-Hydroxy (10/10/2018) = 22.5   
  
            Patient taking anticoagulants yes Heparin 
            Patient has a defibrillator: no  
  
Assessment: 
? Changes in Assessment throughout shift: none 
  
? Patient has central line: no Reasons if yes: Insertion date: Last dressing date: 
? Patient has Suarez Cath: no Reasons if yes:   
Insertion date: 
  
? Last Vitals: 
  
           
Vitals:   10/25/18 0532 10/25/18 0732 10/25/18 0750 10/25/18 1555 BP: 150/59 151/56 157/58 147/60 Pulse: 65 67 68 67 Resp:   18 18 20 Temp:   96.7 °F (35.9 °C)   97.7 °F (36.5 °C) SpO2:   96% 95% 97% Weight:          
Height:          
  
  
· PAIN 
                        Pain Assessment 
                        Pain Intensity 1: 0 (10/25/18 1555) Pain Intensity 1: 2 (12/29/14 1105)                         [facility] Location 1: Leg Pain Location 1: Abdomen 
                        Pain Intervention(s) 1: Medication (see MAR) Pain Intervention(s) 1: Medication (see MAR) Patient Stated Pain Goal: 0 Patient Stated Pain Goal: 0 
? Intervention effective: yes   
? Other actions taken for pain: turn reposition rest 
  
· Skin Assessment Skin color Skin Color: Appropriate for ethnicity Condition/Temperature Skin Condition/Temp: Dry, Warm Integrity Skin Integrity: Abrasion Turgor Turgor: Non-tenting Weekly Pressure Ulcer Documentation  Pressure  Injury Documentation: No Pressure Injury Noted-Pressure Ulcer Prevention Initiated Wound Prevention & Protection Methods Orientation of wound Orientation of Wound Prevention: Posterior Location of Prevention Location of Wound Prevention: Sacrum/Coccyx Dressing Present Dressing Present : No 
Dressing Status Dressing Status: Intact Wound Offloading Wound Offloading (Prevention Methods): Bed, pressure redistribution/air, Repositioning 
  
· INTAKE/OUPUT 
             
Date 10/24/18 1900 - 10/25/18 4432 10/25/18 0700 - 10/26/18 6082 Shift 5365-6901 24 Hour Total 7560-5813 0683-8362 24 Hour Total  
INTAKE  
P. O.     0   0  
  P.O.     0   0 NG/ 1310 1450   1450  
  Water Flush Volume (mL) (PEG/Gastrostomy Tube 10/06/18) 150 450 150   150  
  Medication Volume (PEG/Gastrostomy Tube 10/06/18) 200 320 200   200  
  Intake (ml) (PEG/Gastrostomy Tube 10/06/18)   540 1100   1100 Shift Total(mL/kg) 350(5.2) 1310(19.4) 1450(21.5)   1450(21.5) OUTPUT  
 Urine(mL/kg/hr) 250 550        
  Urine Voided 250 550        
  Urine Occurrence(s) 5 x 8 x 4 x   4 x Emesis/NG output            
  Emesis Occurrence(s)     0 x   0 x Stool            
  Stool Occurrence(s) 0 x 0 x 0 x   0 x Shift Total(mL/kg) 250(3.7) 550(8.1)        
 122 0778   1450 Weight (kg) 67.6 67.6 67.6 67.6 67.6  
  
  
Recommendations: 1. Patient needs and requests: Assist with ADL's, TF,  
  
2. Diet: NPO with bolus TF 
  
3. Pending tests/procedures: LABS  
  
4. Functional Level/Equipment: 1 person assist, BSC, W/C 
  
5. Estimated Discharge Date: TBD Posted on Whiteboard in Patients Room: yes  
  
HEALS Safety Check 
  
A safety check occurred in the patient's room between off going nurse and oncoming nurse listed above. 
  
The safety check included the below items Area Items H High Alert Medications § Verify all high alert medication drips (heparin, PCA, etc.) E Equipment § Suction is set up for ALL patients (with sonia) § Red plugs utilized for all equipment (IV pumps, etc.) § WOWs wiped down at end of shift. § Room stocked with oxygen, suction, and other unit-specific supplies A Alarms § Bed alarm is set for fall risk patients § Ensure chair alarm is in place and activated if patient is up in a chair L Lines § Check IV for any infiltration § Suarez bag is empty if patient has a Suarez § Tubing and IV bags are labeled Andrena Garland Safety 
  § Room is clean, patient is clean, and equipment is clean. § Hallways are clear from equipment besides carts. § Fall bracelet on for fall risk patients § Ensure room is clear and free of clutter § Suction is set up for ALL patients (with sonia) § Hallways are clear from equipment besides carts.   
§ Isolation precautions followed, supplies available outside room, sign posted

## 2018-10-29 NOTE — PROGRESS NOTES
Sw loaded clinical updates to edischarge for [facility] to review. Sw will follow.

## 2018-10-30 VITALS
DIASTOLIC BLOOD PRESSURE: 66 MMHG | OXYGEN SATURATION: 96 % | RESPIRATION RATE: 18 BRPM | SYSTOLIC BLOOD PRESSURE: 156 MMHG | TEMPERATURE: 97.9 F | HEART RATE: 67 BPM | HEIGHT: 66 IN | BODY MASS INDEX: 23.95 KG/M2 | WEIGHT: 149 LBS

## 2018-10-30 LAB — SODIUM SERPL-SCNC: 133 MMOL/L (ref 136–145)

## 2018-10-30 PROCEDURE — 36415 COLL VENOUS BLD VENIPUNCTURE: CPT | Performed by: INTERNAL MEDICINE

## 2018-10-30 PROCEDURE — 74011250637 HC RX REV CODE- 250/637: Performed by: INTERNAL MEDICINE

## 2018-10-30 PROCEDURE — 74011250636 HC RX REV CODE- 250/636: Performed by: INTERNAL MEDICINE

## 2018-10-30 PROCEDURE — 84295 ASSAY OF SERUM SODIUM: CPT | Performed by: INTERNAL MEDICINE

## 2018-10-30 RX ADMIN — Medication 100 MG: at 10:10

## 2018-10-30 RX ADMIN — VITAMIN D, TAB 1000IU (100/BT) 2000 UNITS: 25 TAB at 10:09

## 2018-10-30 RX ADMIN — MODAFINIL 100 MG: 100 TABLET ORAL at 10:09

## 2018-10-30 RX ADMIN — AMLODIPINE BESYLATE 10 MG: 10 TABLET ORAL at 05:53

## 2018-10-30 RX ADMIN — HYDRALAZINE HYDROCHLORIDE 20 MG: 10 TABLET, FILM COATED ORAL at 10:09

## 2018-10-30 RX ADMIN — TIMOLOL MALEATE 1 DROP: 5 SOLUTION OPHTHALMIC at 09:00

## 2018-10-30 RX ADMIN — GUAIFENESIN 100 MG: 100 SOLUTION ORAL at 13:04

## 2018-10-30 RX ADMIN — DEXTROMETHORPHAN 30 MG: 30 SUSPENSION, EXTENDED RELEASE ORAL at 10:09

## 2018-10-30 RX ADMIN — HEPARIN SODIUM 5000 UNITS: 5000 INJECTION, SOLUTION INTRAVENOUS; SUBCUTANEOUS at 05:56

## 2018-10-30 RX ADMIN — DOCUSATE SODIUM 100 MG: 100 CAPSULE, LIQUID FILLED ORAL at 10:10

## 2018-10-30 RX ADMIN — LEVOTHYROXINE SODIUM 50 MCG: 50 TABLET ORAL at 05:53

## 2018-10-30 RX ADMIN — GUAIFENESIN 100 MG: 100 SOLUTION ORAL at 10:09

## 2018-10-30 RX ADMIN — PANTOPRAZOLE SODIUM 40 MG: 40 GRANULE, DELAYED RELEASE ORAL at 05:54

## 2018-10-30 RX ADMIN — ASPIRIN 81 MG CHEWABLE TABLET 81 MG: 81 TABLET CHEWABLE at 10:10

## 2018-10-30 RX ADMIN — LISINOPRIL 40 MG: 40 TABLET ORAL at 05:53

## 2018-10-30 RX ADMIN — METOPROLOL TARTRATE 25 MG: 25 TABLET ORAL at 10:09

## 2018-10-30 RX ADMIN — Medication 1 TABLET: at 10:10

## 2018-10-30 NOTE — DISCHARGE SUMMARY
[facility] PHYSICAL REHABILITATION  [address] [place] [address], [address]     INPATIENT REHABILITATION  DISCHARGE SUMMARY    Name: Taina Courtney MRN: [id number]   Age / Sex: 80 y.o. / male CSN: [id number]   YOB: 1931 Length of Stay: 22 days   Admit Date: 10/8/2018 Discharge Date: 10/30/2018        PRIMARY CARE PHYSICIAN: Reji Glass MD      DISCHARGE DIAGNOSES:    Primary Rehabilitation Diagnosis  1. Impaired Mobility and ADLs  2. Acute Ischemic Stroke (acute infarct of the posterior superior right basal ganglia and adjacent right corona radiata) with residual left hemiparesis, dysphagia and dysarthria     Comorbidities   Urinary frequency R35.0    Nocturia R35.1    Glaucoma H40.9    History of kidney stones Z87.442    Cervical spinal stenosis M48.02    Meningioma  D32.9    Hypertensive heart disease without heart failure X62.3    Diastolic dysfunction without heart failure I51.89    Gastric ulcer K25.9    Coronary artery disease involving native coronary artery of native heart I25.10    Dyslipidemia E78.5    Chronic obstructive pulmonary disease (COPD)  J44.9    Status post insertion of percutaneous endoscopic gastrostomy (PEG) tube  Z93.1    Hypothyroidism E03.9    Benign prostatic hyperplasia N40.0    Bilateral hearing loss H91.93    History of transient ischemic attack (TIA) Z86.73    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 Chest x-ray (PA-lateral) (10/12/2018) showed a small left pleural effusion; prominent interstitial lung markers could represent mild infiltration or chronic interstitial lung disease. 2. Chest x-ray (PA-lateral) (10/24/2018) showed pulmonary edema with bilateral pleural effusions.   3. Chest x-ray (PA-lateral) (10/27/2018) showed similar to improved bilateral pleural effusions with resolved interstitial edema and improved lung base haziness which may have reflected mild pulmonary edema and/or atelectasis. BRIEF HISTORY: The patient is an 44-year-old White male with multiple medical comorbidities who was admitted to [facility] [place] on 10/2/2018 due to left-sided weakness. The patient was apparently well until 3 days prior to admission, while walking through the hallway in his house using a quad cane, patient had a fall and he hit his head against the furniture as he fell to the floor. The wife dit not report any loss of consciousness. The patient refused to go t the doctor for medical attention. On the day of admission, the patient was brought to the office of his PCP and after evaluation, he was sent to the [facility] [place] Emergency Department for further evaluation.     CT scan of the head (10/2/2018) showed atrophy, ischemic change and chronic lacunar infarctions; no hemorrhage, mass effect or convincing CT evidence of acute cortical infarction; no other significant abnormality.     CT scan of the cervical spine (10/2/2018) showed no evidence of fracture or dislocation; central stenosis is maximal at C3-C4 and considerable at this level; multilevel neural foramen stenosis also maximal at C3-C4 and on the right at C6-C7.     Chest x-ray (10/2/2018) showed no acute cardiopulmonary disease.     The patient was admitted under the service of the [address] [place] [address] (Dr. Paolo Joyce).    Excerpt (History of Present Illness) from the H&P by Dr. Paolo Joyce:  \"Mr Danny Pena is a 80 y.o. Male w/ PMH remarkable for HTN, CAD CABG who presents to the ED w/ chief complaint of progressive left arm and leg weakness since Saturday. Patient's wife reports fall and symptoms that started on Saturday where she wanted him to be evaluated but patient refused and wanted to be seen by his PCP today. PCP referred him to the emergency room for further evaluation.  Patient does not have any headache, vision changes, swallowing issues, neck pain, numbness in arms or legs. It appears the fall was mechanical per the wife. Wife reports patient progressively worsening over the past few months. Wife says he only takes baby aspirin but not Plavix. Patient with no chest pain or shortness of breath currently but complains of bilateral lower extremity edema. \"     Neurological exam by Dr. Licha Cline was WNL except for 4/5 motor strength on the LUE and LLE     MRI of the brain (10/3/2018) showed an acute ischemic infarct posterior superior right basal ganglia and adjacent right corona radiata; combination of chronic lacunar infarcts, chronic ischemic changes and prominent perivascular spaces bilateral basal ganglia and thalami; bilateral deep white matter and pontine signal changes likely chronic microvascular ischemic disease; incidental small right parafalcine meningioma without significant mass effect; nonspecific partial opacification bilateral mastoid and epitympanum.     MRI of the cervical spine (10/3/2018) showed high- grade spinal stenosis at C3-4 with lesser degrees of stenosis at C4-5, C5-6 and C6-7; there is likely subtle cord edema around C3-4 due to the severity of stenosis; potentially significant foraminal narrowing at several levels, most notably bilaterally at C3-4 and on the right at C6-7; clinical indication states abnormal cervical spine imaging with bone destruction; there is no destructive lesion seen on this examination; there is no recent cervical spine imaging showing bone destruction.     MRA of the head (10/3/2018) multifocal areas of intracranial stenosis most pronounced proximal right posterior cerebral artery and right M1 segment middle cerebral artery; hypoplastic right vertebral artery terminating in PICA.     MRA of the neck (10/3/2018) showed moderate stenosis proximal bilateral ICAs, 40% on the right and 38% on the left; developmentally small right vertebral artery temrinating in PICA, with multifocal areas of stenosis; no significant left vertebral stenosis; moderate to severe stenosis right M1 segment, mild to moderate stenosis left M1 segment.      Neurology consult (Dr. Gin Sanches) was called for evaluation and comanagement of the stroke.     Dr. Gardenia Chen recommended dual antiplatelet therapy and a high-dose statin. He also recommended repeat imaging of his meningioma in 1 year.     Aspirin was continued. Atorvastatin and Clopidogrel were added. Metoprolol tartrate was continued. Amlodipine, Lisinopril and Hydralazine were added for additional blood pressure control.     2D echocardiogram (10/3/2018) showed EF 55%; mild concentric LVH; mild diastolic dysfunction; no masses; shunts or thrombi seen; negative bubble study x 2.     Gastroenterology consult (Dr. Néstor Garcia) was called for evaluation of dysphagia and placement of PEG tube.     The patient underwent an Insertion of percutaneous endoscopic gastrostomy (PEG) tube on 10/5/2018 done by Dr. Néstor Garcia. The patient tolerated the procedure well with no reported complications.      The patient had remained hemodynamically stable but due to the abovementioned neurologic deficit, the patient was noted to have impaired mobility and ADLs. Patient was felt to be a good candidate for acute inpatient rehabilitation. Upon evaluation by Physical Therapy and Occupational Therapy, the patient was recommended for acute inpatient rehabilitation. The patient was discharged and was subsequently admitted to the [facility] for Physical Rehabilitation for intensive rehabilitation to help recover strength, function and mobility. COURSE IN THE HOSPITAL: Upon admission to the [facility] for Physical Rehabilitation, the patient underwent physical therapy, occupational therapy and speech therapy. The patient was able to actively participate in the rehabilitation activities and progressed well. On discharge, the patient was able to perform the following activities:    1.  Occupational Therapy    ON ADMISSION ON DISCHARGE   Eating  Functional Level: 1   Eating  Functional Level: 1     Grooming  Functional Level: 4   Grooming  Functional Level: 4     Bathing  Functional Level: 2   Bathing  Functional Level: 2     Upper Body Dressing  Functional Level: 2   Upper Body Dressing  Functional Level: 2     Lower Body Dressing  Functional Level: 2   Lower Body Dressing  Functional Level: 2     Toileting  Functional Level: 1   Toileting  Functional Level: 1     Toilet Transfers  Toilet Transfer Score: 2   Toilet Transfers  Toilet Transfer Score: 0     Tub /Shower Transfers  Tub/Shower Transfer Score: 0   Tub/Shower Transfers  Tub/Shower Transfer Score: 0       2.  Physical Therapy    ON ADMISSION ON DISCHARGE   Wheelchair Mobility/Management  Able to Propel (ft): 15 feet  Functional Level: 1  Curbs/Ramps Assist Required (FIM Score): 0 (Not tested)  Wheelchair Setup Assist Required : 2 (Maximal assistance)  Wheelchair Management: Manages right brake Wheelchair Mobility/Management  Able to Propel (ft): (72 ft)  Functional Level: (2)  Curbs/Ramps Assist Required (FIM Score): 0 (Not tested)  Wheelchair Setup Assist Required : 1 (Dependent/total assistance)  Wheelchair Management: Manages left brake, Manages right brake(with VCs and extender)     Gait  Amount of Assistance: 1 (Dependent/total assistance)  Distance (ft): 2 Feet (ft)  Assistive Device: Other (comment)(parallel bars) Gait  Amount of Assistance: 0 (Not tested)(2/2 unsafe at this time)  Distance (ft): (0 ft)  Assistive Device: (N/A)     Balance-Sitting/Standing  Sitting - Static: Fair (occasional)  Sitting - Dynamic: Poor (constant support)  Standing - Static: Poor, Constant support  Standing - Dynamic : Impaired  Other (comment): Postural Assessment Scale for Stroke Patients (PASS): 6/36 Indicating significant difficulty maintaining and changing postures Balance-Sitting/Standing  Sitting - Static: Fair (occasional)  Sitting - Dynamic: Poor (constant support)  Standing - Static: Poor  Standing - Dynamic : Impaired  Other (comment): Postural Assessment Scale for Stroke Patients (PASS): 6/36 Indicating significant difficulty maintaining and changing postures     Bed/Mat Mobility  Rolling Right : 3 (Moderate assistance )  Rolling Left : 3 (Moderate assistance )  Supine to Sit : 2 (Maximal assistance)  Sit to Supine : 2 (Maximal assistance) Bed/Mat Mobility  Rolling Right : 3 (Moderate assistance )  Rolling Left : 4 (Minimal assistance)  Supine to Sit : 3 (Moderate assistance)  Sit to Supine : 3 (Moderate assistance)     Transfers  Transfer Type: SPT without device  Other: stand pivot and squat pivot transfer  Transfer Assistance : 2 (Maximal assistance)  Sit to Stand Assistance: Maximum assistance  Car Transfers: Not tested  Car Type: N/A Transfers  Transfer Type: Lateral with transfer board  Other: also performing squat pivot transfer max assist  Transfer Assistance : 3 (Moderate assistance )  Sit to Stand Assistance: Maximum assistance  Car Transfers: Not tested  Car Type: N/A     Steps or Stairs  Steps/Stairs Ambulated (#): 0  Level of Assist : 0 (Not tested)(not safe to assess at this time)  Rail Use: Right  Steps or Stairs  Steps/Stairs Ambulated (#): ( unsafe at this time)  Level of Assist : 0 (Not tested)  Rail Use: None       3. Speech and Language Pathology    ON ADMISSION ON DISCHARGE   Comprehension (Native Language)  Primary Mode of Comprehension: Auditory  Score: 3  Comments: (Hard of Hearing) Comprehension (Native Language)  Primary Mode of Comprehension: Auditory  Score: 6  Comments: Bilateral hearing aids.      Expression (Native Language)  Primary Mode of Expression: Verbal  Score: 3  Comments: Needing min cues for speaking up to be able to be understood by therapist   Expression (Native Language)  Primary Mode of Expression: Verbal  Score: 4  Comments: Due to low volume with fatigue     Social Interaction/Pragmatics  Score: 3  Comments: minimal encouragement to participate in therapy Social Interaction/Pragmatics  Score: 5  Comments: minimal encouragement to participate in therapy     Problem Solving  Score: 2  Comments: Functional problem solving for initiating transfers, bed mobility needing moderate cues, also needing significant cues for attempting to sequence wheelchair mobility using right UE/LE   Problem Solving  Score: 5  Comments: Functional problem solving for initiating transfers, bed mobility needing moderate cues, also needing significant cues for attempting to sequence wheelchair mobility using right UE/LE     Memory  Score: 2  Comments: able to recall information with only occasional reminder provided by spouse/therapist Memory  Score: 4  Comments: able to recall information with only occasional reminder provided by spouse/therapist       Legend:   7 - Independent   6 - Modified Independent   5 - Standby Assistance / Supervision / Set-up   4 - Minimum Assistance / Contact Guard Assistance   3 - Moderate Assistance   2 - Maximum Assistance   1 - Total Assistance / Dependent       ACUTE MEDICAL ISSUES ADDRESSED IN INPATIENT REHABILITATION FACILITY:     > Acute Ischemic Stroke (acute infarct of the posterior superior right basal ganglia and adjacent right corona radiata) with residual left hemiparesis, dysphagia and dysarthria   > Continue:    > Aspirin 81 mg via PEG tube once daily in AM    > Atorvastatin 80 mg via PEG tube q HS    > Coenzyme Q10 100 mg via PEG tube once daily    > Clopidogrel 75 mg via PEG tube once daily in PM    > Vitamin B complex 1 tab via PEG tube once daily    > Dysphagia as late effect of CVA; S/P Insertion of percutaneous endoscopic gastrostomy (PEG) tube (10/5/2018)   > NPO with tube feeding   > Jevity 1.5 237 ml via PEG tube 5 times daily (6AM, 10AM, 2PM, 6PM, 10PM)   > On 10/15/2018, increased water flush from 100 ml to 150 ml via PEG tube before and after each bolus feeding   > On 10/22/2018, decreased water flush from 150 ml to 75 ml via PEG tube before and after each bolus feeding   > On 10/25/2018, decreased water flush from 75 ml to 50 ml via PEG tube before and after each bolus feeding    > Hypertensive heart disease without heart failure   > On admission to the ARU, decreased Hydralazine from 50 mg to 25 mg via PEG tube q 8 hr (6AM, 2PM, 10PM)   > On 10/10/2018, decrease Hydralazine from 25 mg via PEG tube q 8 hr (6AM, 2PM, 10PM) to 20 mg via PEG tube q 12 hr (9AM, 9PM)   > On 10/18/2018, Amlodipine 10 mg via PEG tube once daily (changed from 9AM to 6AM)   > Chest x-ray (PA-lateral) (10/24/2018) showed pulmonary edema with bilateral pleural effusions.    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   > On 10/11/2018, started Trazodone 50 mg via PEG tube q HS   > On 10/23/2018, changed Trazodone 50 mg via PEG tube q HS to Mirtazapine 15 mg via PEG tube q HS   > Continue Mirtazapine 15 mg via PEG tube q HS    > Neurocognitive modulation   > On 10/11/2018, started:    > Trazodone 50 mg via PEG tube q HS    > Modafinil 100 mg via PEG tube q AM   > On 10/23/2018, changed Trazodone 50 mg via PEG tube q HS to Mirtazapine 15 mg via PEG tube q HS   > Continue:    > Mirtazapine 15 mg via PEG tube q HS     > Modafinil 100 mg via PEG tube q AM    > Difficulty sleeping   > On admission to the ARU, started Melatonin 3 mg via PEG tube q HS   > On 10/11/2018, started Trazodone 50 mg via PEG tube q HS   > On 10/17/2018, increased Melatonin from 3 mg to 5 mg via PEG tube q HS    > On 10/23/2018, changed Trazodone 50 mg via PEG tube q HS to Mirtazapine 15 mg via PEG tube q HS   > Continue:    > Melatonin 5 mg via PEG tube q HS     > Mirtazapine 15 mg via PEG tube q HS    > Cough   > WBC count (10/9/2018) = 6.8   > Chest x-ray (10/12/2018) showed a small left pleural effusion; prominent interstitial lung markers could represent mild infiltration or chronic interstitial lung disease.   > On 10/14/2018, patient was given Furosemide 20 mg via PEG tube x 1 dose   > No fever or desaturation noted since admission   > On 10/15/2018, started:    > Dextromethorphan 30 mg via PEG tube q 12 hr    > Guaifenesin 100 mg via PEG tube 4 times daily   > WBC count (10/22/2018) = 6.1   > On 10/22/2018, started:    > Albuterol nebulization q 4 hr PRN for shortness of breath    > O2 inhalation 2 LPM via nasal cannula PRN for SpO2 less than 90%   > Chest x-ray (PA-lateral) (10/24/2018) showed pulmonary edema with bilateral pleural effusions.    > On 10/24/2018, patient was given Furosemide 40 mg PO x 1 dose    > BNP (10/25/2018) = 4,485   > On 10/26/2018, patient was given Furosemide 40 mg PO x 1 dose    > BNP (10/27/2018) = 2,664   > Chest x-ray (10/27/2018) showed similar to improved bilateral pleural effusions with resolved interstitial edema and improved lung base haziness which may have reflected mild pulmonary edema and/or atelectasis.   > No fever over the past 24 hours   > Continue:    > Dextromethorphan 30 mg via PEG tube q 12 hr    > Guaifenesin 100 mg via PEG tube 4 times daily    > Albuterol nebulization q 4 hr PRN for shortness of breath    > O2 inhalation 2 LPM via nasal cannula PRN for SpO2 less than 90%    > Hypotonic hyponatremia   > Na (10/9/2018, on admission) = 143   > BUN/Creatinine ratio (10/9/2018) = 26 (BUN 20, Creatinine 0.77)    > Na (10/15/2018) = 137   > BUN/Creatinine ratio (10/15/2018) = 37 (BUN 34, Creatinine 0.92)     > On 10/15/2018, increased water flush from 100 ml to 150 ml via PEG tube before and after each bolus feeding   > Na (10/22/2018) = 127   > BUN/Creatinine ratio (10/22/2018) = 29 (BUN 22, Creatinine 0.76)    > Work-up (10/22/2018):    > Serum Osm = 264    > Urine Na = 20    > Urine Osm 404    > Urine SG = 1.014   > On 10/22/2018:    > Decreased water flush from 150 ml to 75 ml via PEG tube before and after each bolus feeding    > Started NaCl 1 gram via PEG tube q 8 hr     > Monitor serum Na q 6 hr   > Na (10/22/2018, 7:00 PM) = 124   > Na (10/23/2018, 12:55 AM) = 127   > Na (10/23/2018, 6:37 AM) = 131   > Na (10/23/2018, 12:05 PM) = 130   > Na (10/23/2018, 7:20 PM) = 130   > Na (10/24/2018, 1:05 AM) = 131   > Na (10/24/2018, 6:03 AM) = 132   > Na (10/24/2018, 12:07 PM) = 132   > BUN/Creatinine ratio (10/24/2018) = 29 (BUN 24, Creatinine 0.84)    > TSH (10/24/2018) = 1.93   > On 10/24/2018:    > Patient was given Furosemide 40 mg PO x 1 dose     > Increased NaCl from 1 gram via PEG tube q 8 hr to 1 gram via PEG tube 4 times daily    > Decreased monitor frequency of serum Na from q 6 hr to q 12 hr   > Na (10/24/2018, 8:48 PM) = 130   > Na (10/25/2018, 6:16 AM) = 133   > On 10/25/2018:    > Patient was given Furosemide 40 mg PO x 1 dose     > Decreased water flush from 75 ml to 50 ml via PEG tube before and after each bolus feeding   > Na (10/25/2018, 6:50 PM) = 136   > Na (10/26/2018, 6:05 AM) = 135    > On 10/26/2018:    > Patient was given Furosemide 40 mg PO x 1 dose     > Decreased monitoring of serum Na from q 12 hr to once daily   > Na (10/27/2018) = 136   > BUN/Creatinine ratio (10/27/2018) = 26 (BUN 22, Creatinine 0.84)   > On 10/27/2018, discontinued NaCl 1 gram via PEG tube 4 times daily   > Na (10/28/2018) = 135    > BUN/Creatinine ratio (10/28/2018) = 28 (BUN 23, Creatinine 0.83)    > TSH (10/28/2018) = 1.46   > Na (10/29/2018) = 136   > BUN/Creatinine ratio (10/29/2018) = 31 (BUN 25, Creatinine 0.81)    > Na (10/30/2018) = 133   > Continue Water flush 50 ml via PEG tube before and after each bolus feeding      MEDICATIONS ON DISCHARGE:    Current Discharge Medication List      START taking these medications    Details   aspirin 81 mg chewable tablet 1 Tab by Per G Tube route daily. Qty: 15 Tab, Refills: 0    Associated Diagnoses: Acute ischemic stroke (HCC)      albuterol (PROVENTIL VENTOLIN) 2.5 mg /3 mL (0.083 %) nebulizer solution 3 mL by Nebulization route every four (4) hours as needed for Wheezing or Shortness of Breath. Qty: 10 Each, Refills: 0    Associated Diagnoses: Chronic obstructive pulmonary disease, unspecified COPD type ([facility]      amLODIPine (NORVASC) 10 mg tablet 1 Tab by Per G Tube route daily. Qty: 15 Tab, Refills: 0    Associated Diagnoses: Hypertensive heart disease without heart failure      atorvastatin (LIPITOR) 80 mg tablet 1 Tab by Per G Tube route nightly. Qty: 15 Tab, Refills: 0    Associated Diagnoses: Acute ischemic stroke ([facility] Coronary artery disease involving native coronary artery of native heart without angina pectoris; Dyslipidemia      bisacodyl (DULCOLAX) 10 mg suppository Insert 10 mg into rectum every forty-eight (48) hours as needed. Qty: 10 Suppository, Refills: 0      cholecalciferol (VITAMIN D3) 1,000 unit tablet Take 2 Tabs by mouth daily. [to be given via PEG tube]  Qty: 30 Tab, Refills: 0    Associated Diagnoses: Vitamin D deficiency      clopidogrel (PLAVIX) 75 mg tab 1 Tab by PEG Tube route every evening. Qty: 15 Tab, Refills: 0    Associated Diagnoses: Acute ischemic stroke ([facility] Coronary artery disease involving native coronary artery of native heart without angina pectoris      co-enzyme Q-10 (CO Q-10) 100 mg capsule Take 1 Cap by mouth daily. [to be given via PEG tube]  Qty: 15 Cap, Refills: 0    Associated Diagnoses: Acute ischemic stroke ([facility] Coronary artery disease involving native coronary artery of native heart without angina pectoris;  Dyslipidemia      dextromethorphan (DELSYM) 30 mg/5 mL liquid 5 mL by Per G Tube route every twelve (12) hours for 10 days. Qty: 100 mL, Refills: 0    Associated Diagnoses: Cough      docusate sodium (COLACE) 100 mg capsule 1 Cap by Per G Tube route two (2) times a day. Qty: 30 Cap, Refills: 0      guaiFENesin (ROBITUSSIN) 100 mg/5 mL liquid 5 mL by Per G Tube route four (4) times daily. Qty: 1 Bottle, Refills: 0    Associated Diagnoses: Cough      heparin sodium,porcine (HEPARIN, PORCINE,) 5,000 unit/mL injection 1 mL by SubCUTAneous route every twelve (12) hours every twelve (12) hours. Qty: 30 Syringe, Refills: 0    Associated Diagnoses: Impaired mobility and ADLs      hydrALAZINE (APRESOLINE) 10 mg tablet 2 Tabs by Per G Tube route every twelve (12) hours. Qty: 60 Tab, Refills: 0    Associated Diagnoses: Hypertensive heart disease without heart failure      levothyroxine (SYNTHROID) 50 mcg tablet 1 Tab by Per G Tube route every morning. Qty: 15 Tab, Refills: 0    Associated Diagnoses: Hypothyroidism, unspecified type      lisinopril (PRINIVIL, ZESTRIL) 40 mg tablet 1 Tab by Per G Tube route daily. Qty: 15 Tab, Refills: 0    Associated Diagnoses: Hypertensive heart disease without heart failure      melatonin tab tablet Take 1 Tab by mouth nightly. [to be given via PEG tube]  Qty: 15 Tab, Refills: 0    Associated Diagnoses: Difficulty sleeping      mirtazapine (REMERON) 15 mg tablet 1 Tab by Per G Tube route nightly. Qty: 15 Tab, Refills: 0    Associated Diagnoses: Difficulty sleeping      modafinil (PROVIGIL) 100 mg tablet 1 Tab by Per G Tube route daily. Max Daily Amount: 100 mg. Qty: 15 Tab, Refills: 0    Associated Diagnoses: Acute ischemic stroke (HCC)      pantoprazole (PROTONIX) 40 mg granules for oral suspension 40 mg by Per G Tube route Daily (before breakfast). Qty: 15 Each, Refills: 0    Associated Diagnoses:  At risk for aspiration pneumonia; Gastric ulcer, unspecified chronicity, unspecified whether gastric ulcer hemorrhage or perforation present      b complex vitamins tablet Take 1 Tab by mouth daily. [to be given via PEG tube]  Qty: 15 Tab, Refills: 0    Associated Diagnoses: Acute ischemic stroke ([address]         CONTINUE these medications which have CHANGED    Details   finasteride (PROSCAR) 5 mg tablet Take 1 Tab by mouth every evening. [to be given via PEG tube]  Qty: 15 Tab, Refills: 0    Associated Diagnoses: Urinary frequency; Benign prostatic hyperplasia with urinary frequency      metoprolol tartrate (LOPRESSOR) 25 mg tablet 1 Tab by Per G Tube route every twelve (12) hours. Qty: 30 Tab, Refills: 0    Associated Diagnoses: Hypertensive heart disease without heart failure; Coronary artery disease involving native coronary artery of native heart without angina pectoris      tamsulosin (FLOMAX) 0.4 mg capsule Take 1 Cap by mouth nightly. [to be given via PEG tube]  Qty: 15 Cap, Refills: 0    Associated Diagnoses: Urinary frequency; Benign prostatic hyperplasia with urinary frequency         CONTINUE these medications which have NOT CHANGED    Details   timolol maleate 0.5 % drpd ophthalmic solution Administer 1 Drop to both eyes daily. Indications: ocular hypertension      Vit A,C,E-Zinc-Copper (PRESERVISION) cap capsule Take 1 Cap by mouth two (2) times a day.          STOP taking these medications       omeprazole (PRILOSEC) 40 mg capsule Comments:   Reason for Stopping:         aspirin delayed-release 81 mg tablet Comments:   Reason for Stopping:         diclofenac EC (VOLTAREN) 75 mg EC tablet Comments:   Reason for Stopping:           Estimated Glomerular Filtration Rate:  On admission, estimated GFR based on a Creatinine of 0.77 mg/dl:              Using CKD-EPI = 81.6 mL/min/1.73m2              Using MDRD = 101.6 mL/min/1.73m2  Most recent estimated GFR, based on a Creatinine of 0.81 mg/dl on 10/29/2018:              Using CKD-EPI = 79.9 mL/min/1.73m2              Using MDRD = 95.8 mL/min/1.73m2       DISCHARGE VITAL SIGNS:  Visit Vitals  /66 (BP 1 Location: Right arm, BP Patient Position: Sitting)   Pulse 67   Temp 97.9 °F (36.6 °C)   Resp 18   Ht 5' 6\" (1.676 m)   Wt 67.6 kg (149 lb)   SpO2 96%   BMI 24.05 kg/m²     Patient Vitals for the past 24 hrs:   Temp Pulse Resp BP SpO2   10/30/18 0826 97.9 °F (36.6 °C) 67 18 156/66 96 %   10/30/18 0553  95  122/70    10/29/18 1646 99 °F (37.2 °C) 96 19 125/86 100 %       DISCHARGE PHYSICAL EXAMINATION:  GENERAL SURVEY: Patient is awake, alert, oriented x 3, laying supine on the bed, not in acute respiratory distress. HEENT: pink palpebral conjunctivae, anicteric sclerae, no nasoaural discharge, moist oral mucosa  NECK: supple, no jugular venous distention, no palpable lymph nodes  CHEST/LUNGS: symmetrical chest expansion, good air entry, clear breath sounds  HEART: adynamic precordium, good S1 S2, no S3, regular rhythm, no murmurs  ABDOMEN: (+) PEG tube in place, flat, bowel sounds appreciated, soft, non-tender  EXTREMITIES: (+) amputated distal phalanx of the thumb of the left hand, pink nailbeds, no edema, full and equal pulses, no calf tenderness   NEUROLOGICAL EXAM: The patient is awake, alert and oriented x3, able to answer questions fairly appropriately, able to follow 1 and 2 step commands.  Able to tell time from the wall clock.  Cranial nerves II-XII are grossly intact except for slurred speech and dysphagia.  No gross sensory deficit.  Motor strength is 4+/5 on the RUE and RLE, 4/5 on the LUE, 3-/5 on the LLE. CONDITION ON DISCHARGE: Stable.       DISPOSITION: Patient clinically improved and was discharged / transferred to a subacute rehabilitation facility ([facility] [address]) to continue his rehabilitative therapies      FOLLOW-UP RECOMMENDATIONS:   Follow-up Information     Follow up With Specialties Details Why Contact Info    Anitra Mukherjee MD Internal Medicine Schedule an appointment as soon as possible for a visit In 1 to 2 weeks after discharge from the [facility] nursing [address] [place] [address]      Bety Levine MD Neurology Schedule an appointment as soon as possible for a visit In 1 to 2 weeks after [facility].  [address]:  1. Diet.               > NPO with tube feeding              > Jevity 1.5 237 ml via PEG tube 5 times daily (6AM, 10AM, 2PM, 6PM, 10PM)              > Water flush 50 ml via PEG tube before and after each bolus feeding  2. Activity. As tolerated. 3. Safety / fall precautions. 4. Aspiration precautions. 5. Heel suspension boots on both feet when supine in bed. 6. Float heels. 7. Turn and position patient when supine on bed every 2 hours (from 6AM to 10PM). 8. Gastrostomy tube site care. 9. Do not resuscitate status. TIME SPENT ON DISCHARGE ACTIVITIES: More than 30 minutes.       Signed:  Claretta Dakin, MD    10/30/2018

## 2018-10-30 NOTE — ROUTINE PROCESS
0800 Pt. Awake sitting up in bed no change in assessment no signs of distress pt. Stated to be feeling fine. 1000 Pt. Repositioned in bed tolerated Jevity 1.5 TF well no residual. 
1200 no complaints. Pt. Stated to be ready for discharge to [facility] 1330 report given to [facility] SPECIALTY [facility] . Medical transport arrived to  pt. By stretcher to transport to [facility].

## 2018-10-30 NOTE — PROGRESS NOTES
Nirav spoke with pt int he room and pt's spouse via phone to advise of planned dc today to [facility] per request.  
 
Transport is arranged for 130 this afternoon. Nirav loaded dc documents to edischarge.

## 2018-10-30 NOTE — REHAB NOTE
Bedside and Verbal shift change report given to Ayaan Fuller RN (oncoming nurse) by Jackelyn Julio RN (offgoing nurse). Report included the following information SBAR, Kardex, MAR and Recent Results. SITUATION:  
? Code Status: DNR 
? Reason for Admission: R CVA ? Acute ischemic stroke ([email or web address] ? Hospital day: 22 
? Problem List:  
   
Hospital Problems  Date Reviewed: 10/29/2018 Codes Class Noted POA Hyponatremia ICD-10-CM: E87.1 ICD-9-CM: 276.1  10/22/2018 No  
   
 History of transient ischemic attack (TIA) ICD-10-CM: J39.76 
ICD-9-CM: V12.54  Unknown Yes Overview Signed 10/9/2018  3:28 PM by Kulwinder Paz MD  
  2x Vitamin D deficiency (Chronic) ICD-10-CM: E55.9 ICD-9-CM: 268.9  10/9/2018 Yes Overview Signed 10/10/2018  1:59 PM by Kulwinder Paz MD  
  Vitamin D 25-Hydroxy (10/10/2018) = 22.5 Hypertensive heart disease without heart failure (Chronic) ICD-10-CM: I11.9 ICD-9-CM: 402.90  Unknown Yes At risk for aspiration pneumonia (Chronic) ICD-10-CM: Z91.89 ICD-9-CM: V49.89  10/8/2018 Yes Cough (Chronic) ICD-10-CM: P55 ICD-9-CM: 786.2  10/8/2018 Yes Difficulty sleeping (Chronic) ICD-10-CM: G47.9 ICD-9-CM: 780.50  10/7/2018 Yes Status post insertion of percutaneous endoscopic gastrostomy (PEG) tube ([email or web address] ICD-10-CM: Z93.1 ICD-9-CM: V44.1  10/5/2018 Yes * (Principal) Acute ischemic stroke ([facility] ICD-10-CM: I63.9 ICD-9-CM: 434.91  10/2/2018 Yes Overview Signed 10/8/2018  6:32 PM by Kulwinder Paz MD  
  Acute Ischemic Stroke (acute infarct of the posterior superior right basal ganglia and adjacent right corona radiata) with residual left hemiparesis, dysphagia and dysarthria Impaired mobility and ADLs ICD-10-CM: Z74.09 
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  
 Past Medical History:  
Past Medical History:  
Diagnosis Date  Acute ischemic stroke ([facility] 10/2/2018 Acute Ischemic Stroke (acute infarct of the posterior superior right basal ganglia and adjacent right corona radiata) with residual left hemiparesis, dysphagia and dysarthria  At risk for aspiration pneumonia 10/8/2018  Benign prostatic hyperplasia  Bilateral hearing loss  Cervical spinal stenosis 10/2/2018  Chronic obstructive pulmonary disease (COPD) ([facility]  Coronary artery disease involving native coronary artery of native heart  Cough 10/8/2018  Diastolic dysfunction without heart failure  Difficulty sleeping 10/7/2018  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) 10/2/2018  Dyslipidemia  Dysphagia as late effect of cerebrovascular accident (CVA) 10/2/2018  Gastric ulcer  Glaucoma  Hemiparesis affecting left side as late effect of cerebrovascular accident (CVA) ([facility] 10/2/2018  History of kidney stones  History of transient ischemic attack (TIA) 2x  Hypertensive heart disease without heart failure  Hypothyroidism  Meningioma ([facility] 10/3/2018 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  
? Changes in Assessment Throughout Shift: none ? Patient has Central Line: no Reasons if yes: ? Patient has Suarez Cath: no Reasons if yes:   
 
? Last Vitals: 
  
Vitals:  
 10/29/18 5639 10/29/18 9882 10/29/18 1646 10/30/18 8339 BP: 133/60 133/60 125/86 122/70 Pulse: 75  96 95 Resp: 17  19 Temp: 98.7 °F (37.1 °C)  99 °F (37.2 °C) SpO2: 92%  100% Weight:      
Height:      
 
 
? IV and DRAINS (will only show if present) PEG/Gastrostomy Tube 10/06/18-Site Assessment: Clean, dry, & intact ? WOUND (if present) Wound Type:  none Dressing present Dressing Present : Yes Wound Concerns/Notes:  none ? PAIN Pain Assessment Pain Intensity 1: 0 (10/30/18 0400) Pain Location 1: Leg 
  Pain Intervention(s) 1: Medication (see MAR) Patient Stated Pain Goal: 0 
o Interventions for Pain:  none 
o Intervention effective: no 
o Time of last intervention: see MAR and flowsheet  
o Reassessment Completed: yes ? Last 3 Weights: 
Last 3 Recorded Weights in this Encounter 10/08/18 2046 Weight: 67.6 kg (149 lb) Weight change: ? INTAKE/OUPUT Current Shift: 10/29 1901 - 10/30 0700 In: 8306 Out: 550 [Urine:550] Last three shifts: 10/28 0701 - 10/29 1900 In: 9185 Out: 1175 [PWANA:[id number]] ? LAB RESULTS Recent Labs 10/29/18 
0703 10/28/18 
0113 WBC  --  5.5 HGB 12.2* 11.6* HCT 35.9* 33.4*  
 253 Recent Labs 10/29/18 
3721 10/28/18 
3010  135*  
K 5.0 4.1 GLU 93 124* BUN 25* 23* CREA 0.81 0.83 CA 8.1* 8.1*  
MG  --  2.4 RECOMMENDATIONS AND DISCHARGE PLANNING 1. Pending tests/procedures/ Plan of Care or Other Needs: labs 2. Discharge plan for patient and Needs/Barriers: SNF 3. Estimated Discharge Date: 10/30/18 Posted on Whiteboard in [place] Room: yes 4. The patient's care plan was reviewed with the oncoming nurse. \"HEALS\" SAFETY CHECK Fall Risk Total Score: 7 Safety Measures: Safety Measures: Bed/Chair alarm on, Bed/Chair-Wheels locked, Bed in low position, Call light within reach A safety check occurred in the patient's room between off going nurse and oncoming nurse listed above. The safety check included the below items Area Items H High Alert Medications ? Verify all high alert medication drips (heparin, PCA, etc.) E Equipment ? Suction is set up for ALL patients (with sonia) ? Red plugs utilized for all equipment (IV pumps, etc.) ? WOWs wiped down at end of shift. ? Room stocked with oxygen, suction, and other unit-specific supplies A Alarms ? Bed alarm is set for fall risk patients ? Ensure chair alarm is in place and activated if patient is up in a chair L Lines ? Check IV for any infiltration ? Suarez bag is empty if patient has a Suarez ? Tubing and IV bags are labeled Almer Prim Safety ? Room is clean, patient is clean, and equipment is clean. ? Hallways are clear from equipment besides carts. ? Fall bracelet on for fall risk patients ? Ensure room is clear and free of clutter ? Suction is set up for ALL patients (with sonia) ? Hallways are clear from equipment besides carts. ? Isolation precautions followed, supplies available outside room, sign posted Marcel Kc RN

## 2018-10-30 NOTE — PROGRESS NOTES
Problem: Falls - Risk of 
Goal: *Absence of Falls Document Shanna Mansfield Fall Risk and appropriate interventions in the flowsheet. Outcome: Resolved/Met Date Met: 10/30/18 Fall Risk Interventions: 
Mobility Interventions: Bed/chair exit alarm, Communicate number of staff needed for ambulation/transfer, OT consult for ADLs, Patient to call before getting OOB, PT Consult for mobility concerns Mentation Interventions: Bed/chair exit alarm Medication Interventions: Bed/chair exit alarm, Evaluate medications/consider consulting pharmacy, Patient to call before getting OOB, Teach patient to arise slowly Elimination Interventions: Bed/chair exit alarm, Call light in reach, Elevated toilet seat, Patient to call for help with toileting needs, Toileting schedule/hourly rounds, Urinal in reach History of Falls Interventions: Bed/chair exit alarm, Consult care management for discharge planning, Door open when patient unattended, Evaluate medications/consider consulting pharmacy, Room close to nurse's station
